# Patient Record
Sex: FEMALE | Race: BLACK OR AFRICAN AMERICAN | NOT HISPANIC OR LATINO | URBAN - METROPOLITAN AREA
[De-identification: names, ages, dates, MRNs, and addresses within clinical notes are randomized per-mention and may not be internally consistent; named-entity substitution may affect disease eponyms.]

---

## 2017-03-21 ENCOUNTER — EMERGENCY (EMERGENCY)
Facility: HOSPITAL | Age: 57
LOS: 1 days | Discharge: HOME | End: 2017-03-21
Attending: EMERGENCY MEDICINE | Admitting: EMERGENCY MEDICINE
Payer: COMMERCIAL

## 2017-03-21 VITALS
RESPIRATION RATE: 22 BRPM | DIASTOLIC BLOOD PRESSURE: 107 MMHG | SYSTOLIC BLOOD PRESSURE: 167 MMHG | OXYGEN SATURATION: 100 % | HEART RATE: 89 BPM | TEMPERATURE: 98 F

## 2017-03-21 DIAGNOSIS — Z98.89 OTHER SPECIFIED POSTPROCEDURAL STATES: Chronic | ICD-10-CM

## 2017-03-21 LAB
ALBUMIN SERPL ELPH-MCNC: 4.3 G/DL — SIGNIFICANT CHANGE UP (ref 3.3–5)
ALP SERPL-CCNC: 75 U/L — SIGNIFICANT CHANGE UP (ref 40–120)
ALT FLD-CCNC: 27 U/L — SIGNIFICANT CHANGE UP (ref 4–33)
AST SERPL-CCNC: 24 U/L — SIGNIFICANT CHANGE UP (ref 4–32)
BASOPHILS # BLD AUTO: 0.01 K/UL — SIGNIFICANT CHANGE UP (ref 0–0.2)
BASOPHILS NFR BLD AUTO: 0.1 % — SIGNIFICANT CHANGE UP (ref 0–2)
BILIRUB SERPL-MCNC: < 0.2 MG/DL — LOW (ref 0.2–1.2)
BUN SERPL-MCNC: 10 MG/DL — SIGNIFICANT CHANGE UP (ref 7–23)
CALCIUM SERPL-MCNC: 9.6 MG/DL — SIGNIFICANT CHANGE UP (ref 8.4–10.5)
CHLORIDE SERPL-SCNC: 102 MMOL/L — SIGNIFICANT CHANGE UP (ref 98–107)
CO2 SERPL-SCNC: 19 MMOL/L — LOW (ref 22–31)
CREAT SERPL-MCNC: 0.82 MG/DL — SIGNIFICANT CHANGE UP (ref 0.5–1.3)
EOSINOPHIL # BLD AUTO: 0.25 K/UL — SIGNIFICANT CHANGE UP (ref 0–0.5)
EOSINOPHIL NFR BLD AUTO: 3.5 % — SIGNIFICANT CHANGE UP (ref 0–6)
GLUCOSE SERPL-MCNC: 138 MG/DL — HIGH (ref 70–99)
HBA1C BLD-MCNC: 6 % — HIGH (ref 4–5.6)
HCT VFR BLD CALC: 37.4 % — SIGNIFICANT CHANGE UP (ref 34.5–45)
HGB BLD-MCNC: 12.4 G/DL — SIGNIFICANT CHANGE UP (ref 11.5–15.5)
IMM GRANULOCYTES NFR BLD AUTO: 0.1 % — SIGNIFICANT CHANGE UP (ref 0–1.5)
LYMPHOCYTES # BLD AUTO: 3.79 K/UL — HIGH (ref 1–3.3)
LYMPHOCYTES # BLD AUTO: 52.4 % — HIGH (ref 13–44)
MAGNESIUM SERPL-MCNC: 2.1 MG/DL — SIGNIFICANT CHANGE UP (ref 1.6–2.6)
MCHC RBC-ENTMCNC: 28.1 PG — SIGNIFICANT CHANGE UP (ref 27–34)
MCHC RBC-ENTMCNC: 33.2 % — SIGNIFICANT CHANGE UP (ref 32–36)
MCV RBC AUTO: 84.8 FL — SIGNIFICANT CHANGE UP (ref 80–100)
MONOCYTES # BLD AUTO: 0.61 K/UL — SIGNIFICANT CHANGE UP (ref 0–0.9)
MONOCYTES NFR BLD AUTO: 8.4 % — SIGNIFICANT CHANGE UP (ref 2–14)
NEUTROPHILS # BLD AUTO: 2.56 K/UL — SIGNIFICANT CHANGE UP (ref 1.8–7.4)
NEUTROPHILS NFR BLD AUTO: 35.5 % — LOW (ref 43–77)
PHOSPHATE SERPL-MCNC: 4 MG/DL — SIGNIFICANT CHANGE UP (ref 2.5–4.5)
PLATELET # BLD AUTO: 254 K/UL — SIGNIFICANT CHANGE UP (ref 150–400)
PMV BLD: 11.2 FL — SIGNIFICANT CHANGE UP (ref 7–13)
POTASSIUM SERPL-MCNC: 3 MMOL/L — LOW (ref 3.5–5.3)
POTASSIUM SERPL-SCNC: 3 MMOL/L — LOW (ref 3.5–5.3)
PROT SERPL-MCNC: 7.4 G/DL — SIGNIFICANT CHANGE UP (ref 6–8.3)
RBC # BLD: 4.41 M/UL — SIGNIFICANT CHANGE UP (ref 3.8–5.2)
RBC # FLD: 12.7 % — SIGNIFICANT CHANGE UP (ref 10.3–14.5)
SODIUM SERPL-SCNC: 143 MMOL/L — SIGNIFICANT CHANGE UP (ref 135–145)
WBC # BLD: 7.23 K/UL — SIGNIFICANT CHANGE UP (ref 3.8–10.5)
WBC # FLD AUTO: 7.23 K/UL — SIGNIFICANT CHANGE UP (ref 3.8–10.5)

## 2017-03-21 PROCEDURE — 71010: CPT | Mod: 26

## 2017-03-21 PROCEDURE — 99219: CPT

## 2017-03-21 RX ORDER — SODIUM CHLORIDE 9 MG/ML
1000 INJECTION INTRAMUSCULAR; INTRAVENOUS; SUBCUTANEOUS
Qty: 0 | Refills: 0 | Status: DISCONTINUED | OUTPATIENT
Start: 2017-03-21 | End: 2017-03-25

## 2017-03-21 RX ORDER — ALBUTEROL 90 UG/1
2.5 AEROSOL, METERED ORAL
Qty: 0 | Refills: 0 | Status: COMPLETED | OUTPATIENT
Start: 2017-03-21 | End: 2017-03-21

## 2017-03-21 RX ORDER — POTASSIUM CHLORIDE 20 MEQ
40 PACKET (EA) ORAL ONCE
Qty: 0 | Refills: 0 | Status: COMPLETED | OUTPATIENT
Start: 2017-03-21 | End: 2017-03-21

## 2017-03-21 RX ORDER — MAGNESIUM SULFATE 500 MG/ML
2 VIAL (ML) INJECTION ONCE
Qty: 0 | Refills: 0 | Status: COMPLETED | OUTPATIENT
Start: 2017-03-21 | End: 2017-03-21

## 2017-03-21 RX ORDER — IPRATROPIUM/ALBUTEROL SULFATE 18-103MCG
3 AEROSOL WITH ADAPTER (GRAM) INHALATION EVERY 6 HOURS
Qty: 0 | Refills: 0 | Status: DISCONTINUED | OUTPATIENT
Start: 2017-03-21 | End: 2017-03-25

## 2017-03-21 RX ORDER — SODIUM CHLORIDE 9 MG/ML
1000 INJECTION INTRAMUSCULAR; INTRAVENOUS; SUBCUTANEOUS ONCE
Qty: 0 | Refills: 0 | Status: COMPLETED | OUTPATIENT
Start: 2017-03-21 | End: 2017-03-21

## 2017-03-21 RX ADMIN — Medication 60 MILLIGRAM(S): at 19:26

## 2017-03-21 RX ADMIN — SODIUM CHLORIDE 150 MILLILITER(S): 9 INJECTION INTRAMUSCULAR; INTRAVENOUS; SUBCUTANEOUS at 23:45

## 2017-03-21 RX ADMIN — Medication 50 GRAM(S): at 20:22

## 2017-03-21 RX ADMIN — SODIUM CHLORIDE 2000 MILLILITER(S): 9 INJECTION INTRAMUSCULAR; INTRAVENOUS; SUBCUTANEOUS at 20:22

## 2017-03-21 RX ADMIN — ALBUTEROL 2.5 MILLIGRAM(S): 90 AEROSOL, METERED ORAL at 19:25

## 2017-03-21 RX ADMIN — ALBUTEROL 2.5 MILLIGRAM(S): 90 AEROSOL, METERED ORAL at 19:42

## 2017-03-21 RX ADMIN — Medication 40 MILLIEQUIVALENT(S): at 22:28

## 2017-03-21 RX ADMIN — ALBUTEROL 2.5 MILLIGRAM(S): 90 AEROSOL, METERED ORAL at 19:26

## 2017-03-21 NOTE — ED CDU PROVIDER NOTE - ATTENDING CONTRIBUTION TO CARE
ED Attending (Dr Sheets): I have personally performed a face to face bedside history and physical examination of this patient.  I have discussed the case with the PA and  I have personally authored the following components: HPI, ROS, Physical Exam and MDM in addition to reviewing and revising the rest of the Provider Note. Asthma exacerbation with likely viral URI. IV hydration, albuterol, meds per orders, trending of Peak Flow measurements, observation and reassessment to continued improvement.

## 2017-03-21 NOTE — ED CDU PROVIDER NOTE - PROGRESS NOTE DETAILS
GIOVANNI Ogden:  Pt endorses improvement in wheezing and shortness of breath.  Pt ambulating around unit without difficulty.  Repeat peak flow 280 LPM which is greater than expected peak flow for pt's age and height (expected is 170-180 LPM).  Pt requesting to go home.  Pt medically stable for discharge.  Pt to follow up with her PMD and pulmonary (referral list provided). GIOVANNI Ogden Discharge note:  Pt endorses improvement in wheezing and shortness of breath.  Pt ambulating around unit without difficulty.  Repeat peak flow 280 LPM which is greater than expected peak flow for pt's age and height (expected is 170-180 LPM).  Pt requesting to go home.  Pt medically stable for discharge.  Pt to follow up with her PMD and pulmonary (referral list provided). CDU ATTG DISPO/PROGRESS NOTE (Dr. Wilkerson) - 55 yo F former smoker, Hx Asthma, DM, HTN who presented to the ED for asthma exacerbation in the setting of recent URI. Pt was partially improved s/p treatment in the ED. Pt sent to CDU for further mgmt. In CDU pt rec'd hydration, nebs and steroids with improvement in symptoms. Ambulate around unit without decompensation. Pt stable for discharge. PMD follow up within 24 - 48 hours. DC instructions and warning signs for return given.  I Debbie M.D. have examined the patient and confirmed the essential components of the history, physical examination, diagnosis, and treatment plan. I agree with the patient's care as documented by the PA and amended herein by me. See note above for complete details of service.

## 2017-03-21 NOTE — ED CDU PROVIDER NOTE - INDICATION FOR OBSERVATION
IV hydration, Duoneb treatments, meds per orders, trending of Peak Flow measurements, observation and reassessment to continued improvement./Other IV hydration, Duoneb treatments, meds per orders, trending of Peak Flow measurements, observation and reassessment to continued improvement./Other/Therapeutic Intensity

## 2017-03-21 NOTE — ED ADULT TRIAGE NOTE - CHIEF COMPLAINT QUOTE
Pt with audible wheeze and chest tightness.  Hx asthma.  Wheezing x few days.  Took albuterol with no relief.  Had sinus infection 1 week ago

## 2017-03-21 NOTE — ED PROVIDER NOTE - RESPIRATORY, MLM
Moderate respiratory distress with audible wheezing, Diminished throughout with expiratory wheezing on exam

## 2017-03-21 NOTE — ED CDU PROVIDER NOTE - RESPIRATORY, MLM
There is slightly generalized diminished air entry overall, but breath sounds auscultated are clear and equal bilaterally.  No wheezing / rales / rhonchi noted.

## 2017-03-21 NOTE — ED ADULT NURSE NOTE - OBJECTIVE STATEMENT
Pt received to spot 26. Pt A&Ox3 PMHx of asthma complaining of chest tightness and wheezing for the past couple days. Pt states that she had no relief with her albuterol at home. Pt given Albuterol treatment as ordered. Will continue to monitor.

## 2017-03-21 NOTE — ED CDU PROVIDER NOTE - PMH
Asthma    DM2 (diabetes mellitus, type 2)    HTN (hypertension) Asthma  (no hx/o intubation)  DM2 (diabetes mellitus, type 2)    Former smoker, stopped smoking many years ago  (Quit ~26 years ago; used to smoke 0.3 ppd x ~10 years.)  HTN (hypertension)

## 2017-03-21 NOTE — ED PROVIDER NOTE - OBJECTIVE STATEMENT
56yof w/ HTN, DM2, Asthma (No intubations, managed w/ symbicort) p/w 3 days of wheezing and shortness of breath. Pt states she was treated for a URI 1 week ago with azithromycin, and for the last 2-3 days she has been having increasing wheezing and shortness of breath that has been unrelieved by home albuterol nebulizers. Endorses a cough productive of yellow sputum. Denies fever or chills. No chest pain. No sick contacts or recent travel. No recent hospitalization. Received duoneb x 3 in triage w/ minimal relief. 56yof w/ HTN, DM2, Asthma (No intubations, managed w/ symbicort) p/w 3 days of wheezing and shortness of breath. Pt states she was treated for a URI 1 week ago with azithromycin, and for the last 2-3 days she has been having increasing wheezing and shortness of breath that has been unrelieved by home albuterol nebulizers. Endorses a cough productive of yellow sputum. Denies fever or chills. No chest pain. No sick contacts or recent travel. No recent hospitalization. Received duoneb x 3 in triage w/ minimal relief. Is SOB upon arrival, tachypneic, even after 3 nebs from triage.

## 2017-03-21 NOTE — ED PROVIDER NOTE - ATTENDING CONTRIBUTION TO CARE
Attending Attestation: Dr. Sheets  I have personally performed a history and physical examination of the patient and discussed management with the resident as well as the patient.  I reviewed the resident's note and agree with the documented findings and plan of care.  I have authored and modified critical sections of the Provider Note, including but not limited to HPI, Physical Exam and MDM. 56yof asthmatic with wheezing, shortness of breath. SpO2 98-99 on room air, pt uncomfortable but not in extremis currently. Continue nebs, prednisone PO, reassess. Consider magnesium if symptoms not improving. Pt does not currently have a pulmonologist, will need referral. Chely CSDU given lack of response to initial nebs

## 2017-03-21 NOTE — ED PROVIDER NOTE - PROGRESS NOTE DETAILS
Alfck: Peak flow 250 prior to albuterol Sadaf: Pt endorses mild improvement after albuterol, peak flow unchanged. Will add magnesium, IVPB, check basic labs. Will obs overnight in CDU, pt endorsed to GIOVANNI Miramontes, will dispo after labs and ECG Sadaf: Peak flow 250 prior to albuterol, predicted 442 Sadaf: Peak flow 250 post triage nebs x 3, but before ED albuterol, predicted 442

## 2017-03-21 NOTE — ED PROVIDER NOTE - MEDICAL DECISION MAKING DETAILS
56yof asthmatic with wheezing, shortness of breath. SpO2 98-99 on room air, pt uncomfortable but not in extremis currently. Continue nebs, prednisone PO, reassess. Consider magnesium if symptoms not improving. Pt does not currently have a pulmonologist, will need referral. 56yof asthmatic with wheezing, shortness of breath. SpO2 98-99 on room air, pt uncomfortable but not in extremis currently. Continue nebs, prednisone PO, reassess. Consider magnesium if symptoms not improving. Pt does not currently have a pulmonologist, will need referral. Chely CSDU given lack of response to initial nebs

## 2017-03-21 NOTE — ED PROVIDER NOTE - PSH
Bunion  b/l   delivery NOS  x1  Uterus Problem  "was getting cramps so they did ablation? enometriosis  10/2012

## 2017-03-21 NOTE — ED CDU PROVIDER NOTE - OBJECTIVE STATEMENT
56yof w/ HTN, DM2, Asthma (No intubations, managed w/ symbicort) p/w 3 days of wheezing and shortness of breath. Pt states she was treated for a URI 1 week ago with azithromycin, and for the last 2-3 days she has been having increasing wheezing and shortness of breath that has been unrelieved by home albuterol nebulizers. Endorses a cough productive of yellow sputum. Denies fever or chills. No chest pain. No sick contacts or recent travel. No recent hospitalization. Received duoneb x 3 in triage w/ minimal relief. Is SOB upon arrival, tachypneic, even after 3 nebs from triage.    CDU GIOVANNI Miramontes Note-----  ED Provider Note reviewed per above; pt is a 57 yo female with PMH of asthma (no intubation history), HTN, and DM, who presented to the ED as noted above.  Pt. was evaluated in the ED and treated for asthma exacerbation; pt was sent to CDU for IV hydration, Duoneb treatments, meds per orders, trending of Peak Flow measurements, observation and reassessment to continued improvement.  On CDU arrival, pt states she is feeling overall improvement as compared to initial arrival in ED; CDU plan discussed with pt who verbalizes agreement with plan.  No hx/o fevers / chills / back pain / abdominal pain / N / V / other c/o. 56yof w/ HTN, DM2, Asthma (No intubations, managed w/ symbicort) p/w 3 days of wheezing and shortness of breath. Pt states she was treated for a URI 1 week ago with azithromycin, and for the last 2-3 days she has been having increasing wheezing and shortness of breath that has been unrelieved by home albuterol nebulizers. Endorses a cough productive of yellow sputum. Denies fever or chills. No chest pain. No sick contacts or recent travel. No recent hospitalization. Received duoneb x 3 in triage w/ minimal relief. Was SOB upon arrival to ED, tachypneic, even after 3 nebs from triage and PF 50% predicted.  Received Mg and Albuterol in ED with some improvement but given severity o sx was sent to CDu for further E/M.     CDU GIOVANNI Miramontes Note-----  ED Provider Note reviewed per above; pt is a 57 yo female with PMH of asthma (no intubation history), HTN, and DM, who presented to the ED as noted above.  Pt. was evaluated in the ED and treated for asthma exacerbation; pt was sent to CDU for IV hydration, Duoneb treatments, meds per orders, trending of Peak Flow measurements, observation and reassessment to continued improvement.  On CDU arrival, pt states she is feeling overall improvement as compared to initial arrival in ED; CDU plan discussed with pt who verbalizes agreement with plan.  No hx/o fevers / chills / back pain / abdominal pain / N / V / other c/o.

## 2017-03-21 NOTE — ED CDU PROVIDER NOTE - MEDICAL DECISION MAKING DETAILS
IV hydration, Duoneb treatments, meds per orders, trending of Peak Flow measurements, observation and reassessment to continued improvement. Asthma exacerbation with likely viral URI. IV hydration, albuterol, meds per orders, trending of Peak Flow measurements, observation and reassessment to continued improvement.

## 2017-03-21 NOTE — ED CDU PROVIDER NOTE - CONSTITUTIONAL, MLM
normal... Well appearing, well nourished, awake, alert, oriented to person, place, time/situation and in no apparent distress.  Pt's voice is slightly hoarse, but pt. is verbalizing in full, otherwise clear, effortless sentences.

## 2017-03-21 NOTE — ED CDU PROVIDER NOTE - PLAN OF CARE
Rest, drink plenty of fluids.  Advance activity as tolerated.  Continue all previously prescribed medications as directed.  Use Albuterol Inhaler 2 puffs every 4 to 6 hours as needed for shortness of breath and/or wheezing. Take Tessalon Perles 100 mg three times a day as needed for cough -- causes drowsiness, no drinking alcohol or driving with this medication. Take Prednisone 20 mg two pills once a day for next 4 days.  Follow up with your primary care physician and pulmonary (referral list provided) in 48-72 hours- bring copies of your results.  Return to the ER for worsening or persistent symptoms, including shortness of breath, wheezing, chest pain, fevers, passing out, and/or ANY NEW OR CONCERNING SYMPTOMS. If you have issues obtaining follow up, please call: [phone] to obtain a doctor or specialist who takes your insurance in your area.

## 2017-03-21 NOTE — ED CDU PROVIDER NOTE - FAMILY HISTORY
No pertinent family history in first degree relatives Father  Still living? No  Family history of MI (myocardial infarction), Age at diagnosis: Age Unknown     Mother  Still living? No  Family history of lung cancer, Age at diagnosis: Age Unknown  Family history of renal cancer, Age at diagnosis: Age Unknown

## 2017-03-22 VITALS
HEART RATE: 76 BPM | OXYGEN SATURATION: 99 % | DIASTOLIC BLOOD PRESSURE: 76 MMHG | RESPIRATION RATE: 16 BRPM | TEMPERATURE: 98 F | SYSTOLIC BLOOD PRESSURE: 131 MMHG

## 2017-03-22 LAB
BUN SERPL-MCNC: 9 MG/DL — SIGNIFICANT CHANGE UP (ref 7–23)
CALCIUM SERPL-MCNC: 9.2 MG/DL — SIGNIFICANT CHANGE UP (ref 8.4–10.5)
CHLORIDE SERPL-SCNC: 106 MMOL/L — SIGNIFICANT CHANGE UP (ref 98–107)
CO2 SERPL-SCNC: 19 MMOL/L — LOW (ref 22–31)
CREAT SERPL-MCNC: 0.77 MG/DL — SIGNIFICANT CHANGE UP (ref 0.5–1.3)
GLUCOSE SERPL-MCNC: 172 MG/DL — HIGH (ref 70–99)
MAGNESIUM SERPL-MCNC: 2.1 MG/DL — SIGNIFICANT CHANGE UP (ref 1.6–2.6)
POTASSIUM SERPL-MCNC: 5 MMOL/L — SIGNIFICANT CHANGE UP (ref 3.5–5.3)
POTASSIUM SERPL-SCNC: 5 MMOL/L — SIGNIFICANT CHANGE UP (ref 3.5–5.3)
SODIUM SERPL-SCNC: 142 MMOL/L — SIGNIFICANT CHANGE UP (ref 135–145)

## 2017-03-22 PROCEDURE — 99217: CPT

## 2017-03-22 RX ORDER — METFORMIN HYDROCHLORIDE 850 MG/1
500 TABLET ORAL
Qty: 0 | Refills: 0 | Status: DISCONTINUED | OUTPATIENT
Start: 2017-03-22 | End: 2017-03-25

## 2017-03-22 RX ORDER — AMLODIPINE BESYLATE 2.5 MG/1
10 TABLET ORAL DAILY
Qty: 0 | Refills: 0 | Status: DISCONTINUED | OUTPATIENT
Start: 2017-03-22 | End: 2017-03-25

## 2017-03-22 RX ORDER — ALBUTEROL 90 UG/1
2 AEROSOL, METERED ORAL
Qty: 1 | Refills: 0
Start: 2017-03-22

## 2017-03-22 RX ORDER — METFORMIN HYDROCHLORIDE 850 MG/1
1 TABLET ORAL
Qty: 0 | Refills: 0 | COMMUNITY

## 2017-03-22 RX ADMIN — Medication 3 MILLILITER(S): at 00:20

## 2017-03-22 RX ADMIN — METFORMIN HYDROCHLORIDE 500 MILLIGRAM(S): 850 TABLET ORAL at 09:38

## 2017-03-22 RX ADMIN — AMLODIPINE BESYLATE 10 MILLIGRAM(S): 2.5 TABLET ORAL at 06:08

## 2017-03-22 RX ADMIN — Medication 50 MILLIGRAM(S): at 10:44

## 2017-03-22 RX ADMIN — Medication 3 MILLILITER(S): at 06:08

## 2017-11-13 ENCOUNTER — EMERGENCY (EMERGENCY)
Facility: HOSPITAL | Age: 57
LOS: 1 days | Discharge: HOME | End: 2017-11-13
Attending: EMERGENCY MEDICINE | Admitting: EMERGENCY MEDICINE
Payer: COMMERCIAL

## 2017-11-13 VITALS
HEART RATE: 83 BPM | TEMPERATURE: 98 F | RESPIRATION RATE: 16 BRPM | DIASTOLIC BLOOD PRESSURE: 99 MMHG | OXYGEN SATURATION: 100 % | SYSTOLIC BLOOD PRESSURE: 154 MMHG

## 2017-11-13 DIAGNOSIS — Z98.89 OTHER SPECIFIED POSTPROCEDURAL STATES: Chronic | ICD-10-CM

## 2017-11-13 LAB
ALBUMIN SERPL ELPH-MCNC: 4.3 G/DL — SIGNIFICANT CHANGE UP (ref 3.3–5)
ALP SERPL-CCNC: 83 U/L — SIGNIFICANT CHANGE UP (ref 40–120)
ALT FLD-CCNC: 34 U/L — HIGH (ref 4–33)
AST SERPL-CCNC: 28 U/L — SIGNIFICANT CHANGE UP (ref 4–32)
BASE EXCESS BLDV CALC-SCNC: 5.4 MMOL/L — SIGNIFICANT CHANGE UP
BILIRUB SERPL-MCNC: 0.2 MG/DL — SIGNIFICANT CHANGE UP (ref 0.2–1.2)
BLOOD GAS VENOUS - CREATININE: 0.73 MG/DL — SIGNIFICANT CHANGE UP (ref 0.5–1.3)
BUN SERPL-MCNC: 16 MG/DL — SIGNIFICANT CHANGE UP (ref 7–23)
CALCIUM SERPL-MCNC: 9.7 MG/DL — SIGNIFICANT CHANGE UP (ref 8.4–10.5)
CHLORIDE BLDV-SCNC: 105 MMOL/L — SIGNIFICANT CHANGE UP (ref 96–108)
CHLORIDE SERPL-SCNC: 101 MMOL/L — SIGNIFICANT CHANGE UP (ref 98–107)
CO2 SERPL-SCNC: 24 MMOL/L — SIGNIFICANT CHANGE UP (ref 22–31)
CREAT SERPL-MCNC: 0.83 MG/DL — SIGNIFICANT CHANGE UP (ref 0.5–1.3)
GAS PNL BLDV: 139 MMOL/L — SIGNIFICANT CHANGE UP (ref 136–146)
GLUCOSE BLDV-MCNC: 147 — HIGH (ref 70–99)
GLUCOSE SERPL-MCNC: 149 MG/DL — HIGH (ref 70–99)
HBA1C BLD-MCNC: 6.6 % — HIGH (ref 4–5.6)
HCO3 BLDV-SCNC: 27 MMOL/L — SIGNIFICANT CHANGE UP (ref 20–27)
HCT VFR BLD CALC: 36.9 % — SIGNIFICANT CHANGE UP (ref 34.5–45)
HCT VFR BLDV CALC: 38.8 % — SIGNIFICANT CHANGE UP (ref 34.5–45)
HGB BLD-MCNC: 12 G/DL — SIGNIFICANT CHANGE UP (ref 11.5–15.5)
HGB BLDV-MCNC: 12.6 G/DL — SIGNIFICANT CHANGE UP (ref 11.5–15.5)
LACTATE BLDV-MCNC: 2.4 MMOL/L — HIGH (ref 0.5–2)
MAGNESIUM SERPL-MCNC: 1.9 MG/DL — SIGNIFICANT CHANGE UP (ref 1.6–2.6)
MCHC RBC-ENTMCNC: 27.3 PG — SIGNIFICANT CHANGE UP (ref 27–34)
MCHC RBC-ENTMCNC: 32.5 % — SIGNIFICANT CHANGE UP (ref 32–36)
MCV RBC AUTO: 84.1 FL — SIGNIFICANT CHANGE UP (ref 80–100)
NRBC # FLD: 0 — SIGNIFICANT CHANGE UP
PCO2 BLDV: 58 MMHG — HIGH (ref 41–51)
PH BLDV: 7.35 PH — SIGNIFICANT CHANGE UP (ref 7.32–7.43)
PHOSPHATE SERPL-MCNC: 3.1 MG/DL — SIGNIFICANT CHANGE UP (ref 2.5–4.5)
PLATELET # BLD AUTO: 259 K/UL — SIGNIFICANT CHANGE UP (ref 150–400)
PMV BLD: 11.1 FL — SIGNIFICANT CHANGE UP (ref 7–13)
PO2 BLDV: 28 MMHG — LOW (ref 35–40)
POTASSIUM BLDV-SCNC: 3.8 MMOL/L — SIGNIFICANT CHANGE UP (ref 3.4–4.5)
POTASSIUM SERPL-MCNC: 4.1 MMOL/L — SIGNIFICANT CHANGE UP (ref 3.5–5.3)
POTASSIUM SERPL-SCNC: 4.1 MMOL/L — SIGNIFICANT CHANGE UP (ref 3.5–5.3)
PROT SERPL-MCNC: 7.3 G/DL — SIGNIFICANT CHANGE UP (ref 6–8.3)
RBC # BLD: 4.39 M/UL — SIGNIFICANT CHANGE UP (ref 3.8–5.2)
RBC # FLD: 12.5 % — SIGNIFICANT CHANGE UP (ref 10.3–14.5)
SAO2 % BLDV: 47.9 % — LOW (ref 60–85)
SODIUM SERPL-SCNC: 141 MMOL/L — SIGNIFICANT CHANGE UP (ref 135–145)
WBC # BLD: 7.39 K/UL — SIGNIFICANT CHANGE UP (ref 3.8–10.5)
WBC # FLD AUTO: 7.39 K/UL — SIGNIFICANT CHANGE UP (ref 3.8–10.5)

## 2017-11-13 PROCEDURE — 99218: CPT

## 2017-11-13 PROCEDURE — 71020: CPT | Mod: 26

## 2017-11-13 RX ORDER — IPRATROPIUM/ALBUTEROL SULFATE 18-103MCG
3 AEROSOL WITH ADAPTER (GRAM) INHALATION ONCE
Qty: 0 | Refills: 0 | Status: COMPLETED | OUTPATIENT
Start: 2017-11-13 | End: 2017-11-13

## 2017-11-13 RX ORDER — MAGNESIUM SULFATE 500 MG/ML
2 VIAL (ML) INJECTION ONCE
Qty: 0 | Refills: 0 | Status: COMPLETED | OUTPATIENT
Start: 2017-11-13 | End: 2017-11-13

## 2017-11-13 RX ORDER — INSULIN LISPRO 100/ML
VIAL (ML) SUBCUTANEOUS AT BEDTIME
Qty: 0 | Refills: 0 | Status: DISCONTINUED | OUTPATIENT
Start: 2017-11-13 | End: 2017-11-17

## 2017-11-13 RX ORDER — LOSARTAN POTASSIUM 100 MG/1
100 TABLET, FILM COATED ORAL DAILY
Qty: 0 | Refills: 0 | Status: DISCONTINUED | OUTPATIENT
Start: 2017-11-13 | End: 2017-11-17

## 2017-11-13 RX ORDER — ACETAMINOPHEN 500 MG
650 TABLET ORAL ONCE
Qty: 0 | Refills: 0 | Status: COMPLETED | OUTPATIENT
Start: 2017-11-13 | End: 2017-11-13

## 2017-11-13 RX ORDER — DEXTROSE 50 % IN WATER 50 %
25 SYRINGE (ML) INTRAVENOUS ONCE
Qty: 0 | Refills: 0 | Status: DISCONTINUED | OUTPATIENT
Start: 2017-11-13 | End: 2017-11-17

## 2017-11-13 RX ORDER — ALBUTEROL 90 UG/1
2.5 AEROSOL, METERED ORAL ONCE
Qty: 0 | Refills: 0 | Status: COMPLETED | OUTPATIENT
Start: 2017-11-13 | End: 2017-11-13

## 2017-11-13 RX ORDER — INSULIN LISPRO 100/ML
VIAL (ML) SUBCUTANEOUS
Qty: 0 | Refills: 0 | Status: DISCONTINUED | OUTPATIENT
Start: 2017-11-13 | End: 2017-11-17

## 2017-11-13 RX ORDER — SODIUM CHLORIDE 9 MG/ML
1000 INJECTION INTRAMUSCULAR; INTRAVENOUS; SUBCUTANEOUS ONCE
Qty: 0 | Refills: 0 | Status: COMPLETED | OUTPATIENT
Start: 2017-11-13 | End: 2017-11-13

## 2017-11-13 RX ORDER — DEXTROSE 50 % IN WATER 50 %
1 SYRINGE (ML) INTRAVENOUS ONCE
Qty: 0 | Refills: 0 | Status: DISCONTINUED | OUTPATIENT
Start: 2017-11-13 | End: 2017-11-17

## 2017-11-13 RX ORDER — DEXTROSE 50 % IN WATER 50 %
12.5 SYRINGE (ML) INTRAVENOUS ONCE
Qty: 0 | Refills: 0 | Status: DISCONTINUED | OUTPATIENT
Start: 2017-11-13 | End: 2017-11-17

## 2017-11-13 RX ORDER — METFORMIN HYDROCHLORIDE 850 MG/1
500 TABLET ORAL
Qty: 0 | Refills: 0 | Status: DISCONTINUED | OUTPATIENT
Start: 2017-11-13 | End: 2017-11-17

## 2017-11-13 RX ORDER — HYDROCHLOROTHIAZIDE 25 MG
12.5 TABLET ORAL DAILY
Qty: 0 | Refills: 0 | Status: DISCONTINUED | OUTPATIENT
Start: 2017-11-13 | End: 2017-11-17

## 2017-11-13 RX ORDER — GLUCAGON INJECTION, SOLUTION 0.5 MG/.1ML
1 INJECTION, SOLUTION SUBCUTANEOUS ONCE
Qty: 0 | Refills: 0 | Status: DISCONTINUED | OUTPATIENT
Start: 2017-11-13 | End: 2017-11-17

## 2017-11-13 RX ORDER — SODIUM CHLORIDE 9 MG/ML
1000 INJECTION, SOLUTION INTRAVENOUS
Qty: 0 | Refills: 0 | Status: DISCONTINUED | OUTPATIENT
Start: 2017-11-13 | End: 2017-11-17

## 2017-11-13 RX ORDER — ALBUTEROL 90 UG/1
2.5 AEROSOL, METERED ORAL EVERY 6 HOURS
Qty: 0 | Refills: 0 | Status: DISCONTINUED | OUTPATIENT
Start: 2017-11-13 | End: 2017-11-14

## 2017-11-13 RX ADMIN — Medication 650 MILLIGRAM(S): at 21:30

## 2017-11-13 RX ADMIN — Medication 125 MILLIGRAM(S): at 10:42

## 2017-11-13 RX ADMIN — Medication 3 MILLILITER(S): at 10:47

## 2017-11-13 RX ADMIN — ALBUTEROL 2.5 MILLIGRAM(S): 90 AEROSOL, METERED ORAL at 18:26

## 2017-11-13 RX ADMIN — Medication 650 MILLIGRAM(S): at 20:26

## 2017-11-13 RX ADMIN — Medication 60 MILLIGRAM(S): at 18:26

## 2017-11-13 RX ADMIN — SODIUM CHLORIDE 2000 MILLILITER(S): 9 INJECTION INTRAMUSCULAR; INTRAVENOUS; SUBCUTANEOUS at 11:23

## 2017-11-13 RX ADMIN — Medication 3 MILLILITER(S): at 10:42

## 2017-11-13 RX ADMIN — Medication: at 23:26

## 2017-11-13 RX ADMIN — Medication 50 GRAM(S): at 14:59

## 2017-11-13 NOTE — ED CDU PROVIDER INITIAL DAY NOTE - OBJECTIVE STATEMENT
58 y/o F w/ past medical history of asthma, previous hospitalizations approx. 5 yrs ago no MICU or intubation, HTN, DM II on metformin, presents to the ED c/o URI sx x 3-4 days. Sx started as sinus congestion and non-productive cough. Now w/ asthma exacerbation w/ associated SOB and wheeze. Pt denies CP, but endorses chest tightness associated w/ cough. Pt also reports HA when coughs. Pt notes sx are similar to previous episodes in the past. Pt endorses use of Nebs at home, last at 3 am, but sx continued to progress so sought treatment in ER. Distant hx of smoking. Denies any other complaints.  ER documentation by scribe as noted above.  Agree with above.

## 2017-11-13 NOTE — ED PROVIDER NOTE - PMH
Asthma  (no hx/o intubation)  DM2 (diabetes mellitus, type 2)    Former smoker, stopped smoking many years ago  (Quit ~26 years ago; used to smoke 0.3 ppd x ~10 years.)  HTN (hypertension)    Hypertension

## 2017-11-13 NOTE — ED CDU PROVIDER INITIAL DAY NOTE - PROGRESS NOTE DETAILS
MARICEL GALDAMEZ, MD: ACP note I performed a face to face bedside interview with this patient regarding history of present illness, review of symptoms and history.  I completed an independent physical examination.  The examination was documented by myself and I attest to the accuracy of the documentation.  I have signed out the follow up of any pending tests (i.e. labs, radiological studies) to the PA.  I have discussed the patient’s plan of care and disposition with the PA. Pt reassessed at beside. Pt speaks in full sentence, c/o mild headache associated with nebulizer treatment. Peak flow 250. On exam: patient no longer wheezing, however, pt cannot get a deep breath. Will continue medication and reassess. patient's glucose 324. Will order insulin sliding scale. Patient feels some tightness, but no SOB or wheezing. peak flow 250.

## 2017-11-13 NOTE — ED PROVIDER NOTE - PROGRESS NOTE DETAILS
MARICEL GALDAMEZ MD: C/W decrease A/E and B/L wheezing.  Will place in CDU for observation.  Steroids and reevaluation.

## 2017-11-13 NOTE — ED CDU PROVIDER INITIAL DAY NOTE - FAMILY HISTORY
Father  Still living? No  Family history of MI (myocardial infarction), Age at diagnosis: Age Unknown     Mother  Still living? No  Family history of lung cancer, Age at diagnosis: Age Unknown  Family history of renal cancer, Age at diagnosis: Age Unknown

## 2017-11-13 NOTE — ED PROVIDER NOTE - OBJECTIVE STATEMENT
56 y/o F w/ PMHx of asthma, previous hospitalizations approx. 5 yrs ago no MICU or intubation, HTN, DM II on metformin, presents to the ED c/o URI sx x3-4 days. Sx started as sinus congestion and non-productive cough. Now w/ asthma exacerbation w/ associated SOB and wheeze. Pt denies CP, but endorses chest tightness associated w/ cough. Pt also reports HA when coughs. Pt notes sx are similar to previous episodes in the past. Pt endorses use of Nebs at home, last at 3 am, but sx continued to progress so sought treatment in ER. Distant hx of smoking. Denies any other complaints.

## 2017-11-13 NOTE — ED PROVIDER NOTE - MEDICAL DECISION MAKING DETAILS
58 y/o F w/ URI/asthma exacerbation. Plan: Duoneb, steroids, CXR and consider CDU vs admission if pt symptomatically does not improve.

## 2017-11-13 NOTE — ED PROVIDER NOTE - PSH
Bunion  b/l   delivery NOS  x1  H/O foot surgery  right  Uterus Problem  "was getting cramps so they did ablation? enometriosis  10/2012

## 2017-11-13 NOTE — ED PROVIDER NOTE - NS_ ATTENDINGSCRIBEDETAILS _ED_A_ED_FT
The scribe's documentation has been prepared under my direction and personally reviewed by me, Beatris Oropeza MD, in its entirety. I confirm that the note above accurately reflects all work, treatment, procedures, and medical decision making performed by me.

## 2017-11-14 LAB
BASE EXCESS BLDV CALC-SCNC: -4.9 MMOL/L — SIGNIFICANT CHANGE UP
BLOOD GAS VENOUS - CREATININE: 0.6 MG/DL — SIGNIFICANT CHANGE UP (ref 0.5–1.3)
BUN SERPL-MCNC: 19 MG/DL — SIGNIFICANT CHANGE UP (ref 7–23)
CALCIUM SERPL-MCNC: 9.4 MG/DL — SIGNIFICANT CHANGE UP (ref 8.4–10.5)
CHLORIDE BLDV-SCNC: 109 MMOL/L — HIGH (ref 96–108)
CHLORIDE SERPL-SCNC: 104 MMOL/L — SIGNIFICANT CHANGE UP (ref 98–107)
CO2 SERPL-SCNC: 20 MMOL/L — LOW (ref 22–31)
CREAT SERPL-MCNC: 0.97 MG/DL — SIGNIFICANT CHANGE UP (ref 0.5–1.3)
GAS PNL BLDV: 134 MMOL/L — LOW (ref 136–146)
GLUCOSE BLDV-MCNC: 207 — HIGH (ref 70–99)
GLUCOSE SERPL-MCNC: 200 MG/DL — HIGH (ref 70–99)
HCO3 BLDV-SCNC: 20 MMOL/L — SIGNIFICANT CHANGE UP (ref 20–27)
HCT VFR BLDV CALC: 39.4 % — SIGNIFICANT CHANGE UP (ref 34.5–45)
HGB BLDV-MCNC: 12.8 G/DL — SIGNIFICANT CHANGE UP (ref 11.5–15.5)
LACTATE BLDV-MCNC: 6.6 MMOL/L — CRITICAL HIGH (ref 0.5–2)
LACTATE SERPL-SCNC: 6.3 MMOL/L — CRITICAL HIGH (ref 0.5–2)
LACTATE SERPL-SCNC: 6.6 MMOL/L — CRITICAL HIGH (ref 0.5–2)
LACTATE SERPL-SCNC: 6.9 MMOL/L — CRITICAL HIGH (ref 0.5–2)
PCO2 BLDV: 39 MMHG — LOW (ref 41–51)
PH BLDV: 7.33 PH — SIGNIFICANT CHANGE UP (ref 7.32–7.43)
PO2 BLDV: 59 MMHG — HIGH (ref 35–40)
POTASSIUM BLDV-SCNC: 4.2 MMOL/L — SIGNIFICANT CHANGE UP (ref 3.4–4.5)
POTASSIUM SERPL-MCNC: 4.3 MMOL/L — SIGNIFICANT CHANGE UP (ref 3.5–5.3)
POTASSIUM SERPL-SCNC: 4.3 MMOL/L — SIGNIFICANT CHANGE UP (ref 3.5–5.3)
SAO2 % BLDV: 89.5 % — HIGH (ref 60–85)
SODIUM SERPL-SCNC: 141 MMOL/L — SIGNIFICANT CHANGE UP (ref 135–145)

## 2017-11-14 PROCEDURE — 99225: CPT

## 2017-11-14 RX ORDER — SODIUM CHLORIDE 9 MG/ML
1000 INJECTION INTRAMUSCULAR; INTRAVENOUS; SUBCUTANEOUS
Qty: 0 | Refills: 0 | Status: DISCONTINUED | OUTPATIENT
Start: 2017-11-14 | End: 2017-11-14

## 2017-11-14 RX ORDER — SODIUM CHLORIDE 9 MG/ML
1000 INJECTION INTRAMUSCULAR; INTRAVENOUS; SUBCUTANEOUS ONCE
Qty: 0 | Refills: 0 | Status: COMPLETED | OUTPATIENT
Start: 2017-11-14 | End: 2017-11-14

## 2017-11-14 RX ORDER — ONDANSETRON 8 MG/1
4 TABLET, FILM COATED ORAL EVERY 4 HOURS
Qty: 0 | Refills: 0 | Status: DISCONTINUED | OUTPATIENT
Start: 2017-11-14 | End: 2017-11-17

## 2017-11-14 RX ORDER — IPRATROPIUM/ALBUTEROL SULFATE 18-103MCG
3 AEROSOL WITH ADAPTER (GRAM) INHALATION EVERY 4 HOURS
Qty: 0 | Refills: 0 | Status: DISCONTINUED | OUTPATIENT
Start: 2017-11-14 | End: 2017-11-17

## 2017-11-14 RX ORDER — ACETAMINOPHEN 500 MG
650 TABLET ORAL ONCE
Qty: 0 | Refills: 0 | Status: COMPLETED | OUTPATIENT
Start: 2017-11-14 | End: 2017-11-14

## 2017-11-14 RX ORDER — IPRATROPIUM/ALBUTEROL SULFATE 18-103MCG
3 AEROSOL WITH ADAPTER (GRAM) INHALATION ONCE
Qty: 0 | Refills: 0 | Status: COMPLETED | OUTPATIENT
Start: 2017-11-14 | End: 2017-11-14

## 2017-11-14 RX ORDER — SODIUM CHLORIDE 9 MG/ML
1000 INJECTION INTRAMUSCULAR; INTRAVENOUS; SUBCUTANEOUS
Qty: 0 | Refills: 0 | Status: DISCONTINUED | OUTPATIENT
Start: 2017-11-14 | End: 2017-11-17

## 2017-11-14 RX ORDER — ALBUTEROL 90 UG/1
2 AEROSOL, METERED ORAL
Qty: 1 | Refills: 0 | OUTPATIENT
Start: 2017-11-14 | End: 2017-12-14

## 2017-11-14 RX ADMIN — Medication 1: at 09:24

## 2017-11-14 RX ADMIN — Medication 3 MILLILITER(S): at 00:28

## 2017-11-14 RX ADMIN — Medication 60 MILLIGRAM(S): at 18:28

## 2017-11-14 RX ADMIN — Medication 3 MILLILITER(S): at 23:08

## 2017-11-14 RX ADMIN — Medication 60 MILLIGRAM(S): at 06:26

## 2017-11-14 RX ADMIN — Medication 2: at 13:37

## 2017-11-14 RX ADMIN — SODIUM CHLORIDE 100 MILLILITER(S): 9 INJECTION INTRAMUSCULAR; INTRAVENOUS; SUBCUTANEOUS at 15:15

## 2017-11-14 RX ADMIN — Medication 3: at 18:30

## 2017-11-14 RX ADMIN — SODIUM CHLORIDE 1000 MILLILITER(S): 9 INJECTION INTRAMUSCULAR; INTRAVENOUS; SUBCUTANEOUS at 07:08

## 2017-11-14 RX ADMIN — Medication 650 MILLIGRAM(S): at 16:15

## 2017-11-14 RX ADMIN — SODIUM CHLORIDE 1000 MILLILITER(S): 9 INJECTION INTRAMUSCULAR; INTRAVENOUS; SUBCUTANEOUS at 10:06

## 2017-11-14 RX ADMIN — Medication 60 MILLIGRAM(S): at 13:36

## 2017-11-14 RX ADMIN — LOSARTAN POTASSIUM 100 MILLIGRAM(S): 100 TABLET, FILM COATED ORAL at 06:26

## 2017-11-14 RX ADMIN — Medication 3 MILLILITER(S): at 06:26

## 2017-11-14 RX ADMIN — METFORMIN HYDROCHLORIDE 500 MILLIGRAM(S): 850 TABLET ORAL at 09:24

## 2017-11-14 RX ADMIN — Medication 650 MILLIGRAM(S): at 15:17

## 2017-11-14 RX ADMIN — SODIUM CHLORIDE 150 MILLILITER(S): 9 INJECTION INTRAMUSCULAR; INTRAVENOUS; SUBCUTANEOUS at 21:14

## 2017-11-14 RX ADMIN — Medication 12.5 MILLIGRAM(S): at 06:26

## 2017-11-14 RX ADMIN — Medication 60 MILLIGRAM(S): at 00:28

## 2017-11-14 NOTE — ED CDU PROVIDER SUBSEQUENT DAY NOTE - PROGRESS NOTE DETAILS
Patient sleeping comfortably in bed NAD, No complaints. DubNebs Q6H. Solu-Medrol Q6H. Insulin sliding scale.  Will continue to monitor overnight. Repeat peak flow in AM. Patient sleeping comfortably in bed NAD, No complaints. DubNebs Q6H. Solu-Medrol Q6H. Insulin sliding scale.  Will continue to monitor overnight. Reassess with peak flow and repeat lactate in AM. Patient laying comfortably in bed NAD, speaking in full sentences. denies any chest tightness or SOB. Breath sound improved b/l, no wheezing. peak flow 325. Lab called w/ lactate 6.6. Will order IVF and re-assess. Rpt lactate showing 6.9 from 6.6. as dw att Licha will give more fluids and repeat lab. Pt feeling well resting comfortably without any complaints at current time. Rpt lactate at 6.3 , slight improvement but still elevated. Pt asthma sx improved, but co mild headache , feeling dehydrated, which she received Tylenol a short while ago for. As dw thomas santiago will give continuous hydration, and observance in CDU over night w/ monitoring lactate pt has remained in the CDU for hydration and repeat lactate. While not rising, it remains relatively stable. from a pulm perspective, pt feels much improved. Pt, however, still feels unwell, difficult to describe w/o specific symptoms but "just doesn't feel myself". Given having symptoms and still elevated lactate, will keep in CDU one more night for hydration and observation. Pt agrees with the plan. will change to nebs prn. continue steroids. pt made an appt for her pulm tomorrow. GIOVANNI Miramontes Addendum----  This patient was signed out to me by GIOVANNI Burgos and attending Dr. Hurt 11/14/17 @ 1900 hrs.  Test results / orders reviewed.  In interim, pt has no complaints; states she is feeling overall improved and breathing is much better; denies SOB or chest discomfort or other c/o.  On exam, pt has WNL cardiac exam and lung exam reveals no wheezing/rales/rhonchi/retractions; pt is verbalizing in full, clear, effortless sentences.  Peak Flow on my reassessment was 260 (best of 3, fair effort).  Pt. will have repeat labs (BMP, lactate ) at 2200 hrs and again at 0600 hrs; if pt continues to feel improved by the morning, plan for likely d/c in am as discussed with Dr. Hurt.  CDU plan discussed with patient; pt verbalizes agreement with plan.

## 2017-11-15 ENCOUNTER — APPOINTMENT (OUTPATIENT)
Dept: PULMONOLOGY | Facility: CLINIC | Age: 57
End: 2017-11-15
Payer: COMMERCIAL

## 2017-11-15 VITALS
OXYGEN SATURATION: 99 % | HEART RATE: 77 BPM | DIASTOLIC BLOOD PRESSURE: 93 MMHG | TEMPERATURE: 98 F | SYSTOLIC BLOOD PRESSURE: 146 MMHG | RESPIRATION RATE: 16 BRPM

## 2017-11-15 VITALS
WEIGHT: 161 LBS | HEIGHT: 61 IN | BODY MASS INDEX: 30.4 KG/M2 | DIASTOLIC BLOOD PRESSURE: 86 MMHG | OXYGEN SATURATION: 98 % | HEART RATE: 75 BPM | SYSTOLIC BLOOD PRESSURE: 146 MMHG

## 2017-11-15 DIAGNOSIS — Z87.09 PERSONAL HISTORY OF OTHER DISEASES OF THE RESPIRATORY SYSTEM: ICD-10-CM

## 2017-11-15 DIAGNOSIS — Z78.9 OTHER SPECIFIED HEALTH STATUS: ICD-10-CM

## 2017-11-15 DIAGNOSIS — Z86.79 PERSONAL HISTORY OF OTHER DISEASES OF THE CIRCULATORY SYSTEM: ICD-10-CM

## 2017-11-15 DIAGNOSIS — Z82.0 FAMILY HISTORY OF EPILEPSY AND OTHER DISEASES OF THE NERVOUS SYSTEM: ICD-10-CM

## 2017-11-15 LAB
BASE EXCESS BLDV CALC-SCNC: -1.7 MMOL/L — SIGNIFICANT CHANGE UP
BLOOD GAS VENOUS - CREATININE: 0.64 MG/DL — SIGNIFICANT CHANGE UP (ref 0.5–1.3)
BUN SERPL-MCNC: 19 MG/DL — SIGNIFICANT CHANGE UP (ref 7–23)
CALCIUM SERPL-MCNC: 9.1 MG/DL — SIGNIFICANT CHANGE UP (ref 8.4–10.5)
CHLORIDE BLDV-SCNC: 111 MMOL/L — HIGH (ref 96–108)
CHLORIDE SERPL-SCNC: 103 MMOL/L — SIGNIFICANT CHANGE UP (ref 98–107)
CO2 SERPL-SCNC: 23 MMOL/L — SIGNIFICANT CHANGE UP (ref 22–31)
CREAT SERPL-MCNC: 0.79 MG/DL — SIGNIFICANT CHANGE UP (ref 0.5–1.3)
GAS PNL BLDV: 137 MMOL/L — SIGNIFICANT CHANGE UP (ref 136–146)
GLUCOSE BLDV-MCNC: 143 — HIGH (ref 70–99)
GLUCOSE SERPL-MCNC: 146 MG/DL — HIGH (ref 70–99)
HCO3 BLDV-SCNC: 23 MMOL/L — SIGNIFICANT CHANGE UP (ref 20–27)
HCT VFR BLDV CALC: 36.2 % — SIGNIFICANT CHANGE UP (ref 34.5–45)
HGB BLDV-MCNC: 11.8 G/DL — SIGNIFICANT CHANGE UP (ref 11.5–15.5)
LACTATE BLDV-MCNC: 3.9 MMOL/L — HIGH (ref 0.5–2)
PCO2 BLDV: 40 MMHG — LOW (ref 41–51)
PH BLDV: 7.38 PH — SIGNIFICANT CHANGE UP (ref 7.32–7.43)
PO2 BLDV: 67 MMHG — HIGH (ref 35–40)
POTASSIUM BLDV-SCNC: 4 MMOL/L — SIGNIFICANT CHANGE UP (ref 3.4–4.5)
POTASSIUM SERPL-MCNC: 4.4 MMOL/L — SIGNIFICANT CHANGE UP (ref 3.5–5.3)
POTASSIUM SERPL-SCNC: 4.4 MMOL/L — SIGNIFICANT CHANGE UP (ref 3.5–5.3)
SAO2 % BLDV: 93.6 % — HIGH (ref 60–85)
SODIUM SERPL-SCNC: 144 MMOL/L — SIGNIFICANT CHANGE UP (ref 135–145)

## 2017-11-15 PROCEDURE — 99217: CPT

## 2017-11-15 PROCEDURE — 99214 OFFICE O/P EST MOD 30 MIN: CPT

## 2017-11-15 RX ORDER — PREDNISONE 50 MG/1
50 TABLET ORAL
Qty: 5 | Refills: 0 | Status: DISCONTINUED | COMMUNITY
Start: 2017-11-14 | End: 2017-11-15

## 2017-11-15 RX ADMIN — Medication 12.5 MILLIGRAM(S): at 06:51

## 2017-11-15 RX ADMIN — Medication 3 MILLILITER(S): at 06:50

## 2017-11-15 RX ADMIN — LOSARTAN POTASSIUM 100 MILLIGRAM(S): 100 TABLET, FILM COATED ORAL at 06:50

## 2017-11-15 RX ADMIN — SODIUM CHLORIDE 150 MILLILITER(S): 9 INJECTION INTRAMUSCULAR; INTRAVENOUS; SUBCUTANEOUS at 06:54

## 2017-11-15 RX ADMIN — Medication 50 MILLIGRAM(S): at 06:50

## 2017-11-15 NOTE — ED CDU PROVIDER DISPOSITION NOTE - CLINICAL COURSE
CDU Att DC Note: Sudeep  Pt is feeling much better.  SOB is improving. Lungs currently CTAB and pt wants to go home. Lactate trending down.  Afebrile and pain free throughout. Cr possibly elevated d/t meds or asthma exacerbation or both.  Pt does not appear to have any systemic infections or signs of ischemia at this time. Seeing her pulm later today. Also, will hold metformin until she sees her PMD next week and confirms lactate continues to be cleared.  I have personally performed a face to face evaluation of this patient including review of the history and focused physical exam.  I have discussed the case and reviewed the plan of care with the PA.

## 2017-11-15 NOTE — ED CDU PROVIDER SUBSEQUENT DAY NOTE - MEDICAL DECISION MAKING DETAILS
Asthma exacerbation-IV, labs, IVF, nebs, IV steroids, CXR, Magnesium.   Observation unit for repeat nebs, repeat IV steroids, and reevaluation
Asthma exacerbation-  Continue steroids/nebs  Elevated Lactate- Will complete IVF and repeat level.

## 2017-11-15 NOTE — ED CDU PROVIDER SUBSEQUENT DAY NOTE - HISTORY
56 yo female with PMH of DM, asthma, HTN, and former smoker status, who presented to the ED 11/13/17 for URI symptoms x 3 -4 days as per initial ED documentation.  Pt. was sent to CDU for further management of asthma exacerbation, including nebs, IV hydration, and steroids; on repeat labs, lactate was noted to have risen to 6's; was trended 11/14.  Overall, pt states she feels much better in interim; has had no new / worsening / other c/o; states her breathing is much improved.  Repeat labs due for 0600 hrs.  CDU plan discussed with pt who verbalizes agreement with plan.
56 yo female, hx asthma, htn  dm being treated for asthma exacerbation. Significant improvement with meds, lungs clear.  lactate elevated, will complete bolus and repeat level prior to dc.

## 2017-11-15 NOTE — ED CDU PROVIDER SUBSEQUENT DAY NOTE - PSH
Bunion  b/l   delivery NOS  x1  H/O foot surgery  right  Uterus Problem  "was getting cramps so they did ablation? enometriosis  10/2012
Bunion  b/l   delivery NOS  x1  H/O foot surgery  right  Uterus Problem  "was getting cramps so they did ablation? enometriosis  10/2012

## 2017-11-15 NOTE — ED CDU PROVIDER SUBSEQUENT DAY NOTE - PMH
Asthma  (no hx/o intubation)  DM2 (diabetes mellitus, type 2)    Former smoker, stopped smoking many years ago  (Quit ~26 years ago; used to smoke 0.3 ppd x ~10 years.)  HTN (hypertension)    Hypertension
Asthma  (no hx/o intubation)  DM2 (diabetes mellitus, type 2)    Former smoker, stopped smoking many years ago  (Quit ~26 years ago; used to smoke 0.3 ppd x ~10 years.)  HTN (hypertension)    Hypertension

## 2017-11-15 NOTE — ED CDU PROVIDER DISPOSITION NOTE - ATTENDING CONTRIBUTION TO CARE
ED Attending (Dr Sheets): I have personally performed a face to face bedside history and physical examination of this patient.  I have discussed the case with the PA and  I have personally authored the following components: HPI, ROS, Physical Exam and MDM in addition to reviewing and revising the rest of the Provider Note.

## 2017-11-15 NOTE — ED CDU PROVIDER SUBSEQUENT DAY NOTE - FAMILY HISTORY
Father  Still living? No  Family history of MI (myocardial infarction), Age at diagnosis: Age Unknown     Mother  Still living? No  Family history of lung cancer, Age at diagnosis: Age Unknown  Family history of renal cancer, Age at diagnosis: Age Unknown
Father  Still living? No  Family history of MI (myocardial infarction), Age at diagnosis: Age Unknown     Mother  Still living? No  Family history of lung cancer, Age at diagnosis: Age Unknown  Family history of renal cancer, Age at diagnosis: Age Unknown

## 2017-11-15 NOTE — ED CDU PROVIDER SUBSEQUENT DAY NOTE - ATTENDING CONTRIBUTION TO CARE
asthmatic patient who presented to ED with an asthma exac, received nebs, steroids and mags in ED with minimal improvement so sent to CDU for futher management. received nebs overnight. This am, notes significant improvement. denies further wheezing. exam, vs wnl, nad. hs normal, lungs clear, abdo benign, legs normal. no resp distress. vbg this am shows lactate 6.6, likely due to nebs. started on IVF prior to my arrival. will check repeat lactate after and if improving, will d/c.
ED Attending (Dr Sheets): I have personally performed a face to face bedside history and physical examination of this patient.  I have discussed the case with the PA and  I have personally authored the following components: HPI, ROS, Physical Exam and MDM in addition to reviewing and revising the rest of the Provider Note. Asthma exacerbation-IV, labs, IVF, nebs, IV steroids, CXR, Magnesium.   Observation unit for repeat nebs, repeat IV steroids, and reevaluation

## 2017-11-15 NOTE — ED CDU PROVIDER SUBSEQUENT DAY NOTE - PROGRESS NOTE DETAILS
GIOVANNI Miramontes Addendum-----  Pt. has been resting comfortably in interim.  Pt has not had any complaints or issues in interim; repeat labs sent; repeat lactate 3.9.  Pt. will be signed out to CDU day PA and MD at 0700 hrs.

## 2017-11-15 NOTE — ED CDU PROVIDER DISPOSITION NOTE - PLAN OF CARE
Follow up with your primary physician for a post hospital visit within 48 hours, taking all results from the ER to be reviewed.   To complete the prednisone 50mg 1 tab daily for 5 more days. You can use the Proventil 2 puffs every 6 hours as needed for wheezing/cough/bronchospasm.  To follow up with your pulmonologist as planned today. In addition monitor your lactate level as discussed with either your pulmonologist or your primary physician. You can hold your metformin until early next week when they repeat your blood work. If any difficulty breathing, fevers, chills, worsening, concerning or new signs or symptoms return to the ER

## 2017-12-07 ENCOUNTER — APPOINTMENT (OUTPATIENT)
Dept: PULMONOLOGY | Facility: CLINIC | Age: 57
End: 2017-12-07
Payer: COMMERCIAL

## 2017-12-07 VITALS
HEIGHT: 61 IN | OXYGEN SATURATION: 99 % | SYSTOLIC BLOOD PRESSURE: 116 MMHG | HEART RATE: 79 BPM | WEIGHT: 152 LBS | BODY MASS INDEX: 28.7 KG/M2 | DIASTOLIC BLOOD PRESSURE: 72 MMHG

## 2017-12-07 PROCEDURE — 99213 OFFICE O/P EST LOW 20 MIN: CPT

## 2017-12-07 RX ORDER — ALBUTEROL SULFATE 90 UG/1
108 (90 BASE) AEROSOL, METERED RESPIRATORY (INHALATION)
Qty: 8 | Refills: 0 | Status: ACTIVE | COMMUNITY
Start: 2017-05-18

## 2017-12-07 RX ORDER — PREDNISONE 10 MG/1
10 TABLET ORAL AS DIRECTED
Qty: 60 | Refills: 0 | Status: DISCONTINUED | COMMUNITY
Start: 2017-11-15 | End: 2017-12-07

## 2018-01-12 NOTE — ED CDU PROVIDER NOTE - CPE EDP HEME LYMPH NORM
CKD (chronic kidney disease), stage 4 (severe)    Dialysis complication    Glomerulonephritis    Hypertension normal...

## 2018-01-28 ENCOUNTER — INPATIENT (INPATIENT)
Facility: HOSPITAL | Age: 58
LOS: 4 days | Discharge: HOME | End: 2018-02-02
Attending: INTERNAL MEDICINE | Admitting: INTERNAL MEDICINE
Payer: COMMERCIAL

## 2018-01-28 VITALS
HEART RATE: 69 BPM | RESPIRATION RATE: 16 BRPM | SYSTOLIC BLOOD PRESSURE: 143 MMHG | DIASTOLIC BLOOD PRESSURE: 90 MMHG | TEMPERATURE: 98 F | OXYGEN SATURATION: 98 %

## 2018-01-28 DIAGNOSIS — Z98.89 OTHER SPECIFIED POSTPROCEDURAL STATES: Chronic | ICD-10-CM

## 2018-01-28 LAB
ALBUMIN SERPL ELPH-MCNC: 4.5 G/DL — SIGNIFICANT CHANGE UP (ref 3.3–5)
ALP SERPL-CCNC: 87 U/L — SIGNIFICANT CHANGE UP (ref 40–120)
ALT FLD-CCNC: 29 U/L — SIGNIFICANT CHANGE UP (ref 4–33)
AST SERPL-CCNC: 22 U/L — SIGNIFICANT CHANGE UP (ref 4–32)
BASE EXCESS BLDV CALC-SCNC: 5.2 MMOL/L — SIGNIFICANT CHANGE UP
BASOPHILS # BLD AUTO: 0.01 K/UL — SIGNIFICANT CHANGE UP (ref 0–0.2)
BASOPHILS NFR BLD AUTO: 0.1 % — SIGNIFICANT CHANGE UP (ref 0–2)
BILIRUB SERPL-MCNC: 0.2 MG/DL — SIGNIFICANT CHANGE UP (ref 0.2–1.2)
BLOOD GAS VENOUS - CREATININE: 0.86 MG/DL — SIGNIFICANT CHANGE UP (ref 0.5–1.3)
BUN SERPL-MCNC: 26 MG/DL — HIGH (ref 7–23)
CALCIUM SERPL-MCNC: 9.3 MG/DL — SIGNIFICANT CHANGE UP (ref 8.4–10.5)
CHLORIDE BLDV-SCNC: 98 MMOL/L — SIGNIFICANT CHANGE UP (ref 96–108)
CHLORIDE SERPL-SCNC: 96 MMOL/L — LOW (ref 98–107)
CO2 SERPL-SCNC: 28 MMOL/L — SIGNIFICANT CHANGE UP (ref 22–31)
CREAT SERPL-MCNC: 0.99 MG/DL — SIGNIFICANT CHANGE UP (ref 0.5–1.3)
EOSINOPHIL # BLD AUTO: 0.02 K/UL — SIGNIFICANT CHANGE UP (ref 0–0.5)
EOSINOPHIL NFR BLD AUTO: 0.2 % — SIGNIFICANT CHANGE UP (ref 0–6)
GAS PNL BLDV: 137 MMOL/L — SIGNIFICANT CHANGE UP (ref 136–146)
GLUCOSE BLDV-MCNC: 185 — HIGH (ref 70–99)
GLUCOSE SERPL-MCNC: 171 MG/DL — HIGH (ref 70–99)
HBA1C BLD-MCNC: 7.1 % — HIGH (ref 4–5.6)
HCO3 BLDV-SCNC: 27 MMOL/L — SIGNIFICANT CHANGE UP (ref 20–27)
HCT VFR BLD CALC: 40.6 % — SIGNIFICANT CHANGE UP (ref 34.5–45)
HCT VFR BLDV CALC: 41.3 % — SIGNIFICANT CHANGE UP (ref 34.5–45)
HGB BLD-MCNC: 13.3 G/DL — SIGNIFICANT CHANGE UP (ref 11.5–15.5)
HGB BLDV-MCNC: 13.4 G/DL — SIGNIFICANT CHANGE UP (ref 11.5–15.5)
IMM GRANULOCYTES # BLD AUTO: 0.12 # — SIGNIFICANT CHANGE UP
IMM GRANULOCYTES NFR BLD AUTO: 1.2 % — SIGNIFICANT CHANGE UP (ref 0–1.5)
LACTATE BLDV-MCNC: 3 MMOL/L — HIGH (ref 0.5–2)
LYMPHOCYTES # BLD AUTO: 2.92 K/UL — SIGNIFICANT CHANGE UP (ref 1–3.3)
LYMPHOCYTES # BLD AUTO: 29.5 % — SIGNIFICANT CHANGE UP (ref 13–44)
MCHC RBC-ENTMCNC: 27.6 PG — SIGNIFICANT CHANGE UP (ref 27–34)
MCHC RBC-ENTMCNC: 32.8 % — SIGNIFICANT CHANGE UP (ref 32–36)
MCV RBC AUTO: 84.2 FL — SIGNIFICANT CHANGE UP (ref 80–100)
MONOCYTES # BLD AUTO: 0.83 K/UL — SIGNIFICANT CHANGE UP (ref 0–0.9)
MONOCYTES NFR BLD AUTO: 8.4 % — SIGNIFICANT CHANGE UP (ref 2–14)
NEUTROPHILS # BLD AUTO: 5.99 K/UL — SIGNIFICANT CHANGE UP (ref 1.8–7.4)
NEUTROPHILS NFR BLD AUTO: 60.6 % — SIGNIFICANT CHANGE UP (ref 43–77)
NRBC # FLD: 0 — SIGNIFICANT CHANGE UP
PCO2 BLDV: 58 MMHG — HIGH (ref 41–51)
PH BLDV: 7.35 PH — SIGNIFICANT CHANGE UP (ref 7.32–7.43)
PLATELET # BLD AUTO: 304 K/UL — SIGNIFICANT CHANGE UP (ref 150–400)
PMV BLD: 10.8 FL — SIGNIFICANT CHANGE UP (ref 7–13)
PO2 BLDV: 46 MMHG — HIGH (ref 35–40)
POTASSIUM BLDV-SCNC: 4.1 MMOL/L — SIGNIFICANT CHANGE UP (ref 3.4–4.5)
POTASSIUM SERPL-MCNC: 4.5 MMOL/L — SIGNIFICANT CHANGE UP (ref 3.5–5.3)
POTASSIUM SERPL-SCNC: 4.5 MMOL/L — SIGNIFICANT CHANGE UP (ref 3.5–5.3)
PROT SERPL-MCNC: 7.7 G/DL — SIGNIFICANT CHANGE UP (ref 6–8.3)
RBC # BLD: 4.82 M/UL — SIGNIFICANT CHANGE UP (ref 3.8–5.2)
RBC # FLD: 12.8 % — SIGNIFICANT CHANGE UP (ref 10.3–14.5)
SAO2 % BLDV: 76.2 % — SIGNIFICANT CHANGE UP (ref 60–85)
SODIUM SERPL-SCNC: 140 MMOL/L — SIGNIFICANT CHANGE UP (ref 135–145)
WBC # BLD: 9.89 K/UL — SIGNIFICANT CHANGE UP (ref 3.8–10.5)
WBC # FLD AUTO: 9.89 K/UL — SIGNIFICANT CHANGE UP (ref 3.8–10.5)

## 2018-01-28 PROCEDURE — 71046 X-RAY EXAM CHEST 2 VIEWS: CPT | Mod: 26

## 2018-01-28 RX ORDER — IPRATROPIUM/ALBUTEROL SULFATE 18-103MCG
3 AEROSOL WITH ADAPTER (GRAM) INHALATION
Qty: 0 | Refills: 0 | Status: DISCONTINUED | OUTPATIENT
Start: 2018-01-28 | End: 2018-01-28

## 2018-01-28 RX ORDER — ALBUTEROL 90 UG/1
1 AEROSOL, METERED ORAL EVERY 4 HOURS
Qty: 0 | Refills: 0 | Status: DISCONTINUED | OUTPATIENT
Start: 2018-01-28 | End: 2018-02-02

## 2018-01-28 RX ORDER — SODIUM CHLORIDE 9 MG/ML
1000 INJECTION INTRAMUSCULAR; INTRAVENOUS; SUBCUTANEOUS ONCE
Qty: 0 | Refills: 0 | Status: COMPLETED | OUTPATIENT
Start: 2018-01-28 | End: 2018-01-28

## 2018-01-28 RX ORDER — LOSARTAN POTASSIUM 100 MG/1
100 TABLET, FILM COATED ORAL DAILY
Qty: 0 | Refills: 0 | Status: DISCONTINUED | OUTPATIENT
Start: 2018-01-29 | End: 2018-02-02

## 2018-01-28 RX ORDER — TIOTROPIUM BROMIDE 18 UG/1
1 CAPSULE ORAL; RESPIRATORY (INHALATION) DAILY
Qty: 0 | Refills: 0 | Status: DISCONTINUED | OUTPATIENT
Start: 2018-01-28 | End: 2018-02-02

## 2018-01-28 RX ORDER — IPRATROPIUM/ALBUTEROL SULFATE 18-103MCG
3 AEROSOL WITH ADAPTER (GRAM) INHALATION EVERY 6 HOURS
Qty: 0 | Refills: 0 | Status: DISCONTINUED | OUTPATIENT
Start: 2018-01-28 | End: 2018-02-02

## 2018-01-28 RX ORDER — HYDROCHLOROTHIAZIDE 25 MG
12.5 TABLET ORAL DAILY
Qty: 0 | Refills: 0 | Status: DISCONTINUED | OUTPATIENT
Start: 2018-01-29 | End: 2018-02-02

## 2018-01-28 RX ORDER — METFORMIN HYDROCHLORIDE 850 MG/1
500 TABLET ORAL
Qty: 0 | Refills: 0 | Status: DISCONTINUED | OUTPATIENT
Start: 2018-01-28 | End: 2018-01-28

## 2018-01-28 RX ORDER — IPRATROPIUM/ALBUTEROL SULFATE 18-103MCG
3 AEROSOL WITH ADAPTER (GRAM) INHALATION ONCE
Qty: 0 | Refills: 0 | Status: COMPLETED | OUTPATIENT
Start: 2018-01-28 | End: 2018-01-28

## 2018-01-28 RX ORDER — METFORMIN HYDROCHLORIDE 850 MG/1
500 TABLET ORAL
Qty: 0 | Refills: 0 | Status: DISCONTINUED | OUTPATIENT
Start: 2018-01-28 | End: 2018-01-29

## 2018-01-28 RX ORDER — MAGNESIUM SULFATE 500 MG/ML
2 VIAL (ML) INJECTION ONCE
Qty: 0 | Refills: 0 | Status: COMPLETED | OUTPATIENT
Start: 2018-01-28 | End: 2018-01-28

## 2018-01-28 RX ADMIN — Medication 3 MILLILITER(S): at 15:24

## 2018-01-28 RX ADMIN — Medication 3 MILLILITER(S): at 16:52

## 2018-01-28 RX ADMIN — SODIUM CHLORIDE 1000 MILLILITER(S): 9 INJECTION INTRAMUSCULAR; INTRAVENOUS; SUBCUTANEOUS at 16:52

## 2018-01-28 RX ADMIN — Medication 50 GRAM(S): at 17:45

## 2018-01-28 RX ADMIN — Medication 3 MILLILITER(S): at 17:35

## 2018-01-28 RX ADMIN — Medication 3 MILLILITER(S): at 16:58

## 2018-01-28 RX ADMIN — Medication 3 MILLILITER(S): at 20:21

## 2018-01-28 RX ADMIN — Medication 50 MILLIGRAM(S): at 16:52

## 2018-01-28 NOTE — ED PROVIDER NOTE - MEDICAL DECISION MAKING DETAILS
URI symptoms with diffuse wheezing.  Likely acute asthma exacerbation.  Albuterol nebs, steroids.  will reassess.

## 2018-01-28 NOTE — ED ADULT TRIAGE NOTE - CHIEF COMPLAINT QUOTE
Pt w/ hx of asthma hospitalized in November for same never intubated c/o asthma exacerbation x 1 week with chest tightness.  Pt p/w audible wheeze and cough, Pt placed in amb bay charge RN notified.

## 2018-01-28 NOTE — ED CDU PROVIDER INITIAL DAY NOTE - OBJECTIVE STATEMENT
57 year old female pmhx asthma, dm, htn, former smoker.  recent CDU stay in November for asthma exacerbation, no hx of intubations.  presents today with 1 week hx of post nasal drip and cough and chest tightness.  completed last dose of medrol dose pack this am with no improvement. has been using inhaler at home without improvement.  + wheezing denies fever, chills, cp n/v/d, abdominal pain Attendin year old female pmhx asthma, dm, htn, former smoker.  recent CDU stay in November for asthma exacerbation, no hx of intubations.  presents today with 1 week hx of post nasal drip and cough and chest tightness.  completed last dose of medrol dose pack this am with no improvement. has been using inhaler at home without improvement.  + wheezing denies fever, chills, cp n/v/d, abdominal pain

## 2018-01-28 NOTE — ED ADULT NURSE REASSESSMENT NOTE - NS ED NURSE REASSESS COMMENT FT1
Break coverage note: Pt placed spot 8A.  Resting with eyes closed, easy to arouse.  AOX4.  No acute respiratory distress noted.  Awaiting dispo.

## 2018-01-28 NOTE — ED PROVIDER NOTE - OBJECTIVE STATEMENT
Attendiny female presents with asthma exacerbation.  pt has history of asthma and is complaining of cough and chest tightness, similar to when he had asthma in the past.  no fever.  no vomiting.  Just finished up a medrol dose pack which did not help her. 57 y.o female former smoker 30 years ago pmhx of DM, HTN asthma no intubations, has been hospitalized recent CDU stay for asthma exacerbation presenting with 1 week of productive cough and wheezing. Was seen at urgent care and given Medrol dose pack with no improvements. Has been using neb and rescue inhaler at home with no improvement. Denies fevers, chills, chest pain, SOB, n/v/d, abdominal pain, numbness, tingling, weakness, urinary symptoms.     Attendiny female presents with asthma exacerbation.  pt has history of asthma and is complaining of cough and chest tightness, similar to when he had asthma in the past.  no fever.  no vomiting.  Just finished up a medrol dose pack which did not help her.

## 2018-01-28 NOTE — ED ADULT NURSE NOTE - OBJECTIVE STATEMENT
A&Ox4, respirations even, speaks in clear sentences, audible wheezes noted, ambulatory with steady gait, skin warm and dry good for color. Patient reports asthma exacerbation since 1 week ago, worsening symptoms. States was seen at urgent care and prescribed prednisone with no improvement of symptoms. Denies chest pain. Denies hx of intubations, states unknown best peak flow. Left AC 20g IV placed, labs drawn and sent.

## 2018-01-28 NOTE — ED CDU PROVIDER INITIAL DAY NOTE - PROGRESS NOTE DETAILS
pt status not improving + audible wheezing.  sat pt status not improving + audible wheezing.  sat 98% ra.  in no apparent distress.  pulm - Dr Burton primary care doctor Parveen pt status not improving + audible wheezing.  sat 98% ra.  in no apparent distress.  pulm - Dr Amaral  primary care doctor Parveen paged Dr Amaral re admission Dr. Amaral asked for pt to be admitted to Dr. Demetri Woodson's service. Spoke with Dr. Woodson, pt admitted to medicine.

## 2018-01-29 DIAGNOSIS — J45.901 UNSPECIFIED ASTHMA WITH (ACUTE) EXACERBATION: ICD-10-CM

## 2018-01-29 DIAGNOSIS — I10 ESSENTIAL (PRIMARY) HYPERTENSION: ICD-10-CM

## 2018-01-29 DIAGNOSIS — E11.9 TYPE 2 DIABETES MELLITUS WITHOUT COMPLICATIONS: ICD-10-CM

## 2018-01-29 DIAGNOSIS — J10.1 INFLUENZA DUE TO OTHER IDENTIFIED INFLUENZA VIRUS WITH OTHER RESPIRATORY MANIFESTATIONS: ICD-10-CM

## 2018-01-29 DIAGNOSIS — Z87.891 PERSONAL HISTORY OF NICOTINE DEPENDENCE: ICD-10-CM

## 2018-01-29 LAB
B PERT DNA SPEC QL NAA+PROBE: SIGNIFICANT CHANGE UP
C PNEUM DNA SPEC QL NAA+PROBE: NOT DETECTED — SIGNIFICANT CHANGE UP
FLUAV H1 2009 PAND RNA SPEC QL NAA+PROBE: NOT DETECTED — SIGNIFICANT CHANGE UP
FLUAV H1 RNA SPEC QL NAA+PROBE: NOT DETECTED — SIGNIFICANT CHANGE UP
FLUAV H3 RNA SPEC QL NAA+PROBE: NOT DETECTED — SIGNIFICANT CHANGE UP
FLUAV SUBTYP SPEC NAA+PROBE: SIGNIFICANT CHANGE UP
FLUBV RNA SPEC QL NAA+PROBE: POSITIVE — HIGH
GLUCOSE BLDC GLUCOMTR-MCNC: 125 MG/DL — HIGH (ref 70–99)
GLUCOSE BLDC GLUCOMTR-MCNC: 157 MG/DL — HIGH (ref 70–99)
GLUCOSE BLDC GLUCOMTR-MCNC: 350 MG/DL — HIGH (ref 70–99)
HADV DNA SPEC QL NAA+PROBE: NOT DETECTED — SIGNIFICANT CHANGE UP
HCOV 229E RNA SPEC QL NAA+PROBE: NOT DETECTED — SIGNIFICANT CHANGE UP
HCOV HKU1 RNA SPEC QL NAA+PROBE: NOT DETECTED — SIGNIFICANT CHANGE UP
HCOV NL63 RNA SPEC QL NAA+PROBE: NOT DETECTED — SIGNIFICANT CHANGE UP
HCOV OC43 RNA SPEC QL NAA+PROBE: NOT DETECTED — SIGNIFICANT CHANGE UP
HMPV RNA SPEC QL NAA+PROBE: NOT DETECTED — SIGNIFICANT CHANGE UP
HPIV1 RNA SPEC QL NAA+PROBE: NOT DETECTED — SIGNIFICANT CHANGE UP
HPIV2 RNA SPEC QL NAA+PROBE: NOT DETECTED — SIGNIFICANT CHANGE UP
HPIV3 RNA SPEC QL NAA+PROBE: NOT DETECTED — SIGNIFICANT CHANGE UP
HPIV4 RNA SPEC QL NAA+PROBE: NOT DETECTED — SIGNIFICANT CHANGE UP
M PNEUMO DNA SPEC QL NAA+PROBE: NOT DETECTED — SIGNIFICANT CHANGE UP
RSV RNA SPEC QL NAA+PROBE: NOT DETECTED — SIGNIFICANT CHANGE UP
RV+EV RNA SPEC QL NAA+PROBE: NOT DETECTED — SIGNIFICANT CHANGE UP

## 2018-01-29 RX ORDER — BUDESONIDE AND FORMOTEROL FUMARATE DIHYDRATE 160; 4.5 UG/1; UG/1
2 AEROSOL RESPIRATORY (INHALATION)
Qty: 0 | Refills: 0 | Status: DISCONTINUED | OUTPATIENT
Start: 2018-01-29 | End: 2018-02-02

## 2018-01-29 RX ORDER — DEXTROSE 50 % IN WATER 50 %
25 SYRINGE (ML) INTRAVENOUS ONCE
Qty: 0 | Refills: 0 | Status: DISCONTINUED | OUTPATIENT
Start: 2018-01-29 | End: 2018-02-02

## 2018-01-29 RX ORDER — DEXTROSE 50 % IN WATER 50 %
12.5 SYRINGE (ML) INTRAVENOUS ONCE
Qty: 0 | Refills: 0 | Status: DISCONTINUED | OUTPATIENT
Start: 2018-01-29 | End: 2018-02-02

## 2018-01-29 RX ORDER — INSULIN LISPRO 100/ML
4 VIAL (ML) SUBCUTANEOUS ONCE
Qty: 0 | Refills: 0 | Status: COMPLETED | OUTPATIENT
Start: 2018-01-29 | End: 2018-01-30

## 2018-01-29 RX ORDER — MONTELUKAST 4 MG/1
10 TABLET, CHEWABLE ORAL DAILY
Qty: 0 | Refills: 0 | Status: DISCONTINUED | OUTPATIENT
Start: 2018-01-29 | End: 2018-02-02

## 2018-01-29 RX ORDER — IPRATROPIUM/ALBUTEROL SULFATE 18-103MCG
3 AEROSOL WITH ADAPTER (GRAM) INHALATION ONCE
Qty: 0 | Refills: 0 | Status: COMPLETED | OUTPATIENT
Start: 2018-01-29 | End: 2018-01-29

## 2018-01-29 RX ORDER — SODIUM CHLORIDE 9 MG/ML
1000 INJECTION, SOLUTION INTRAVENOUS
Qty: 0 | Refills: 0 | Status: DISCONTINUED | OUTPATIENT
Start: 2018-01-29 | End: 2018-02-02

## 2018-01-29 RX ORDER — AMLODIPINE BESYLATE 2.5 MG/1
1 TABLET ORAL
Qty: 0 | Refills: 0 | COMMUNITY

## 2018-01-29 RX ORDER — INSULIN LISPRO 100/ML
5 VIAL (ML) SUBCUTANEOUS ONCE
Qty: 0 | Refills: 0 | Status: DISCONTINUED | OUTPATIENT
Start: 2018-01-29 | End: 2018-01-29

## 2018-01-29 RX ORDER — INSULIN LISPRO 100/ML
VIAL (ML) SUBCUTANEOUS
Qty: 0 | Refills: 0 | Status: DISCONTINUED | OUTPATIENT
Start: 2018-01-29 | End: 2018-02-02

## 2018-01-29 RX ORDER — GLUCAGON INJECTION, SOLUTION 0.5 MG/.1ML
1 INJECTION, SOLUTION SUBCUTANEOUS ONCE
Qty: 0 | Refills: 0 | Status: DISCONTINUED | OUTPATIENT
Start: 2018-01-29 | End: 2018-02-02

## 2018-01-29 RX ORDER — METFORMIN HYDROCHLORIDE 850 MG/1
1 TABLET ORAL
Qty: 0 | Refills: 0 | COMMUNITY

## 2018-01-29 RX ORDER — DEXTROSE 50 % IN WATER 50 %
1 SYRINGE (ML) INTRAVENOUS ONCE
Qty: 0 | Refills: 0 | Status: DISCONTINUED | OUTPATIENT
Start: 2018-01-29 | End: 2018-02-02

## 2018-01-29 RX ORDER — ENOXAPARIN SODIUM 100 MG/ML
40 INJECTION SUBCUTANEOUS DAILY
Qty: 0 | Refills: 0 | Status: DISCONTINUED | OUTPATIENT
Start: 2018-01-29 | End: 2018-02-02

## 2018-01-29 RX ADMIN — Medication 20 MILLIGRAM(S): at 13:23

## 2018-01-29 RX ADMIN — MONTELUKAST 10 MILLIGRAM(S): 4 TABLET, CHEWABLE ORAL at 13:24

## 2018-01-29 RX ADMIN — ENOXAPARIN SODIUM 40 MILLIGRAM(S): 100 INJECTION SUBCUTANEOUS at 13:23

## 2018-01-29 RX ADMIN — Medication 3 MILLILITER(S): at 09:19

## 2018-01-29 RX ADMIN — Medication 1: at 18:22

## 2018-01-29 RX ADMIN — Medication 3 MILLILITER(S): at 23:11

## 2018-01-29 RX ADMIN — Medication 3 MILLILITER(S): at 04:00

## 2018-01-29 RX ADMIN — Medication 75 MILLIGRAM(S): at 11:25

## 2018-01-29 RX ADMIN — LOSARTAN POTASSIUM 100 MILLIGRAM(S): 100 TABLET, FILM COATED ORAL at 06:47

## 2018-01-29 RX ADMIN — Medication 3 MILLILITER(S): at 20:59

## 2018-01-29 RX ADMIN — Medication 200 MILLIGRAM(S): at 21:36

## 2018-01-29 RX ADMIN — BUDESONIDE AND FORMOTEROL FUMARATE DIHYDRATE 2 PUFF(S): 160; 4.5 AEROSOL RESPIRATORY (INHALATION) at 22:19

## 2018-01-29 RX ADMIN — Medication 3 MILLILITER(S): at 17:00

## 2018-01-29 RX ADMIN — Medication 20 MILLIGRAM(S): at 18:26

## 2018-01-29 RX ADMIN — Medication 1 TABLET(S): at 18:27

## 2018-01-29 RX ADMIN — Medication 75 MILLIGRAM(S): at 23:19

## 2018-01-29 RX ADMIN — Medication 12.5 MILLIGRAM(S): at 06:47

## 2018-01-29 NOTE — CONSULT NOTE ADULT - PROBLEM SELECTOR RECOMMENDATION 2
Has been wheezing extensively: Change to IV steroids as well as add Symbicort as well as Singulair and cont nebulizers q 6 hours:  Chest x-ray is clear: no pneumonia: No need for  antibiotics:

## 2018-01-29 NOTE — ED CDU PROVIDER SUBSEQUENT DAY NOTE - HISTORY
56 y/o female pmh htn, dm, former smoker, asthma never intubated c/o worsening URI x1 week. Pt admits to URI symptoms x1 week. Pt admits to going to Urgent Care this week and prescribed medrol dose pack. Pt states that wheezing has progressed despite frequent nebulizer use. Pt denies chest pain, abd pain, n/v/d, numbness, tingling ,weakness, dizziness, syncope, fever or chills.

## 2018-01-29 NOTE — H&P ADULT - FAMILY HISTORY
Family history of MI (myocardial infarction)     Father  Still living? Unknown  Family history of lung cancer, Age at diagnosis: Age Unknown

## 2018-01-29 NOTE — ED CDU PROVIDER DISPOSITION NOTE - ATTENDING CONTRIBUTION TO CARE
AJM: Pt presented to CDU after being for to have worsening asthma exacerbation triggered by influenza B. No signs of hypoxia, resp distress or fever. However despite multiple nebs and steroids, pt still with significant wheezing on exam, and decreased breath sounds bilaterally. No pna on cxr. Spoke with pts private  pulmonologist who agrees with admission for asthma exacerbation secondary to influenza. Pt comfortable with admission plan.

## 2018-01-29 NOTE — ED CDU PROVIDER SUBSEQUENT DAY NOTE - ATTENDING CONTRIBUTION TO CARE
Pt presented to CDU after being for to have worsening asthma exacerbation triggered by influenza B. No signs of hypoxia, resp distress or fever. However despite multiple nebs and steroids, pt still with significant wheezing on exam, and decreased breath sounds bilaterally. No pna on cxr. Spoke with pts private  pulmonologist who agrees with admission for asthma exacerbation secondary to influenza. Pt comfortable with admission plan.

## 2018-01-29 NOTE — CHART NOTE - NSCHARTNOTEFT_GEN_A_CORE
Called by RN to inform that patient has FS of 350. Insulin (humalog) 5 units was ordered as Subcutaneous once.

## 2018-01-29 NOTE — CONSULT NOTE ADULT - SUBJECTIVE AND OBJECTIVE BOX
I  have seen and examine d the patient and reviewed the history pt has asthma for years and have been using Symbicort at home: She could not specify for how many years: She was sick about a week ago with increasing SOB , Wheezing as well as PND , she went to urgicenter: and was given nasal spray as well as medrol dose pack as well as inhalers but she kept getting worse and she took her last dose of steroids and came to hospital; She has been wheezing and today has audible wheezing: She is SOB as well as complaining of chert tightness:      HPI:7 y.o female former smoker 30 years ago pmhx of DM, HTN asthma no intubations, has been hospitalized recent CDU stay for asthma exacerbation presenting with 1 week of productive cough and wheezing. Was seen at urgent care and given Medrol dose pack with no improvements. Has been using neb and rescue inhaler at home with no improvement. Denies fevers, chills, chest pain, SOB, n/v/d, abdominal pain, numbness, tingling, weakness, urinary symptoms.         ?FOLLOWING PRESENT  [x ] Hx of PE/DVT, [x] Hx COPD, [y ] Hx of Asthma, [y ] Hx of Hospitalization, [x ]  Hx of BiPAP/CPAP use, [x ] Hx of LUCIE    Allergies    AValox (Unknown)  NSAIDs (Unknown)  NSAIDS (Unknown)    Intolerances        PAST MEDICAL & SURGICAL HISTORY:  Former smoker, stopped smoking many years ago: (Quit ~26 years ago; used to smoke 0.3 ppd x ~10 years.)  DM2 (diabetes mellitus, type 2)  Hypertension  HTN (hypertension)  Asthma: (no hx/o intubation)  H/O foot surgery: right   delivery NOS: x1  Bunion: b/l  Uterus Problem: &quot;was getting cramps so they did ablation? enometriosis  10/2012      FAMILY HISTORY:  Family history of renal cancer: (primary renal cancer)  Family history of lung cancer: (primary lung cancer)  Family history of MI (myocardial infarction)      Social History: [x: ex : < 5 pk years] TOBACCO                  [ x ] ETOH                                 [ x ] IVDA/DRUGS    REVIEW OF SYSTEMS      General:	x    Skin/Breast:x  	  Ophthalmologic:x  	  ENMT:	x    Respiratory and Thorax: sob, wheezing  	  Cardiovascular:	chest tightness    Gastrointestinal:	x    Genitourinary:	x    Musculoskeletal:	x    Neurological:	x    Psychiatric:	x    Hematology/Lymphatics:	x    Endocrine:	x    Allergic/Immunologic:	x    MEDICATIONS  (STANDING):  ALBUTerol    90 MICROgram(s) HFA Inhaler 1 Puff(s) Inhalation every 4 hours  ALBUTerol/ipratropium for Nebulization 3 milliLiter(s) Nebulizer every 6 hours  hydrochlorothiazide 12.5 milliGRAM(s) Oral daily  losartan 100 milliGRAM(s) Oral daily  metFORMIN  milliGRAM(s) Oral with dinner  oseltamivir 75 milliGRAM(s) Oral two times a day  predniSONE   Tablet 50 milliGRAM(s) Oral once  tiotropium 18 MICROgram(s) Capsule 1 Capsule(s) Inhalation daily    MEDICATIONS  (PRN):       Vital Signs Last 24 Hrs  T(C): 36.7 (2018 09:47), Max: 36.8 (2018 15:07)  T(F): 98.1 (2018 09:47), Max: 98.3 (2018 15:07)  HR: 79 (2018 09:47) (69 - 81)  BP: 123/77 (2018 09:47) (123/72 - 143/90)  BP(mean): --  RR: 20 (2018 09:47) (16 - 20)  SpO2: 98% (2018 09:47) (97% - 100%)        I&O's Summary      Physical Exam:   GENERAL: NAD, well-groomed, well-developed  HEENT: SAQIB/   Atraumatic, Normocephalic  ENMT: No tonsillar erythema, exudates, or enlargement; Moist mucous membranes, Good dentition, No lesions  NECK: Supple, No JVD, Normal thyroid  CHEST/LUNG: wheezing++  CVS: Regular rate and rhythm; No murmurs, rubs, or gallops  GI: : Soft, Nontender, Nondistended; Bowel sounds present  NERVOUS SYSTEM:  Alert & Oriented X3  EXTREMITIES:  2+ Peripheral Pulses, No clubbing, cyanosis, or edema  LYMPH: No lymphadenopathy noted  SKIN: No rashes or lesions  ENDOCRINOLOGY: No Thyromegaly  PSYCH: Appropriate    Labs:  5.2<58<4>>46<<7.355>>5.2<<3><<4><<5<<469>>                            13.3   9.89  )-----------( 304      ( 2018 16:45 )             40.6         140  |  96<L>  |  26<H>  ----------------------------<  171<H>  4.5   |  28  |  0.99    Ca    9.3      2018 16:45    TPro  7.7  /  Alb  4.5  /  TBili  0.2  /  DBili  x   /  AST  22  /  ALT  29  /  AlkPhos  87      CAPILLARY BLOOD GLUCOSE      POCT Blood Glucose.: 129 mg/dL (2018 07:46)  POCT Blood Glucose.: 277 mg/dL (2018 22:45)    LIVER FUNCTIONS - ( 2018 16:45 )  Alb: 4.5 g/dL / Pro: 7.7 g/dL / ALK PHOS: 87 u/L / ALT: 29 u/L / AST: 22 u/L / GGT: x               D DImer    Cultures:   < from: Xray Chest 2 Views PA/Lat (18 @ 17:21) >    INTERPRETATION:  CLINICAL INDICATION: Cough, asthma exacerbation.    EXAM: Frontal and lateral views of the chest with comparison made to   chest radiograph on 2017    FINDINGS:   Low lung volumes. The lungs are clear. There is no pleural effusion or   pneumothorax.  The heart size is normal.   No acute bony pathology.    IMPRESSION:   Clear lungs.              BARRY ABBOTT M.D., RADIOLOGY RESIDENT  This document has beenelectronically signed.  MALISSA PUGA M.D.; ATTENDING RADIOLOGIST  This document has been electronically signed. 2018  7:34AM    < end of copied text >    < from: Xray Chest 2 Views PA/Lat (17 @ 11:02) >  EXAM:  RAD CHEST PA LAT        PROCEDURE DATE:  2017         INTERPRETATION:  CLINICAL INFORMATION: Dyspnea    TIME OF EXAMINATION: 2017 at 11:27 AM    EXAM: PA and Lateral Chest    FINDINGS:  Examination in frontal and lateral projection fails to show   evidence of any active pulmonary disease.  The heart is not enlarged and   there is no pleural effusion or pneumothorax.  There is no acute bone   pathology.        COMPARISON: 2017 without change.        IMPRESSION: Normal chest                    MALISSA PUGA M.D.; ATTENDING RADIOLOGIST  This document has been electronically signed. 2017 11:18AM        < end of copied text >                    Rapid Respiratory Viral Panel Result         @ 03:40  Rapid RVP --  Coronovirus --  Adenovirus NOT DETECTED  Bordetella Pertussis NOT DETECTED  Chlamydia Pneumonia NOT DETECTED  Entero/RhinovirusNOT DETECTED  HKU1 Coronovirus --  HMPV Coronovirus NOT DETECTED  Influenza A NOT DETECTED (any subtype)  Influenza AH1 NOT DETECTED  Influenza AH1 2009 NOT DETECTED  Influenza AH3 NOT DETECTED  Influenza B POSITIVE  Mycoplasma Pneumoniae NOT DETECTED This nucleic acid amplification assay was performed using  the [employer]. The following pathogens are tested  for: Adenovirus, Coronavirus 229E, Coronavirus HKU1,  Coronavirus NL63, Coronavirus OC43, Human Metapneumovirus  (HMPV), Rhinovirus/Enterovirus, Influenza A H1, Influenza A  H1 2009 (Pandemic H1 2009), Influenza A H3, Influenza A (Flu  A) subtype not identified, Influenza B, Parainfluenza Virus  (types 1, 2, 3, 4), Respiratory Syncytial Virus (RSV),  Bordetella pertussis, Chlamydophila pneumoniae, and  Mycoplasma pneumoniae. A negative FilmArray result does not  always exclude the possibility of viral or bacterial  infection. Laboratory results should always be interpreted  in the context of clinical findings.  NL63 Coronovirus --  OC43 Coronovirus --  Parainfluenza 1 NOT DETECTED  Parainfluenza 2 NOT DETECTED  Parainfluenza 3 NOT DETECTED  Parainfluenza 4 NOT DETECTED  Resp Syncytial Virus NOT DETECTED        Studies  Chest X-RAY  CT SCAN Chest   CT Abdomen  Venous Dopplers: LE:   Others

## 2018-01-29 NOTE — H&P ADULT - ASSESSMENT
57 y.o female with a PMH of DM2, HTN asthma no intubations presents to the Emergency Department with 1 week of productive cough and wheezing  diagnosed with influenza B and severe bronchospasm

## 2018-01-29 NOTE — H&P ADULT - HISTORY OF PRESENT ILLNESS
57 y.o female with a PMH of DM2, HTN asthma no intubations presents to the Emergency Department with 1 week of productive cough and wheezing. She was seen at an urgent care center and given a Medrol dose pack with absolutely no improvement in her symptoms. She has been using nebulizers and rescue inhaler at home with no improvement. Denies fevers, chills, chest pain,, nausea, vomiting, abdominal pain, numbness, tingling, weakness, urinary symptoms. Currently feels very tight and states is constantly coughing

## 2018-01-29 NOTE — CONSULT NOTE ADULT - ASSESSMENT
57 y.o female former smoker 30 years ago pmhx of DM, HTN asthma no intubations, has been hospitalized recent CDU stay for asthma exacerbation presenting with 1 week of productive cough and wheezing. Was seen at urgent care and given Medrol dose pack with no improvements. Has been using neb and rescue inhaler at home with no improvement. Denies fevers, chills, chest pain, SOB, n/v/d, abdominal pain, numbness, tingling, weakness, urinary symptoms.

## 2018-01-29 NOTE — H&P ADULT - PMH
Asthma  (no hx/o intubation)  DM2 (diabetes mellitus, type 2)    Former smoker, stopped smoking many years ago  (Quit ~26 years ago; used to smoke 0.3 ppd x ~10 years.)  Hypertension

## 2018-01-29 NOTE — H&P ADULT - NSHPPHYSICALEXAM_GEN_ALL_CORE
PHYSICAL EXAM: vital signs as above  in no apparent distress  HEENT: SAQIB EOMI  Neck: Supple, no JVD, no thyromegaly  Lungs: diffuse expiratory rhonchi bilaterally with very poor air movement  CVS: S1 S2 no M/R/G  Abdomen: no tenderness, no organomegaly, BS present  Neuro: AO x 3 no focal weakness, no sensory abnormalities  Psych: appropriate affect  Skin: warm, dry  Ext: no cyanosis or clubbing, no edema  Msk: no joint swelling or deformities  Back: no CVA tenderness, no kyphosis/scoliosis

## 2018-01-29 NOTE — H&P ADULT - NSHPREVIEWOFSYSTEMS_GEN_ALL_CORE
Gen: no loss of wt or appetite  ENT: no dizziness or hearing loss  Ophth: no blurring of vision or loss of vision  Resp: see above HPI  CVS: No chest pain or palpitations or orthopnea  GI: no N/V/D  : no dysuria, hematuria  Endo: no polyuria or excessive sweating  Neuro: no weakness or paresthesias  Psych: No suicidal or depressive ideation  Heme: No petechiae or easy bruising  Msk: No joint pain or swelling  Skin: No rash or itching  All other ROS negative

## 2018-01-29 NOTE — H&P ADULT - PROBLEM SELECTOR PLAN 1
severe, resistant to po steroids   likely secondary to influenza B  will continue pulmonary recommendations   Dr Noel input noted  IV solumedrol 20 q 6

## 2018-01-30 DIAGNOSIS — E05.90 THYROTOXICOSIS, UNSPECIFIED WITHOUT THYROTOXIC CRISIS OR STORM: ICD-10-CM

## 2018-01-30 LAB
BUN SERPL-MCNC: 28 MG/DL — HIGH (ref 7–23)
CALCIUM SERPL-MCNC: 9.8 MG/DL — SIGNIFICANT CHANGE UP (ref 8.4–10.5)
CHLORIDE SERPL-SCNC: 94 MMOL/L — LOW (ref 98–107)
CO2 SERPL-SCNC: 24 MMOL/L — SIGNIFICANT CHANGE UP (ref 22–31)
CREAT SERPL-MCNC: 1.01 MG/DL — SIGNIFICANT CHANGE UP (ref 0.5–1.3)
GLUCOSE BLDC GLUCOMTR-MCNC: 153 MG/DL — HIGH (ref 70–99)
GLUCOSE BLDC GLUCOMTR-MCNC: 224 MG/DL — HIGH (ref 70–99)
GLUCOSE BLDC GLUCOMTR-MCNC: 231 MG/DL — HIGH (ref 70–99)
GLUCOSE SERPL-MCNC: 209 MG/DL — HIGH (ref 70–99)
HCT VFR BLD CALC: 39.4 % — SIGNIFICANT CHANGE UP (ref 34.5–45)
HCV AB S/CO SERPL IA: 0.05 S/CO — SIGNIFICANT CHANGE UP
HCV AB SERPL-IMP: SIGNIFICANT CHANGE UP
HGB BLD-MCNC: 12.9 G/DL — SIGNIFICANT CHANGE UP (ref 11.5–15.5)
MCHC RBC-ENTMCNC: 27.6 PG — SIGNIFICANT CHANGE UP (ref 27–34)
MCHC RBC-ENTMCNC: 32.7 % — SIGNIFICANT CHANGE UP (ref 32–36)
MCV RBC AUTO: 84.2 FL — SIGNIFICANT CHANGE UP (ref 80–100)
NRBC # FLD: 0 — SIGNIFICANT CHANGE UP
PLATELET # BLD AUTO: 309 K/UL — SIGNIFICANT CHANGE UP (ref 150–400)
PMV BLD: 10.8 FL — SIGNIFICANT CHANGE UP (ref 7–13)
POTASSIUM SERPL-MCNC: 5.1 MMOL/L — SIGNIFICANT CHANGE UP (ref 3.5–5.3)
POTASSIUM SERPL-SCNC: 5.1 MMOL/L — SIGNIFICANT CHANGE UP (ref 3.5–5.3)
RBC # BLD: 4.68 M/UL — SIGNIFICANT CHANGE UP (ref 3.8–5.2)
RBC # FLD: 13.1 % — SIGNIFICANT CHANGE UP (ref 10.3–14.5)
SODIUM SERPL-SCNC: 138 MMOL/L — SIGNIFICANT CHANGE UP (ref 135–145)
TSH SERPL-MCNC: 0.22 UIU/ML — LOW (ref 0.27–4.2)
WBC # BLD: 12.75 K/UL — HIGH (ref 3.8–10.5)
WBC # FLD AUTO: 12.75 K/UL — HIGH (ref 3.8–10.5)

## 2018-01-30 RX ORDER — INSULIN LISPRO 100/ML
5 VIAL (ML) SUBCUTANEOUS
Qty: 0 | Refills: 0 | Status: DISCONTINUED | OUTPATIENT
Start: 2018-01-30 | End: 2018-02-02

## 2018-01-30 RX ORDER — INSULIN GLARGINE 100 [IU]/ML
7 INJECTION, SOLUTION SUBCUTANEOUS AT BEDTIME
Qty: 0 | Refills: 0 | Status: DISCONTINUED | OUTPATIENT
Start: 2018-01-30 | End: 2018-02-02

## 2018-01-30 RX ADMIN — Medication 20 MILLIGRAM(S): at 06:45

## 2018-01-30 RX ADMIN — Medication 20 MILLIGRAM(S): at 19:58

## 2018-01-30 RX ADMIN — Medication 3 MILLILITER(S): at 10:00

## 2018-01-30 RX ADMIN — Medication 20 MILLIGRAM(S): at 00:04

## 2018-01-30 RX ADMIN — Medication 1 TABLET(S): at 12:24

## 2018-01-30 RX ADMIN — Medication 3 MILLILITER(S): at 17:00

## 2018-01-30 RX ADMIN — BUDESONIDE AND FORMOTEROL FUMARATE DIHYDRATE 2 PUFF(S): 160; 4.5 AEROSOL RESPIRATORY (INHALATION) at 09:00

## 2018-01-30 RX ADMIN — BUDESONIDE AND FORMOTEROL FUMARATE DIHYDRATE 2 PUFF(S): 160; 4.5 AEROSOL RESPIRATORY (INHALATION) at 21:45

## 2018-01-30 RX ADMIN — MONTELUKAST 10 MILLIGRAM(S): 4 TABLET, CHEWABLE ORAL at 12:24

## 2018-01-30 RX ADMIN — Medication 75 MILLIGRAM(S): at 07:08

## 2018-01-30 RX ADMIN — Medication 2: at 18:00

## 2018-01-30 RX ADMIN — Medication 75 MILLIGRAM(S): at 19:57

## 2018-01-30 RX ADMIN — Medication 4: at 12:51

## 2018-01-30 RX ADMIN — INSULIN GLARGINE 7 UNIT(S): 100 INJECTION, SOLUTION SUBCUTANEOUS at 23:45

## 2018-01-30 RX ADMIN — Medication 20 MILLIGRAM(S): at 12:23

## 2018-01-30 RX ADMIN — ENOXAPARIN SODIUM 40 MILLIGRAM(S): 100 INJECTION SUBCUTANEOUS at 12:24

## 2018-01-30 RX ADMIN — Medication 1: at 09:00

## 2018-01-30 RX ADMIN — Medication 12.5 MILLIGRAM(S): at 07:07

## 2018-01-30 RX ADMIN — Medication 3 MILLILITER(S): at 04:40

## 2018-01-30 RX ADMIN — LOSARTAN POTASSIUM 100 MILLIGRAM(S): 100 TABLET, FILM COATED ORAL at 07:07

## 2018-01-30 RX ADMIN — Medication 3 MILLILITER(S): at 22:50

## 2018-01-30 RX ADMIN — Medication 4 UNIT(S): at 00:08

## 2018-01-30 NOTE — CONSULT NOTE ADULT - SUBJECTIVE AND OBJECTIVE BOX
HPI:  57 y.o female with a PMH of DM2, HTN asthma no intubations presents to the Emergency Department with 1 week of productive cough and wheezing. She was seen at an urgent care center and given a Medrol dose pack with absolutely no improvement in her symptoms. She has been using nebulizers and rescue inhaler at home with no improvement. Denies fevers, chills, chest pain,, nausea, vomiting, abdominal pain, numbness, tingling, weakness, urinary symptoms. Currently feels very tight and states is constantly coughing (2018 12:33)  Patient has history of diabetes, on oral meds/Metformin at home, no recent hypoglycemic episodes, no polyuria polydipsia. Patient follows up with PCP for diabetes management. No history of thyroid disease not on supplements.    PAST MEDICAL & SURGICAL HISTORY:  Former smoker, stopped smoking many years ago: (Quit ~26 years ago; used to smoke 0.3 ppd x ~10 years.)  DM2 (diabetes mellitus, type 2)  Hypertension  Asthma: (no hx/o intubation)  H/O foot surgery: right   delivery NOS: x1  Bunion: b/l  Uterus Problem: &quot;was getting cramps so they did ablation? enometriosis  10/2012      FAMILY HISTORY:  Family history of lung cancer (Father): (primary lung cancer)  Family history of MI (myocardial infarction)      Social History:    Outpatient Medications:    MEDICATIONS  (STANDING):  ALBUTerol    90 MICROgram(s) HFA Inhaler 1 Puff(s) Inhalation every 4 hours  ALBUTerol/ipratropium for Nebulization 3 milliLiter(s) Nebulizer every 6 hours  buDESOnide 160 MICROgram(s)/formoterol 4.5 MICROgram(s) Inhaler 2 Puff(s) Inhalation two times a day  dextrose 5%. 1000 milliLiter(s) (50 mL/Hr) IV Continuous <Continuous>  dextrose 50% Injectable 12.5 Gram(s) IV Push once  dextrose 50% Injectable 25 Gram(s) IV Push once  dextrose 50% Injectable 25 Gram(s) IV Push once  enoxaparin Injectable 40 milliGRAM(s) SubCutaneous daily  hydrochlorothiazide 12.5 milliGRAM(s) Oral daily  insulin glargine Injectable (LANTUS) 7 Unit(s) SubCutaneous at bedtime  insulin lispro (HumaLOG) corrective regimen sliding scale   SubCutaneous three times a day before meals  insulin lispro Injectable (HumaLOG) 5 Unit(s) SubCutaneous three times a day before meals  losartan 100 milliGRAM(s) Oral daily  methylPREDNISolone sodium succinate Injectable 20 milliGRAM(s) IV Push every 6 hours  montelukast 10 milliGRAM(s) Oral daily  multivitamin 1 Tablet(s) Oral daily  oseltamivir 75 milliGRAM(s) Oral two times a day  tiotropium 18 MICROgram(s) Capsule 1 Capsule(s) Inhalation daily    MEDICATIONS  (PRN):  dextrose Gel 1 Dose(s) Oral once PRN Blood Glucose LESS THAN 70 milliGRAM(s)/deciliter  glucagon  Injectable 1 milliGRAM(s) IntraMuscular once PRN Glucose LESS THAN 70 milligrams/deciliter  guaiFENesin   Syrup  (Sugar-Free) 200 milliGRAM(s) Oral every 6 hours PRN Cough      Allergies    AValox (Unknown)  NSAIDs (Unknown)  NSAIDS (Unknown)    Intolerances      Review of Systems:  Constitutional: No fever, no chills  Eyes: No blurry vision  Neuro: No tremors  HEENT: No pain, no neck swelling  Cardiovascular: No chest pain, no palpitations  Respiratory: Has SOB, no cough  GI: No nausea, vomiting, abdominal pain  : No dysuria  Skin: no rash  MSK: Has leg swelling, no foot ulcers.  Psych: no depression  Endocrine: no polyuria, polydipsia    ALL OTHER SYSTEMS REVIEWED AND NEGATIVE    UNABLE TO OBTAIN    PHYSICAL EXAM:  VITALS: T(C): 36.8 (18 @ 14:26)  T(F): 98.2 (18 @ 14:26), Max: 98.3 (18 @ 05:00)  HR: 86 (18 @ 14:26) (81 - 87)  BP: 124/79 (18 @ 14:26) (124/79 - 154/87)  RR:  (16 - 20)  SpO2:  (97% - 100%)  Wt(kg): --  GENERAL: NAD, well-groomed, well-developed  EYES: No proptosis, no lid lag  HEENT:  Atraumatic, Normocephalic  THYROID: Normal size, no palpable nodules  RESPIRATORY: Clear to auscultation bilaterally; No rales, rhonchi, wheezing  CARDIOVASCULAR: Si S2, No murmurs;  GI: Soft, non distended, normal bowel sounds  SKIN: Dry, intact, No rashes or lesions  MUSCULOSKELETAL: Has BL lower extremity edema.  NEURO:  no tremor, sensation decreased in feet BL,    POCT Blood Glucose.: 231 mg/dL (18 @ 17:27)  POCT Blood Glucose.: 329 mg/dL (18 @ 12:31)  POCT Blood Glucose.: 153 mg/dL (18 @ 08:44)  POCT Blood Glucose.: 350 mg/dL (18 @ 23:07)  POCT Blood Glucose.: 157 mg/dL (18 @ 17:34)  POCT Blood Glucose.: 125 mg/dL (18 @ 13:06)  POCT Blood Glucose.: 129 mg/dL (18 @ 07:46)  POCT Blood Glucose.: 277 mg/dL (18 @ 22:45)                            12.9   12.75 )-----------( 309      ( 2018 05:30 )             39.4           138  |  94<L>  |  28<H>  ----------------------------<  209<H>  5.1   |  24  |  1.01    EGFR if : 73  EGFR if non : 63    Ca    9.8          TPro  7.7  /  Alb  4.5  /  TBili  0.2  /  DBili  x   /  AST  22  /  ALT  29  /  AlkPhos  87        Thyroid Function Tests:   @ 05:30 TSH 0.22 FreeT4 -- T3 -- Anti TPO -- Anti Thyroglobulin Ab -- TSI --      Hemoglobin A1C, Whole Blood: 7.1 % <H> [4.0 - 5.6] (18 @ 16:05)  Hemoglobin A1C, Whole Blood: 6.6 % <H> [4.0 - 5.6] (17 @ 10:30)          Radiology:

## 2018-01-30 NOTE — CONSULT NOTE ADULT - PROBLEM SELECTOR RECOMMENDATION 9
Will start on Lantus to 10 units at bed time.  Will start Humalog to 60 units before each meal in addition to Humalog correction scale coverage.  Patient counseled for compliance with consistent low carb diet.

## 2018-01-30 NOTE — CONSULT NOTE ADULT - ASSESSMENT
Assessment  DMT2: 57y Female with DM T2 with hyperglycemia on insulin, on High dose IV steroids, blood sugars running high, no hypoglycemia,  eating meals,  non compliant with low carb diet.  Asthma exacerbation: Still has cough, SOB, On medications, monitored.  Hyperthyroidism: Elevated TSH, asymptomatic.

## 2018-01-31 LAB
ALBUMIN SERPL ELPH-MCNC: 4.3 G/DL — SIGNIFICANT CHANGE UP (ref 3.3–5)
ALP SERPL-CCNC: 79 U/L — SIGNIFICANT CHANGE UP (ref 40–120)
ALT FLD-CCNC: 22 U/L — SIGNIFICANT CHANGE UP (ref 4–33)
AST SERPL-CCNC: 17 U/L — SIGNIFICANT CHANGE UP (ref 4–32)
BASOPHILS # BLD AUTO: 0.03 K/UL — SIGNIFICANT CHANGE UP (ref 0–0.2)
BASOPHILS NFR BLD AUTO: 0.2 % — SIGNIFICANT CHANGE UP (ref 0–2)
BASOPHILS NFR SPEC: 0 % — SIGNIFICANT CHANGE UP (ref 0–2)
BILIRUB SERPL-MCNC: 0.2 MG/DL — SIGNIFICANT CHANGE UP (ref 0.2–1.2)
BUN SERPL-MCNC: 30 MG/DL — HIGH (ref 7–23)
BUN SERPL-MCNC: 34 MG/DL — HIGH (ref 7–23)
CALCIUM SERPL-MCNC: 10 MG/DL — SIGNIFICANT CHANGE UP (ref 8.4–10.5)
CALCIUM SERPL-MCNC: 9.7 MG/DL — SIGNIFICANT CHANGE UP (ref 8.4–10.5)
CHLORIDE SERPL-SCNC: 96 MMOL/L — LOW (ref 98–107)
CHLORIDE SERPL-SCNC: 96 MMOL/L — LOW (ref 98–107)
CO2 SERPL-SCNC: 22 MMOL/L — SIGNIFICANT CHANGE UP (ref 22–31)
CO2 SERPL-SCNC: 22 MMOL/L — SIGNIFICANT CHANGE UP (ref 22–31)
CREAT SERPL-MCNC: 0.93 MG/DL — SIGNIFICANT CHANGE UP (ref 0.5–1.3)
CREAT SERPL-MCNC: 0.95 MG/DL — SIGNIFICANT CHANGE UP (ref 0.5–1.3)
EOSINOPHIL # BLD AUTO: 0 K/UL — SIGNIFICANT CHANGE UP (ref 0–0.5)
EOSINOPHIL NFR BLD AUTO: 0 % — SIGNIFICANT CHANGE UP (ref 0–6)
EOSINOPHIL NFR FLD: 0 % — SIGNIFICANT CHANGE UP (ref 0–6)
GLUCOSE BLDC GLUCOMTR-MCNC: 173 MG/DL — HIGH (ref 70–99)
GLUCOSE BLDC GLUCOMTR-MCNC: 185 MG/DL — HIGH (ref 70–99)
GLUCOSE BLDC GLUCOMTR-MCNC: 230 MG/DL — HIGH (ref 70–99)
GLUCOSE SERPL-MCNC: 175 MG/DL — HIGH (ref 70–99)
GLUCOSE SERPL-MCNC: 202 MG/DL — HIGH (ref 70–99)
HCT VFR BLD CALC: 38.5 % — SIGNIFICANT CHANGE UP (ref 34.5–45)
HGB BLD-MCNC: 13.6 G/DL — SIGNIFICANT CHANGE UP (ref 11.5–15.5)
IMM GRANULOCYTES # BLD AUTO: 0.6 # — SIGNIFICANT CHANGE UP
IMM GRANULOCYTES NFR BLD AUTO: 3.1 % — HIGH (ref 0–1.5)
LYMPHOCYTES # BLD AUTO: 1.94 K/UL — SIGNIFICANT CHANGE UP (ref 1–3.3)
LYMPHOCYTES # BLD AUTO: 10 % — LOW (ref 13–44)
LYMPHOCYTES NFR SPEC AUTO: 11 % — LOW (ref 13–44)
MCHC RBC-ENTMCNC: 29.6 PG — SIGNIFICANT CHANGE UP (ref 27–34)
MCHC RBC-ENTMCNC: 35.3 % — SIGNIFICANT CHANGE UP (ref 32–36)
MCV RBC AUTO: 83.7 FL — SIGNIFICANT CHANGE UP (ref 80–100)
MONOCYTES # BLD AUTO: 0.57 K/UL — SIGNIFICANT CHANGE UP (ref 0–0.9)
MONOCYTES NFR BLD AUTO: 2.9 % — SIGNIFICANT CHANGE UP (ref 2–14)
MONOCYTES NFR BLD: 0 % — LOW (ref 2–9)
NEUTROPHIL AB SER-ACNC: 88 % — HIGH (ref 43–77)
NEUTROPHILS # BLD AUTO: 16.27 K/UL — HIGH (ref 1.8–7.4)
NEUTROPHILS NFR BLD AUTO: 83.8 % — HIGH (ref 43–77)
NRBC # BLD: 0 /100WBC — SIGNIFICANT CHANGE UP
NRBC # FLD: 0 — SIGNIFICANT CHANGE UP
PLATELET # BLD AUTO: 292 K/UL — SIGNIFICANT CHANGE UP (ref 150–400)
PMV BLD: 10.7 FL — SIGNIFICANT CHANGE UP (ref 7–13)
POTASSIUM SERPL-MCNC: 5.1 MMOL/L — SIGNIFICANT CHANGE UP (ref 3.5–5.3)
POTASSIUM SERPL-MCNC: 5.4 MMOL/L — HIGH (ref 3.5–5.3)
POTASSIUM SERPL-SCNC: 5.1 MMOL/L — SIGNIFICANT CHANGE UP (ref 3.5–5.3)
POTASSIUM SERPL-SCNC: 5.4 MMOL/L — HIGH (ref 3.5–5.3)
PROT SERPL-MCNC: 7.7 G/DL — SIGNIFICANT CHANGE UP (ref 6–8.3)
RBC # BLD: 4.6 M/UL — SIGNIFICANT CHANGE UP (ref 3.8–5.2)
RBC # FLD: 13.2 % — SIGNIFICANT CHANGE UP (ref 10.3–14.5)
REVIEW TO FOLLOW: YES — SIGNIFICANT CHANGE UP
SODIUM SERPL-SCNC: 137 MMOL/L — SIGNIFICANT CHANGE UP (ref 135–145)
SODIUM SERPL-SCNC: 137 MMOL/L — SIGNIFICANT CHANGE UP (ref 135–145)
T4 FREE SERPL-MCNC: 0.92 NG/DL — SIGNIFICANT CHANGE UP (ref 0.9–1.8)
THYROPEROXIDASE AB SERPL-ACNC: 22.6 IU/ML — SIGNIFICANT CHANGE UP (ref 0–34.9)
VARIANT LYMPHS # BLD: 1 % — SIGNIFICANT CHANGE UP
WBC # BLD: 19.41 K/UL — HIGH (ref 3.8–10.5)
WBC # FLD AUTO: 19.41 K/UL — HIGH (ref 3.8–10.5)

## 2018-01-31 RX ADMIN — Medication 3 MILLILITER(S): at 17:20

## 2018-01-31 RX ADMIN — BUDESONIDE AND FORMOTEROL FUMARATE DIHYDRATE 2 PUFF(S): 160; 4.5 AEROSOL RESPIRATORY (INHALATION) at 21:06

## 2018-01-31 RX ADMIN — Medication 20 MILLIGRAM(S): at 22:13

## 2018-01-31 RX ADMIN — Medication 1: at 09:35

## 2018-01-31 RX ADMIN — Medication 5 UNIT(S): at 09:36

## 2018-01-31 RX ADMIN — Medication 3 MILLILITER(S): at 04:21

## 2018-01-31 RX ADMIN — Medication 1: at 13:25

## 2018-01-31 RX ADMIN — Medication 20 MILLIGRAM(S): at 00:30

## 2018-01-31 RX ADMIN — LOSARTAN POTASSIUM 100 MILLIGRAM(S): 100 TABLET, FILM COATED ORAL at 08:26

## 2018-01-31 RX ADMIN — Medication 1 TABLET(S): at 12:57

## 2018-01-31 RX ADMIN — Medication 2: at 18:00

## 2018-01-31 RX ADMIN — MONTELUKAST 10 MILLIGRAM(S): 4 TABLET, CHEWABLE ORAL at 12:57

## 2018-01-31 RX ADMIN — Medication 5 UNIT(S): at 13:25

## 2018-01-31 RX ADMIN — Medication 75 MILLIGRAM(S): at 08:26

## 2018-01-31 RX ADMIN — Medication 3 MILLILITER(S): at 22:19

## 2018-01-31 RX ADMIN — Medication 12.5 MILLIGRAM(S): at 08:26

## 2018-01-31 RX ADMIN — Medication 5 UNIT(S): at 18:00

## 2018-01-31 RX ADMIN — Medication 75 MILLIGRAM(S): at 19:17

## 2018-01-31 RX ADMIN — Medication 20 MILLIGRAM(S): at 14:47

## 2018-01-31 RX ADMIN — Medication 3 MILLILITER(S): at 09:58

## 2018-01-31 RX ADMIN — ENOXAPARIN SODIUM 40 MILLIGRAM(S): 100 INJECTION SUBCUTANEOUS at 12:57

## 2018-01-31 RX ADMIN — Medication 20 MILLIGRAM(S): at 07:30

## 2018-01-31 RX ADMIN — BUDESONIDE AND FORMOTEROL FUMARATE DIHYDRATE 2 PUFF(S): 160; 4.5 AEROSOL RESPIRATORY (INHALATION) at 09:59

## 2018-02-01 LAB
BASOPHILS # BLD AUTO: 0.05 K/UL — SIGNIFICANT CHANGE UP (ref 0–0.2)
BASOPHILS NFR BLD AUTO: 0.3 % — SIGNIFICANT CHANGE UP (ref 0–2)
BUN SERPL-MCNC: 30 MG/DL — HIGH (ref 7–23)
CALCIUM SERPL-MCNC: 9.5 MG/DL — SIGNIFICANT CHANGE UP (ref 8.4–10.5)
CHLORIDE SERPL-SCNC: 95 MMOL/L — LOW (ref 98–107)
CO2 SERPL-SCNC: 22 MMOL/L — SIGNIFICANT CHANGE UP (ref 22–31)
CREAT SERPL-MCNC: 0.95 MG/DL — SIGNIFICANT CHANGE UP (ref 0.5–1.3)
EOSINOPHIL # BLD AUTO: 0.01 K/UL — SIGNIFICANT CHANGE UP (ref 0–0.5)
EOSINOPHIL NFR BLD AUTO: 0.1 % — SIGNIFICANT CHANGE UP (ref 0–6)
GLUCOSE BLDC GLUCOMTR-MCNC: 168 MG/DL — HIGH (ref 70–99)
GLUCOSE BLDC GLUCOMTR-MCNC: 177 MG/DL — HIGH (ref 70–99)
GLUCOSE BLDC GLUCOMTR-MCNC: 188 MG/DL — HIGH (ref 70–99)
GLUCOSE BLDC GLUCOMTR-MCNC: 210 MG/DL — HIGH (ref 70–99)
GLUCOSE SERPL-MCNC: 247 MG/DL — HIGH (ref 70–99)
HCT VFR BLD CALC: 38.4 % — SIGNIFICANT CHANGE UP (ref 34.5–45)
HGB BLD-MCNC: 12.8 G/DL — SIGNIFICANT CHANGE UP (ref 11.5–15.5)
IMM GRANULOCYTES # BLD AUTO: 1.05 # — SIGNIFICANT CHANGE UP
IMM GRANULOCYTES NFR BLD AUTO: 5.7 % — HIGH (ref 0–1.5)
LYMPHOCYTES # BLD AUTO: 10.9 % — LOW (ref 13–44)
LYMPHOCYTES # BLD AUTO: 2.01 K/UL — SIGNIFICANT CHANGE UP (ref 1–3.3)
MCHC RBC-ENTMCNC: 27.8 PG — SIGNIFICANT CHANGE UP (ref 27–34)
MCHC RBC-ENTMCNC: 33.3 % — SIGNIFICANT CHANGE UP (ref 32–36)
MCV RBC AUTO: 83.5 FL — SIGNIFICANT CHANGE UP (ref 80–100)
MONOCYTES # BLD AUTO: 0.66 K/UL — SIGNIFICANT CHANGE UP (ref 0–0.9)
MONOCYTES NFR BLD AUTO: 3.6 % — SIGNIFICANT CHANGE UP (ref 2–14)
NEUTROPHILS # BLD AUTO: 14.63 K/UL — HIGH (ref 1.8–7.4)
NEUTROPHILS NFR BLD AUTO: 79.4 % — HIGH (ref 43–77)
NRBC # FLD: 0 — SIGNIFICANT CHANGE UP
PLATELET # BLD AUTO: 279 K/UL — SIGNIFICANT CHANGE UP (ref 150–400)
PMV BLD: 10.6 FL — SIGNIFICANT CHANGE UP (ref 7–13)
POTASSIUM SERPL-MCNC: 5.4 MMOL/L — HIGH (ref 3.5–5.3)
POTASSIUM SERPL-SCNC: 5.4 MMOL/L — HIGH (ref 3.5–5.3)
RBC # BLD: 4.6 M/UL — SIGNIFICANT CHANGE UP (ref 3.8–5.2)
RBC # FLD: 13.4 % — SIGNIFICANT CHANGE UP (ref 10.3–14.5)
SODIUM SERPL-SCNC: 136 MMOL/L — SIGNIFICANT CHANGE UP (ref 135–145)
TSH RECEP AB FLD-ACNC: <0.5 IU/L — SIGNIFICANT CHANGE UP (ref 0–1.75)
WBC # BLD: 18.41 K/UL — HIGH (ref 3.8–10.5)
WBC # FLD AUTO: 18.41 K/UL — HIGH (ref 3.8–10.5)

## 2018-02-01 RX ADMIN — Medication 12.5 MILLIGRAM(S): at 05:26

## 2018-02-01 RX ADMIN — ENOXAPARIN SODIUM 40 MILLIGRAM(S): 100 INJECTION SUBCUTANEOUS at 11:01

## 2018-02-01 RX ADMIN — Medication 5 UNIT(S): at 17:11

## 2018-02-01 RX ADMIN — Medication 20 MILLIGRAM(S): at 22:35

## 2018-02-01 RX ADMIN — LOSARTAN POTASSIUM 100 MILLIGRAM(S): 100 TABLET, FILM COATED ORAL at 05:26

## 2018-02-01 RX ADMIN — INSULIN GLARGINE 7 UNIT(S): 100 INJECTION, SOLUTION SUBCUTANEOUS at 01:36

## 2018-02-01 RX ADMIN — Medication 3 MILLILITER(S): at 15:49

## 2018-02-01 RX ADMIN — Medication 75 MILLIGRAM(S): at 17:11

## 2018-02-01 RX ADMIN — BUDESONIDE AND FORMOTEROL FUMARATE DIHYDRATE 2 PUFF(S): 160; 4.5 AEROSOL RESPIRATORY (INHALATION) at 20:46

## 2018-02-01 RX ADMIN — Medication 3 MILLILITER(S): at 10:16

## 2018-02-01 RX ADMIN — Medication 20 MILLIGRAM(S): at 16:16

## 2018-02-01 RX ADMIN — BUDESONIDE AND FORMOTEROL FUMARATE DIHYDRATE 2 PUFF(S): 160; 4.5 AEROSOL RESPIRATORY (INHALATION) at 10:59

## 2018-02-01 RX ADMIN — Medication 1 TABLET(S): at 11:01

## 2018-02-01 RX ADMIN — Medication 1: at 12:34

## 2018-02-01 RX ADMIN — Medication 3 MILLILITER(S): at 04:30

## 2018-02-01 RX ADMIN — Medication 75 MILLIGRAM(S): at 05:26

## 2018-02-01 RX ADMIN — MONTELUKAST 10 MILLIGRAM(S): 4 TABLET, CHEWABLE ORAL at 11:00

## 2018-02-01 RX ADMIN — Medication 3 MILLILITER(S): at 21:34

## 2018-02-01 RX ADMIN — Medication 20 MILLIGRAM(S): at 05:25

## 2018-02-01 RX ADMIN — Medication 5 UNIT(S): at 09:18

## 2018-02-01 RX ADMIN — Medication 1: at 17:11

## 2018-02-01 RX ADMIN — Medication 1: at 09:17

## 2018-02-01 RX ADMIN — INSULIN GLARGINE 7 UNIT(S): 100 INJECTION, SOLUTION SUBCUTANEOUS at 22:35

## 2018-02-01 RX ADMIN — Medication 5 UNIT(S): at 12:34

## 2018-02-02 ENCOUNTER — TRANSCRIPTION ENCOUNTER (OUTPATIENT)
Age: 58
End: 2018-02-02

## 2018-02-02 VITALS
OXYGEN SATURATION: 98 % | TEMPERATURE: 98 F | HEART RATE: 89 BPM | SYSTOLIC BLOOD PRESSURE: 147 MMHG | RESPIRATION RATE: 18 BRPM | DIASTOLIC BLOOD PRESSURE: 88 MMHG

## 2018-02-02 LAB
BASOPHILS # BLD AUTO: 0.07 K/UL — SIGNIFICANT CHANGE UP (ref 0–0.2)
BASOPHILS NFR BLD AUTO: 0.3 % — SIGNIFICANT CHANGE UP (ref 0–2)
BUN SERPL-MCNC: 27 MG/DL — HIGH (ref 7–23)
CALCIUM SERPL-MCNC: 9.7 MG/DL — SIGNIFICANT CHANGE UP (ref 8.4–10.5)
CHLORIDE SERPL-SCNC: 92 MMOL/L — LOW (ref 98–107)
CO2 SERPL-SCNC: 29 MMOL/L — SIGNIFICANT CHANGE UP (ref 22–31)
CREAT SERPL-MCNC: 0.91 MG/DL — SIGNIFICANT CHANGE UP (ref 0.5–1.3)
EOSINOPHIL # BLD AUTO: 0 K/UL — SIGNIFICANT CHANGE UP (ref 0–0.5)
EOSINOPHIL NFR BLD AUTO: 0 % — SIGNIFICANT CHANGE UP (ref 0–6)
GLUCOSE BLDC GLUCOMTR-MCNC: 139 MG/DL — HIGH (ref 70–99)
GLUCOSE BLDC GLUCOMTR-MCNC: 170 MG/DL — HIGH (ref 70–99)
GLUCOSE SERPL-MCNC: 170 MG/DL — HIGH (ref 70–99)
HCT VFR BLD CALC: 40.9 % — SIGNIFICANT CHANGE UP (ref 34.5–45)
HGB BLD-MCNC: 13.7 G/DL — SIGNIFICANT CHANGE UP (ref 11.5–15.5)
IMM GRANULOCYTES # BLD AUTO: 1.46 # — SIGNIFICANT CHANGE UP
IMM GRANULOCYTES NFR BLD AUTO: 6.9 % — HIGH (ref 0–1.5)
LYMPHOCYTES # BLD AUTO: 13.2 % — SIGNIFICANT CHANGE UP (ref 13–44)
LYMPHOCYTES # BLD AUTO: 2.8 K/UL — SIGNIFICANT CHANGE UP (ref 1–3.3)
MCHC RBC-ENTMCNC: 28.5 PG — SIGNIFICANT CHANGE UP (ref 27–34)
MCHC RBC-ENTMCNC: 33.5 % — SIGNIFICANT CHANGE UP (ref 32–36)
MCV RBC AUTO: 85.2 FL — SIGNIFICANT CHANGE UP (ref 80–100)
MONOCYTES # BLD AUTO: 0.72 K/UL — SIGNIFICANT CHANGE UP (ref 0–0.9)
MONOCYTES NFR BLD AUTO: 3.4 % — SIGNIFICANT CHANGE UP (ref 2–14)
NEUTROPHILS # BLD AUTO: 16.24 K/UL — HIGH (ref 1.8–7.4)
NEUTROPHILS NFR BLD AUTO: 76.2 % — SIGNIFICANT CHANGE UP (ref 43–77)
NRBC # FLD: 0.02 — SIGNIFICANT CHANGE UP
PLATELET # BLD AUTO: 305 K/UL — SIGNIFICANT CHANGE UP (ref 150–400)
PMV BLD: 10.8 FL — SIGNIFICANT CHANGE UP (ref 7–13)
POTASSIUM SERPL-MCNC: 5.5 MMOL/L — HIGH (ref 3.5–5.3)
POTASSIUM SERPL-SCNC: 5.5 MMOL/L — HIGH (ref 3.5–5.3)
RBC # BLD: 4.8 M/UL — SIGNIFICANT CHANGE UP (ref 3.8–5.2)
RBC # FLD: 13.2 % — SIGNIFICANT CHANGE UP (ref 10.3–14.5)
SODIUM SERPL-SCNC: 137 MMOL/L — SIGNIFICANT CHANGE UP (ref 135–145)
WBC # BLD: 21.29 K/UL — HIGH (ref 3.8–10.5)
WBC # FLD AUTO: 21.29 K/UL — HIGH (ref 3.8–10.5)

## 2018-02-02 RX ORDER — METFORMIN HYDROCHLORIDE 850 MG/1
1 TABLET ORAL
Qty: 0 | Refills: 0 | COMMUNITY

## 2018-02-02 RX ORDER — SODIUM POLYSTYRENE SULFONATE 4.1 MEQ/G
15 POWDER, FOR SUSPENSION ORAL ONCE
Qty: 0 | Refills: 0 | Status: DISCONTINUED | OUTPATIENT
Start: 2018-02-02 | End: 2018-02-02

## 2018-02-02 RX ORDER — BUDESONIDE AND FORMOTEROL FUMARATE DIHYDRATE 160; 4.5 UG/1; UG/1
2 AEROSOL RESPIRATORY (INHALATION)
Qty: 0 | Refills: 0 | DISCHARGE
Start: 2018-02-02

## 2018-02-02 RX ORDER — MONTELUKAST 4 MG/1
1 TABLET, CHEWABLE ORAL
Qty: 30 | Refills: 0
Start: 2018-02-02 | End: 2018-03-03

## 2018-02-02 RX ORDER — LINAGLIPTIN AND METFORMIN HYDROCHLORIDE 2.5; 85 MG/1; MG/1
1 TABLET, FILM COATED ORAL
Qty: 60 | Refills: 0
Start: 2018-02-02 | End: 2018-03-03

## 2018-02-02 RX ORDER — PANTOPRAZOLE SODIUM 20 MG/1
1 TABLET, DELAYED RELEASE ORAL
Qty: 30 | Refills: 0 | OUTPATIENT
Start: 2018-02-02 | End: 2018-03-03

## 2018-02-02 RX ORDER — SITAGLIPTIN AND METFORMIN HYDROCHLORIDE 500; 50 MG/1; MG/1
1 TABLET, FILM COATED ORAL
Qty: 60 | Refills: 0 | OUTPATIENT
Start: 2018-02-02 | End: 2018-03-03

## 2018-02-02 RX ORDER — TIOTROPIUM BROMIDE 18 UG/1
1 CAPSULE ORAL; RESPIRATORY (INHALATION)
Qty: 1 | Refills: 0 | OUTPATIENT
Start: 2018-02-02 | End: 2018-03-03

## 2018-02-02 RX ORDER — PANTOPRAZOLE SODIUM 20 MG/1
1 TABLET, DELAYED RELEASE ORAL
Qty: 30 | Refills: 0
Start: 2018-02-02 | End: 2018-03-03

## 2018-02-02 RX ADMIN — ENOXAPARIN SODIUM 40 MILLIGRAM(S): 100 INJECTION SUBCUTANEOUS at 11:44

## 2018-02-02 RX ADMIN — Medication 1 TABLET(S): at 11:44

## 2018-02-02 RX ADMIN — Medication 20 MILLIGRAM(S): at 06:14

## 2018-02-02 RX ADMIN — BUDESONIDE AND FORMOTEROL FUMARATE DIHYDRATE 2 PUFF(S): 160; 4.5 AEROSOL RESPIRATORY (INHALATION) at 08:38

## 2018-02-02 RX ADMIN — LOSARTAN POTASSIUM 100 MILLIGRAM(S): 100 TABLET, FILM COATED ORAL at 06:15

## 2018-02-02 RX ADMIN — Medication 1: at 08:38

## 2018-02-02 RX ADMIN — Medication 5 UNIT(S): at 08:38

## 2018-02-02 RX ADMIN — Medication 3 MILLILITER(S): at 16:32

## 2018-02-02 RX ADMIN — Medication 3 MILLILITER(S): at 10:39

## 2018-02-02 RX ADMIN — Medication 75 MILLIGRAM(S): at 06:15

## 2018-02-02 RX ADMIN — MONTELUKAST 10 MILLIGRAM(S): 4 TABLET, CHEWABLE ORAL at 11:44

## 2018-02-02 RX ADMIN — Medication 3 MILLILITER(S): at 04:23

## 2018-02-02 RX ADMIN — Medication 12.5 MILLIGRAM(S): at 06:15

## 2018-02-02 RX ADMIN — Medication 5 UNIT(S): at 13:03

## 2018-02-02 NOTE — PROGRESS NOTE ADULT - PROBLEM SELECTOR PLAN 3
On meds primary team following up
no oral meds  finger sticks with short acting insulin sliding scale  A1C 7.1  uncontrolled DM Type 2 worsened by IV solumedrol  endocrinologist evaluation noted
no oral meds  finger sticks with short acting insulin sliding scale  A1C 7.1  uncontrolled DM Type 2 worsened by IV solumedrol  endocrinologist evaluation noted
no oral meds  finger sticks with short acting insulin sliding scale  A1C 7.1  uncontrolled DM Type 2 worsened by IV solumedrol  endocrinologist evaluation requested
seems to be under c ontrol
seems to be under control
seems to be under control
no oral meds  finger sticks with short acting insulin sliding scale  A1C 7.1  uncontrolled DM Type 2 worsened by IV solumedrol  endocrinologist evaluation noted

## 2018-02-02 NOTE — PROGRESS NOTE ADULT - PROBLEM SELECTOR PROBLEM 4
DM2 (diabetes mellitus, type 2)
Hypertension

## 2018-02-02 NOTE — PROGRESS NOTE ADULT - SUBJECTIVE AND OBJECTIVE BOX
Chief complaint  Patient is a 57y old  Female who presents with a chief complaint of wheezing and shortness of breath (02 Feb 2018 13:25)   Review of systems  Patient in bed, looks comfortable, no fever, no hypoglycemia.    Labs and Fingersticks  CAPILLARY BLOOD GLUCOSE      POCT Blood Glucose.: 139 mg/dL (02 Feb 2018 12:27)  POCT Blood Glucose.: 170 mg/dL (02 Feb 2018 08:16)  POCT Blood Glucose.: 210 mg/dL (01 Feb 2018 22:16)  POCT Blood Glucose.: 188 mg/dL (01 Feb 2018 16:49)          Calcium, Total Serum: 9.7 (02-02 @ 07:10)  Calcium, Total Serum: 9.5 (02-01 @ 05:13)          02-02    137  |  92<L>  |  27<H>  ----------------------------<  170<H>  5.5<H>   |  29  |  0.91    Ca    9.7      02 Feb 2018 07:10                          13.7   21.29 )-----------( 305      ( 02 Feb 2018 07:10 )             40.9     Medications  MEDICATIONS  (STANDING):  ALBUTerol    90 MICROgram(s) HFA Inhaler 1 Puff(s) Inhalation every 4 hours  ALBUTerol/ipratropium for Nebulization 3 milliLiter(s) Nebulizer every 6 hours  buDESOnide 160 MICROgram(s)/formoterol 4.5 MICROgram(s) Inhaler 2 Puff(s) Inhalation two times a day  dextrose 5%. 1000 milliLiter(s) (50 mL/Hr) IV Continuous <Continuous>  dextrose 50% Injectable 12.5 Gram(s) IV Push once  dextrose 50% Injectable 25 Gram(s) IV Push once  dextrose 50% Injectable 25 Gram(s) IV Push once  enoxaparin Injectable 40 milliGRAM(s) SubCutaneous daily  hydrochlorothiazide 12.5 milliGRAM(s) Oral daily  insulin glargine Injectable (LANTUS) 7 Unit(s) SubCutaneous at bedtime  insulin lispro (HumaLOG) corrective regimen sliding scale   SubCutaneous three times a day before meals  insulin lispro Injectable (HumaLOG) 5 Unit(s) SubCutaneous three times a day before meals  losartan 100 milliGRAM(s) Oral daily  montelukast 10 milliGRAM(s) Oral daily  multivitamin 1 Tablet(s) Oral daily  oseltamivir 75 milliGRAM(s) Oral two times a day  predniSONE   Tablet 40 milliGRAM(s) Oral daily  tiotropium 18 MICROgram(s) Capsule 1 Capsule(s) Inhalation daily      Physical Exam  General: Patient comfortable in bed  Vital Signs Last 12 Hrs  T(F): 97.8 (02-02-18 @ 13:32), Max: 97.8 (02-02-18 @ 06:08)  HR: 89 (02-02-18 @ 13:32) (67 - 89)  BP: 147/88 (02-02-18 @ 13:32) (132/85 - 147/88)  BP(mean): --  RR: 18 (02-02-18 @ 13:32) (18 - 18)  SpO2: 98% (02-02-18 @ 13:32) (98% - 98%)  Neck: No palpable thyroid nodules.  CVS: S1S2, No murmurs  Respiratory: No wheezing, no crepitations  GI: Abdomen soft, bowel sounds positive  Musculoskeletal:  edema lower extremities.   Skin: No skin rashes, no ecchymosis    Diagnostics    Free Thyroxine, Serum: AM Sched. Collection: 31-Jan-2018 06:00 (01-30 @ 19:08)  Thyroperoxidase Antibody: AM Sched. Collection: 31-Jan-2018 06:00 (01-30 @ 19:08)  TSH Receptor Antibody: AM Sched. Collection: 31-Jan-2018 06:00 (01-30 @ 19:08)
Chief complaint  Patient is a 57y old  Female who presents with a chief complaint of wheezing and shortness of breath (29 Jan 2018 12:33)   Review of systems  Patient in bed, looks comfortable, no fever, no hypoglycemia.    Labs and Fingersticks  CAPILLARY BLOOD GLUCOSE      POCT Blood Glucose.: 173 mg/dL (31 Jan 2018 09:01)  POCT Blood Glucose.: 224 mg/dL (30 Jan 2018 23:33)  POCT Blood Glucose.: 231 mg/dL (30 Jan 2018 17:27)  POCT Blood Glucose.: 329 mg/dL (30 Jan 2018 12:31)          Calcium, Total Serum: 10.0 (01-31 @ 08:35)  Calcium, Total Serum: 9.8 (01-30 @ 05:30)  Albumin, Serum: 4.3 (01-31 @ 08:35)    Alanine Aminotransferase (ALT/SGPT): 22 (01-31 @ 08:35)  Alkaline Phosphatase, Serum: 79 (01-31 @ 08:35)  Aspartate Aminotransferase (AST/SGOT): 17 (01-31 @ 08:35)        01-31    137  |  96<L>  |  30<H>  ----------------------------<  175<H>  5.4<H>   |  22  |  0.93    Ca    10.0      31 Jan 2018 08:35    TPro  7.7  /  Alb  4.3  /  TBili  0.2  /  DBili  x   /  AST  17  /  ALT  22  /  AlkPhos  79  01-31                        12.9   12.75 )-----------( 309      ( 30 Jan 2018 05:30 )             39.4     Medications  MEDICATIONS  (STANDING):  ALBUTerol    90 MICROgram(s) HFA Inhaler 1 Puff(s) Inhalation every 4 hours  ALBUTerol/ipratropium for Nebulization 3 milliLiter(s) Nebulizer every 6 hours  buDESOnide 160 MICROgram(s)/formoterol 4.5 MICROgram(s) Inhaler 2 Puff(s) Inhalation two times a day  dextrose 5%. 1000 milliLiter(s) (50 mL/Hr) IV Continuous <Continuous>  dextrose 50% Injectable 12.5 Gram(s) IV Push once  dextrose 50% Injectable 25 Gram(s) IV Push once  dextrose 50% Injectable 25 Gram(s) IV Push once  enoxaparin Injectable 40 milliGRAM(s) SubCutaneous daily  hydrochlorothiazide 12.5 milliGRAM(s) Oral daily  insulin glargine Injectable (LANTUS) 7 Unit(s) SubCutaneous at bedtime  insulin lispro (HumaLOG) corrective regimen sliding scale   SubCutaneous three times a day before meals  insulin lispro Injectable (HumaLOG) 5 Unit(s) SubCutaneous three times a day before meals  losartan 100 milliGRAM(s) Oral daily  montelukast 10 milliGRAM(s) Oral daily  multivitamin 1 Tablet(s) Oral daily  oseltamivir 75 milliGRAM(s) Oral two times a day  tiotropium 18 MICROgram(s) Capsule 1 Capsule(s) Inhalation daily      Physical Exam  General: Patient comfortable in bed  Vital Signs Last 12 Hrs  T(F): 97.2 (01-31-18 @ 05:08), Max: 97.2 (01-31-18 @ 05:08)  HR: 72 (01-31-18 @ 05:08) (72 - 72)  BP: 126/85 (01-31-18 @ 05:08) (126/85 - 126/85)  BP(mean): --  RR: 18 (01-31-18 @ 05:08) (18 - 18)  SpO2: 98% (01-31-18 @ 05:08) (98% - 98%)  Neck: No palpable thyroid nodules.  CVS: S1S2, No murmurs  Respiratory: No wheezing, no crepitations  GI: Abdomen soft, bowel sounds positive  Musculoskeletal:  edema lower extremities.   Skin: No skin rashes, no ecchymosis    Diagnostics    Free Thyroxine, Serum: AM Sched. Collection: 31-Jan-2018 06:00 (01-30 @ 19:08)  Thyroperoxidase Antibody: AM Sched. Collection: 31-Jan-2018 06:00 (01-30 @ 19:08)  TSH Receptor Antibody: AM Sched. Collection: 31-Jan-2018 06:00 (01-30 @ 19:08)
Patient is a 57y old  Female who presents with a chief complaint of wheezing and shortness of breath    SUBJECTIVE / OVERNIGHT EVENTS: Overnight events noted  states much improved  less cough    ROS:  Resp: No cough or sputum production  CVS: No chest pain or palpitations or orthopnea  GI: no N/V/D  : no dysuria, hematuria  Neuro: no weakness or paresthesias  Heme: No petechiae or easy bruising  Msk: No joint pain or swelling  Skin: No rash or itching  All other ROS negative       MEDICATIONS  (STANDING):  ALBUTerol    90 MICROgram(s) HFA Inhaler 1 Puff(s) Inhalation every 4 hours  ALBUTerol/ipratropium for Nebulization 3 milliLiter(s) Nebulizer every 6 hours  buDESOnide 160 MICROgram(s)/formoterol 4.5 MICROgram(s) Inhaler 2 Puff(s) Inhalation two times a day  dextrose 5%. 1000 milliLiter(s) (50 mL/Hr) IV Continuous <Continuous>  dextrose 50% Injectable 12.5 Gram(s) IV Push once  dextrose 50% Injectable 25 Gram(s) IV Push once  dextrose 50% Injectable 25 Gram(s) IV Push once  enoxaparin Injectable 40 milliGRAM(s) SubCutaneous daily  hydrochlorothiazide 12.5 milliGRAM(s) Oral daily  insulin glargine Injectable (LANTUS) 7 Unit(s) SubCutaneous at bedtime  insulin lispro (HumaLOG) corrective regimen sliding scale   SubCutaneous three times a day before meals  insulin lispro Injectable (HumaLOG) 5 Unit(s) SubCutaneous three times a day before meals  losartan 100 milliGRAM(s) Oral daily  montelukast 10 milliGRAM(s) Oral daily  multivitamin 1 Tablet(s) Oral daily  oseltamivir 75 milliGRAM(s) Oral two times a day  tiotropium 18 MICROgram(s) Capsule 1 Capsule(s) Inhalation daily    MEDICATIONS  (PRN):  dextrose Gel 1 Dose(s) Oral once PRN Blood Glucose LESS THAN 70 milliGRAM(s)/deciliter  glucagon  Injectable 1 milliGRAM(s) IntraMuscular once PRN Glucose LESS THAN 70 milligrams/deciliter  guaiFENesin   Syrup  (Sugar-Free) 200 milliGRAM(s) Oral every 6 hours PRN Cough        CAPILLARY BLOOD GLUCOSE      POCT Blood Glucose.: 139 mg/dL (02 Feb 2018 12:27)  POCT Blood Glucose.: 170 mg/dL (02 Feb 2018 08:16)  POCT Blood Glucose.: 210 mg/dL (01 Feb 2018 22:16)  POCT Blood Glucose.: 188 mg/dL (01 Feb 2018 16:49)    I&O's Summary    PHYSICAL EXAM: vital signs as above  in no apparent distress  HEENT: SAQIB EOMI  Neck: Supple, no JVD, no thyromegaly  Lungs: air entry significantly improved from yesterday   expiratory rhonchi fully resolved   CVS: S1 S2 no M/R/G  Abdomen: no tenderness, no organomegaly, BS present  Neuro: AO x 3 no focal weakness, no sensory abnormalities  Psych: appropriate affect  Skin: warm, dry  Ext: no cyanosis or clubbing, no edema  Msk: no joint swelling or deformities  Back: no CVA tenderness, no kyphosis/scoliosis    LABS:                        13.7   21.29 )-----------( 305      ( 02 Feb 2018 07:10 )             40.9     02-02    137  |  92<L>  |  27<H>  ----------------------------<  170<H>  5.5<H>   |  29  |  0.91    Ca    9.7      02 Feb 2018 07:10                  Consultant(s) Notes Reviewed:      Care Discussed with Consultants/Other Providers:    Contact Number, Dr Woodson [phone]
Patient is a 57y old  Female who presents with a chief complaint of wheezing and shortness of breath (02 Feb 2018 13:25)      Any change in ROS: pt is doing ok   feels better  wheezing is less     MEDICATIONS  (STANDING):  ALBUTerol    90 MICROgram(s) HFA Inhaler 1 Puff(s) Inhalation every 4 hours  ALBUTerol/ipratropium for Nebulization 3 milliLiter(s) Nebulizer every 6 hours  buDESOnide 160 MICROgram(s)/formoterol 4.5 MICROgram(s) Inhaler 2 Puff(s) Inhalation two times a day  dextrose 5%. 1000 milliLiter(s) (50 mL/Hr) IV Continuous <Continuous>  dextrose 50% Injectable 12.5 Gram(s) IV Push once  dextrose 50% Injectable 25 Gram(s) IV Push once  dextrose 50% Injectable 25 Gram(s) IV Push once  enoxaparin Injectable 40 milliGRAM(s) SubCutaneous daily  hydrochlorothiazide 12.5 milliGRAM(s) Oral daily  insulin glargine Injectable (LANTUS) 7 Unit(s) SubCutaneous at bedtime  insulin lispro (HumaLOG) corrective regimen sliding scale   SubCutaneous three times a day before meals  insulin lispro Injectable (HumaLOG) 5 Unit(s) SubCutaneous three times a day before meals  losartan 100 milliGRAM(s) Oral daily  montelukast 10 milliGRAM(s) Oral daily  multivitamin 1 Tablet(s) Oral daily  oseltamivir 75 milliGRAM(s) Oral two times a day  predniSONE   Tablet 40 milliGRAM(s) Oral daily  tiotropium 18 MICROgram(s) Capsule 1 Capsule(s) Inhalation daily    MEDICATIONS  (PRN):  dextrose Gel 1 Dose(s) Oral once PRN Blood Glucose LESS THAN 70 milliGRAM(s)/deciliter  glucagon  Injectable 1 milliGRAM(s) IntraMuscular once PRN Glucose LESS THAN 70 milligrams/deciliter  guaiFENesin   Syrup  (Sugar-Free) 200 milliGRAM(s) Oral every 6 hours PRN Cough    Vital Signs Last 24 Hrs  T(C): 36.6 (02 Feb 2018 13:32), Max: 37 (01 Feb 2018 22:18)  T(F): 97.8 (02 Feb 2018 13:32), Max: 98.6 (01 Feb 2018 22:18)  HR: 89 (02 Feb 2018 13:32) (67 - 89)  BP: 147/88 (02 Feb 2018 13:32) (132/85 - 147/88)  BP(mean): --  RR: 18 (02 Feb 2018 13:32) (17 - 18)  SpO2: 98% (02 Feb 2018 13:32) (97% - 100%)    I&O's Summary        Physical Exam:   GENERAL: NAD, well-groomed, well-developed  HEENT: SAQIB/   Atraumatic, Normocephalic  ENMT: No tonsillar erythema, exudates, or enlargement; Moist mucous membranes, Good dentition, No lesions  NECK: Supple, No JVD, Normal thyroid  CHEST/LUNG: exp wheezing+  CVS: Regular rate and rhythm; No murmurs, rubs, or gallops  GI: : Soft, Nontender, Nondistended; Bowel sounds present  NERVOUS SYSTEM:  Alert & Oriented X3  EXTREMITIES:  2+ Peripheral Pulses, No clubbing, cyanosis, or edema  LYMPH: No lymphadenopathy noted  SKIN: No rashes or lesions  ENDOCRINOLOGY: No Thyromegaly  PSYCH: Appropriate    Labs:  27                            13.7   21.29 )-----------( 305      ( 02 Feb 2018 07:10 )             40.9                         12.8   18.41 )-----------( 279      ( 01 Feb 2018 05:13 )             38.4                         13.6   19.41 )-----------( 292      ( 31 Jan 2018 09:55 )             38.5                         12.9   12.75 )-----------( 309      ( 30 Jan 2018 05:30 )             39.4     02-02    137  |  92<L>  |  27<H>  ----------------------------<  170<H>  5.5<H>   |  29  |  0.91  02-01    136  |  95<L>  |  30<H>  ----------------------------<  247<H>  5.4<H>   |  22  |  0.95  01-31    137  |  96<L>  |  34<H>  ----------------------------<  202<H>  5.1   |  22  |  0.95  01-31    137  |  96<L>  |  30<H>  ----------------------------<  175<H>  5.4<H>   |  22  |  0.93  01-30    138  |  94<L>  |  28<H>  ----------------------------<  209<H>  5.1   |  24  |  1.01    Ca    9.7      02 Feb 2018 07:10  Ca    9.5      01 Feb 2018 05:13    TPro  7.7  /  Alb  4.3  /  TBili  0.2  /  DBili  x   /  AST  17  /  ALT  22  /  AlkPhos  79  01-31    CAPILLARY BLOOD GLUCOSE      POCT Blood Glucose.: 139 mg/dL (02 Feb 2018 12:27)  POCT Blood Glucose.: 170 mg/dL (02 Feb 2018 08:16)  POCT Blood Glucose.: 210 mg/dL (01 Feb 2018 22:16)  POCT Blood Glucose.: 188 mg/dL (01 Feb 2018 16:49)            Cultures:     < from: Xray Chest 2 Views PA/Lat (01.28.18 @ 17:21) >      PROCEDURE DATE:  Jan 28 2018         INTERPRETATION:  CLINICAL INDICATION: Cough, asthma exacerbation.    EXAM: Frontal and lateral views of the chest with comparison made to   chest radiograph on 11/13/2017    FINDINGS:   Low lung volumes. The lungs are clear. There is no pleural effusion or   pneumothorax.  The heart size is normal.   No acute bony pathology.    IMPRESSION:   Clear lungs.              BARRY ABBOTT M.D., RADIOLOGY RESIDENT  This document has beenelectronically signed.  MALISSA PUGA M.D.; ATTENDING RADIOLOGIST  This document has been electronically signed. Jan 29 2018  7:34AM    < end of copied text >                    Rapid Respiratory Viral Panel Result        01-29 @ 03:40  Rapid RVP --  Coronovirus --  Adenovirus NOT DETECTED  Bordetella Pertussis NOT DETECTED  Chlamydia Pneumonia NOT DETECTED  Entero/RhinovirusNOT DETECTED  HKU1 Coronovirus --  HMPV Coronovirus NOT DETECTED  Influenza A NOT DETECTED (any subtype)  Influenza AH1 NOT DETECTED  Influenza AH1 2009 NOT DETECTED  Influenza AH3 NOT DETECTED  Influenza B POSITIVE  Mycoplasma Pneumoniae NOT DETECTED This nucleic acid amplification assay was performed using  the [employer]. The following pathogens are tested  for: Adenovirus, Coronavirus 229E, Coronavirus HKU1,  Coronavirus NL63, Coronavirus OC43, Human Metapneumovirus  (HMPV), Rhinovirus/Enterovirus, Influenza A H1, Influenza A  H1 2009 (Pandemic H1 2009), Influenza A H3, Influenza A (Flu  A) subtype not identified, Influenza B, Parainfluenza Virus  (types 1, 2, 3, 4), Respiratory Syncytial Virus (RSV),  Bordetella pertussis, Chlamydophila pneumoniae, and  Mycoplasma pneumoniae. A negative FilmArray result does not  always exclude the possibility of viral or bacterial  infection. Laboratory results should always be interpreted  in the context of clinical findings.  NL63 Coronovirus --  OC43 Coronovirus --  Parainfluenza 1 NOT DETECTED  Parainfluenza 2 NOT DETECTED  Parainfluenza 3 NOT DETECTED  Parainfluenza 4 NOT DETECTED  Resp Syncytial Virus NOT DETECTED          Studies  Chest X-RAY  CT SCAN Chest   Venous Dopplers: LE:   CT Abdomen  Others
Patient is a 57y old  Female who presents with a chief complaint of wheezing and shortness of breath (29 Jan 2018 12:33)      Any change in ROS: Still with Wheezing    MEDICATIONS  (STANDING):  ALBUTerol    90 MICROgram(s) HFA Inhaler 1 Puff(s) Inhalation every 4 hours  ALBUTerol/ipratropium for Nebulization 3 milliLiter(s) Nebulizer every 6 hours  buDESOnide 160 MICROgram(s)/formoterol 4.5 MICROgram(s) Inhaler 2 Puff(s) Inhalation two times a day  dextrose 5%. 1000 milliLiter(s) (50 mL/Hr) IV Continuous <Continuous>  dextrose 50% Injectable 12.5 Gram(s) IV Push once  dextrose 50% Injectable 25 Gram(s) IV Push once  dextrose 50% Injectable 25 Gram(s) IV Push once  enoxaparin Injectable 40 milliGRAM(s) SubCutaneous daily  hydrochlorothiazide 12.5 milliGRAM(s) Oral daily  insulin glargine Injectable (LANTUS) 7 Unit(s) SubCutaneous at bedtime  insulin lispro (HumaLOG) corrective regimen sliding scale   SubCutaneous three times a day before meals  insulin lispro Injectable (HumaLOG) 5 Unit(s) SubCutaneous three times a day before meals  losartan 100 milliGRAM(s) Oral daily  methylPREDNISolone sodium succinate Injectable 20 milliGRAM(s) IV Push every 6 hours  montelukast 10 milliGRAM(s) Oral daily  multivitamin 1 Tablet(s) Oral daily  oseltamivir 75 milliGRAM(s) Oral two times a day  tiotropium 18 MICROgram(s) Capsule 1 Capsule(s) Inhalation daily    MEDICATIONS  (PRN):  dextrose Gel 1 Dose(s) Oral once PRN Blood Glucose LESS THAN 70 milliGRAM(s)/deciliter  glucagon  Injectable 1 milliGRAM(s) IntraMuscular once PRN Glucose LESS THAN 70 milligrams/deciliter  guaiFENesin   Syrup  (Sugar-Free) 200 milliGRAM(s) Oral every 6 hours PRN Cough    Vital Signs Last 24 Hrs  T(C): 36.8 (30 Jan 2018 14:26), Max: 36.8 (30 Jan 2018 05:00)  T(F): 98.2 (30 Jan 2018 14:26), Max: 98.3 (30 Jan 2018 05:00)  HR: 86 (30 Jan 2018 14:26) (81 - 87)  BP: 124/79 (30 Jan 2018 14:26) (124/79 - 154/87)  BP(mean): --  RR: 16 (30 Jan 2018 14:26) (16 - 20)  SpO2: 100% (30 Jan 2018 14:26) (97% - 100%)    I&O's Summary        Physical Exam:   GENERAL: NAD, well-groomed, well-developed  HEENT: SAQIB/   Atraumatic, Normocephalic  ENMT: No tonsillar erythema, exudates, or enlargement; Moist mucous membranes, Good dentition, No lesions  NECK: Supple, No JVD, Normal thyroid  CHEST/LUNG: wheeze++  CVS: Regular rate and rhythm; No murmurs, rubs, or gallops  GI: : Soft, Nontender, Nondistended; Bowel sounds present  NERVOUS SYSTEM:  Alert & Oriented X3  EXTREMITIES:  2+ Peripheral Pulses, No clubbing, cyanosis, or edema  LYMPH: No lymphadenopathy noted  SKIN: No rashes or lesions  ENDOCRINOLOGY: No Thyromegaly  PSYCH: Appropriate    Labs:  27                            12.9   12.75 )-----------( 309      ( 30 Jan 2018 05:30 )             39.4                         13.3   9.89  )-----------( 304      ( 28 Jan 2018 16:45 )             40.6     01-30    138  |  94<L>  |  28<H>  ----------------------------<  209<H>  5.1   |  24  |  1.01  01-28    140  |  96<L>  |  26<H>  ----------------------------<  171<H>  4.5   |  28  |  0.99    Ca    9.8      30 Jan 2018 05:30    TPro  7.7  /  Alb  4.5  /  TBili  0.2  /  DBili  x   /  AST  22  /  ALT  29  /  AlkPhos  87  01-28    CAPILLARY BLOOD GLUCOSE      POCT Blood Glucose.: 231 mg/dL (30 Jan 2018 17:27)  POCT Blood Glucose.: 329 mg/dL (30 Jan 2018 12:31)  POCT Blood Glucose.: 153 mg/dL (30 Jan 2018 08:44)  POCT Blood Glucose.: 350 mg/dL (29 Jan 2018 23:07)            Cultures:     < from: Xray Chest 2 Views PA/Lat (01.28.18 @ 17:21) >  chest radiograph on 11/13/2017    FINDINGS:   Low lung volumes. The lungs are clear. There is no pleural effusion or   pneumothorax.  The heart size is normal.   No acute bony pathology.    IMPRESSION:   Clear lungs.              BARRY ABBOTT M.D., RADIOLOGY RESIDENT  This document has beenelectronically signed.  MALISSA PUGA M.D.; ATTENDING RADIOLOGIST  This document has been electronically signed. Jan 29 2018  7:34AM    < end of copied text >                    Rapid Respiratory Viral Panel Result        01-29 @ 03:40  Rapid RVP --  Coronovirus --  Adenovirus NOT DETECTED  Bordetella Pertussis NOT DETECTED  Chlamydia Pneumonia NOT DETECTED  Entero/RhinovirusNOT DETECTED  HKU1 Coronovirus --  HMPV Coronovirus NOT DETECTED  Influenza A NOT DETECTED (any subtype)  Influenza AH1 NOT DETECTED  Influenza AH1 2009 NOT DETECTED  Influenza AH3 NOT DETECTED  Influenza B POSITIVE  Mycoplasma Pneumoniae NOT DETECTED This nucleic acid amplification assay was performed using  the [employer]. The following pathogens are tested  for: Adenovirus, Coronavirus 229E, Coronavirus HKU1,  Coronavirus NL63, Coronavirus OC43, Human Metapneumovirus  (HMPV), Rhinovirus/Enterovirus, Influenza A H1, Influenza A  H1 2009 (Pandemic H1 2009), Influenza A H3, Influenza A (Flu  A) subtype not identified, Influenza B, Parainfluenza Virus  (types 1, 2, 3, 4), Respiratory Syncytial Virus (RSV),  Bordetella pertussis, Chlamydophila pneumoniae, and  Mycoplasma pneumoniae. A negative FilmArray result does not  always exclude the possibility of viral or bacterial  infection. Laboratory results should always be interpreted  in the context of clinical findings.  NL63 Coronovirus --  OC43 Coronovirus --  Parainfluenza 1 NOT DETECTED  Parainfluenza 2 NOT DETECTED  Parainfluenza 3 NOT DETECTED  Parainfluenza 4 NOT DETECTED  Resp Syncytial Virus NOT DETECTED          Studies  Chest X-RAY  CT SCAN Chest   Venous Dopplers: LE:   CT Abdomen  Others
Patient is a 57y old  Female who presents with a chief complaint of wheezing and shortness of breath (29 Jan 2018 12:33)      Any change in ROS: doing ok   feels better    MEDICATIONS  (STANDING):  ALBUTerol    90 MICROgram(s) HFA Inhaler 1 Puff(s) Inhalation every 4 hours  ALBUTerol/ipratropium for Nebulization 3 milliLiter(s) Nebulizer every 6 hours  buDESOnide 160 MICROgram(s)/formoterol 4.5 MICROgram(s) Inhaler 2 Puff(s) Inhalation two times a day  dextrose 5%. 1000 milliLiter(s) (50 mL/Hr) IV Continuous <Continuous>  dextrose 50% Injectable 12.5 Gram(s) IV Push once  dextrose 50% Injectable 25 Gram(s) IV Push once  dextrose 50% Injectable 25 Gram(s) IV Push once  enoxaparin Injectable 40 milliGRAM(s) SubCutaneous daily  hydrochlorothiazide 12.5 milliGRAM(s) Oral daily  insulin glargine Injectable (LANTUS) 7 Unit(s) SubCutaneous at bedtime  insulin lispro (HumaLOG) corrective regimen sliding scale   SubCutaneous three times a day before meals  insulin lispro Injectable (HumaLOG) 5 Unit(s) SubCutaneous three times a day before meals  losartan 100 milliGRAM(s) Oral daily  montelukast 10 milliGRAM(s) Oral daily  multivitamin 1 Tablet(s) Oral daily  oseltamivir 75 milliGRAM(s) Oral two times a day  tiotropium 18 MICROgram(s) Capsule 1 Capsule(s) Inhalation daily    MEDICATIONS  (PRN):  dextrose Gel 1 Dose(s) Oral once PRN Blood Glucose LESS THAN 70 milliGRAM(s)/deciliter  glucagon  Injectable 1 milliGRAM(s) IntraMuscular once PRN Glucose LESS THAN 70 milligrams/deciliter  guaiFENesin   Syrup  (Sugar-Free) 200 milliGRAM(s) Oral every 6 hours PRN Cough    Vital Signs Last 24 Hrs  T(C): 36.7 (31 Jan 2018 10:01), Max: 36.9 (30 Jan 2018 18:00)  T(F): 98 (31 Jan 2018 10:01), Max: 98.4 (30 Jan 2018 18:00)  HR: 70 (31 Jan 2018 10:01) (70 - 88)  BP: 106/60 (31 Jan 2018 10:01) (106/60 - 126/85)  BP(mean): --  RR: 18 (31 Jan 2018 10:01) (16 - 18)  SpO2: 98% (31 Jan 2018 10:01) (98% - 100%)    I&O's Summary        Physical Exam:   GENERAL: NAD, well-groomed, well-developed  HEENT: SAQIB/   Atraumatic, Normocephalic  ENMT: No tonsillar erythema, exudates, or enlargement; Moist mucous membranes, Good dentition, No lesions  NECK: Supple, No JVD, Normal thyroid  CHEST/LUNG: wheezing+  CVS: Regular rate and rhythm; No murmurs, rubs, or gallops  GI: : Soft, Nontender, Nondistended; Bowel sounds present  NERVOUS SYSTEM:  Alert & Oriented X3  EXTREMITIES:  2+ Peripheral Pulses, No clubbing, cyanosis, or edema  LYMPH: No lymphadenopathy noted  SKIN: No rashes or lesions  ENDOCRINOLOGY: No Thyromegaly  PSYCH: Appropriate    Labs:  27                            13.6   19.41 )-----------( 292      ( 31 Jan 2018 09:55 )             38.5                         12.9   12.75 )-----------( 309      ( 30 Jan 2018 05:30 )             39.4                         13.3   9.89  )-----------( 304      ( 28 Jan 2018 16:45 )             40.6     01-31    137  |  96<L>  |  30<H>  ----------------------------<  175<H>  5.4<H>   |  22  |  0.93  01-30    138  |  94<L>  |  28<H>  ----------------------------<  209<H>  5.1   |  24  |  1.01  01-28    140  |  96<L>  |  26<H>  ----------------------------<  171<H>  4.5   |  28  |  0.99    Ca    10.0      31 Jan 2018 08:35  Ca    9.8      30 Jan 2018 05:30    TPro  7.7  /  Alb  4.3  /  TBili  0.2  /  DBili  x   /  AST  17  /  ALT  22  /  AlkPhos  79  01-31  TPro  7.7  /  Alb  4.5  /  TBili  0.2  /  DBili  x   /  AST  22  /  ALT  29  /  AlkPhos  87  01-28    CAPILLARY BLOOD GLUCOSE      POCT Blood Glucose.: 173 mg/dL (31 Jan 2018 09:01)  POCT Blood Glucose.: 224 mg/dL (30 Jan 2018 23:33)  POCT Blood Glucose.: 231 mg/dL (30 Jan 2018 17:27)  POCT Blood Glucose.: 329 mg/dL (30 Jan 2018 12:31)      LIVER FUNCTIONS - ( 31 Jan 2018 08:35 )  Alb: 4.3 g/dL / Pro: 7.7 g/dL / ALK PHOS: 79 u/L / ALT: 22 u/L / AST: 17 u/L / GGT: x               Cultures:           < from: Xray Chest 2 Views PA/Lat (01.28.18 @ 17:21) >    EXAM:  XR CHEST PA LAT 2V        PROCEDURE DATE:  Jan 28 2018         INTERPRETATION:  CLINICAL INDICATION: Cough, asthma exacerbation.    EXAM: Frontal and lateral views of the chest with comparison made to   chest radiograph on 11/13/2017    FINDINGS:   Low lung volumes. The lungs are clear. There is no pleural effusion or   pneumothorax.  The heart size is normal.   No acute bony pathology.    IMPRESSION:   Clear lungs.              BARRY ABBOTT M.D., RADIOLOGY RESIDENT  This document has beenelectronically signed.  MALISSA PUGA M.D.; ATTENDING RADIOLOGIST  This document has been electronically signed. Jan 29 2018  7:34AM        < end of copied text >              Rapid Respiratory Viral Panel Result        01-29 @ 03:40  Rapid RVP --  Coronovirus --  Adenovirus NOT DETECTED  Bordetella Pertussis NOT DETECTED  Chlamydia Pneumonia NOT DETECTED  Entero/RhinovirusNOT DETECTED  HKU1 Coronovirus --  HMPV Coronovirus NOT DETECTED  Influenza A NOT DETECTED (any subtype)  Influenza AH1 NOT DETECTED  Influenza AH1 2009 NOT DETECTED  Influenza AH3 NOT DETECTED  Influenza B POSITIVE  Mycoplasma Pneumoniae NOT DETECTED This nucleic acid amplification assay was performed using  the cheerapp. The following pathogens are tested  for: Adenovirus, Coronavirus 229E, Coronavirus HKU1,  Coronavirus NL63, Coronavirus OC43, Human Metapneumovirus  (HMPV), Rhinovirus/Enterovirus, Influenza A H1, Influenza A  H1 2009 (Pandemic H1 2009), Influenza A H3, Influenza A (Flu  A) subtype not identified, Influenza B, Parainfluenza Virus  (types 1, 2, 3, 4), Respiratory Syncytial Virus (RSV),  Bordetella pertussis, Chlamydophila pneumoniae, and  Mycoplasma pneumoniae. A negative FilmArray result does not  always exclude the possibility of viral or bacterial  infection. Laboratory results should always be interpreted  in the context of clinical findings.  NL63 Coronovirus --  OC43 Coronovirus --  Parainfluenza 1 NOT DETECTED  Parainfluenza 2 NOT DETECTED  Parainfluenza 3 NOT DETECTED  Parainfluenza 4 NOT DETECTED  Resp Syncytial Virus NOT DETECTED          Studies  Chest X-RAY  CT SCAN Chest   Venous Dopplers: LE:   CT Abdomen  Others          < from: Xray Chest 2 Views PA/Lat (01.28.18 @ 17:21) >      INTERPRETATION:  CLINICAL INDICATION: Cough, asthma exacerbation.    EXAM: Frontal and lateral views of the chest with comparison made to   chest radiograph on 11/13/2017    FINDINGS:   Low lung volumes. The lungs are clear. There is no pleural effusion or   pneumothorax.  The heart size is normal.   No acute bony pathology.    IMPRESSION:   Clear lungs.              BARRY ABBOTT M.D., RADIOLOGY RESIDENT  This document has beenelectronically signed.  MALISSA PUGA M.D.; ATTENDING RADIOLOGIST  This document has been electronically signed. Jan 29 2018  7:34AM        < end of copied text >
Patient is a 57y old  Female who presents with a chief complaint of wheezing and shortness of breath (29 Jan 2018 12:33)      SUBJECTIVE / OVERNIGHT EVENTS: Overnight events noted    ROS:  Resp: No cough or sputum production  CVS: No chest pain or palpitations or orthopnea  GI: no N/V/D  : no dysuria, hematuria  Neuro: no weakness or paresthesias  Heme: No petechiae or easy bruising  Msk: No joint pain or swelling  Skin: No rash or itching  All other ROS negative       MEDICATIONS  (STANDING):  ALBUTerol    90 MICROgram(s) HFA Inhaler 1 Puff(s) Inhalation every 4 hours  ALBUTerol/ipratropium for Nebulization 3 milliLiter(s) Nebulizer every 6 hours  buDESOnide 160 MICROgram(s)/formoterol 4.5 MICROgram(s) Inhaler 2 Puff(s) Inhalation two times a day  dextrose 5%. 1000 milliLiter(s) (50 mL/Hr) IV Continuous <Continuous>  dextrose 50% Injectable 12.5 Gram(s) IV Push once  dextrose 50% Injectable 25 Gram(s) IV Push once  dextrose 50% Injectable 25 Gram(s) IV Push once  enoxaparin Injectable 40 milliGRAM(s) SubCutaneous daily  hydrochlorothiazide 12.5 milliGRAM(s) Oral daily  insulin glargine Injectable (LANTUS) 7 Unit(s) SubCutaneous at bedtime  insulin lispro (HumaLOG) corrective regimen sliding scale   SubCutaneous three times a day before meals  insulin lispro Injectable (HumaLOG) 5 Unit(s) SubCutaneous three times a day before meals  losartan 100 milliGRAM(s) Oral daily  methylPREDNISolone sodium succinate Injectable 20 milliGRAM(s) IV Push every 8 hours  montelukast 10 milliGRAM(s) Oral daily  multivitamin 1 Tablet(s) Oral daily  oseltamivir 75 milliGRAM(s) Oral two times a day  tiotropium 18 MICROgram(s) Capsule 1 Capsule(s) Inhalation daily    MEDICATIONS  (PRN):  dextrose Gel 1 Dose(s) Oral once PRN Blood Glucose LESS THAN 70 milliGRAM(s)/deciliter  glucagon  Injectable 1 milliGRAM(s) IntraMuscular once PRN Glucose LESS THAN 70 milligrams/deciliter  guaiFENesin   Syrup  (Sugar-Free) 200 milliGRAM(s) Oral every 6 hours PRN Cough        CAPILLARY BLOOD GLUCOSE      POCT Blood Glucose.: 177 mg/dL (01 Feb 2018 12:16)  POCT Blood Glucose.: 168 mg/dL (01 Feb 2018 09:09)  POCT Blood Glucose.: 230 mg/dL (31 Jan 2018 17:20)    I&O's Summary      PHYSICAL EXAM: vital signs as above  in no apparent distress  HEENT: SAQIB EOMI  Neck: Supple, no JVD, no thyromegaly  Lungs: air entry significantly improved from yesterday   still scattered expiratory rhonchi  CVS: S1 S2 no M/R/G  Abdomen: no tenderness, no organomegaly, BS present  Neuro: AO x 3 no focal weakness, no sensory abnormalities  Psych: appropriate affect  Skin: warm, dry  Ext: no cyanosis or clubbing, no edema  Msk: no joint swelling or deformities  Back: no CVA tenderness, no kyphosis/scoliosis    LABS:                        12.8   18.41 )-----------( 279      ( 01 Feb 2018 05:13 )             38.4     02-01    136  |  95<L>  |  30<H>  ----------------------------<  247<H>  5.4<H>   |  22  |  0.95    Ca    9.5      01 Feb 2018 05:13    TPro  7.7  /  Alb  4.3  /  TBili  0.2  /  DBili  x   /  AST  17  /  ALT  22  /  AlkPhos  79  01-31                Consultant(s) Notes Reviewed:      Care Discussed with Consultants/Other Providers:    Contact Number, Dr Woodson [phone]
Patient is a 57y old  Female who presents with a chief complaint of wheezing and shortness of breath (29 Jan 2018 12:33)    SUBJECTIVE / OVERNIGHT EVENTS: Overnight events noted    ROS:  Resp: + cough but better than yesterday  no sputum production yet  CVS: No chest pain or palpitations or orthopnea  GI: no N/V/D  : no dysuria, hematuria  Neuro: no weakness or paresthesias  Heme: No petechiae or easy bruising  Msk: No joint pain or swelling  Skin: No rash or itching  All other ROS negative       MEDICATIONS  (STANDING):  ALBUTerol    90 MICROgram(s) HFA Inhaler 1 Puff(s) Inhalation every 4 hours  ALBUTerol/ipratropium for Nebulization 3 milliLiter(s) Nebulizer every 6 hours  buDESOnide 160 MICROgram(s)/formoterol 4.5 MICROgram(s) Inhaler 2 Puff(s) Inhalation two times a day  dextrose 5%. 1000 milliLiter(s) (50 mL/Hr) IV Continuous <Continuous>  dextrose 50% Injectable 12.5 Gram(s) IV Push once  dextrose 50% Injectable 25 Gram(s) IV Push once  dextrose 50% Injectable 25 Gram(s) IV Push once  enoxaparin Injectable 40 milliGRAM(s) SubCutaneous daily  hydrochlorothiazide 12.5 milliGRAM(s) Oral daily  insulin lispro (HumaLOG) corrective regimen sliding scale   SubCutaneous three times a day before meals  losartan 100 milliGRAM(s) Oral daily  methylPREDNISolone sodium succinate Injectable 20 milliGRAM(s) IV Push every 6 hours  montelukast 10 milliGRAM(s) Oral daily  multivitamin 1 Tablet(s) Oral daily  oseltamivir 75 milliGRAM(s) Oral two times a day  tiotropium 18 MICROgram(s) Capsule 1 Capsule(s) Inhalation daily    MEDICATIONS  (PRN):  dextrose Gel 1 Dose(s) Oral once PRN Blood Glucose LESS THAN 70 milliGRAM(s)/deciliter  glucagon  Injectable 1 milliGRAM(s) IntraMuscular once PRN Glucose LESS THAN 70 milligrams/deciliter  guaiFENesin   Syrup  (Sugar-Free) 200 milliGRAM(s) Oral every 6 hours PRN Cough        CAPILLARY BLOOD GLUCOSE      POCT Blood Glucose.: 329 mg/dL (30 Jan 2018 12:31)  POCT Blood Glucose.: 153 mg/dL (30 Jan 2018 08:44)  POCT Blood Glucose.: 350 mg/dL (29 Jan 2018 23:07)  POCT Blood Glucose.: 157 mg/dL (29 Jan 2018 17:34)    I&O's Summary    PHYSICAL EXAM: vital signs as above  in no apparent distress  HEENT: SAQIB EOMI  Neck: Supple, no JVD, no thyromegaly  Lungs: diffuse expiratory rhonchi bilaterally with very poor air movement but significantly improved from yesterday   CVS: S1 S2 no M/R/G  Abdomen: no tenderness, no organomegaly, BS present  Neuro: AO x 3 no focal weakness, no sensory abnormalities  Psych: appropriate affect  Skin: warm, dry  Ext: no cyanosis or clubbing, no edema  Msk: no joint swelling or deformities  Back: no CVA tenderness, no kyphosis/scoliosis    LABS:                        12.9   12.75 )-----------( 309      ( 30 Jan 2018 05:30 )             39.4     01-30    138  |  94<L>  |  28<H>  ----------------------------<  209<H>  5.1   |  24  |  1.01    Ca    9.8      30 Jan 2018 05:30    TPro  7.7  /  Alb  4.5  /  TBili  0.2  /  DBili  x   /  AST  22  /  ALT  29  /  AlkPhos  87  01-28                Consultant(s) Notes Reviewed:      Care Discussed with Consultants/Other Providers:    Contact Number, Dr Woodson [phone]
Patient is a 57y old  Female who presents with a chief complaint of wheezing and shortness of breath (29 Jan 2018 12:33)      SUBJECTIVE / OVERNIGHT EVENTS: Overnight events noted  still coughing but overall better    ROS:  Resp: + cough minimal sputum production  CVS: No chest pain or palpitations or orthopnea  GI: no N/V/D  : no dysuria, hematuria  Neuro: no weakness or paresthesias  Heme: No petechiae or easy bruising  Msk: No joint pain or swelling  Skin: No rash or itching  All other ROS negative       MEDICATIONS  (STANDING):  ALBUTerol    90 MICROgram(s) HFA Inhaler 1 Puff(s) Inhalation every 4 hours  ALBUTerol/ipratropium for Nebulization 3 milliLiter(s) Nebulizer every 6 hours  buDESOnide 160 MICROgram(s)/formoterol 4.5 MICROgram(s) Inhaler 2 Puff(s) Inhalation two times a day  dextrose 5%. 1000 milliLiter(s) (50 mL/Hr) IV Continuous <Continuous>  dextrose 50% Injectable 12.5 Gram(s) IV Push once  dextrose 50% Injectable 25 Gram(s) IV Push once  dextrose 50% Injectable 25 Gram(s) IV Push once  enoxaparin Injectable 40 milliGRAM(s) SubCutaneous daily  hydrochlorothiazide 12.5 milliGRAM(s) Oral daily  insulin glargine Injectable (LANTUS) 7 Unit(s) SubCutaneous at bedtime  insulin lispro (HumaLOG) corrective regimen sliding scale   SubCutaneous three times a day before meals  insulin lispro Injectable (HumaLOG) 5 Unit(s) SubCutaneous three times a day before meals  losartan 100 milliGRAM(s) Oral daily  methylPREDNISolone sodium succinate Injectable 20 milliGRAM(s) IV Push every 8 hours  montelukast 10 milliGRAM(s) Oral daily  multivitamin 1 Tablet(s) Oral daily  oseltamivir 75 milliGRAM(s) Oral two times a day  tiotropium 18 MICROgram(s) Capsule 1 Capsule(s) Inhalation daily    MEDICATIONS  (PRN):  dextrose Gel 1 Dose(s) Oral once PRN Blood Glucose LESS THAN 70 milliGRAM(s)/deciliter  glucagon  Injectable 1 milliGRAM(s) IntraMuscular once PRN Glucose LESS THAN 70 milligrams/deciliter  guaiFENesin   Syrup  (Sugar-Free) 200 milliGRAM(s) Oral every 6 hours PRN Cough        CAPILLARY BLOOD GLUCOSE      POCT Blood Glucose.: 185 mg/dL (31 Jan 2018 12:44)  POCT Blood Glucose.: 173 mg/dL (31 Jan 2018 09:01)  POCT Blood Glucose.: 224 mg/dL (30 Jan 2018 23:33)  POCT Blood Glucose.: 231 mg/dL (30 Jan 2018 17:27)    I&O's Summary    PHYSICAL EXAM: vital signs as above  in no apparent distress  HEENT: SAQIB EOMI  Neck: Supple, no JVD, no thyromegaly  Lungs: air entry significantly improved from yesterday   still scattered expiratory rhonchi  CVS: S1 S2 no M/R/G  Abdomen: no tenderness, no organomegaly, BS present  Neuro: AO x 3 no focal weakness, no sensory abnormalities  Psych: appropriate affect  Skin: warm, dry  Ext: no cyanosis or clubbing, no edema  Msk: no joint swelling or deformities  Back: no CVA tenderness, no kyphosis/scoliosis    LABS:                        13.6   19.41 )-----------( 292      ( 31 Jan 2018 09:55 )             38.5     01-31    137  |  96<L>  |  34<H>  ----------------------------<  202<H>  5.1   |  22  |  0.95    Ca    9.7      31 Jan 2018 12:10    TPro  7.7  /  Alb  4.3  /  TBili  0.2  /  DBili  x   /  AST  17  /  ALT  22  /  AlkPhos  79  01-31                Consultant(s) Notes Reviewed:      Care Discussed with Consultants/Other Providers:    Contact Number, Dr Woodson [phone]
Chief complaint  Patient is a 57y old  Female who presents with a chief complaint of wheezing and shortness of breath (29 Jan 2018 12:33)   Review of systems  Patient in bed, looks comfortable, no fever, no hypoglycemia.    Labs and Fingersticks  CAPILLARY BLOOD GLUCOSE      POCT Blood Glucose.: 177 mg/dL (01 Feb 2018 12:16)  POCT Blood Glucose.: 168 mg/dL (01 Feb 2018 09:09)  POCT Blood Glucose.: 230 mg/dL (31 Jan 2018 17:20)          Calcium, Total Serum: 9.5 (02-01 @ 05:13)  Calcium, Total Serum: 9.7 (01-31 @ 12:10)  Calcium, Total Serum: 10.0 (01-31 @ 08:35)  Albumin, Serum: 4.3 (01-31 @ 08:35)    Alanine Aminotransferase (ALT/SGPT): 22 (01-31 @ 08:35)  Alkaline Phosphatase, Serum: 79 (01-31 @ 08:35)  Aspartate Aminotransferase (AST/SGOT): 17 (01-31 @ 08:35)        02-01    136  |  95<L>  |  30<H>  ----------------------------<  247<H>  5.4<H>   |  22  |  0.95    Ca    9.5      01 Feb 2018 05:13    TPro  7.7  /  Alb  4.3  /  TBili  0.2  /  DBili  x   /  AST  17  /  ALT  22  /  AlkPhos  79  01-31                        12.8   18.41 )-----------( 279      ( 01 Feb 2018 05:13 )             38.4     Medications  MEDICATIONS  (STANDING):  ALBUTerol    90 MICROgram(s) HFA Inhaler 1 Puff(s) Inhalation every 4 hours  ALBUTerol/ipratropium for Nebulization 3 milliLiter(s) Nebulizer every 6 hours  buDESOnide 160 MICROgram(s)/formoterol 4.5 MICROgram(s) Inhaler 2 Puff(s) Inhalation two times a day  dextrose 5%. 1000 milliLiter(s) (50 mL/Hr) IV Continuous <Continuous>  dextrose 50% Injectable 12.5 Gram(s) IV Push once  dextrose 50% Injectable 25 Gram(s) IV Push once  dextrose 50% Injectable 25 Gram(s) IV Push once  enoxaparin Injectable 40 milliGRAM(s) SubCutaneous daily  hydrochlorothiazide 12.5 milliGRAM(s) Oral daily  insulin glargine Injectable (LANTUS) 7 Unit(s) SubCutaneous at bedtime  insulin lispro (HumaLOG) corrective regimen sliding scale   SubCutaneous three times a day before meals  insulin lispro Injectable (HumaLOG) 5 Unit(s) SubCutaneous three times a day before meals  losartan 100 milliGRAM(s) Oral daily  methylPREDNISolone sodium succinate Injectable 20 milliGRAM(s) IV Push every 8 hours  montelukast 10 milliGRAM(s) Oral daily  multivitamin 1 Tablet(s) Oral daily  oseltamivir 75 milliGRAM(s) Oral two times a day  tiotropium 18 MICROgram(s) Capsule 1 Capsule(s) Inhalation daily      Physical Exam  General: Patient comfortable in bed  Vital Signs Last 12 Hrs  T(F): 98.3 (02-01-18 @ 05:22), Max: 98.3 (02-01-18 @ 05:22)  HR: 78 (02-01-18 @ 10:16) (71 - 79)  BP: 138/85 (02-01-18 @ 05:22) (138/85 - 138/85)  BP(mean): --  RR: 16 (02-01-18 @ 05:22) (16 - 16)  SpO2: 97% (02-01-18 @ 10:16) (97% - 99%)  Neck: No palpable thyroid nodules.  CVS: S1S2, No murmurs  Respiratory: No wheezing, no crepitations  GI: Abdomen soft, bowel sounds positive  Musculoskeletal:  edema lower extremities.   Skin: No skin rashes, no ecchymosis    Diagnostics    Free Thyroxine, Serum: AM Sched. Collection: 31-Jan-2018 06:00 (01-30 @ 19:08)  Thyroperoxidase Antibody: AM Sched. Collection: 31-Jan-2018 06:00 (01-30 @ 19:08)  TSH Receptor Antibody: AM Sched. Collection: 31-Jan-2018 06:00 (01-30 @ 19:08)

## 2018-02-02 NOTE — PROGRESS NOTE ADULT - PROBLEM SELECTOR PLAN 5
encouraged to stay abstinent from smoking

## 2018-02-02 NOTE — PROGRESS NOTE ADULT - PROBLEM SELECTOR PROBLEM 1
DM2 (diabetes mellitus, type 2)
Asthma exacerbation
DM2 (diabetes mellitus, type 2)
DM2 (diabetes mellitus, type 2)
Influenza B
Asthma exacerbation

## 2018-02-02 NOTE — DISCHARGE NOTE ADULT - PLAN OF CARE
symptoms control Complete total of 5 days of Prednisone, use inhalers as ordered and singular at bed time. follow up with your PCP in 1 week Last day of Tamiflu 2/2/2018 BP control Continue low salt diet and home meds Your FS were elevated most likely due to Steroids, Stop Metformin and take Janumet 50/1000 mg 1 tab with breakfast and 1 tab with dinner  Make a follow  up appt with your PCP and/or Dr Ledezma (endocrine ) and follow up . Check FS 1-2 x day in AM and 2 hrs after dinner, bring FS log with your on your follow up appts Your FS were elevated most likely due to Steroids, Stop Metformin and take Jentaduetto 2.5 mg 2 x day 1 tab with breakfast and 1 tab with dinner  Make a follow  up appt with your PCP and/or Dr Ledezma (endocrine ) and follow up . Check FS 1-2 x day in AM and 2 hrs after dinner, bring FS log with your on your follow up appts

## 2018-02-02 NOTE — PROGRESS NOTE ADULT - PROBLEM SELECTOR PROBLEM 2
Hyperthyroidism
Asthma exacerbation
Hyperthyroidism
Hyperthyroidism
Influenza B

## 2018-02-02 NOTE — PROGRESS NOTE ADULT - PROBLEM SELECTOR PLAN 2
Will FU with Free T4 levels.
Still with significant wheezing: cont iv steroids for today too!
Still with significant wheezing: cont iv steroids for today too!  1/31: doing ok : wheezing + better but still signiciant: decrese the dose to 20 mg q 8 hours
Still with significant wheezing: cont iv steroids for today too!  1/31: doing ok : wheezing + better but still significant: decrease the dose to 20 mg q 8 hours  2/2: doing great : change to po prednisone for 5 days 40 mg a day
Will FU with Free T4 levels.
Will FU with Free T4 levels.
continue Tamiflu
continue Tamiflu
finish course of oseltamivir
continue Tamiflu

## 2018-02-02 NOTE — DISCHARGE NOTE ADULT - PATIENT PORTAL LINK FT
“You can access the [facility] Patient Portal, offered by [facility], by registering with the following website: [email or web address]”

## 2018-02-02 NOTE — PROGRESS NOTE ADULT - PROBLEM SELECTOR PLAN 4
continue Losartan HCT with hold precautions   continue to monitor
continue Losartan HCT with hold precautions   continue to monitor
continue Losartan HCT with hold precautions   continue to monitor  K 5.5 hemolyzed  no intervention required  resume HCTZ on discharge
endo on the case: on steroids too!!
karthikeyan on the case: on steorids too!!
karthikeyan on the case: on steorids too!!
continue Losartan HCT with hold precautions   continue to monitor

## 2018-02-02 NOTE — DISCHARGE NOTE ADULT - HOSPITAL COURSE
57 y.o female with a PMH of DM2, HTN asthma no intubations presents to the Emergency Department with 1 week of productive cough and wheezing  diagnosed with influenza B and severe bronchospasm     Problem/Plan - 1:  ·  Problem: Asthma exacerbation.  Plan: likely discharge home today  will defer to pulmonary clearance  likely prednisone 40 po qd x 5 more days  d/w Dr Noel.      Problem/Plan - 2:  ·  Problem: Influenza B.  Plan: finish course of oseltamivir.      Problem/Plan - 3:  ·  Problem: DM2 (diabetes mellitus, type 2).  Plan: no oral meds  finger sticks with short acting insulin sliding scale  A1C 7.1  uncontrolled DM Type 2 worsened by IV solumedrol  endocrinologist evaluation noted.      Problem/Plan - 4:  ·  Problem: Hypertension.  Plan: continue Losartan HCT with hold precautions   continue to monitor  K 5.5 hemolyzed  no intervention required  resume HCTZ on discharge. 57 y.o female with a PMH of DM2, HTN asthma no intubations presents to the Emergency Department with 1 week of productive cough and wheezing diagnosed with influenza B and severe bronchospasm Started on Tamiflu, pulm consulted, treated with IV steroids and transitioned to PO Steroid will complete total 5 days. Pt developed hyperglycemia d/t steroids,  known hx of DM, hg a1c 7.1 %  Pt with significant improvement in her symptoms, optimized for discharge with outpt follow up

## 2018-02-02 NOTE — PROGRESS NOTE ADULT - ASSESSMENT
Assessment  DMT2: 57y Female with DM T2 with hyperglycemia on insulin, on High dose IV steroids, blood sugars improving, no hypoglycemia,  eating meals,  non compliant with low carb diet.  Asthma exacerbation: Still has cough, SOB feeling better, On medications, monitored.  Hyperthyroidism: Elevated TSH, asymptomatic, Free T4 pending.
57 y.o female former smoker 30 years ago pmhx of DM, HTN asthma no intubations, has been hospitalized recent CDU stay for asthma exacerbation presenting with 1 week of productive cough and wheezing. Was seen at urgent care and given Medrol dose pack with no improvements. Has been using neb and rescue inhaler at home with no improvement. Denies fevers, chills, chest pain, SOB, n/v/d, abdominal pain, numbness, tingling, weakness, urinary symptoms.
57 y.o female with a PMH of DM2, HTN asthma no intubations presents to the Emergency Department with 1 week of productive cough and wheezing  diagnosed with influenza B and severe bronchospasm
Assessment  DMT2: 57y Female with DM T2 with hyperglycemia on insulin, on High dose IV steroids, blood sugars improving, no hypoglycemia,  eating meals,  non compliant with low carb diet.  Asthma exacerbation: Still has cough, SOB feeling better, On medications, monitored.  Hyperthyroidism: Elevated TSH, asymptomatic, Free T4 pending.
Assessment  DMT2: 57y Female with DM T2 with hyperglycemia on insulin, on High dose IV steroids, blood sugars improving, no hypoglycemia,  eating meals,  non compliant with low carb diet.  Asthma exacerbation: Still has cough, SOB feeling better, On medications, monitored.  Hyperthyroidism: Elevated TSH, asymptomatic, Free T4 pending.
57 y.o female with a PMH of DM2, HTN asthma no intubations presents to the Emergency Department with 1 week of productive cough and wheezing  diagnosed with influenza B and severe bronchospasm

## 2018-02-02 NOTE — PROGRESS NOTE ADULT - ATTENDING COMMENTS
discussed with patient in detail, all questions answered
improved: change to po prednisone
discussed with patient in detail, all questions answered
discussed with patient in detail, all questions answered
discussed with patient in detail, all questions answered  d/w pulmonary attending

## 2018-02-02 NOTE — PROGRESS NOTE ADULT - PROBLEM SELECTOR PROBLEM 3
Asthma exacerbation
DM2 (diabetes mellitus, type 2)
Hypertension
DM2 (diabetes mellitus, type 2)

## 2018-02-02 NOTE — DISCHARGE NOTE ADULT - MEDICATION SUMMARY - MEDICATIONS TO TAKE
I will START or STAY ON the medications listed below when I get home from the hospital:    predniSONE 20 mg oral tablet  -- 2 tab(s) by mouth once a day  -- Indication: For Asthma exacerbation    sitagliptin-metformin 50mg-1000mg oral tablet  -- 1 tab(s) by mouth 2 times a day   -- Do not drink alcoholic beverages when taking this medication.  Take with food or milk.    -- Indication: For DM2 (diabetes mellitus, type 2)    losartan-hydrochlorothiazide 100mg-12.5mg oral tablet  -- 1 tab(s) by mouth once a day  -- Indication: For Hypertension    oseltamivir 75 mg oral capsule  -- 1 cap(s) by mouth 2 times a day, last day 2/2/18 PM dose   -- Indication: For Influenza B    budesonide-formoterol 160 mcg-4.5 mcg/inh inhalation aerosol  -- 2 puff(s) inhaled 2 times a day  -- Indication: For Asthma exacerbation    tiotropium 18 mcg inhalation capsule  -- 1 cap(s) inhaled once a day  -- Indication: For Asthma exacerbation    Proventil HFA 90 mcg/inh inhalation aerosol  -- 2 puff(s) inhaled every 4 hours, As Needed -for shortness of breath and/or wheezing  -- For inhalation only.  It is very important that you take or use this exactly as directed.  Do not skip doses or discontinue unless directed by your doctor.  Obtain medical advice before taking any non-prescription drugs as some may affect the action of this medication.  Shake well before use.    -- Indication: For Asthma exacerbation    guaiFENesin 100 mg/5 mL oral liquid  -- 10 milliliter(s) by mouth every 6 hours, As needed, Cough  -- Indication: For Influenza B    montelukast 10 mg oral tablet  -- 1 tab(s) by mouth once a day  -- Indication: For Asthma exacerbation    pantoprazole 40 mg oral granule, delayed release  -- 1 each by mouth once a day   -- It is very important that you take or use this exactly as directed.  Do not skip doses or discontinue unless directed by your doctor.  Obtain medical advice before taking any non-prescription drugs as some may affect the action of this medication.    -- Indication: For prophylaxis     Multiple Vitamins oral tablet  -- 1 tab(s) by mouth once a day  -- Indication: For supplement I will START or STAY ON the medications listed below when I get home from the hospital:    predniSONE 20 mg oral tablet  -- 2 tab(s) by mouth once a day  -- Indication: For Asthma exacerbation    sitagliptin-metformin 50mg-1000mg oral tablet  -- 1 tab(s) by mouth 2 times a day   -- Do not drink alcoholic beverages when taking this medication.  Take with food or milk.    -- Indication: For DM2 (diabetes mellitus, type 2)    losartan-hydrochlorothiazide 100mg-12.5mg oral tablet  -- 1 tab(s) by mouth once a day  -- Indication: For Hypertension    oseltamivir 75 mg oral capsule  -- 1 cap(s) by mouth 2 times a day, last day 2/2/18 PM dose   -- Indication: For Influenza B    budesonide-formoterol 160 mcg-4.5 mcg/inh inhalation aerosol  -- 2 puff(s) inhaled 2 times a day  -- Indication: For Asthma exacerbation    Proventil HFA 90 mcg/inh inhalation aerosol  -- 2 puff(s) inhaled every 4 hours, As Needed -for shortness of breath and/or wheezing  -- For inhalation only.  It is very important that you take or use this exactly as directed.  Do not skip doses or discontinue unless directed by your doctor.  Obtain medical advice before taking any non-prescription drugs as some may affect the action of this medication.  Shake well before use.    -- Indication: For Asthma exacerbation    guaiFENesin 100 mg/5 mL oral liquid  -- 10 milliliter(s) by mouth every 6 hours, As needed, Cough  -- Indication: For Influenza B    montelukast 10 mg oral tablet  -- 1 tab(s) by mouth once a day  -- Indication: For Asthma exacerbation    pantoprazole 40 mg oral granule, delayed release  -- 1 each by mouth once a day   -- It is very important that you take or use this exactly as directed.  Do not skip doses or discontinue unless directed by your doctor.  Obtain medical advice before taking any non-prescription drugs as some may affect the action of this medication.    -- Indication: For prophylaxis     Multiple Vitamins oral tablet  -- 1 tab(s) by mouth once a day  -- Indication: For supplement I will START or STAY ON the medications listed below when I get home from the hospital:    predniSONE 20 mg oral tablet  -- 2 tab(s) by mouth once a day  -- Indication: For Asthma exacerbation    Jentadueto 2.5 mg-1000 mg oral tablet  -- 1 tab(s) by mouth 2 times a day   -- Check with your doctor before becoming pregnant.  Do not drink alcoholic beverages when taking this medication.  It is very important that you take or use this exactly as directed.  Do not skip doses or discontinue unless directed by your doctor.  Take with food.    -- Indication: For DM2 (diabetes mellitus, type 2)    losartan-hydrochlorothiazide 100mg-12.5mg oral tablet  -- 1 tab(s) by mouth once a day  -- Indication: For Hypertension    oseltamivir 75 mg oral capsule  -- 1 cap(s) by mouth 2 times a day, last day 2/2/18 PM dose   -- Indication: For Influenza B    budesonide-formoterol 160 mcg-4.5 mcg/inh inhalation aerosol  -- 2 puff(s) inhaled 2 times a day  -- Indication: For Asthma exacerbation    Proventil HFA 90 mcg/inh inhalation aerosol  -- 2 puff(s) inhaled every 4 hours, As Needed -for shortness of breath and/or wheezing  -- For inhalation only.  It is very important that you take or use this exactly as directed.  Do not skip doses or discontinue unless directed by your doctor.  Obtain medical advice before taking any non-prescription drugs as some may affect the action of this medication.  Shake well before use.    -- Indication: For Asthma exacerbation    guaiFENesin 100 mg/5 mL oral liquid  -- 10 milliliter(s) by mouth every 6 hours, As needed, Cough  -- Indication: For Influenza B    montelukast 10 mg oral tablet  -- 1 tab(s) by mouth once a day  -- Indication: For Asthma exacerbation    pantoprazole 20 mg oral delayed release tablet  -- 1 tab(s) by mouth once a day   -- It is very important that you take or use this exactly as directed.  Do not skip doses or discontinue unless directed by your doctor.  Obtain medical advice before taking any non-prescription drugs as some may affect the action of this medication.  Swallow whole.  Do not crush.    -- Indication: For prophylaxis     Multiple Vitamins oral tablet  -- 1 tab(s) by mouth once a day  -- Indication: For supplement

## 2018-02-02 NOTE — PROGRESS NOTE ADULT - PROBLEM SELECTOR PROBLEM 5
Former smoker, stopped smoking many years ago

## 2018-02-02 NOTE — DISCHARGE NOTE ADULT - CARE PLAN
Principal Discharge DX:	Asthma exacerbation  Goal:	symptoms control  Assessment and plan of treatment:	Complete total of 5 days of Prednisone, use inhalers as ordered and singular at bed time. follow up with your PCP in 1 week  Secondary Diagnosis:	Influenza B  Goal:	symptoms control  Assessment and plan of treatment:	Last day of Tamiflu 2/2/2018  Secondary Diagnosis:	Hypertension  Goal:	BP control  Assessment and plan of treatment:	Continue low salt diet and home meds  Secondary Diagnosis:	DM2 (diabetes mellitus, type 2)  Goal:	BP control  Assessment and plan of treatment:	Your FS were elevated most likely due to Steroids, Stop Metformin and take Janumet 50/1000 mg 1 tab with breakfast and 1 tab with dinner  Make a follow  up appt with your PCP and/or Dr Ledezma (endocrine ) and follow up . Check FS 1-2 x day in AM and 2 hrs after dinner, bring FS log with your on your follow up appts Principal Discharge DX:	Asthma exacerbation  Goal:	symptoms control  Assessment and plan of treatment:	Complete total of 5 days of Prednisone, use inhalers as ordered and singular at bed time. follow up with your PCP in 1 week  Secondary Diagnosis:	Influenza B  Goal:	symptoms control  Assessment and plan of treatment:	Last day of Tamiflu 2/2/2018  Secondary Diagnosis:	Hypertension  Goal:	BP control  Assessment and plan of treatment:	Continue low salt diet and home meds  Secondary Diagnosis:	DM2 (diabetes mellitus, type 2)  Goal:	BP control  Assessment and plan of treatment:	Your FS were elevated most likely due to Steroids, Stop Metformin and take Jentaduetto 2.5 mg 2 x day 1 tab with breakfast and 1 tab with dinner  Make a follow  up appt with your PCP and/or Dr Ledezma (endocrine ) and follow up . Check FS 1-2 x day in AM and 2 hrs after dinner, bring FS log with your on your follow up appts

## 2018-02-02 NOTE — PROGRESS NOTE ADULT - PROBLEM SELECTOR PLAN 1
Will continue current insulin regimen for now. Will continue monitoring FS, log, will Follow up.  DC home on home DM meds to FU 1 month  Patient counseled for compliance with consistent low carb diet.
cONT TAMIFLU
cONT TAMIFLU
cONT TAMIFLU  2/2: finish 5 days of tamiflu
likely discharge home today  will defer to pulmonary clearance  likely prednisone 40 po qd x 5 more days  d/w Dr Noel
severe, resistant to po steroids   likely secondary to influenza B  will continue pulmonary recommendations   continue iv solumedrol   maintain 20 q 6 for now  patient subjectively better feels that she is 'opening up'
solumedrol reduced to 20 q 8  will continue pulmonary recommendations   subjectively better but still coughing
solumedrol reduced to 20 q 8  will continue pulmonary recommendations   subjectively better  air entry improved

## 2018-02-05 ENCOUNTER — EMERGENCY (EMERGENCY)
Facility: HOSPITAL | Age: 58
LOS: 1 days | Discharge: HOME | End: 2018-02-05
Attending: EMERGENCY MEDICINE | Admitting: EMERGENCY MEDICINE
Payer: COMMERCIAL

## 2018-02-05 VITALS
HEART RATE: 95 BPM | TEMPERATURE: 98 F | RESPIRATION RATE: 16 BRPM | SYSTOLIC BLOOD PRESSURE: 143 MMHG | DIASTOLIC BLOOD PRESSURE: 73 MMHG | OXYGEN SATURATION: 100 %

## 2018-02-05 DIAGNOSIS — Z98.89 OTHER SPECIFIED POSTPROCEDURAL STATES: Chronic | ICD-10-CM

## 2018-02-05 PROCEDURE — 93971 EXTREMITY STUDY: CPT | Mod: 26,LT

## 2018-02-05 PROCEDURE — 99284 EMERGENCY DEPT VISIT MOD MDM: CPT

## 2018-02-05 RX ORDER — OXYCODONE HYDROCHLORIDE 5 MG/1
5 TABLET ORAL ONCE
Qty: 0 | Refills: 0 | Status: DISCONTINUED | OUTPATIENT
Start: 2018-02-05 | End: 2018-02-05

## 2018-02-05 RX ORDER — ONDANSETRON 8 MG/1
1 TABLET, FILM COATED ORAL
Qty: 15 | Refills: 0
Start: 2018-02-05 | End: 2018-02-09

## 2018-02-05 RX ORDER — ONDANSETRON 8 MG/1
4 TABLET, FILM COATED ORAL ONCE
Qty: 0 | Refills: 0 | Status: COMPLETED | OUTPATIENT
Start: 2018-02-05 | End: 2018-02-05

## 2018-02-05 RX ORDER — ACETAMINOPHEN 500 MG
975 TABLET ORAL ONCE
Qty: 0 | Refills: 0 | Status: COMPLETED | OUTPATIENT
Start: 2018-02-05 | End: 2018-02-05

## 2018-02-05 RX ADMIN — OXYCODONE HYDROCHLORIDE 5 MILLIGRAM(S): 5 TABLET ORAL at 19:11

## 2018-02-05 RX ADMIN — ONDANSETRON 4 MILLIGRAM(S): 8 TABLET, FILM COATED ORAL at 22:07

## 2018-02-05 RX ADMIN — OXYCODONE HYDROCHLORIDE 5 MILLIGRAM(S): 5 TABLET ORAL at 20:15

## 2018-02-05 RX ADMIN — Medication 975 MILLIGRAM(S): at 20:16

## 2018-02-05 RX ADMIN — Medication 975 MILLIGRAM(S): at 19:11

## 2018-02-05 NOTE — ED PROVIDER NOTE - LOWER EXTREMITY EXAM, RIGHT
diffuse rt sided calf and ankle TTP w/ mild lower extremity edema, not arm or erythematous, NV intact, odl surgical scar on lateral aspect of rt heel and on medial aspect of 1st MTP diffuse rt sided calf and ankle TTP w/ mild lower extremity edema, not warm or erythematous, NV intact distally, old surgical scar on lateral aspect of rt heel and on medial aspect of 1st MTP

## 2018-02-05 NOTE — ED PROVIDER NOTE - CARE PLAN
Principal Discharge DX:	Leg pain  Assessment and plan of treatment:	Follow up with your Primary Medical Doctor within 2-3days. Recommending to repeat Duplex DVT study in one week. Follow up with Orthopedist as scheduled for tomorrow.  If results or reports were given to you, show copies of your reports given to you. Take all of your medications as previously prescribed. Worsening pain, fevers, or any other new or concerning symptoms return to the ED.

## 2018-02-05 NOTE — ED ADULT TRIAGE NOTE - CHIEF COMPLAINT QUOTE
Pt complaining of R heel pain, swelling and redness since Friday. Pt denies any recent injury to area. Pt ambulatory at triage. Pt denies n/v/d, fever or chills.

## 2018-02-05 NOTE — ED PROVIDER NOTE - PLAN OF CARE
Follow up with your Primary Medical Doctor within 2-3days. Recommending to repeat Duplex DVT study in one week. Follow up with Orthopedist as scheduled for tomorrow.  If results or reports were given to you, show copies of your reports given to you. Take all of your medications as previously prescribed. Worsening pain, fevers, or any other new or concerning symptoms return to the ED.

## 2018-02-05 NOTE — ED PROVIDER NOTE - PROGRESS NOTE DETAILS
GIOVANNI Valdez: Received signout and discussed plan with QUID attending. DVT study negative., pain well controlled. Stable for d/c home with followup. Pt amenable with plan. upon dc. pt felt slightly nauseous. No other symptoms of vomiting,abd pain.

## 2018-02-05 NOTE — ED PROVIDER NOTE - OBJECTIVE STATEMENT
56 y/o F w/ PMHx of Asthma, DM, HTN, presents to ED c/o x4days of worsening rt ankle swelling and pain w/ associated difficulty bearing weight. Has h/o previous surgery on the ankle [id number] ago; no complications s/p surgery. Pt states she was recently admitted to [facility] until x4days ago for +influenza; noted to be on blood thinners during hospital stay. Did not take any pain medication today. Denies fever, chills, and other complaints/injuries. Allergic to anti-inflammatories which cause wheezing and hives. 56 y/o F w/ PMHx of Asthma, DM, HTN, presents to ED c/o x4days of worsening rt ankle swelling and pain w/ associated difficulty bearing weight. Has h/o previous surgery on the ankle [id number] ago; no complications s/p surgery. Pt states she was recently admitted to [facility] until x4days ago for +influenza; noted to be on blood thinners during hospital stay. No chest pain or SOB- significantly improved since admission. Did not take any pain medication today. Denies fever, chills, and other complaints/injuries. Allergic to anti-inflammatories which cause wheezing and hives.

## 2018-02-07 ENCOUNTER — APPOINTMENT (OUTPATIENT)
Dept: PULMONOLOGY | Facility: CLINIC | Age: 58
End: 2018-02-07
Payer: COMMERCIAL

## 2018-02-07 VITALS
HEIGHT: 61 IN | DIASTOLIC BLOOD PRESSURE: 79 MMHG | WEIGHT: 154 LBS | OXYGEN SATURATION: 98 % | BODY MASS INDEX: 29.07 KG/M2 | SYSTOLIC BLOOD PRESSURE: 124 MMHG | HEART RATE: 81 BPM

## 2018-02-07 PROCEDURE — 94010 BREATHING CAPACITY TEST: CPT

## 2018-02-07 PROCEDURE — 94729 DIFFUSING CAPACITY: CPT

## 2018-02-07 PROCEDURE — 99214 OFFICE O/P EST MOD 30 MIN: CPT | Mod: 25

## 2018-02-07 PROCEDURE — 94727 GAS DIL/WSHOT DETER LNG VOL: CPT

## 2018-02-07 RX ORDER — MONTELUKAST 10 MG/1
10 TABLET, FILM COATED ORAL
Qty: 30 | Refills: 0 | Status: ACTIVE | COMMUNITY
Start: 2018-01-22

## 2018-02-09 ENCOUNTER — EMERGENCY (EMERGENCY)
Facility: HOSPITAL | Age: 58
LOS: 1 days | Discharge: HOME | End: 2018-02-09
Attending: EMERGENCY MEDICINE | Admitting: EMERGENCY MEDICINE
Payer: COMMERCIAL

## 2018-02-09 VITALS
TEMPERATURE: 98 F | HEART RATE: 95 BPM | RESPIRATION RATE: 16 BRPM | OXYGEN SATURATION: 97 % | DIASTOLIC BLOOD PRESSURE: 67 MMHG | SYSTOLIC BLOOD PRESSURE: 125 MMHG

## 2018-02-09 DIAGNOSIS — Z98.89 OTHER SPECIFIED POSTPROCEDURAL STATES: Chronic | ICD-10-CM

## 2018-02-09 LAB
ALBUMIN SERPL ELPH-MCNC: 4 G/DL — SIGNIFICANT CHANGE UP (ref 3.3–5)
ALP SERPL-CCNC: 59 U/L — SIGNIFICANT CHANGE UP (ref 40–120)
ALT FLD-CCNC: 44 U/L — HIGH (ref 4–33)
AST SERPL-CCNC: 27 U/L — SIGNIFICANT CHANGE UP (ref 4–32)
BASOPHILS # BLD AUTO: 0.01 K/UL — SIGNIFICANT CHANGE UP (ref 0–0.2)
BASOPHILS NFR BLD AUTO: 0.1 % — SIGNIFICANT CHANGE UP (ref 0–2)
BILIRUB SERPL-MCNC: 0.2 MG/DL — SIGNIFICANT CHANGE UP (ref 0.2–1.2)
BUN SERPL-MCNC: 25 MG/DL — HIGH (ref 7–23)
CALCIUM SERPL-MCNC: 9.5 MG/DL — SIGNIFICANT CHANGE UP (ref 8.4–10.5)
CHLORIDE SERPL-SCNC: 100 MMOL/L — SIGNIFICANT CHANGE UP (ref 98–107)
CO2 SERPL-SCNC: 29 MMOL/L — SIGNIFICANT CHANGE UP (ref 22–31)
CREAT SERPL-MCNC: 0.93 MG/DL — SIGNIFICANT CHANGE UP (ref 0.5–1.3)
CRP SERPL-MCNC: 13.5 MG/L — HIGH
EOSINOPHIL # BLD AUTO: 0.1 K/UL — SIGNIFICANT CHANGE UP (ref 0–0.5)
EOSINOPHIL NFR BLD AUTO: 1 % — SIGNIFICANT CHANGE UP (ref 0–6)
ERYTHROCYTE [SEDIMENTATION RATE] IN BLOOD: 12 MM/HR — SIGNIFICANT CHANGE UP (ref 4–25)
GLUCOSE SERPL-MCNC: 108 MG/DL — HIGH (ref 70–99)
HCT VFR BLD CALC: 40.3 % — SIGNIFICANT CHANGE UP (ref 34.5–45)
HGB BLD-MCNC: 12.8 G/DL — SIGNIFICANT CHANGE UP (ref 11.5–15.5)
IMM GRANULOCYTES # BLD AUTO: 0.06 # — SIGNIFICANT CHANGE UP
IMM GRANULOCYTES NFR BLD AUTO: 0.6 % — SIGNIFICANT CHANGE UP (ref 0–1.5)
LYMPHOCYTES # BLD AUTO: 4.53 K/UL — HIGH (ref 1–3.3)
LYMPHOCYTES # BLD AUTO: 43.2 % — SIGNIFICANT CHANGE UP (ref 13–44)
MCHC RBC-ENTMCNC: 27.4 PG — SIGNIFICANT CHANGE UP (ref 27–34)
MCHC RBC-ENTMCNC: 31.8 % — LOW (ref 32–36)
MCV RBC AUTO: 86.3 FL — SIGNIFICANT CHANGE UP (ref 80–100)
MONOCYTES # BLD AUTO: 0.9 K/UL — SIGNIFICANT CHANGE UP (ref 0–0.9)
MONOCYTES NFR BLD AUTO: 8.6 % — SIGNIFICANT CHANGE UP (ref 2–14)
NEUTROPHILS # BLD AUTO: 4.88 K/UL — SIGNIFICANT CHANGE UP (ref 1.8–7.4)
NEUTROPHILS NFR BLD AUTO: 46.5 % — SIGNIFICANT CHANGE UP (ref 43–77)
NRBC # FLD: 0 — SIGNIFICANT CHANGE UP
PLATELET # BLD AUTO: 267 K/UL — SIGNIFICANT CHANGE UP (ref 150–400)
PMV BLD: 10.9 FL — SIGNIFICANT CHANGE UP (ref 7–13)
POTASSIUM SERPL-MCNC: 4.3 MMOL/L — SIGNIFICANT CHANGE UP (ref 3.5–5.3)
POTASSIUM SERPL-SCNC: 4.3 MMOL/L — SIGNIFICANT CHANGE UP (ref 3.5–5.3)
PROT SERPL-MCNC: 6.7 G/DL — SIGNIFICANT CHANGE UP (ref 6–8.3)
RBC # BLD: 4.67 M/UL — SIGNIFICANT CHANGE UP (ref 3.8–5.2)
RBC # FLD: 13.6 % — SIGNIFICANT CHANGE UP (ref 10.3–14.5)
SODIUM SERPL-SCNC: 144 MMOL/L — SIGNIFICANT CHANGE UP (ref 135–145)
WBC # BLD: 10.48 K/UL — SIGNIFICANT CHANGE UP (ref 3.8–10.5)
WBC # FLD AUTO: 10.48 K/UL — SIGNIFICANT CHANGE UP (ref 3.8–10.5)

## 2018-02-09 PROCEDURE — 73620 X-RAY EXAM OF FOOT: CPT | Mod: 26,RT

## 2018-02-09 PROCEDURE — 73590 X-RAY EXAM OF LOWER LEG: CPT | Mod: 26,RT

## 2018-02-09 PROCEDURE — 99285 EMERGENCY DEPT VISIT HI MDM: CPT

## 2018-02-09 RX ORDER — ACETAMINOPHEN 500 MG
650 TABLET ORAL ONCE
Qty: 0 | Refills: 0 | Status: COMPLETED | OUTPATIENT
Start: 2018-02-09 | End: 2018-02-09

## 2018-02-09 NOTE — ED PROVIDER NOTE - OBJECTIVE STATEMENT
56 y/o F pt with PMHx of Asthma, HTN, and DM 2 and PSHx of Bunion, foot surgery, , and Uterus problem, arrives to the ED c/o worsening left foot pain/swelling and difficulty bearing weight (limping) for 1 week. Pt reports that she was admitted here on  for the flu, and was released on .; upon d/c home pt began experience left foot swelling/pain. Pt was then here this past Monday and had an US done; results unknown but pt was given Oxycontin (no relief). Pt then saw her podiatrist Tuesday (Dr. Blanco, who advised performed left foot surgery on pt 2 years ago), who advised pt to begin physical therapy and come back to the ED for further evaluation. No hx of PE. Former smoker. Denies any other complaints. Allergic to NSAIDs and Avalox. 58 y/o F pt with PMHx of Asthma, HTN, and DM 2 and PSHx of Bunion, foot surgery, , and Uterus problem, arrives to the ED c/o worsening  right foot pain/swelling and difficulty bearing weight (limping) for 1 week. Pt reports that she was admitted here on  for the flu, and was released on .; upon d/c home pt began experience right foot swelling/pain. Pt was then here this past Monday and had an US done; results unknown but pt was given Oxycontin (no relief). Pt then saw her podiatrist Tuesday (Dr. Blanco, who advised performed  right foot surgery on pt 2 years ago), who advised pt to begin physical therapy and come back to the ED for further evaluation. No hx of PE. Former smoker. Denies any other complaints. Allergic to NSAIDs and Avalox.

## 2018-02-09 NOTE — CONSULT NOTE ADULT - ASSESSMENT
56y/o female pt with right foot pain  - pt seen and evaluated  - pt known to Dr. Clifton who has previously preformed surgery on her  - pt has history of repaired peroneal tear that was reinjured previously and is likely aggravated  - no signs of infection are present, no pain at ankle with range of motion, pain is present at the peroneals  - will treat with immobilization at this point as per Dr. Clifton   - will follow up with Dr. Clfiton on Monday  - advised pt to return to ED if any signs of infection present or if condition worsens  - Call Dr. Clifton's office to follow up (call  to make appointment)

## 2018-02-09 NOTE — ED ADULT TRIAGE NOTE - CHIEF COMPLAINT QUOTE
pt c/o right foot pain and swelling since last friday. pt sts had an u/s on monday with no clots noted. denies any trauma. denies any sob. pmhx asthma, htn, dm

## 2018-02-09 NOTE — ED PROVIDER NOTE - CARE PLAN
Principal Discharge DX:	Foot pain, right  Assessment and plan of treatment:	Rest, ice, elevate area.  Take Motrin 600mg every 8 hrs with food for pain. Follow up with podiatrist Dr. Blanco on Monday.  Follow up with PMD within 48-72 hrs.  Any worsening pain, swelling, weakness, numbness return to ED. Principal Discharge DX:	Foot pain, right  Assessment and plan of treatment:	Rest, ice, elevate area.  Take Tylenol as directed for pain. Follow up with podiatrist Dr. Blanoc on Monday.  Follow up with PMD within 48-72 hrs.  Any worsening pain, swelling, weakness, numbness return to ED.

## 2018-02-09 NOTE — ED PROVIDER NOTE - LOWER EXTREMITY EXAM, LEFT
right lateral ankle with 3cm by 3cm warm circular region above the old incision sight. right lateral ankle with 3cm by 3cm circular region above the old incision sight.

## 2018-02-09 NOTE — CONSULT NOTE ADULT - SUBJECTIVE AND OBJECTIVE BOX
Patient is a 57y old  Female who presents with a chief complaint of right foot pain and swelling    HPI: pt states that she has had pain and swelling in her right foot for about a week. She states that she previously came to ED because of the pain and was given pain medications but she could not tolerate them because they made her nauseous. She states that the pain goes up the side of her foot. She has a history of multiple podiatric surgeries in the past. She denies any f/n/v/c/sob.      PAST MEDICAL & SURGICAL HISTORY:  Former smoker, stopped smoking many years ago: (Quit ~26 years ago; used to smoke 0.3 ppd x ~10 years.)  DM2 (diabetes mellitus, type 2)  Hypertension  Asthma: (no hx/o intubation)  H/O foot surgery: right   delivery NOS: x1  Bunion: b/l  Uterus Problem: &quot;was getting cramps so they did ablation? enometriosis  10/2012      MEDICATIONS  (STANDING):    MEDICATIONS  (PRN):      Allergies    AValox (Unknown)  NSAIDs (Unknown)  NSAIDS (Unknown)    Intolerances        VITALS:    Vital Signs Last 24 Hrs  T(C): 36.8 (2018 11:14), Max: 36.8 (2018 11:14)  T(F): 98.2 (2018 11:14), Max: 98.2 (2018 11:14)  HR: 95 (2018 11:14) (95 - 95)  BP: 125/67 (2018 11:14) (125/67 - 125/67)  BP(mean): --  RR: 16 (2018 11:14) (16 - 16)  SpO2: 97% (2018 11:14) (97% - 97%)    LABS:                          12.8   10.48 )-----------( 267      ( 2018 12:26 )             40.3       -    144  |  100  |  25<H>  ----------------------------<  108<H>  4.3   |  29  |  0.93    Ca    9.5      2018 13:12    TPro  6.7  /  Alb  4.0  /  TBili  0.2  /  DBili  x   /  AST  27  /  ALT  44<H>  /  AlkPhos  59  -      CAPILLARY BLOOD GLUCOSE              LOWER EXTREMITY PHYSICAL EXAM:    Vasular: DP/PT 2/4, B/L, CFT <3 seconds B/L, Temperature gradient WNL B/L.   Neuro: Epicritic sensation intact to the level of toes, B/L.  Musculoskeletal/Ortho: pain with palpation of the of the lateral aspect of the right ankle distal to the fibula, and proximally at the lateral aspect of the leg, edema noted to the lateral aspect of the foot and ankle.  Skin: no open lesions, no erythema, no purulence, no crepitus, no fluctuance, no clinical signs of infection.    RADIOLOGY & ADDITIONAL STUDIES:  no emergent findings on xrays

## 2018-02-09 NOTE — ED PROVIDER NOTE - PROGRESS NOTE DETAILS
podiatry consult appreciated. Pt immobilized by ortho and advised to follow up on Monday 2-12-18 w Dr Clifton. PA Jonas: Pt admits to feeling better, pt instructed to follow up with Dr. Blanco on monday. Patient verbalizes understanding.

## 2018-02-09 NOTE — ED PROVIDER NOTE - PLAN OF CARE
Rest, ice, elevate area.  Take Motrin 600mg every 8 hrs with food for pain. Follow up with podiatrist Dr. Blanco on Monday.  Follow up with PMD within 48-72 hrs.  Any worsening pain, swelling, weakness, numbness return to ED. Rest, ice, elevate area.  Take Tylenol as directed for pain. Follow up with podiatrist Dr. Blanco on Monday.  Follow up with PMD within 48-72 hrs.  Any worsening pain, swelling, weakness, numbness return to ED.

## 2018-02-15 ENCOUNTER — APPOINTMENT (OUTPATIENT)
Dept: PULMONOLOGY | Facility: CLINIC | Age: 58
End: 2018-02-15

## 2018-03-22 ENCOUNTER — APPOINTMENT (OUTPATIENT)
Dept: PULMONOLOGY | Facility: CLINIC | Age: 58
End: 2018-03-22
Payer: COMMERCIAL

## 2018-03-22 VITALS
OXYGEN SATURATION: 97 % | WEIGHT: 152 LBS | HEIGHT: 61 IN | SYSTOLIC BLOOD PRESSURE: 92 MMHG | HEART RATE: 102 BPM | BODY MASS INDEX: 28.7 KG/M2 | DIASTOLIC BLOOD PRESSURE: 63 MMHG

## 2018-03-22 PROCEDURE — 99214 OFFICE O/P EST MOD 30 MIN: CPT

## 2018-03-22 RX ORDER — COLCHICINE 0.6 MG/1
0.6 TABLET, FILM COATED ORAL TWICE DAILY
Qty: 60 | Refills: 0 | Status: DISCONTINUED | COMMUNITY
Start: 2018-02-07 | End: 2018-03-22

## 2018-03-22 RX ORDER — PANTOPRAZOLE 20 MG/1
20 TABLET, DELAYED RELEASE ORAL
Qty: 30 | Refills: 0 | Status: DISCONTINUED | COMMUNITY
Start: 2018-02-02 | End: 2018-03-22

## 2018-03-22 RX ORDER — LOSARTAN POTASSIUM AND HYDROCHLOROTHIAZIDE 12.5; 1 MG/1; MG/1
100-12.5 TABLET ORAL
Qty: 30 | Refills: 0 | Status: DISCONTINUED | COMMUNITY
Start: 2017-05-18 | End: 2018-03-22

## 2018-03-22 RX ORDER — PREDNISONE 20 MG/1
20 TABLET ORAL
Qty: 10 | Refills: 0 | Status: DISCONTINUED | COMMUNITY
Start: 2018-02-02 | End: 2018-03-22

## 2018-03-22 RX ORDER — LINAGLIPTIN AND METFORMIN HYDROCHLORIDE 2.5; 1 MG/1; MG/1
2.5-1 TABLET, FILM COATED ORAL
Qty: 60 | Refills: 0 | Status: DISCONTINUED | COMMUNITY
Start: 2018-02-02 | End: 2018-03-22

## 2018-06-28 ENCOUNTER — APPOINTMENT (OUTPATIENT)
Dept: PULMONOLOGY | Facility: CLINIC | Age: 58
End: 2018-06-28
Payer: COMMERCIAL

## 2018-06-28 VITALS
WEIGHT: 148 LBS | SYSTOLIC BLOOD PRESSURE: 148 MMHG | BODY MASS INDEX: 27.94 KG/M2 | HEIGHT: 61 IN | DIASTOLIC BLOOD PRESSURE: 90 MMHG | OXYGEN SATURATION: 99 % | HEART RATE: 66 BPM

## 2018-06-28 PROCEDURE — 99214 OFFICE O/P EST MOD 30 MIN: CPT

## 2018-07-02 LAB
25(OH)D3 SERPL-MCNC: 28.8 NG/ML
ALBUMIN SERPL ELPH-MCNC: 4.4 G/DL
ALP BLD-CCNC: 106 U/L
ALT SERPL-CCNC: 20 U/L
ANION GAP SERPL CALC-SCNC: 15 MMOL/L
AST SERPL-CCNC: 19 U/L
BASOPHILS # BLD AUTO: 0.01 K/UL
BASOPHILS NFR BLD AUTO: 0.1 %
BILIRUB SERPL-MCNC: <0.2 MG/DL
BUN SERPL-MCNC: 13 MG/DL
CALCIUM SERPL-MCNC: 9.9 MG/DL
CHLORIDE SERPL-SCNC: 102 MMOL/L
CO2 SERPL-SCNC: 27 MMOL/L
CREAT SERPL-MCNC: 0.8 MG/DL
EOSINOPHIL # BLD AUTO: 0.27 K/UL
EOSINOPHIL NFR BLD AUTO: 3.5 %
GLUCOSE SERPL-MCNC: 96 MG/DL
HBA1C MFR BLD HPLC: 6.2 %
HCT VFR BLD CALC: 38.5 %
HGB BLD-MCNC: 12.4 G/DL
IMM GRANULOCYTES NFR BLD AUTO: 0.1 %
LYMPHOCYTES # BLD AUTO: 4.2 K/UL
LYMPHOCYTES NFR BLD AUTO: 53.8 %
MAN DIFF?: NORMAL
MCHC RBC-ENTMCNC: 27 PG
MCHC RBC-ENTMCNC: 32.2 GM/DL
MCV RBC AUTO: 83.7 FL
MONOCYTES # BLD AUTO: 0.41 K/UL
MONOCYTES NFR BLD AUTO: 5.2 %
NEUTROPHILS # BLD AUTO: 2.91 K/UL
NEUTROPHILS NFR BLD AUTO: 37.3 %
PLATELET # BLD AUTO: 280 K/UL
POTASSIUM SERPL-SCNC: 4.4 MMOL/L
PROT SERPL-MCNC: 7.3 G/DL
RBC # BLD: 4.6 M/UL
RBC # FLD: 13.7 %
SODIUM SERPL-SCNC: 144 MMOL/L
WBC # FLD AUTO: 7.81 K/UL

## 2018-07-29 ENCOUNTER — EMERGENCY (EMERGENCY)
Facility: HOSPITAL | Age: 58
LOS: 1 days | Discharge: HOME | End: 2018-07-29
Attending: EMERGENCY MEDICINE
Payer: COMMERCIAL

## 2018-07-29 VITALS
RESPIRATION RATE: 18 BRPM | HEART RATE: 89 BPM | TEMPERATURE: 101 F | SYSTOLIC BLOOD PRESSURE: 133 MMHG | DIASTOLIC BLOOD PRESSURE: 86 MMHG | OXYGEN SATURATION: 95 %

## 2018-07-29 VITALS
SYSTOLIC BLOOD PRESSURE: 137 MMHG | OXYGEN SATURATION: 95 % | WEIGHT: 147.05 LBS | TEMPERATURE: 101 F | DIASTOLIC BLOOD PRESSURE: 82 MMHG | HEART RATE: 94 BPM | RESPIRATION RATE: 18 BRPM

## 2018-07-29 DIAGNOSIS — Z98.89 OTHER SPECIFIED POSTPROCEDURAL STATES: Chronic | ICD-10-CM

## 2018-07-29 PROCEDURE — 82962 GLUCOSE BLOOD TEST: CPT

## 2018-07-29 PROCEDURE — 99283 EMERGENCY DEPT VISIT LOW MDM: CPT

## 2018-07-29 RX ORDER — ACETAMINOPHEN 500 MG
975 TABLET ORAL ONCE
Qty: 0 | Refills: 0 | Status: COMPLETED | OUTPATIENT
Start: 2018-07-29 | End: 2018-07-29

## 2018-07-29 RX ADMIN — Medication 975 MILLIGRAM(S): at 19:53

## 2018-07-29 RX ADMIN — Medication 300 MILLIGRAM(S): at 20:13

## 2018-07-29 NOTE — ED ADULT NURSE NOTE - OBJECTIVE STATEMENT
58 year old female patient presents to ED c/o R upper jaw pain x 3 weeks, worsening over past few days. Patient had xrays at dentists office and was told that her bridge broke and she needed a root canal. Patient has follow up appointment with dentist on 8/15, but states pain has become unbearable. Patient took 1000mg tylenol PTA with little improvement in pain. Pt endorses HA, R sided jaw swelling and pain, R sided ear pain, decreased PO intake and chills. Pt reports pain with swallowing but denies drooling or difficulty swallowing. Patient appears uncomfortable but no apparent respiratory distress. Patient aware of plan of care for dental evaluation

## 2018-07-29 NOTE — ED PROVIDER NOTE - MEDICAL DECISION MAKING DETAILS
58y f w known broken dental bridge in R lower jaw p/w R jaw pain and fever worsening over last 3 days. Pt only taking tylenol, has not seen a dentist. will consult dental for management and recommendations -Tao

## 2018-07-29 NOTE — ED PROVIDER NOTE - ATTENDING CONTRIBUTION TO CARE
58y f w PMhx HTN, asthma, borderline DM, p/w fever and tooth pain. She has multiple dental bridges with pain to gingival region with no swelling, seen by dental with no drainable abscess noted, pain control, has follow up tomorrow dental, low grade temp, abx, analgesia given, rx.

## 2018-07-29 NOTE — PROGRESS NOTE ADULT - SUBJECTIVE AND OBJECTIVE BOX
Please refer to previous dental consult note for additional information.     *INTERVAL HPI/OVERNIGHT EVENTS: 58 year old F present to ED with severe pain on lower right side. Bridge on lower right had fractured about 1 month ago. Patient went to dentist, who said that she needed root canal treatment to fix her bridge. She has an appointment with her dentist in mid-August. On Thursday, July 26th, patient states that she started feeling pain and swelling on lower right side. Patient presents today with fever.    MEDICATIONS  (STANDING): metformin, albuterol    MEDICATIONS  (PRN): acetominophen      Allergies  NSAIDs (Short breath; Hives)      Vital Signs Last 24 Hrs  T(C): 38.6 (29 Jul 2018 20:09), Max: 38.6 (29 Jul 2018 20:09)  T(F): 101.5 (29 Jul 2018 20:09), Max: 101.5 (29 Jul 2018 20:09)  HR: 89 (29 Jul 2018 20:09) (89 - 94)  BP: 133/86 (29 Jul 2018 20:09) (133/86 - 137/82)  BP(mean): --  RR: 18 (29 Jul 2018 20:09) (18 - 18)  SpO2: 95% (29 Jul 2018 20:09) (95% - 95%)    CLINICAL EXAM:  EOE: tenderness on palpation on R buccal to cervical areas of mandible, difficulty swallowing due to pain  No asymmetry, swelling, LAD, TMD, trismus    IOE: tenderness to palpation on R buccal and lingual vestibular areas. No signs of acute inflammation or infection. No lesions present on FOM, lateral borders of tongue, hard/soft palate, uvula, gingiva, buccal mucosa.    DENTAL RADIOGRAPHS: 1 panoramic radiograph and 2 periapical radiographs of lower right teeth taken and reviewed. No signs of periapical pathology noted. Recurrent caries on 5 unit bridge on lower right (#28, #29, and #32).    ASSESSMENT:  58 year old F with a history of asthma, hypertension, diabetes, and allergy to NSAIDs presents to ED for pain and swelling of LR quadrant. Patient reports she was told by her dentist that she needed root canal treatment on some of the lower right teeth in order to fix the bridge. Pain started on Thursday, July 26th and has gotten worse. The acetaminophen has not been helping with the pain. Patient describes pain on swallowing due to pain in her mouth. Clinical and radiographic examination reveal no signs of acute infection at this time.    PROCEDURE:  Verbal and written consent given.  Anesthesia: 18% topical benzocaine applied. 1 x 1.7 cc 0.5% bupivacaine with 1:200k epi given as R AGUSTO block.  Procedure: Patient reports relief of pain following block and no difficulty swallowing at this time.    RECOMMENDATIONS:  1) Pain management as per ED  2) F/U with outpatient dentist for comprehensive dental care upon discharge.  3) If any acute changes occur, page Dental.     Dr. Radha Jackson DDS and Dr. Dori Zee DDS , Pager #[phone]

## 2018-07-29 NOTE — ED PROVIDER NOTE - OBJECTIVE STATEMENT
58y f w PMhx HTN, asthma, borderline DM, p/w fever and tooth pain. She has multiple dental bridges, and 1 mo. ago a bridge in her R lower jaw broke. For the last 3 days she has had worsening pain in her R lower jaw near her bridge, and has developed swelling in the region. She has also developed a fever. She has been taking only tylenol for her symptoms, has not seen a dentist since the pain began.

## 2018-07-29 NOTE — ED ADULT NURSE REASSESSMENT NOTE - NS ED NURSE REASSESS COMMENT FT1
MD made aware that patient took tylenol at about 130pm, will wait another hour before administering additional dose of tylenol

## 2018-07-30 PROBLEM — E11.9 TYPE 2 DIABETES MELLITUS WITHOUT COMPLICATIONS: Chronic | Status: ACTIVE | Noted: 2017-03-21

## 2018-08-11 ENCOUNTER — RX RENEWAL (OUTPATIENT)
Age: 58
End: 2018-08-11

## 2018-08-20 ENCOUNTER — EMERGENCY (EMERGENCY)
Facility: HOSPITAL | Age: 58
LOS: 1 days | Discharge: HOME | End: 2018-08-20
Attending: EMERGENCY MEDICINE | Admitting: EMERGENCY MEDICINE
Payer: COMMERCIAL

## 2018-08-20 VITALS
DIASTOLIC BLOOD PRESSURE: 85 MMHG | RESPIRATION RATE: 22 BRPM | SYSTOLIC BLOOD PRESSURE: 162 MMHG | OXYGEN SATURATION: 100 % | TEMPERATURE: 98 F | HEART RATE: 67 BPM

## 2018-08-20 DIAGNOSIS — Z98.89 OTHER SPECIFIED POSTPROCEDURAL STATES: Chronic | ICD-10-CM

## 2018-08-20 PROCEDURE — 99284 EMERGENCY DEPT VISIT MOD MDM: CPT | Mod: 25

## 2018-08-20 NOTE — ED ADULT TRIAGE NOTE - CHIEF COMPLAINT QUOTE
C/o asthma attack starting this afternoon unrelieved by home rescue medications, respirations even and unlabored however audible wheeze noted. C/o asthma attack starting this afternoon unrelieved by home rescue medications, respirations even and unlabored however audible wheeze noted.    Addendum:  Pt family member became irritate yelling at staff members and other pts about wife's wait time.  MD Perez evaluated pt and ordered duonebs x2.  security present and redirected patient.  security present to observe pt for rpt aggressive behavior.

## 2018-08-21 VITALS
OXYGEN SATURATION: 100 % | DIASTOLIC BLOOD PRESSURE: 83 MMHG | SYSTOLIC BLOOD PRESSURE: 164 MMHG | RESPIRATION RATE: 20 BRPM | HEART RATE: 74 BPM

## 2018-08-21 RX ORDER — ALBUTEROL 90 UG/1
2.5 AEROSOL, METERED ORAL ONCE
Qty: 0 | Refills: 0 | Status: COMPLETED | OUTPATIENT
Start: 2018-08-21 | End: 2018-08-21

## 2018-08-21 RX ORDER — IPRATROPIUM/ALBUTEROL SULFATE 18-103MCG
3 AEROSOL WITH ADAPTER (GRAM) INHALATION ONCE
Qty: 0 | Refills: 0 | Status: COMPLETED | OUTPATIENT
Start: 2018-08-21 | End: 2018-08-21

## 2018-08-21 RX ORDER — ACETAMINOPHEN 500 MG
650 TABLET ORAL ONCE
Qty: 0 | Refills: 0 | Status: COMPLETED | OUTPATIENT
Start: 2018-08-21 | End: 2018-08-21

## 2018-08-21 RX ORDER — ALBUTEROL 90 UG/1
1 AEROSOL, METERED ORAL ONCE
Qty: 0 | Refills: 0 | Status: COMPLETED | OUTPATIENT
Start: 2018-08-21 | End: 2018-08-21

## 2018-08-21 RX ORDER — ALBUTEROL 90 UG/1
2 AEROSOL, METERED ORAL
Qty: 18 | Refills: 0
Start: 2018-08-21 | End: 2018-09-19

## 2018-08-21 RX ADMIN — Medication 3 MILLILITER(S): at 01:19

## 2018-08-21 RX ADMIN — ALBUTEROL 2.5 MILLIGRAM(S): 90 AEROSOL, METERED ORAL at 01:46

## 2018-08-21 RX ADMIN — Medication 3 MILLILITER(S): at 00:16

## 2018-08-21 RX ADMIN — ALBUTEROL 1 PUFF(S): 90 AEROSOL, METERED ORAL at 02:19

## 2018-08-21 RX ADMIN — Medication 650 MILLIGRAM(S): at 01:19

## 2018-08-21 RX ADMIN — Medication 650 MILLIGRAM(S): at 02:19

## 2018-08-21 RX ADMIN — Medication 50 MILLIGRAM(S): at 01:19

## 2018-08-21 NOTE — ED PROVIDER NOTE - PSH
Bunion  b/l   delivery NOS  x1  H/O foot surgery  right  No significant past surgical history    Uterus Problem  "was getting cramps so they did ablation? enometriosis  10/2012

## 2018-08-21 NOTE — ED ADULT NURSE NOTE - OBJECTIVE STATEMENT
pt received to room 14. complains of chest tightness, cough, nasal congestion, and sob since this am. was not relieved with home aluterol and symbicort. hx asthma. received 2 duonebs in triage. reports some relief from nebs but still wheezing. medicated as ordered. pt received to room 14. complains of chest tightness, cough, nasal congestion, and sob since this am. was not relieved with home aluterol and symbicort. hx asthma. received 2 duonebs in triage. reports some relief from nebs but still tight. medicated as ordered.

## 2018-08-21 NOTE — ED ADULT NURSE NOTE - NSIMPLEMENTINTERV_GEN_ALL_ED
Implemented All Universal Safety Interventions:  [place] to call system. Call bell, personal items and telephone within reach. Instruct patient to call for assistance. Room bathroom lighting operational. Non-slip footwear when patient is off stretcher. Physically safe environment: no spills, clutter or unnecessary equipment. Stretcher in lowest position, wheels locked, appropriate side rails in place.

## 2018-08-21 NOTE — ED PROVIDER NOTE - PHYSICAL EXAMINATION
[place]: resp: very minimal expiratory wheeze, good air entry. slightly diminished breath sounds. no r/r.   no stridor.   no LE edema, normal equal distal pulses.

## 2018-08-21 NOTE — ED PROVIDER NOTE - OBJECTIVE STATEMENT
59yo female h/o asthma requiring admissions but no intubations p/w 1 day chest tightness. pt woke up with nasal congestion and chest tightness, had minimal improvement with albuterol and symbicort, worsened throughout the day so came to ED. feels like prior asthma exacerbations. + cough today, no fevers, no chills, no sick contacts. 59yo female h/o asthma requiring admissions but no intubations p/w 1 day chest tightness. pt woke up with nasal congestion and chest tightness, had minimal improvement with albuterol and symbicort, worsened throughout the day so came to ED. feels like prior asthma exacerbations. + cough today, no fevers, no chills, no sick contacts.  [place]: 58F PMh asthma (no intubations) p/w 1d of SOB, chest tightness, slight cough and nasal congestion. Feels like prior asthma 57yo female h/o asthma requiring admissions but no intubations p/w 1 day chest tightness. pt woke up with nasal congestion and chest tightness, had minimal improvement with albuterol and symbicort, worsened throughout the day so came to ED. feels like prior asthma exacerbations. + cough today, no fevers, no chills, no sick contacts.  [place]: 58F PMh asthma (no intubations) p/w 1d of SOB, chest tightness, slight cough and nasal congestion. Feels like prior asthma. Denies associated NVD, lightheaded, diaphoresis, palpitations, black/bloody stool, LE pain/swelling, focal weakness/numbness, recent travel/immobilization, abd pain, urinary complaints, f/c. No hormone use. No FMH CAD/clots/sudden death. Received 2 duonebs prior to my eval w/ improvement in symptoms.

## 2018-08-21 NOTE — ED ADULT NURSE NOTE - CHIEF COMPLAINT QUOTE
C/o asthma attack starting this afternoon unrelieved by home rescue medications, respirations even and unlabored however audible wheeze noted.    Addendum:  Pt family member became irritate yelling at staff members and other pts about wife's wait time.  MD Perez evaluated pt and ordered duonebs x2.  security present and redirected patient.  security present to observe pt for rpt aggressive behavior.

## 2018-08-21 NOTE — ED PROVIDER NOTE - PROGRESS NOTE DETAILS
[place]: improved, breathing comfortably, air movement improved [place]: Feeling much better, even w/ ambulating, wants to go home. Comfortable for dc, outpt pmd f/u.

## 2018-09-17 ENCOUNTER — RX RENEWAL (OUTPATIENT)
Age: 58
End: 2018-09-17

## 2018-09-18 NOTE — ED PROVIDER NOTE - ENMT, MLM
Outpatient Physical Therapy Peds Treatment Note [facility]     Patient Name: Sinan Julian  : 2004  MRN: [id number]  Today's Date: 2018       Visit Date: 2018      Visit Dx:    ICD-10-CM ICD-9-CM   1. PDD (pervasive developmental disorder) F84.9 299.90               PT Assessment/Plan     Row Name 18 0905          PT Assessment    Assessment Comments Child participated well with therapist for treatment this date. Child requires frequent rest breaks secondary to decreased endurance and increased fatigue throughout session, particularly with core and LE strengthening activities. Child completes scooterboard activity seated on stool secondary to inability to complete on scooterboard at this time. Child improving with LE strength as she is now able to complete x3 laps with minimal breaks on low stool. During session therapist child demonstrates improved success with core strengthening exercises however demonstrates decreased core strength with completion of sit-ups requiring cues for posture. Child completes partial movement of rainbow pass activity, keeps knees bent and places ball between knees while only lifting LE off mat; child is attempting to complete with ball between feet but is unable at this time. Child introduced to core exercises using medicine ball today. Child appears to enjoy activity and will work to increase size of medicine ball at later sessions. Child remains appropriate for OP PT services at this time in order to address deficits with strength, coordination, and balance in order for child to participate with same age peers during age appropriate activities.  -        PT Plan    PT Frequency 1x/week  -     PT Plan Comments Continue with current PT POC - encourage endurance type activities at home  -       User Key  (r) = Recorded By, (t) = Taken By, (c) = Cosigned By    Initials Name Provider Type     Kaye Rainey, PT Physical Therapist                     Exercises     Row Name 09/18/18 0905             Subjective Comments    Subjective Comments Child arrived with mother who remained in [facility] for session. Mother and child report no changes and no new concerns.  -DH         Subjective Pain    Able to rate subjective pain? no  -      Subjective Pain Comment No signs or symptoms before, during, or after treatment.  -         Exercise 1    Exercise Name 1 UE/LE recumbent bike for strengthening and coronation  -      Cueing 1 Verbal  -      Time 1 10 minutes  -      Additional Comments level 7.0 (child increased independently to this level)  -         Exercise 2    Exercise Name 2 stance on Bosu ball for dynamic balance activity  -      Cueing 2 Verbal;Tactile  -      Time 2 5 minutes   -         Exercise 3    Exercise Name 3 Scooter activity for lower extremity strengthening  -      Cueing 3 Verbal;Tactile  -      Additional Comments seated on stool with theraband on seat to prevent sliding off; child completes 3 laps - reports increased tiredness of legs and asks for break between laps  -         Exercise 4    Exercise Name 4 Sit-ups  -      Cueing 4 Verbal  -      Sets 4 1  -DH      Reps 4 5  -DH      Additional Comments increased difficulty; max tactile cues for posture during activity  -         Exercise 5    Exercise Name 5 side pass - medicine ball   core strengthening  -      Cueing 5 Verbal;Tactile  -      Reps 5 10   each direction  -DH      Additional Comments standing back to back with PT; pass ball from one side to other; 4 pound ball used   -         Exercise 6    Exercise Name 6 over/under pass with medicine ball   core strengthening   -      Cueing 6 Verbal;Tactile  -      Reps 6 10   each direction  -DH      Additional Comments standing back to back with PT; 4 pound ball used  -         Exercise 7    Exercise Name 7 ascend/descend 6 flights of stairs for LE strengthening   -      Cueing 7 Verbal   -      Additional Comments break at top of stairs due to decreased endurance   -         Exercise 8    Exercise Name 8 rainbow pass activity   -      Cueing 8 Verbal;Tactile  -      Reps 8 10  -      Additional Comments to improve core strength and bilateral coordination; max verbal cues. completes partial movement with just legs being lifted off mat (ball between knees with knees bent)  -        User Key  (r) = Recorded By, (t) = Taken By, (c) = Cosigned By    Initials Name Provider Type     Kaye Rainey, PT Physical Therapist           All Therapeutic Exercises/Activities were chosen and performed to address the patient's specific short-term and long-term goals.              PT OP Goals     Row Name 09/18/18 0905          PT Short Term Goals    STG Date to Achieve 08/10/18  -     STG 1 Child and caregiver will be independent with completing home program a minimum of 5 days per week.  -     STG 1 Progress Met  -     STG 2 Child will hop on 1 leg 5 times consecutively (bilaterally) to demonstrate improvements with balance and lower extremity strength  -     STG 2 Progress Progressing  -     STG 3 Child will throw ball at target 10 feet away with 80% accuracy to demonstrate improvements with hand eye coordination with age-appropriate skill.  -     STG 3 Progress Progressing  -     STG 4 Child will hold superman position for 20 seconds without rest demonstrate improvements with core strength.  -     STG 4 Progress Progressing;Partially Met  -        Long Term Goals    LTG Date to Achieve 11/10/18  -     LTG 1 Child will complete 10 minutes on UE/LE recumbent bike without rest breaks, in order to demonstrate improvements in endurance.  -     LTG 1 Progress Progressing  -     LTG 2 Child will complete 4 flights of stairs, using reciprocal pattern and one handrail, without rest break in order to demonstrate improvements with lower extremity strength and endurance with  functional activity.  -     LTG 2 Progress Partially Met  -     LTG 2 Progress Comments requires rest at top of 6 flights  -     LTG 3 Child will demonstrate lower extremity strength  MMT score 4/5 bilaterally.  -     LTG 3 Progress Progressing  -     LTG 3 Progress Comments improving; approximately 4-/5 bilaterally  -     LTG 4 Child will complete shuttle run, using reciprocal arm/leg pattern, in less than 12 seconds.  -     LTG 4 Progress Progressing  -     LTG 5 Child will complete 5 pushups on knees without falling to demonstrate improvements with overall strength.   -     LTG 5 Progress Progressing  -     LTG 6 Child will independently ride bicycle with no reports of falls  -     LTG 6 Progress Progressing  -        Time Calculation    PT Goal Re-Cert Due Date 10/09/18  -       User Key  (r) = Recorded By, (t) = Taken By, (c) = Cosigned By    Initials Name Provider Type     Kaye Rainey, PT Physical Therapist          Therapy Education  Education Details: Compliant with current [facility] which is appropriate for child             Time Calculation:   Start Time: 0905  Stop Time: 0959  Time Calculation (min): 54 min  Total Timed Code Minutes- PT: 54 minute(s)  Therapy Suggested Charges     Code   Minutes Charges    None           Therapy Charges for Today     Code Description Service Date Service Provider Modifiers Qty    82916581625 HC PT THER PROC EA 15 MIN 9/18/2018 Kaye Rainey, PT GP 4    79626576886 HC PT THER SUPP EA 15 MIN 9/18/2018 Kaye Rainey, PT GP 1                Kaye Rainey PT, DPT, CIMI-2  9/18/2018      Airway patent, Nasal mucosa clear. Mouth with normal mucosa. Throat has no vesicles, no oropharyngeal exudates and uvula is midline. Airway patent.  No stridor, no oropharyngeal edema.

## 2019-03-13 ENCOUNTER — EMERGENCY (EMERGENCY)
Facility: HOSPITAL | Age: 59
LOS: 1 days | Discharge: HOME | End: 2019-03-13
Attending: STUDENT IN AN ORGANIZED HEALTH CARE EDUCATION/TRAINING PROGRAM
Payer: COMMERCIAL

## 2019-03-13 VITALS
RESPIRATION RATE: 20 BRPM | SYSTOLIC BLOOD PRESSURE: 182 MMHG | HEART RATE: 67 BPM | DIASTOLIC BLOOD PRESSURE: 107 MMHG | TEMPERATURE: 98 F | WEIGHT: 149.91 LBS | HEIGHT: 60 IN | OXYGEN SATURATION: 99 %

## 2019-03-13 DIAGNOSIS — Z98.89 OTHER SPECIFIED POSTPROCEDURAL STATES: Chronic | ICD-10-CM

## 2019-03-13 PROCEDURE — 99284 EMERGENCY DEPT VISIT MOD MDM: CPT | Mod: 25

## 2019-03-13 PROCEDURE — 93010 ELECTROCARDIOGRAM REPORT: CPT

## 2019-03-14 VITALS
TEMPERATURE: 98 F | OXYGEN SATURATION: 97 % | RESPIRATION RATE: 18 BRPM | DIASTOLIC BLOOD PRESSURE: 73 MMHG | SYSTOLIC BLOOD PRESSURE: 113 MMHG | HEART RATE: 78 BPM

## 2019-03-14 LAB — RAPID RVP RESULT: SIGNIFICANT CHANGE UP

## 2019-03-14 PROCEDURE — 87581 M.PNEUMON DNA AMP PROBE: CPT

## 2019-03-14 PROCEDURE — 94640 AIRWAY INHALATION TREATMENT: CPT

## 2019-03-14 PROCEDURE — 71046 X-RAY EXAM CHEST 2 VIEWS: CPT | Mod: 26

## 2019-03-14 PROCEDURE — 93005 ELECTROCARDIOGRAM TRACING: CPT

## 2019-03-14 PROCEDURE — 99285 EMERGENCY DEPT VISIT HI MDM: CPT | Mod: 25

## 2019-03-14 PROCEDURE — 87798 DETECT AGENT NOS DNA AMP: CPT

## 2019-03-14 PROCEDURE — 87486 CHLMYD PNEUM DNA AMP PROBE: CPT

## 2019-03-14 PROCEDURE — 71046 X-RAY EXAM CHEST 2 VIEWS: CPT

## 2019-03-14 PROCEDURE — 87633 RESP VIRUS 12-25 TARGETS: CPT

## 2019-03-14 RX ORDER — IPRATROPIUM/ALBUTEROL SULFATE 18-103MCG
3 AEROSOL WITH ADAPTER (GRAM) INHALATION ONCE
Qty: 0 | Refills: 0 | Status: COMPLETED | OUTPATIENT
Start: 2019-03-14 | End: 2019-03-14

## 2019-03-14 RX ORDER — ALBUTEROL 90 UG/1
2.5 AEROSOL, METERED ORAL
Qty: 0 | Refills: 0 | Status: COMPLETED | OUTPATIENT
Start: 2019-03-14 | End: 2019-03-14

## 2019-03-14 RX ORDER — ALBUTEROL 90 UG/1
2.5 AEROSOL, METERED ORAL
Qty: 0 | Refills: 0 | Status: DISCONTINUED | OUTPATIENT
Start: 2019-03-14 | End: 2019-03-14

## 2019-03-14 RX ADMIN — Medication 3 MILLILITER(S): at 03:26

## 2019-03-14 RX ADMIN — ALBUTEROL 2.5 MILLIGRAM(S): 90 AEROSOL, METERED ORAL at 05:08

## 2019-03-14 RX ADMIN — Medication 50 MILLIGRAM(S): at 05:08

## 2019-03-14 RX ADMIN — ALBUTEROL 2.5 MILLIGRAM(S): 90 AEROSOL, METERED ORAL at 06:07

## 2019-03-14 RX ADMIN — Medication 3 MILLILITER(S): at 03:27

## 2019-03-14 NOTE — ED ADULT NURSE NOTE - OBJECTIVE STATEMENT
58y Female PMH Asthma, Htn, DM presents to the ED c/o CP. 58y Female PMH Asthma, Htn, DM presents to the ED c/o CP X4 days. Pt reporting generalized chest tightness for four days and states she sometimes gets a similar feeling with the asthma but states this time is different because she also has pain to L. arm with tingling to L. fingers. Pt was seen at urgent care and sent here for further evaluation. Pt a&oX4, well in appearance, non-diaphoretic, airway intact, breathing spontaneously and unlabored, diminished lung sounds to bilateral lower lung fields, SpO2 of 98% noted on RA, skin warm dry and intact, cap refill <3 seconds, +peripheral pulses, pt denies ha, dizziness, SOB, numbness, weakness, visual changes, fever/chills. MD at bedside for eval. safety maintained.

## 2019-03-14 NOTE — ED PROVIDER NOTE - NSFOLLOWUPINSTRUCTIONS_ED_ALL_ED_FT
You were seen for chest tightness. Your EKG appeared normal. You had decreased air movement on exam thought to be due to asthma, and improved with duonebs and steroids. You should continue steroids for next 4 days and use albuterol every 4 hours for next 3 days, and then as needed. Return to ER if you have worsening symptoms.

## 2019-03-14 NOTE — ED PROVIDER NOTE - CLINICAL SUMMARY MEDICAL DECISION MAKING FREE TEXT BOX
58 F with chest tightness similar to asthma and diffusely diminished BS on exam. LIkely asthma. Will tx with duonebs (patient does not want steroids at this time) 58 F with chest tightness similar to asthma and diffusely diminished BS on exam. LIkely asthma. Will tx with duonebs (patient does not want steroids at this time)  Gonsalo Tariq DO: 59 yo F DM,asthma, htn,former smoker with chest tightness and sob for several days. feels similar to prior asthma attacks. no fever chills, cough n/v abd pain back pain. on exam NAD, diffusely decreased BS slight wheezing, no chest/abd tenderness. ekg nsr no go/d twi. plan nebs steroids, cxr, reassess.

## 2019-03-14 NOTE — ED ADULT NURSE REASSESSMENT NOTE - NS ED NURSE REASSESS COMMENT FT1
Pt sleeping in no apparent distress and reports that she is breathing easier. Pt still reporting mild chest tightness at this time. MD Mario aware.

## 2019-03-14 NOTE — ED PROVIDER NOTE - PROGRESS NOTE DETAILS
Patient feeling slightly better but still tight on exam. No distress. Another albuterol and steroids ordered Patient feeling better. Improved air entry. Ambulating well without symptoms. Offered CDU for further management but patient would like to go home. DC with albuterol q4 and steroids. Return for worsening symptoms, sob, chest tightness. Patient feeling better. Improved air entry. Ambulating well without symptoms. Offered CDU for further management but patient would like to go home. DC with albuterol q4 and steroids. Return for worsening symptoms, sob, chest tightness.  Gonsalo Tariq DO: agree with above. patient feeling better and exam improve. better air movement but some wheezing. walking around ed without sob, labored breathing. recommended cdu stay for continued tx but patient declined and preferred dc. Return precautions were discussed with patient at bedside and patient expressed understanding.

## 2019-03-14 NOTE — ED PROVIDER NOTE - OBJECTIVE STATEMENT
58 F h/o asthma, htn, pre-DM, sent from urgent care for chest tightness. Has been going on for a few days. Feels similar to asthma. Was seen at urgent care and found to have diminished bs diffusely that was concerning so sent to ED. Also with numbness in L fingers and pain with wrist rotation. No fevers/cough/URI symptoms.

## 2019-03-14 NOTE — ED ADULT NURSE NOTE - NSIMPLEMENTINTERV_GEN_ALL_ED
Implemented All Universal Safety Interventions:  [place] to call system. Call bell, personal items and telephone within reach. Instruct patient to call for assistance. Room bathroom lighting operational. Non-slip footwear when patient is off stretcher. Physically safe environment: no spills, clutter or unnecessary equipment. Stretcher in lowest position, wheels locked, appropriate side rails in place.

## 2019-03-14 NOTE — ED ADULT NURSE REASSESSMENT NOTE - NS ED NURSE REASSESS COMMENT FT1
Pt completed Duoneb treatments and reports some relief but states she still feels chest tightness and like she is wheezing. Pt breathing spontaneously and unlabored at this time. MD Mario at bedside for eval. Pt now taken to Xray. safety maintained.

## 2021-02-17 ENCOUNTER — EMERGENCY (EMERGENCY)
Facility: HOSPITAL | Age: 61
LOS: 1 days | Discharge: HOME | End: 2021-02-17
Attending: EMERGENCY MEDICINE | Admitting: EMERGENCY MEDICINE
Payer: COMMERCIAL

## 2021-02-17 VITALS
HEIGHT: 60 IN | DIASTOLIC BLOOD PRESSURE: 81 MMHG | TEMPERATURE: 98 F | HEART RATE: 100 BPM | RESPIRATION RATE: 18 BRPM | SYSTOLIC BLOOD PRESSURE: 166 MMHG | OXYGEN SATURATION: 100 %

## 2021-02-17 VITALS
RESPIRATION RATE: 16 BRPM | TEMPERATURE: 98 F | DIASTOLIC BLOOD PRESSURE: 65 MMHG | OXYGEN SATURATION: 100 % | SYSTOLIC BLOOD PRESSURE: 131 MMHG

## 2021-02-17 DIAGNOSIS — Z98.89 OTHER SPECIFIED POSTPROCEDURAL STATES: Chronic | ICD-10-CM

## 2021-02-17 LAB
ALBUMIN SERPL ELPH-MCNC: 5 G/DL — SIGNIFICANT CHANGE UP (ref 3.3–5)
ALP SERPL-CCNC: 112 U/L — SIGNIFICANT CHANGE UP (ref 40–120)
ALT FLD-CCNC: 27 U/L — SIGNIFICANT CHANGE UP (ref 4–33)
ANION GAP SERPL CALC-SCNC: 18 MMOL/L — HIGH (ref 7–14)
AST SERPL-CCNC: 22 U/L — SIGNIFICANT CHANGE UP (ref 4–32)
BASOPHILS # BLD AUTO: 0.01 K/UL — SIGNIFICANT CHANGE UP (ref 0–0.2)
BASOPHILS NFR BLD AUTO: 0.1 % — SIGNIFICANT CHANGE UP (ref 0–2)
BILIRUB SERPL-MCNC: <0.2 MG/DL — SIGNIFICANT CHANGE UP (ref 0.2–1.2)
BUN SERPL-MCNC: 16 MG/DL — SIGNIFICANT CHANGE UP (ref 7–23)
CALCIUM SERPL-MCNC: 10.5 MG/DL — SIGNIFICANT CHANGE UP (ref 8.4–10.5)
CHLORIDE SERPL-SCNC: 99 MMOL/L — SIGNIFICANT CHANGE UP (ref 98–107)
CO2 SERPL-SCNC: 25 MMOL/L — SIGNIFICANT CHANGE UP (ref 22–31)
CREAT SERPL-MCNC: 0.93 MG/DL — SIGNIFICANT CHANGE UP (ref 0.5–1.3)
EOSINOPHIL # BLD AUTO: 0 K/UL — SIGNIFICANT CHANGE UP (ref 0–0.5)
EOSINOPHIL NFR BLD AUTO: 0 % — SIGNIFICANT CHANGE UP (ref 0–6)
GLUCOSE SERPL-MCNC: 305 MG/DL — HIGH (ref 70–99)
HCT VFR BLD CALC: 41.3 % — SIGNIFICANT CHANGE UP (ref 34.5–45)
HGB BLD-MCNC: 13.3 G/DL — SIGNIFICANT CHANGE UP (ref 11.5–15.5)
IANC: 6.71 K/UL — SIGNIFICANT CHANGE UP (ref 1.5–8.5)
IMM GRANULOCYTES NFR BLD AUTO: 0.5 % — SIGNIFICANT CHANGE UP (ref 0–1.5)
LYMPHOCYTES # BLD AUTO: 1.11 K/UL — SIGNIFICANT CHANGE UP (ref 1–3.3)
LYMPHOCYTES # BLD AUTO: 14 % — SIGNIFICANT CHANGE UP (ref 13–44)
MCHC RBC-ENTMCNC: 27.5 PG — SIGNIFICANT CHANGE UP (ref 27–34)
MCHC RBC-ENTMCNC: 32.2 GM/DL — SIGNIFICANT CHANGE UP (ref 32–36)
MCV RBC AUTO: 85.3 FL — SIGNIFICANT CHANGE UP (ref 80–100)
MONOCYTES # BLD AUTO: 0.06 K/UL — SIGNIFICANT CHANGE UP (ref 0–0.9)
MONOCYTES NFR BLD AUTO: 0.8 % — LOW (ref 2–14)
NEUTROPHILS # BLD AUTO: 6.71 K/UL — SIGNIFICANT CHANGE UP (ref 1.8–7.4)
NEUTROPHILS NFR BLD AUTO: 84.6 % — HIGH (ref 43–77)
NRBC # BLD: 0 /100 WBCS — SIGNIFICANT CHANGE UP
NRBC # FLD: 0 K/UL — SIGNIFICANT CHANGE UP
PLATELET # BLD AUTO: 276 K/UL — SIGNIFICANT CHANGE UP (ref 150–400)
POTASSIUM SERPL-MCNC: 5.2 MMOL/L — SIGNIFICANT CHANGE UP (ref 3.5–5.3)
POTASSIUM SERPL-SCNC: 5.2 MMOL/L — SIGNIFICANT CHANGE UP (ref 3.5–5.3)
PROT SERPL-MCNC: 8.4 G/DL — HIGH (ref 6–8.3)
RBC # BLD: 4.84 M/UL — SIGNIFICANT CHANGE UP (ref 3.8–5.2)
RBC # FLD: 12.8 % — SIGNIFICANT CHANGE UP (ref 10.3–14.5)
SODIUM SERPL-SCNC: 142 MMOL/L — SIGNIFICANT CHANGE UP (ref 135–145)
WBC # BLD: 7.93 K/UL — SIGNIFICANT CHANGE UP (ref 3.8–10.5)
WBC # FLD AUTO: 7.93 K/UL — SIGNIFICANT CHANGE UP (ref 3.8–10.5)

## 2021-02-17 PROCEDURE — 71045 X-RAY EXAM CHEST 1 VIEW: CPT | Mod: 26

## 2021-02-17 PROCEDURE — 99284 EMERGENCY DEPT VISIT MOD MDM: CPT

## 2021-02-17 RX ORDER — ALBUTEROL 90 UG/1
1 AEROSOL, METERED ORAL ONCE
Refills: 0 | Status: COMPLETED | OUTPATIENT
Start: 2021-02-17 | End: 2021-02-17

## 2021-02-17 RX ADMIN — ALBUTEROL 1 PUFF(S): 90 AEROSOL, METERED ORAL at 19:32

## 2021-02-17 NOTE — ED ADULT TRIAGE NOTE - CHIEF COMPLAINT QUOTE
pt c/o sob and headache x 3 days. states she got her second vaccine on Friday and has been experiencing these symptoms since. also report non productive cough since this morning. denies fever/chills/chest pain. was seen by PMD and had negative rapid covid test in office today. referred to ED for eval and chest xray.

## 2021-02-17 NOTE — ED PROVIDER NOTE - PATIENT PORTAL LINK FT
You can access the FollowMyHealth Patient Portal offered by [facility] by registering at the following website: [email or web address]. By joining [employer]’s FollowMyHealth portal, you will also be able to view your health information using other applications (apps) compatible with our system.

## 2021-02-17 NOTE — ED PROVIDER NOTE - OBJECTIVE STATEMENT
60 yr old female with PMH of HTN, asthma (no ETT, last admission many years ago) breast CA in remission presents from [facility] office (neg COVID swab) for further workup.  States received 2nd covid vaccine on Friday and since then has had cough, chest congestion, mild SOB.  Denies long travel, leg swelling, PE risk factors.  States cough non productive.  Denies fever.

## 2021-02-17 NOTE — ED ADULT NURSE NOTE - OBJECTIVE STATEMENT
Facilitator RN- Pt received into intake spot #2. AAOx4, c/o chest tightness, sob and headache x 3 days. Pt states symptoms started after receiving her second COVID vaccine on Friday. Pt also report non productive cough since this morning. MD coleman performed. #22g IVSL placed to right ac, labs drawn and sent. no acute distress. Awaiting further plan of care.

## 2021-02-18 LAB — SARS-COV-2 RNA SPEC QL NAA+PROBE: SIGNIFICANT CHANGE UP

## 2021-03-03 ENCOUNTER — INPATIENT (INPATIENT)
Facility: HOSPITAL | Age: 61
LOS: 1 days | Discharge: HOME | End: 2021-03-05
Attending: STUDENT IN AN ORGANIZED HEALTH CARE EDUCATION/TRAINING PROGRAM | Admitting: STUDENT IN AN ORGANIZED HEALTH CARE EDUCATION/TRAINING PROGRAM
Payer: COMMERCIAL

## 2021-03-03 VITALS
HEIGHT: 60 IN | TEMPERATURE: 98 F | RESPIRATION RATE: 16 BRPM | OXYGEN SATURATION: 99 % | HEART RATE: 97 BPM | SYSTOLIC BLOOD PRESSURE: 127 MMHG | DIASTOLIC BLOOD PRESSURE: 72 MMHG

## 2021-03-03 DIAGNOSIS — C50.911 MALIGNANT NEOPLASM OF UNSPECIFIED SITE OF RIGHT FEMALE BREAST: ICD-10-CM

## 2021-03-03 DIAGNOSIS — Z98.89 OTHER SPECIFIED POSTPROCEDURAL STATES: Chronic | ICD-10-CM

## 2021-03-03 DIAGNOSIS — Z98.890 OTHER SPECIFIED POSTPROCEDURAL STATES: Chronic | ICD-10-CM

## 2021-03-03 DIAGNOSIS — Z29.9 ENCOUNTER FOR PROPHYLACTIC MEASURES, UNSPECIFIED: ICD-10-CM

## 2021-03-03 DIAGNOSIS — J45.909 UNSPECIFIED ASTHMA, UNCOMPLICATED: ICD-10-CM

## 2021-03-03 DIAGNOSIS — E11.65 TYPE 2 DIABETES MELLITUS WITH HYPERGLYCEMIA: ICD-10-CM

## 2021-03-03 DIAGNOSIS — I10 ESSENTIAL (PRIMARY) HYPERTENSION: ICD-10-CM

## 2021-03-03 LAB
ALBUMIN SERPL ELPH-MCNC: 4.8 G/DL — SIGNIFICANT CHANGE UP (ref 3.3–5)
ALP SERPL-CCNC: 101 U/L — SIGNIFICANT CHANGE UP (ref 40–120)
ALT FLD-CCNC: 28 U/L — SIGNIFICANT CHANGE UP (ref 4–33)
ANION GAP SERPL CALC-SCNC: 15 MMOL/L — HIGH (ref 7–14)
AST SERPL-CCNC: 25 U/L — SIGNIFICANT CHANGE UP (ref 4–32)
BASOPHILS # BLD AUTO: 0.02 K/UL — SIGNIFICANT CHANGE UP (ref 0–0.2)
BASOPHILS NFR BLD AUTO: 0.2 % — SIGNIFICANT CHANGE UP (ref 0–2)
BILIRUB SERPL-MCNC: <0.2 MG/DL — SIGNIFICANT CHANGE UP (ref 0.2–1.2)
BUN SERPL-MCNC: 12 MG/DL — SIGNIFICANT CHANGE UP (ref 7–23)
CALCIUM SERPL-MCNC: 10.3 MG/DL — SIGNIFICANT CHANGE UP (ref 8.4–10.5)
CHLORIDE SERPL-SCNC: 99 MMOL/L — SIGNIFICANT CHANGE UP (ref 98–107)
CO2 SERPL-SCNC: 26 MMOL/L — SIGNIFICANT CHANGE UP (ref 22–31)
CREAT SERPL-MCNC: 0.68 MG/DL — SIGNIFICANT CHANGE UP (ref 0.5–1.3)
EOSINOPHIL # BLD AUTO: 0.18 K/UL — SIGNIFICANT CHANGE UP (ref 0–0.5)
EOSINOPHIL NFR BLD AUTO: 2.2 % — SIGNIFICANT CHANGE UP (ref 0–6)
GLUCOSE BLDC GLUCOMTR-MCNC: 260 MG/DL — HIGH (ref 70–99)
GLUCOSE SERPL-MCNC: 155 MG/DL — HIGH (ref 70–99)
HCT VFR BLD CALC: 43.4 % — SIGNIFICANT CHANGE UP (ref 34.5–45)
HGB BLD-MCNC: 13.6 G/DL — SIGNIFICANT CHANGE UP (ref 11.5–15.5)
IANC: 4.61 K/UL — SIGNIFICANT CHANGE UP (ref 1.5–8.5)
IMM GRANULOCYTES NFR BLD AUTO: 0.1 % — SIGNIFICANT CHANGE UP (ref 0–1.5)
LYMPHOCYTES # BLD AUTO: 2.85 K/UL — SIGNIFICANT CHANGE UP (ref 1–3.3)
LYMPHOCYTES # BLD AUTO: 34.8 % — SIGNIFICANT CHANGE UP (ref 13–44)
MCHC RBC-ENTMCNC: 27.3 PG — SIGNIFICANT CHANGE UP (ref 27–34)
MCHC RBC-ENTMCNC: 31.3 GM/DL — LOW (ref 32–36)
MCV RBC AUTO: 87 FL — SIGNIFICANT CHANGE UP (ref 80–100)
MONOCYTES # BLD AUTO: 0.52 K/UL — SIGNIFICANT CHANGE UP (ref 0–0.9)
MONOCYTES NFR BLD AUTO: 6.3 % — SIGNIFICANT CHANGE UP (ref 2–14)
NEUTROPHILS # BLD AUTO: 4.61 K/UL — SIGNIFICANT CHANGE UP (ref 1.8–7.4)
NEUTROPHILS NFR BLD AUTO: 56.4 % — SIGNIFICANT CHANGE UP (ref 43–77)
NRBC # BLD: 0 /100 WBCS — SIGNIFICANT CHANGE UP
NRBC # FLD: 0 K/UL — SIGNIFICANT CHANGE UP
PLATELET # BLD AUTO: 242 K/UL — SIGNIFICANT CHANGE UP (ref 150–400)
POTASSIUM SERPL-MCNC: 4.1 MMOL/L — SIGNIFICANT CHANGE UP (ref 3.5–5.3)
POTASSIUM SERPL-SCNC: 4.1 MMOL/L — SIGNIFICANT CHANGE UP (ref 3.5–5.3)
PROT SERPL-MCNC: 7.9 G/DL — SIGNIFICANT CHANGE UP (ref 6–8.3)
RBC # BLD: 4.99 M/UL — SIGNIFICANT CHANGE UP (ref 3.8–5.2)
RBC # FLD: 13.3 % — SIGNIFICANT CHANGE UP (ref 10.3–14.5)
SARS-COV-2 RNA SPEC QL NAA+PROBE: SIGNIFICANT CHANGE UP
SODIUM SERPL-SCNC: 140 MMOL/L — SIGNIFICANT CHANGE UP (ref 135–145)
TROPONIN T, HIGH SENSITIVITY RESULT: 11 NG/L — SIGNIFICANT CHANGE UP
TROPONIN T, HIGH SENSITIVITY RESULT: 11 NG/L — SIGNIFICANT CHANGE UP
WBC # BLD: 8.19 K/UL — SIGNIFICANT CHANGE UP (ref 3.8–10.5)
WBC # FLD AUTO: 8.19 K/UL — SIGNIFICANT CHANGE UP (ref 3.8–10.5)

## 2021-03-03 PROCEDURE — 99285 EMERGENCY DEPT VISIT HI MDM: CPT

## 2021-03-03 PROCEDURE — 71045 X-RAY EXAM CHEST 1 VIEW: CPT | Mod: 26

## 2021-03-03 PROCEDURE — 99223 1ST HOSP IP/OBS HIGH 75: CPT

## 2021-03-03 RX ORDER — HYDROCORTISONE 1 %
1 OINTMENT (GRAM) TOPICAL DAILY
Refills: 0 | Status: DISCONTINUED | OUTPATIENT
Start: 2021-03-03 | End: 2021-03-05

## 2021-03-03 RX ORDER — LOSARTAN POTASSIUM 100 MG/1
100 TABLET, FILM COATED ORAL DAILY
Refills: 0 | Status: DISCONTINUED | OUTPATIENT
Start: 2021-03-03 | End: 2021-03-05

## 2021-03-03 RX ORDER — ACETAMINOPHEN 500 MG
650 TABLET ORAL EVERY 6 HOURS
Refills: 0 | Status: DISCONTINUED | OUTPATIENT
Start: 2021-03-03 | End: 2021-03-05

## 2021-03-03 RX ORDER — LOSARTAN/HYDROCHLOROTHIAZIDE 100MG-25MG
1 TABLET ORAL
Qty: 0 | Refills: 0 | DISCHARGE

## 2021-03-03 RX ORDER — IPRATROPIUM/ALBUTEROL SULFATE 18-103MCG
3 AEROSOL WITH ADAPTER (GRAM) INHALATION ONCE
Refills: 0 | Status: COMPLETED | OUTPATIENT
Start: 2021-03-03 | End: 2021-03-03

## 2021-03-03 RX ORDER — TIOTROPIUM BROMIDE 18 UG/1
1 CAPSULE ORAL; RESPIRATORY (INHALATION) DAILY
Refills: 0 | Status: DISCONTINUED | OUTPATIENT
Start: 2021-03-03 | End: 2021-03-03

## 2021-03-03 RX ORDER — MAGNESIUM SULFATE 500 MG/ML
1 VIAL (ML) INJECTION ONCE
Refills: 0 | Status: COMPLETED | OUTPATIENT
Start: 2021-03-03 | End: 2021-03-03

## 2021-03-03 RX ORDER — INSULIN LISPRO 100/ML
VIAL (ML) SUBCUTANEOUS AT BEDTIME
Refills: 0 | Status: DISCONTINUED | OUTPATIENT
Start: 2021-03-03 | End: 2021-03-05

## 2021-03-03 RX ORDER — ALBUTEROL 90 UG/1
1 AEROSOL, METERED ORAL EVERY 4 HOURS
Refills: 0 | Status: DISCONTINUED | OUTPATIENT
Start: 2021-03-03 | End: 2021-03-05

## 2021-03-03 RX ORDER — HYDROCHLOROTHIAZIDE 25 MG
12.5 TABLET ORAL DAILY
Refills: 0 | Status: DISCONTINUED | OUTPATIENT
Start: 2021-03-03 | End: 2021-03-05

## 2021-03-03 RX ORDER — DEXTROSE 50 % IN WATER 50 %
25 SYRINGE (ML) INTRAVENOUS ONCE
Refills: 0 | Status: DISCONTINUED | OUTPATIENT
Start: 2021-03-03 | End: 2021-03-05

## 2021-03-03 RX ORDER — IPRATROPIUM/ALBUTEROL SULFATE 18-103MCG
3 AEROSOL WITH ADAPTER (GRAM) INHALATION EVERY 6 HOURS
Refills: 0 | Status: DISCONTINUED | OUTPATIENT
Start: 2021-03-03 | End: 2021-03-05

## 2021-03-03 RX ORDER — DEXTROSE 50 % IN WATER 50 %
12.5 SYRINGE (ML) INTRAVENOUS ONCE
Refills: 0 | Status: DISCONTINUED | OUTPATIENT
Start: 2021-03-03 | End: 2021-03-05

## 2021-03-03 RX ORDER — PANTOPRAZOLE SODIUM 20 MG/1
40 TABLET, DELAYED RELEASE ORAL
Refills: 0 | Status: DISCONTINUED | OUTPATIENT
Start: 2021-03-03 | End: 2021-03-05

## 2021-03-03 RX ORDER — DEXTROSE 50 % IN WATER 50 %
15 SYRINGE (ML) INTRAVENOUS ONCE
Refills: 0 | Status: DISCONTINUED | OUTPATIENT
Start: 2021-03-03 | End: 2021-03-05

## 2021-03-03 RX ORDER — TIOTROPIUM BROMIDE 18 UG/1
1 CAPSULE ORAL; RESPIRATORY (INHALATION) DAILY
Refills: 0 | Status: DISCONTINUED | OUTPATIENT
Start: 2021-03-03 | End: 2021-03-05

## 2021-03-03 RX ORDER — SODIUM CHLORIDE 9 MG/ML
1000 INJECTION, SOLUTION INTRAVENOUS
Refills: 0 | Status: DISCONTINUED | OUTPATIENT
Start: 2021-03-03 | End: 2021-03-05

## 2021-03-03 RX ORDER — GLUCAGON INJECTION, SOLUTION 0.5 MG/.1ML
1 INJECTION, SOLUTION SUBCUTANEOUS ONCE
Refills: 0 | Status: DISCONTINUED | OUTPATIENT
Start: 2021-03-03 | End: 2021-03-05

## 2021-03-03 RX ORDER — ALBUTEROL 90 UG/1
2 AEROSOL, METERED ORAL EVERY 6 HOURS
Refills: 0 | Status: DISCONTINUED | OUTPATIENT
Start: 2021-03-03 | End: 2021-03-03

## 2021-03-03 RX ORDER — INSULIN LISPRO 100/ML
VIAL (ML) SUBCUTANEOUS
Refills: 0 | Status: DISCONTINUED | OUTPATIENT
Start: 2021-03-03 | End: 2021-03-05

## 2021-03-03 RX ORDER — SODIUM CHLORIDE 9 MG/ML
3 INJECTION INTRAMUSCULAR; INTRAVENOUS; SUBCUTANEOUS EVERY 8 HOURS
Refills: 0 | Status: DISCONTINUED | OUTPATIENT
Start: 2021-03-03 | End: 2021-03-05

## 2021-03-03 RX ORDER — ENOXAPARIN SODIUM 100 MG/ML
40 INJECTION SUBCUTANEOUS DAILY
Refills: 0 | Status: DISCONTINUED | OUTPATIENT
Start: 2021-03-03 | End: 2021-03-05

## 2021-03-03 RX ADMIN — SODIUM CHLORIDE 3 MILLILITER(S): 9 INJECTION INTRAMUSCULAR; INTRAVENOUS; SUBCUTANEOUS at 21:04

## 2021-03-03 RX ADMIN — Medication 100 GRAM(S): at 19:57

## 2021-03-03 RX ADMIN — Medication 3 MILLILITER(S): at 15:39

## 2021-03-03 RX ADMIN — Medication 70 MILLIGRAM(S): at 16:21

## 2021-03-03 RX ADMIN — Medication 3 MILLILITER(S): at 16:53

## 2021-03-03 RX ADMIN — Medication 2: at 21:02

## 2021-03-03 NOTE — H&P ADULT - NEGATIVE OPHTHALMOLOGIC SYMPTOMS
no photophobia/no lacrimation L/no lacrimation R/no blurred vision L/no blurred vision R/no discharge L/no discharge R no diplopia/no photophobia/no lacrimation L/no lacrimation R/no blurred vision L/no blurred vision R/no discharge L/no discharge R

## 2021-03-03 NOTE — H&P ADULT - PROBLEM SELECTOR PLAN 3
BP = 144/ 87.  Low salt diet.  Continue BP meds. BP in acceptable range currently.  - C/w pt's home BP meds - losartan and HCTZ. If pt continues to be hyperglycemic, will hold HCTZ given possible diuresis with hyperglycemia.

## 2021-03-03 NOTE — H&P ADULT - NSICDXPASTMEDICALHX_GEN_ALL_CORE_FT
PAST MEDICAL HISTORY:  Asthma (no hx/o intubation)    Breast cancer R breast 10/ 2019    DM2 (diabetes mellitus, type 2)     Former smoker, stopped smoking many years ago (Quit ~32years ago; used to Nicotine patch  Informed pt of the various negative side effects of smoking including risk of COPD, Lung Ca etc  Strongly recommended that pt stops smoking and pt given various options of smoking cessation tools such as NRT's and other pharmacotherapies 0.3 ppd x ~10 years.)    Hypertension

## 2021-03-03 NOTE — H&P ADULT - PROBLEM SELECTOR PLAN 5
VTE with Lovenox 40 mf sub cut daily.  Fall, Aspiration, safety precautions. VTE with Lovenox 40 mg sub cut daily.  Fall, Aspiration, safety precautions. VTE ppx with Lovenox 40 mg sub cut daily.  Fall, aspiration, safety precautions.

## 2021-03-03 NOTE — H&P ADULT - NSICDXPASTSURGICALHX_GEN_ALL_CORE_FT
PAST SURGICAL HISTORY:  Bunion b/l     delivery NOS x1    H/O foot surgery right    S/P lumpectomy, right breast 10/2019    Uterus Problem "was getting cramps so they did ablation? enometriosis  10/2012

## 2021-03-03 NOTE — H&P ADULT - NSHPLABSRESULTS_GEN_ALL_CORE
-CXR Clear  -TROP =11> 11.   -NSR @ 87b/ min, QT/ QTC= 366/ 440.  -COVID PCR Not detected.                         13.6   8.19  )-----------( 242      ( 03 Mar 2021 15:50 )             43.4   03-03    140  |  99  |  12  ----------------------------<  155<H>  4.1   |  26  |  0.68    Ca    10.3      03 Mar 2021 15:50    TPro  7.9  /  Alb  4.8  /  TBili  <0.2  /  DBili  x   /  AST  25  /  ALT  28  /  AlkPhos  101  03-03 -CXR personally reviewed. Clear lungs.   -TROP =11> 11.   -EKG personally reviewed. NSR @ 87b/ min, QT/ QTC= 366/ 440 ms, no acute ischemic changes.  -COVID PCR Not detected.                         13.6   8.19  )-----------( 242      ( 03 Mar 2021 15:50 )             43.4   03-03    140  |  99  |  12  ----------------------------<  155<H>  4.1   |  26  |  0.68    Ca    10.3      03 Mar 2021 15:50    TPro  7.9  /  Alb  4.8  /  TBili  <0.2  /  DBili  x   /  AST  25  /  ALT  28  /  AlkPhos  101  03-03

## 2021-03-03 NOTE — H&P ADULT - PROBLEM SELECTOR PLAN 1
-Check peak flow daily.  -F/U TSH.  -Give DuoNebs Q6* PRN wheeze/ SOB and transition to Pro Air and Spiriva inhaler.  -Give Prednisone 40 mg po daily starting 3/4.  - Robitussin-DM and Tylenol PRN -Check peak flow daily.  -F/U RVP, TSH.  -Give DuoNebs Q6* PRN wheeze/ SOB and transition to Pro Air and Spiriva inhaler.  -Give Prednisone 40 mg po daily starting 3/4.  - Robitussin-DM and Tylenol PRN Possibly 2/2 vaccine reaction and/or URI (pt with postnasal drip).   - COVID-19 PCR negative. Will check SARS-CoV2 / RVP.   - S/p IV Solumedrol 70 mg x1 in ED. C/w steroids with Prednisone 40 mg daily.  - S/p IV magnesium 1 g in ED  - C/w duonebs q6hrs for now, wean to albuterol and Spiriva inhalers as tolerated  - C/w LABA/ICS  - Supplemental O2 PRN  - Robitussin PRN for cough  - Flonase PRN for postnasal drip

## 2021-03-03 NOTE — H&P ADULT - GASTROINTESTINAL DETAILS
soft/nontender/no masses palpable/bowel sounds normal/no bruit/no rebound tenderness/no guarding/no rigidity

## 2021-03-03 NOTE — H&P ADULT - PROBLEM SELECTOR PLAN 4
Currently in remission.  Pt to f/u Mammogram result as out patient. Currently in remission.  - Pt to f/u mammogram result as outpatient

## 2021-03-03 NOTE — H&P ADULT - ASSESSMENT
60F history of DM2, R breast Ca, s/p R Lumpectomy, HTN, asthma, admitted for asthma exacerbation likely due to vaccine reaction.   59 yo woman with history of DM2, R breast Ca, s/p R Lumpectomy, HTN, asthma, admitted for asthma exacerbation likely due to vaccine reaction.

## 2021-03-03 NOTE — H&P ADULT - HISTORY OF PRESENT ILLNESS
60F, self ambulating, obese, history of DM2, R breast Ca, s/p 10/2019, R Lumpectomy, chemo and radiation, HTN, asthma, never intubated, hospitalized a few times, last was 2 years ago. The pt had her second Moderna Covid Vax on 2/12 and on 2/14 she began wheezings with dry cough. She went to her PCP and was given Prednisone 20 mg po x 5 days, started on Amoxicillin po X 5 days and sent to the ED. In the Ed, the pt had a CXR and was discharged home. The pt's wheezing continued and became worse today, 3/3 with HA, continued dry cough, chest tightness felt with cough.  Denies dizziness, falls, palpitations, nausea, vomit, diarrhea, constipation, abdominal pain, chills, diaphoresis, dysuria, generalized body aches.  She came to the Ed and was given Duo Nebs x 3 and Solumedrol 70mg IV x1. The pt says her wheezing decreased s/p treatment.     TROP = 11> 11  CXR Clear  EKG NSR @ 87b/ min , QT/ QTC= 366/ 440    Vitals: T max= 98.7* oral, HR= 88b/ min, BP = 144/ 87, RR= 20b/ min, SPO2= 98% ra    60F, self ambulating, obese, history of DM2, R breast Ca, s/p 10/2019, R Lumpectomy, chemo and radiation, HTN, asthma, never intubated, hospitalized a few times, last was 2 years ago. The pt had her second Moderna Covid Vax on 2/12 and on 2/14 she began wheezings with dry cough. She went to her PCP on 2/17 and was given Prednisone 20 mg po x 5 days, started on Amoxicillin po X 5 days and sent to the ED. In the Ed, the pt had a CXR and was discharged home. The pt's wheezing continued and became worse today, 3/3 with HA, continued dry cough, chest tightness felt with cough.  Denies dizziness, falls, palpitations, nausea, vomit, diarrhea, constipation, abdominal pain, chills, diaphoresis, dysuria, generalized body aches.  She came to the Ed and was given Duo Nebs x 3 and Solumedrol 70mg IV x1. The pt says her wheezing decreased s/p treatment.     TROP = 11> 11  CXR Clear  EKG NSR @ 87b/ min , QT/ QTC= 366/ 440    Vitals: T max= 98.7* oral, HR= 88b/ min, BP = 144/ 87, RR= 20b/ min, SPO2= 98% ra    61 yo woman, self ambulating, obese, with history of DM2, R breast Ca, s/p 10/2019, R Lumpectomy, chemo and radiation, HTN, asthma, never intubated, hospitalized a few times, last was 2 years ago. The pt had her second Moderna Covid Vax on 2/12, and on 2/14, she began having wheezing with dry cough. She went to her PCP on 2/17 and was given Prednisone 20 mg po x 5 days, started on Amoxicillin po x 5 days and sent to the ED. In the ED, the pt had a CXR and was discharged home. The pt's wheezing continued and became worse today, 3/3, associated with HA, continued dry cough, chest tightness felt with cough. Denies dizziness, falls, palpitations, nausea, vomit, diarrhea, constipation, abdominal pain, fevers, chills, diaphoresis, dysuria, generalized body aches.  She came to the ED and was given Duo Nebs x 3 and Solumedrol 70mg IV x1. The pt says her wheezing decreased s/p treatment.     TROP = 11> 11  CXR Clear  EKG NSR @ 87b/ min , QT/ QTC= 366/ 440    Vitals: T max= 98.7* oral, HR= 88b/ min, BP = 144/ 87, RR= 20b/ min, SPO2= 98% ra

## 2021-03-03 NOTE — H&P ADULT - NSHPSOCIALHISTORY_GEN_ALL_CORE
.  Lives with the spouse.  Quit smoking Nicotine 32 years.  Denies ETOH  Denies Illicit/ recreational drugs.  Works as a Prevocational instructor.  Flu Vax 10/2020  COVID Vax 2/12/21  Colonoscopy 2011 Normal.  PAP 2021 Normal  Mammogram 2/2021: Results pending. .  Lives with the spouse.  Quit smoking Nicotine 32 years.  Denies ETOH use.  Denies illicit/recreational drug use.  Works as a Prevocational instructor.  Flu Vax 10/2020  COVID Vax 2/12/21  Colonoscopy 2011 Normal.  PAP 2021 Normal  Mammogram 2/2021: Results pending.

## 2021-03-03 NOTE — ED ADULT NURSE NOTE - OBJECTIVE STATEMENT
61 y/o female presents to ED complaining of sob, chest tightness, and wheezing since Feb 14th. Pt a&ox3, endorses receiving COVID vaccine on 2/13 and on 2/14 began experiencing symptoms. Breathing appears labored, audible wheezes noted. Pt was here at [facility] on 2/17 and was prescribed amoxicillin and prednisone x5 days without relief of symptoms. Pt has been using her albuterol at home with no relief as well. 20g IV placed to LAC. Hx asthma, DM, HTN, Breast CA

## 2021-03-03 NOTE — ED PROVIDER NOTE - OBJECTIVE STATEMENT
60 yr old female with PMH of HTN, asthma (no ETT, last admission many years ago) breast CA in remission, evaluated for SOB 2/17 in ED and discharged. Since then has had persistent and wosrening SOB, cough at night, took albuterol 3 times today (with inhaler/spacer), unknown baseline peak flow. Patient states this feels like her prior asthma exacerbations (no hx of intubations) but she states it's never lasted this long, last dose of prednisone about 1 week ago. denies fevers/chills. SOB worse with exertion, had covid vaccine 2/13, day later symptoms began.     meds: albuterol, budesonide, losartan/hctz

## 2021-03-03 NOTE — ED PROVIDER NOTE - PHYSICAL EXAMINATION
[Const] well-appearing, resting comfortably, no acute distress  [HEENT] PERRL, EOMI, moist mucus membranes  [Neck] Supple, trachea midline  [CV] +S1/S2, no m/r/g appreciated  [Lungs] inspiratory/expiratory wheezing appreciated BL, dimished breath sounds BL  [Abd] soft, non-tender, nondistended in all 4 quadrants  [MSK] 5/5 upper extremity and lower extremity str bilaterally  [Skin] warm, dry, well-perfused  [Neuro] A&Ox3, Cranial Nerves II-XII intact

## 2021-03-03 NOTE — H&P ADULT - MUSCULOSKELETAL
no joint swelling/no joint erythema/no joint warmth/no calf tenderness/normal strength details… detailed exam

## 2021-03-03 NOTE — ED PROVIDER NOTE - CLINICAL SUMMARY MEDICAL DECISION MAKING FREE TEXT BOX
59 y/o F w/ hx asthma presents with worsening and persistent SOB since 2/17 (was seen in ED once previously), no hx intubations, took albuterol via inhaler/space x3 today with minimal improvement, last dose prednisone 1 week ago. no inc WOB but bl wheezing appreciated. Obtain basics, trop, ekg to r/o alternative etiology given persistent sx, duonebs x2 w/ steroids, reassess.

## 2021-03-03 NOTE — H&P ADULT - RS GEN PE MLT RESP DETAILS PC
airway patent/respirations non-labored/clear to auscultation bilaterally/no chest wall tenderness/no intercostal retractions/no rales/no rhonchi/no subcutaneous emphysema

## 2021-03-03 NOTE — H&P ADULT - NSICDXFAMILYHX_GEN_ALL_CORE_FT
FAMILY HISTORY:  Family history of MI (myocardial infarction), Father    Father  Still living? Unknown  Family history of lung cancer, Age at diagnosis: Age Unknown

## 2021-03-03 NOTE — ED PROVIDER NOTE - ATTENDING CONTRIBUTION TO CARE
60F with hx of Asthma p/w worsening sob and wheezing. patient received her 2nd covid vaccine 2 weeks ago and since has been having difficulty breathing. not improving much with her albuterol inhaler. no fevers.    ***GEN - NAD; well appearing; A+O x3 ***HEAD - NC/AT ***EYES/NOSE - PERRL, EOMI, mucous membranes moist, no discharge ***THROAT: Oral cavity and pharynx normal. No inflammation, swelling, exudate, or lesions.  ***NECK: Neck supple, non-tender without lymphadenopathy, no masses, no thyromegaly.   ***PULMONARY - decreased breath sounds bilaterally.. ***CARDIAC -s1s2, RRR, no M,G,R  ***ABDOMEN - +BS, ND, NT, soft, no guarding, no rebound, no masses   ***BACK - no CVA tenderness, Normal  spine ***EXTREMITIES - symmetric pulses, 2+ dp, capillary refill < 2 seconds, no clubbing, no cyanosis, no edema ***SKIN - no rash or bruising   ***NEUROLOGIC - alert, CN 2-12 intact  MDM: 60F with asthma exacerbation. labs, nebs, cxr, steroids, reassess.

## 2021-03-03 NOTE — ED ADULT TRIAGE NOTE - CHIEF COMPLAINT QUOTE
Pt with  sob , chest tightness, and  wheezing since  Feb 16th after receiving vaccine 2nd dose. Expiratory wheeze noted/breathing labored at triage

## 2021-03-03 NOTE — H&P ADULT - PROBLEM SELECTOR PLAN 2
-BS =155  -FS= 260  -S/P Solumedrol 70 mg IV x 1.  -Pt received Admelog 2 U sub cut x1.   -F/U A1C.  -FS QID.   -ISS.  -DM diet. Pt only on Metformin at home. Pt hyperglycemic with BS =155 and FS = 260 on admission.  - S/p IV Solumedrol 70 mg x1 in ED. Pt received Admelog 2u stat overnight.  - Hold Metformin. Start moderate-dose ISS and FS checks qAC and qhs.  - Given that pt on steroids, will likely need basal-bolus regimen if glycemic control poor  - F/u A1c. Possible Endocrine consult pending A1c results.

## 2021-03-03 NOTE — H&P ADULT - PROBLEM SELECTOR PROBLEM 1
Asthma, unspecified asthma severity, unspecified whether complicated, unspecified whether persistent Asthma exacerbation

## 2021-03-03 NOTE — H&P ADULT - NSHPPHYSICALEXAM_GEN_ALL_CORE
Vital Signs Last 24 Hrs  T(C): 36.5 (04 Mar 2021 07:29), Max: 37.1 (03 Mar 2021 15:23)  T(F): 97.7 (04 Mar 2021 07:29), Max: 98.7 (03 Mar 2021 15:23)  HR: 80 (04 Mar 2021 09:42) (68 - 110)  BP: 142/79 (04 Mar 2021 07:29) (105/60 - 196/111)  BP(mean): --  RR: 18 (04 Mar 2021 07:29) (16 - 24)  SpO2: 98% (04 Mar 2021 09:42) (98% - 100%)

## 2021-03-03 NOTE — H&P ADULT - NEGATIVE MUSCULOSKELETAL SYMPTOMS
no myalgia/no muscle weakness/no arm pain L/no arm pain R/no leg pain L/no leg pain R no myalgia/no muscle weakness/no arm pain L/no arm pain R/no back pain/no leg pain L/no leg pain R

## 2021-03-03 NOTE — H&P ADULT - NEGATIVE GASTROINTESTINAL SYMPTOMS
no nausea/no vomiting/no diarrhea/no constipation/no abdominal pain no nausea/no vomiting/no diarrhea/no constipation/no abdominal pain/no melena/no hematochezia

## 2021-03-04 DIAGNOSIS — J45.901 UNSPECIFIED ASTHMA WITH (ACUTE) EXACERBATION: ICD-10-CM

## 2021-03-04 DIAGNOSIS — E78.5 HYPERLIPIDEMIA, UNSPECIFIED: ICD-10-CM

## 2021-03-04 LAB
A1C WITH ESTIMATED AVERAGE GLUCOSE RESULT: 7.7 % — HIGH (ref 4–5.6)
ANION GAP SERPL CALC-SCNC: 19 MMOL/L — HIGH (ref 7–14)
B PERT DNA SPEC QL NAA+PROBE: SIGNIFICANT CHANGE UP
BUN SERPL-MCNC: 20 MG/DL — SIGNIFICANT CHANGE UP (ref 7–23)
C PNEUM DNA SPEC QL NAA+PROBE: SIGNIFICANT CHANGE UP
CALCIUM SERPL-MCNC: 10.2 MG/DL — SIGNIFICANT CHANGE UP (ref 8.4–10.5)
CHLORIDE SERPL-SCNC: 96 MMOL/L — LOW (ref 98–107)
CHOLEST SERPL-MCNC: 230 MG/DL — HIGH
CO2 SERPL-SCNC: 22 MMOL/L — SIGNIFICANT CHANGE UP (ref 22–31)
CREAT SERPL-MCNC: 0.76 MG/DL — SIGNIFICANT CHANGE UP (ref 0.5–1.3)
ESTIMATED AVERAGE GLUCOSE: 174 MG/DL — HIGH (ref 68–114)
FLUAV SUBTYP SPEC NAA+PROBE: SIGNIFICANT CHANGE UP
FLUBV RNA SPEC QL NAA+PROBE: SIGNIFICANT CHANGE UP
GLUCOSE BLDC GLUCOMTR-MCNC: 211 MG/DL — HIGH (ref 70–99)
GLUCOSE BLDC GLUCOMTR-MCNC: 218 MG/DL — HIGH (ref 70–99)
GLUCOSE BLDC GLUCOMTR-MCNC: 296 MG/DL — HIGH (ref 70–99)
GLUCOSE BLDC GLUCOMTR-MCNC: 340 MG/DL — HIGH (ref 70–99)
GLUCOSE BLDC GLUCOMTR-MCNC: 340 MG/DL — HIGH (ref 70–99)
GLUCOSE BLDC GLUCOMTR-MCNC: 421 MG/DL — HIGH (ref 70–99)
GLUCOSE SERPL-MCNC: 283 MG/DL — HIGH (ref 70–99)
HADV DNA SPEC QL NAA+PROBE: SIGNIFICANT CHANGE UP
HCOV 229E RNA SPEC QL NAA+PROBE: SIGNIFICANT CHANGE UP
HCOV HKU1 RNA SPEC QL NAA+PROBE: SIGNIFICANT CHANGE UP
HCOV NL63 RNA SPEC QL NAA+PROBE: SIGNIFICANT CHANGE UP
HCOV OC43 RNA SPEC QL NAA+PROBE: SIGNIFICANT CHANGE UP
HCT VFR BLD CALC: 39.3 % — SIGNIFICANT CHANGE UP (ref 34.5–45)
HDLC SERPL-MCNC: 104 MG/DL — SIGNIFICANT CHANGE UP
HGB BLD-MCNC: 12.7 G/DL — SIGNIFICANT CHANGE UP (ref 11.5–15.5)
HMPV RNA SPEC QL NAA+PROBE: SIGNIFICANT CHANGE UP
HPIV1 RNA SPEC QL NAA+PROBE: SIGNIFICANT CHANGE UP
HPIV2 RNA SPEC QL NAA+PROBE: SIGNIFICANT CHANGE UP
HPIV3 RNA SPEC QL NAA+PROBE: SIGNIFICANT CHANGE UP
HPIV4 RNA SPEC QL NAA+PROBE: SIGNIFICANT CHANGE UP
LIPID PNL WITH DIRECT LDL SERPL: 91 MG/DL — SIGNIFICANT CHANGE UP
MAGNESIUM SERPL-MCNC: 2.3 MG/DL — SIGNIFICANT CHANGE UP (ref 1.6–2.6)
MCHC RBC-ENTMCNC: 27.5 PG — SIGNIFICANT CHANGE UP (ref 27–34)
MCHC RBC-ENTMCNC: 32.3 GM/DL — SIGNIFICANT CHANGE UP (ref 32–36)
MCV RBC AUTO: 85.2 FL — SIGNIFICANT CHANGE UP (ref 80–100)
NON HDL CHOLESTEROL: 126 MG/DL — SIGNIFICANT CHANGE UP
NRBC # BLD: 0 /100 WBCS — SIGNIFICANT CHANGE UP
NRBC # FLD: 0 K/UL — SIGNIFICANT CHANGE UP
PHOSPHATE SERPL-MCNC: 2.8 MG/DL — SIGNIFICANT CHANGE UP (ref 2.5–4.5)
PLATELET # BLD AUTO: 223 K/UL — SIGNIFICANT CHANGE UP (ref 150–400)
POTASSIUM SERPL-MCNC: 5 MMOL/L — SIGNIFICANT CHANGE UP (ref 3.5–5.3)
POTASSIUM SERPL-SCNC: 5 MMOL/L — SIGNIFICANT CHANGE UP (ref 3.5–5.3)
RAPID RVP RESULT: SIGNIFICANT CHANGE UP
RBC # BLD: 4.61 M/UL — SIGNIFICANT CHANGE UP (ref 3.8–5.2)
RBC # FLD: 13.6 % — SIGNIFICANT CHANGE UP (ref 10.3–14.5)
RSV RNA SPEC QL NAA+PROBE: SIGNIFICANT CHANGE UP
RV+EV RNA SPEC QL NAA+PROBE: SIGNIFICANT CHANGE UP
SARS-COV-2 RNA SPEC QL NAA+PROBE: SIGNIFICANT CHANGE UP
SODIUM SERPL-SCNC: 137 MMOL/L — SIGNIFICANT CHANGE UP (ref 135–145)
TRIGL SERPL-MCNC: 173 MG/DL — HIGH
TSH SERPL-MCNC: 0.29 UIU/ML — SIGNIFICANT CHANGE UP (ref 0.27–4.2)
WBC # BLD: 14.6 K/UL — HIGH (ref 3.8–10.5)
WBC # FLD AUTO: 14.6 K/UL — HIGH (ref 3.8–10.5)

## 2021-03-04 PROCEDURE — 71250 CT THORAX DX C-: CPT | Mod: 26

## 2021-03-04 PROCEDURE — 99233 SBSQ HOSP IP/OBS HIGH 50: CPT

## 2021-03-04 RX ORDER — ATORVASTATIN CALCIUM 80 MG/1
20 TABLET, FILM COATED ORAL AT BEDTIME
Refills: 0 | Status: DISCONTINUED | OUTPATIENT
Start: 2021-03-04 | End: 2021-03-05

## 2021-03-04 RX ORDER — INSULIN GLARGINE 100 [IU]/ML
8 INJECTION, SOLUTION SUBCUTANEOUS EVERY MORNING
Refills: 0 | Status: DISCONTINUED | OUTPATIENT
Start: 2021-03-04 | End: 2021-03-05

## 2021-03-04 RX ORDER — FLUTICASONE PROPIONATE 50 MCG
1 SPRAY, SUSPENSION NASAL
Refills: 0 | Status: DISCONTINUED | OUTPATIENT
Start: 2021-03-04 | End: 2021-03-05

## 2021-03-04 RX ORDER — BUDESONIDE AND FORMOTEROL FUMARATE DIHYDRATE 160; 4.5 UG/1; UG/1
2 AEROSOL RESPIRATORY (INHALATION)
Refills: 0 | Status: DISCONTINUED | OUTPATIENT
Start: 2021-03-04 | End: 2021-03-05

## 2021-03-04 RX ORDER — INSULIN LISPRO 100/ML
3 VIAL (ML) SUBCUTANEOUS
Refills: 0 | Status: DISCONTINUED | OUTPATIENT
Start: 2021-03-04 | End: 2021-03-05

## 2021-03-04 RX ADMIN — LOSARTAN POTASSIUM 100 MILLIGRAM(S): 100 TABLET, FILM COATED ORAL at 06:15

## 2021-03-04 RX ADMIN — PANTOPRAZOLE SODIUM 40 MILLIGRAM(S): 20 TABLET, DELAYED RELEASE ORAL at 06:14

## 2021-03-04 RX ADMIN — Medication 12.5 MILLIGRAM(S): at 06:14

## 2021-03-04 RX ADMIN — Medication 1 SPRAY(S): at 10:24

## 2021-03-04 RX ADMIN — Medication 3 UNIT(S): at 09:47

## 2021-03-04 RX ADMIN — Medication 3 UNIT(S): at 13:53

## 2021-03-04 RX ADMIN — Medication 3 MILLILITER(S): at 16:25

## 2021-03-04 RX ADMIN — INSULIN GLARGINE 8 UNIT(S): 100 INJECTION, SOLUTION SUBCUTANEOUS at 10:24

## 2021-03-04 RX ADMIN — Medication 6: at 13:52

## 2021-03-04 RX ADMIN — Medication 40 MILLIGRAM(S): at 06:15

## 2021-03-04 RX ADMIN — Medication 12: at 09:46

## 2021-03-04 RX ADMIN — Medication 4: at 18:46

## 2021-03-04 RX ADMIN — SODIUM CHLORIDE 3 MILLILITER(S): 9 INJECTION INTRAMUSCULAR; INTRAVENOUS; SUBCUTANEOUS at 14:34

## 2021-03-04 RX ADMIN — BUDESONIDE AND FORMOTEROL FUMARATE DIHYDRATE 2 PUFF(S): 160; 4.5 AEROSOL RESPIRATORY (INHALATION) at 22:55

## 2021-03-04 RX ADMIN — Medication 3 MILLILITER(S): at 09:40

## 2021-03-04 RX ADMIN — SODIUM CHLORIDE 3 MILLILITER(S): 9 INJECTION INTRAMUSCULAR; INTRAVENOUS; SUBCUTANEOUS at 21:34

## 2021-03-04 RX ADMIN — ENOXAPARIN SODIUM 40 MILLIGRAM(S): 100 INJECTION SUBCUTANEOUS at 13:57

## 2021-03-04 RX ADMIN — Medication 3 UNIT(S): at 18:47

## 2021-03-04 RX ADMIN — Medication 3 MILLILITER(S): at 22:23

## 2021-03-04 RX ADMIN — Medication 1 TABLET(S): at 13:57

## 2021-03-04 RX ADMIN — SODIUM CHLORIDE 3 MILLILITER(S): 9 INJECTION INTRAMUSCULAR; INTRAVENOUS; SUBCUTANEOUS at 05:44

## 2021-03-04 RX ADMIN — ATORVASTATIN CALCIUM 20 MILLIGRAM(S): 80 TABLET, FILM COATED ORAL at 22:02

## 2021-03-04 RX ADMIN — Medication 1 SUPPOSITORY(S): at 13:51

## 2021-03-04 RX ADMIN — Medication 1 SPRAY(S): at 18:46

## 2021-03-04 NOTE — PROGRESS NOTE ADULT - ASSESSMENT
60F history of DM2, R breast Ca, s/p R Lumpectomy, HTN, asthma, admitted for asthma exacerbation likely due to vaccine reaction.   60F history of DM2, R breast Ca, s/p R Lumpectomy, HTN, asthma, admitted for asthma exacerbation likely d/t sinus congestion.

## 2021-03-04 NOTE — PROGRESS NOTE ADULT - SUBJECTIVE AND OBJECTIVE BOX
Cielo Powell MD  PGY 1 Department of Internal Medicine  Pager: [phone] ([facility])       Patient is a 60y old  Female who presents with a chief complaint of Wheezing x 2 weeks (03 Mar 2021 21:36)      SUBJECTIVE / OVERNIGHT EVENTS: Pt seen and examined. No acute overnight events. Denies fevers, chills, CP, SOB, Abdominal pain, N/V, Constipation, Diarrhea        MEDICATIONS  (STANDING):  ALBUTerol    90 MICROgram(s) HFA Inhaler 1 Puff(s) Inhalation every 4 hours  albuterol/ipratropium for Nebulization 3 milliLiter(s) Nebulizer every 6 hours  dextrose 40% Gel 15 Gram(s) Oral once  dextrose 5%. 1000 milliLiter(s) (50 mL/Hr) IV Continuous <Continuous>  dextrose 5%. 1000 milliLiter(s) (100 mL/Hr) IV Continuous <Continuous>  dextrose 50% Injectable 25 Gram(s) IV Push once  dextrose 50% Injectable 12.5 Gram(s) IV Push once  dextrose 50% Injectable 25 Gram(s) IV Push once  enoxaparin Injectable 40 milliGRAM(s) SubCutaneous daily  fluticasone propionate 50 MICROgram(s)/spray Nasal Spray 1 Spray(s) Both Nostrils two times a day  glucagon  Injectable 1 milliGRAM(s) IntraMuscular once  hydrochlorothiazide 12.5 milliGRAM(s) Oral daily  hydrocortisone hemorrhoidal Suppository 1 Suppository(s) Rectal daily  insulin lispro (ADMELOG) corrective regimen sliding scale   SubCutaneous three times a day before meals  insulin lispro (ADMELOG) corrective regimen sliding scale   SubCutaneous at bedtime  losartan 100 milliGRAM(s) Oral daily  multivitamin 1 Tablet(s) Oral daily  pantoprazole    Tablet 40 milliGRAM(s) Oral before breakfast  predniSONE   Tablet 40 milliGRAM(s) Oral daily  sodium chloride 0.9% lock flush 3 milliLiter(s) IV Push every 8 hours  tiotropium 18 MICROgram(s) Capsule 1 Capsule(s) Inhalation daily    MEDICATIONS  (PRN):  acetaminophen   Tablet .. 650 milliGRAM(s) Oral every 6 hours PRN Temp greater or equal to 38C (100.4F), Mild Pain (1 - 3), Moderate Pain (4 - 6)  guaiFENesin   Syrup  (Sugar-Free) 200 milliGRAM(s) Oral every 6 hours PRN Cough      I&O's Summary      Vital Signs Last 24 Hrs  T(C): 36.5 (04 Mar 2021 07:29), Max: 37.1 (03 Mar 2021 15:23)  T(F): 97.7 (04 Mar 2021 07:29), Max: 98.7 (03 Mar 2021 15:23)  HR: 87 (04 Mar 2021 07:29) (68 - 110)  BP: 142/79 (04 Mar 2021 07:29) (105/60 - 196/111)  BP(mean): --  RR: 18 (04 Mar 2021 07:29) (16 - 24)  SpO2: 98% (04 Mar 2021 07:29) (98% - 100%)    CAPILLARY BLOOD GLUCOSE      POCT Blood Glucose.: 340 mg/dL (04 Mar 2021 03:48)  POCT Blood Glucose.: 340 mg/dL (04 Mar 2021 03:47)  POCT Blood Glucose.: 260 mg/dL (03 Mar 2021 20:55)      PHYSICAL EXAM:         LABS:                        12.7   14.60 )-----------( 223      ( 04 Mar 2021 05:49 )             39.3     Auto Eosinophil # x     / Auto Eosinophil % x     / Auto Neutrophil # x     / Auto Neutrophil % x     / BANDS % x                            13.6   8.19  )-----------( 242      ( 03 Mar 2021 15:50 )             43.4     Auto Eosinophil # 0.18  / Auto Eosinophil % 2.2   / Auto Neutrophil # 4.61  / Auto Neutrophil % 56.4  / BANDS % x        03-04    137  |  96<L>  |  20  ----------------------------<  283<H>  5.0   |  22  |  0.76  03-03    140  |  99  |  12  ----------------------------<  155<H>  4.1   |  26  |  0.68    Ca    10.2      04 Mar 2021 05:49  Mg     2.3     03-04  Phos  2.8     03-04  TPro  7.9  /  Alb  4.8  /  TBili  <0.2  /  DBili  x   /  AST  25  /  ALT  28  /  AlkPhos  101  03-03                  RADIOLOGY & ADDITIONAL TESTS:      EXAM:  XR CHEST PORTABLE URGENT 1V    PROCEDURE DATE:  Mar  3 2021     INTERPRETATION:  CLINICAL INFORMATION: Shortness of breath and cough. Evaluate for pneumonia.    TECHNIQUE: AP view of the chest.    COMPARISON: X-ray of the chest from 2/17/2021.    FINDINGS:  The lungs are clear.  No pleural effusion or pneumothorax.  Heart size cannot be appropriately assessed in this projection..  No acute osseous abnormalities..    IMPRESSION:  Clear lungs.   Cielo Powell MD  PGY 1 Department of Internal Medicine  Pager: [phone] ([facility])       Patient is a 60y old  Female who presents with a chief complaint of Wheezing x 2 weeks (03 Mar 2021 21:36)      SUBJECTIVE / OVERNIGHT EVENTS: Pt seen and examined. PatientDenies fevers, chills, CP, SOB, Abdominal pain, N/V, Constipation, Diarrhea        MEDICATIONS  (STANDING):  ALBUTerol    90 MICROgram(s) HFA Inhaler 1 Puff(s) Inhalation every 4 hours  albuterol/ipratropium for Nebulization 3 milliLiter(s) Nebulizer every 6 hours  dextrose 40% Gel 15 Gram(s) Oral once  dextrose 5%. 1000 milliLiter(s) (50 mL/Hr) IV Continuous <Continuous>  dextrose 5%. 1000 milliLiter(s) (100 mL/Hr) IV Continuous <Continuous>  dextrose 50% Injectable 25 Gram(s) IV Push once  dextrose 50% Injectable 12.5 Gram(s) IV Push once  dextrose 50% Injectable 25 Gram(s) IV Push once  enoxaparin Injectable 40 milliGRAM(s) SubCutaneous daily  fluticasone propionate 50 MICROgram(s)/spray Nasal Spray 1 Spray(s) Both Nostrils two times a day  glucagon  Injectable 1 milliGRAM(s) IntraMuscular once  hydrochlorothiazide 12.5 milliGRAM(s) Oral daily  hydrocortisone hemorrhoidal Suppository 1 Suppository(s) Rectal daily  insulin lispro (ADMELOG) corrective regimen sliding scale   SubCutaneous three times a day before meals  insulin lispro (ADMELOG) corrective regimen sliding scale   SubCutaneous at bedtime  losartan 100 milliGRAM(s) Oral daily  multivitamin 1 Tablet(s) Oral daily  pantoprazole    Tablet 40 milliGRAM(s) Oral before breakfast  predniSONE   Tablet 40 milliGRAM(s) Oral daily  sodium chloride 0.9% lock flush 3 milliLiter(s) IV Push every 8 hours  tiotropium 18 MICROgram(s) Capsule 1 Capsule(s) Inhalation daily    MEDICATIONS  (PRN):  acetaminophen   Tablet .. 650 milliGRAM(s) Oral every 6 hours PRN Temp greater or equal to 38C (100.4F), Mild Pain (1 - 3), Moderate Pain (4 - 6)  guaiFENesin   Syrup  (Sugar-Free) 200 milliGRAM(s) Oral every 6 hours PRN Cough      I&O's Summary      Vital Signs Last 24 Hrs  T(C): 36.5 (04 Mar 2021 07:29), Max: 37.1 (03 Mar 2021 15:23)  T(F): 97.7 (04 Mar 2021 07:29), Max: 98.7 (03 Mar 2021 15:23)  HR: 87 (04 Mar 2021 07:29) (68 - 110)  BP: 142/79 (04 Mar 2021 07:29) (105/60 - 196/111)  BP(mean): --  RR: 18 (04 Mar 2021 07:29) (16 - 24)  SpO2: 98% (04 Mar 2021 07:29) (98% - 100%)    CAPILLARY BLOOD GLUCOSE      POCT Blood Glucose.: 340 mg/dL (04 Mar 2021 03:48)  POCT Blood Glucose.: 340 mg/dL (04 Mar 2021 03:47)  POCT Blood Glucose.: 260 mg/dL (03 Mar 2021 20:55)      PHYSICAL EXAM:         LABS:                        12.7   14.60 )-----------( 223      ( 04 Mar 2021 05:49 )             39.3     Auto Eosinophil # x     / Auto Eosinophil % x     / Auto Neutrophil # x     / Auto Neutrophil % x     / BANDS % x                            13.6   8.19  )-----------( 242      ( 03 Mar 2021 15:50 )             43.4     Auto Eosinophil # 0.18  / Auto Eosinophil % 2.2   / Auto Neutrophil # 4.61  / Auto Neutrophil % 56.4  / BANDS % x        03-04    137  |  96<L>  |  20  ----------------------------<  283<H>  5.0   |  22  |  0.76  03-03    140  |  99  |  12  ----------------------------<  155<H>  4.1   |  26  |  0.68    Ca    10.2      04 Mar 2021 05:49  Mg     2.3     03-04  Phos  2.8     03-04  TPro  7.9  /  Alb  4.8  /  TBili  <0.2  /  DBili  x   /  AST  25  /  ALT  28  /  AlkPhos  101  03-03                  RADIOLOGY & ADDITIONAL TESTS:      EXAM:  XR CHEST PORTABLE URGENT 1V    PROCEDURE DATE:  Mar  3 2021     INTERPRETATION:  CLINICAL INFORMATION: Shortness of breath and cough. Evaluate for pneumonia.    TECHNIQUE: AP view of the chest.    COMPARISON: X-ray of the chest from 2/17/2021.    FINDINGS:  The lungs are clear.  No pleural effusion or pneumothorax.  Heart size cannot be appropriately assessed in this projection..  No acute osseous abnormalities..    IMPRESSION:  Clear lungs.

## 2021-03-05 ENCOUNTER — TRANSCRIPTION ENCOUNTER (OUTPATIENT)
Age: 61
End: 2021-03-05

## 2021-03-05 VITALS — OXYGEN SATURATION: 98 %

## 2021-03-05 PROBLEM — C50.919 MALIGNANT NEOPLASM OF UNSPECIFIED SITE OF UNSPECIFIED FEMALE BREAST: Chronic | Status: ACTIVE | Noted: 2021-03-03

## 2021-03-05 LAB
ANION GAP SERPL CALC-SCNC: 16 MMOL/L — HIGH (ref 7–14)
BUN SERPL-MCNC: 17 MG/DL — SIGNIFICANT CHANGE UP (ref 7–23)
CALCIUM SERPL-MCNC: 10.1 MG/DL — SIGNIFICANT CHANGE UP (ref 8.4–10.5)
CHLORIDE SERPL-SCNC: 98 MMOL/L — SIGNIFICANT CHANGE UP (ref 98–107)
CO2 SERPL-SCNC: 26 MMOL/L — SIGNIFICANT CHANGE UP (ref 22–31)
CREAT SERPL-MCNC: 0.76 MG/DL — SIGNIFICANT CHANGE UP (ref 0.5–1.3)
GLUCOSE BLDC GLUCOMTR-MCNC: 180 MG/DL — HIGH (ref 70–99)
GLUCOSE BLDC GLUCOMTR-MCNC: 298 MG/DL — HIGH (ref 70–99)
GLUCOSE SERPL-MCNC: 143 MG/DL — HIGH (ref 70–99)
HCT VFR BLD CALC: 37.1 % — SIGNIFICANT CHANGE UP (ref 34.5–45)
HGB BLD-MCNC: 12.2 G/DL — SIGNIFICANT CHANGE UP (ref 11.5–15.5)
MAGNESIUM SERPL-MCNC: 2.2 MG/DL — SIGNIFICANT CHANGE UP (ref 1.6–2.6)
MCHC RBC-ENTMCNC: 27.9 PG — SIGNIFICANT CHANGE UP (ref 27–34)
MCHC RBC-ENTMCNC: 32.9 GM/DL — SIGNIFICANT CHANGE UP (ref 32–36)
MCV RBC AUTO: 84.7 FL — SIGNIFICANT CHANGE UP (ref 80–100)
NRBC # BLD: 0 /100 WBCS — SIGNIFICANT CHANGE UP
NRBC # FLD: 0 K/UL — SIGNIFICANT CHANGE UP
PHOSPHATE SERPL-MCNC: 3.7 MG/DL — SIGNIFICANT CHANGE UP (ref 2.5–4.5)
PLATELET # BLD AUTO: 202 K/UL — SIGNIFICANT CHANGE UP (ref 150–400)
POTASSIUM SERPL-MCNC: 3.9 MMOL/L — SIGNIFICANT CHANGE UP (ref 3.5–5.3)
POTASSIUM SERPL-SCNC: 3.9 MMOL/L — SIGNIFICANT CHANGE UP (ref 3.5–5.3)
RBC # BLD: 4.38 M/UL — SIGNIFICANT CHANGE UP (ref 3.8–5.2)
RBC # FLD: 13.9 % — SIGNIFICANT CHANGE UP (ref 10.3–14.5)
SODIUM SERPL-SCNC: 140 MMOL/L — SIGNIFICANT CHANGE UP (ref 135–145)
WBC # BLD: 13.91 K/UL — HIGH (ref 3.8–10.5)
WBC # FLD AUTO: 13.91 K/UL — HIGH (ref 3.8–10.5)

## 2021-03-05 PROCEDURE — 99239 HOSP IP/OBS DSCHRG MGMT >30: CPT

## 2021-03-05 RX ORDER — ALBUTEROL 90 UG/1
0 AEROSOL, METERED ORAL
Qty: 0 | Refills: 1 | DISCHARGE

## 2021-03-05 RX ORDER — FLUTICASONE PROPIONATE 50 MCG
1 SPRAY, SUSPENSION NASAL
Qty: 1 | Refills: 0
Start: 2021-03-05 | End: 2021-03-18

## 2021-03-05 RX ORDER — ATORVASTATIN CALCIUM 80 MG/1
1 TABLET, FILM COATED ORAL
Qty: 14 | Refills: 0
Start: 2021-03-05 | End: 2021-03-18

## 2021-03-05 RX ORDER — ALBUTEROL 90 UG/1
2 AEROSOL, METERED ORAL
Qty: 1 | Refills: 0
Start: 2021-03-05 | End: 2021-04-03

## 2021-03-05 RX ADMIN — Medication 2: at 09:18

## 2021-03-05 RX ADMIN — Medication 3 UNIT(S): at 09:18

## 2021-03-05 RX ADMIN — BUDESONIDE AND FORMOTEROL FUMARATE DIHYDRATE 2 PUFF(S): 160; 4.5 AEROSOL RESPIRATORY (INHALATION) at 09:20

## 2021-03-05 RX ADMIN — Medication 40 MILLIGRAM(S): at 06:09

## 2021-03-05 RX ADMIN — Medication 3 UNIT(S): at 12:54

## 2021-03-05 RX ADMIN — Medication 3 MILLILITER(S): at 09:43

## 2021-03-05 RX ADMIN — SODIUM CHLORIDE 3 MILLILITER(S): 9 INJECTION INTRAMUSCULAR; INTRAVENOUS; SUBCUTANEOUS at 13:01

## 2021-03-05 RX ADMIN — PANTOPRAZOLE SODIUM 40 MILLIGRAM(S): 20 TABLET, DELAYED RELEASE ORAL at 06:09

## 2021-03-05 RX ADMIN — Medication 1 TABLET(S): at 12:54

## 2021-03-05 RX ADMIN — Medication 12.5 MILLIGRAM(S): at 06:08

## 2021-03-05 RX ADMIN — LOSARTAN POTASSIUM 100 MILLIGRAM(S): 100 TABLET, FILM COATED ORAL at 06:09

## 2021-03-05 RX ADMIN — Medication 3 MILLILITER(S): at 04:29

## 2021-03-05 RX ADMIN — SODIUM CHLORIDE 3 MILLILITER(S): 9 INJECTION INTRAMUSCULAR; INTRAVENOUS; SUBCUTANEOUS at 06:11

## 2021-03-05 RX ADMIN — INSULIN GLARGINE 8 UNIT(S): 100 INJECTION, SOLUTION SUBCUTANEOUS at 09:17

## 2021-03-05 RX ADMIN — Medication 1 SPRAY(S): at 06:09

## 2021-03-05 RX ADMIN — Medication 6: at 12:54

## 2021-03-05 NOTE — DISCHARGE NOTE PROVIDER - NSDCMRMEDTOKEN_GEN_ALL_CORE_FT
ALBUTEROL HFA INH(200 PUFFS)18GM: INHALE 2 PUFFS BY MOUTH EVERY 6 HOURS AS NEEDED  budesonide-formoterol 160 mcg-4.5 mcg/inh inhalation aerosol: 2 puff(s) inhaled 2 times a day  guaiFENesin 100 mg/5 mL oral liquid: 10 milliliter(s) orally every 6 hours, As needed, Cough  HC PRAMOXINE 2.5-1% CREAM 30GM: INSERT 1 APPLICATION RECTALLY TWICE DAILY  LOSARTAN/HCTZ 100/12.5MG TABLETS: TAKE 1 TABLET BY MOUTH EVERY DAY  METFORMIN ER 500MG 24HR TABS: TAKE 1 TABLET BY MOUTH EVERY DAY WITH THE EVENING MEAL  Multiple Vitamins oral tablet: 1 tab(s) orally once a day  OMEPRAZOLE 20MG CAPSULES: TAKE 1 CAPSULE BY MOUTH EVERY DAY BEFORE MEALS   albuterol 90 mcg/inh inhalation aerosol: 2 puff(s) inhaled every 4 hours, As Needed -for bronchospasm   atorvastatin 20 mg oral tablet: 1 tab(s) orally once a day (at bedtime)  budesonide-formoterol 160 mcg-4.5 mcg/inh inhalation aerosol: 2 puff(s) inhaled 2 times a day  fluticasone 50 mcg/inh nasal spray: 1 spray(s) nasal 2 times a day  guaiFENesin 100 mg/5 mL oral liquid: 10 milliliter(s) orally every 6 hours, As needed, Cough  HC PRAMOXINE 2.5-1% CREAM 30GM: INSERT 1 APPLICATION RECTALLY TWICE DAILY  LOSARTAN/HCTZ 100/12.5MG TABLETS: TAKE 1 TABLET BY MOUTH EVERY DAY  METFORMIN ER 500MG 24HR TABS: TAKE 1 TABLET BY MOUTH EVERY DAY WITH THE EVENING MEAL  Multiple Vitamins oral tablet: 1 tab(s) orally once a day  OMEPRAZOLE 20MG CAPSULES: TAKE 1 CAPSULE BY MOUTH EVERY DAY BEFORE MEALS  predniSONE 20 mg oral tablet: 2 tab(s) orally once a day

## 2021-03-05 NOTE — PROGRESS NOTE ADULT - PROBLEM SELECTOR PLAN 2
-BS =155  -FS= 260  -S/P Solumedrol 70 mg IV x 1.  -Pt received Admelog 2 U sub cut x1.   -HgbA1c- 7.7  -FS QID.   -ISS.  -DM diet.  - adjust insulin regimen as needed
-S/P Solumedrol 70 mg IV x 1.  -Pt received Admelog 2 U sub cut x1.   -HgbA1c- 7.7  - On Metformin  mg daily   Current regimen: Lantus 8, lispro 3 TID   -ISS.  -DM diet.  - will discuss with team regarding discharge recs

## 2021-03-05 NOTE — PROGRESS NOTE ADULT - SUBJECTIVE AND OBJECTIVE BOX
Cielo Powell MD  PGY 1 Department of Internal Medicine  Pager: [phone] ([facility])       Patient is a 60y old  Female who presents with a chief complaint of Wheezing x 2 weeks (04 Mar 2021 07:44)      SUBJECTIVE / OVERNIGHT EVENTS: Pt seen and examined. No acute overnight events. CT chest completed overnight, prelim report, clear lungs, no PNA. Vitals stable overnight.   Denies fevers, chills, CP, SOB, Abdominal pain, N/V, Constipation, Diarrhea        MEDICATIONS  (STANDING):  ALBUTerol    90 MICROgram(s) HFA Inhaler 1 Puff(s) Inhalation every 4 hours  albuterol/ipratropium for Nebulization 3 milliLiter(s) Nebulizer every 6 hours  atorvastatin 20 milliGRAM(s) Oral at bedtime  budesonide 160 MICROgram(s)/formoterol 4.5 MICROgram(s) Inhaler 2 Puff(s) Inhalation two times a day  dextrose 40% Gel 15 Gram(s) Oral once  dextrose 5%. 1000 milliLiter(s) (50 mL/Hr) IV Continuous <Continuous>  dextrose 5%. 1000 milliLiter(s) (100 mL/Hr) IV Continuous <Continuous>  dextrose 50% Injectable 25 Gram(s) IV Push once  dextrose 50% Injectable 12.5 Gram(s) IV Push once  dextrose 50% Injectable 25 Gram(s) IV Push once  enoxaparin Injectable 40 milliGRAM(s) SubCutaneous daily  fluticasone propionate 50 MICROgram(s)/spray Nasal Spray 1 Spray(s) Both Nostrils two times a day  glucagon  Injectable 1 milliGRAM(s) IntraMuscular once  hydrochlorothiazide 12.5 milliGRAM(s) Oral daily  hydrocortisone hemorrhoidal Suppository 1 Suppository(s) Rectal daily  insulin glargine Injectable (LANTUS) 8 Unit(s) SubCutaneous every morning  insulin lispro (ADMELOG) corrective regimen sliding scale   SubCutaneous three times a day before meals  insulin lispro (ADMELOG) corrective regimen sliding scale   SubCutaneous at bedtime  insulin lispro Injectable (ADMELOG) 3 Unit(s) SubCutaneous three times a day before meals  losartan 100 milliGRAM(s) Oral daily  multivitamin 1 Tablet(s) Oral daily  pantoprazole    Tablet 40 milliGRAM(s) Oral before breakfast  predniSONE   Tablet 40 milliGRAM(s) Oral daily  sodium chloride 0.9% lock flush 3 milliLiter(s) IV Push every 8 hours  tiotropium 18 MICROgram(s) Capsule 1 Capsule(s) Inhalation daily    MEDICATIONS  (PRN):  acetaminophen   Tablet .. 650 milliGRAM(s) Oral every 6 hours PRN Temp greater or equal to 38C (100.4F), Mild Pain (1 - 3), Moderate Pain (4 - 6)  guaiFENesin   Syrup  (Sugar-Free) 200 milliGRAM(s) Oral every 6 hours PRN Cough      I&O's Summary      Vital Signs Last 24 Hrs  T(C): 36.8 (05 Mar 2021 06:06), Max: 36.9 (04 Mar 2021 15:14)  T(F): 98.3 (05 Mar 2021 06:06), Max: 98.5 (04 Mar 2021 15:14)  HR: 70 (05 Mar 2021 06:06) (70 - 87)  BP: 121/69 (05 Mar 2021 06:06) (111/67 - 142/79)  BP(mean): --  RR: 18 (05 Mar 2021 06:06) (18 - 18)  SpO2: 100% (05 Mar 2021 06:06) (96% - 100%)    CAPILLARY BLOOD GLUCOSE      POCT Blood Glucose.: 211 mg/dL (04 Mar 2021 22:50)  POCT Blood Glucose.: 218 mg/dL (04 Mar 2021 17:48)  POCT Blood Glucose.: 296 mg/dL (04 Mar 2021 13:22)  POCT Blood Glucose.: 421 mg/dL (04 Mar 2021 09:27)      PHYSICAL EXAM:  GENERAL: NAD, laying in bed, audible auditory wheeze   HEAD:  Atraumatic, Normocephalic  EYES: conjunctiva and sclera clear  CHEST/LUNG: Clear to auscultation on upper lobes, mild expiratory wheeze on lower lobes   HEART: Regular rate and rhythm; No murmurs, rubs, or gallops  ABDOMEN: Obese, Soft, Nontender, Nondistended; Bowel sounds present  EXTREMITIES:  2+ Peripheral Pulses, No clubbing, cyanosis, or edema  PSYCH: AAOx3  NEUROLOGY: non-focal         LABS:                        12.7   14.60 )-----------( 223      ( 04 Mar 2021 05:49 )             39.3     Auto Eosinophil # x     / Auto Eosinophil % x     / Auto Neutrophil # x     / Auto Neutrophil % x     / BANDS % x                            13.6   8.19  )-----------( 242      ( 03 Mar 2021 15:50 )             43.4     Auto Eosinophil # 0.18  / Auto Eosinophil % 2.2   / Auto Neutrophil # 4.61  / Auto Neutrophil % 56.4  / BANDS % x        03-04    137  |  96<L>  |  20  ----------------------------<  283<H>  5.0   |  22  |  0.76  03-03    140  |  99  |  12  ----------------------------<  155<H>  4.1   |  26  |  0.68    Ca    10.2      04 Mar 2021 05:49  Mg     2.3     03-04  Phos  2.8     03-04  TPro  7.9  /  Alb  4.8  /  TBili  <0.2  /  DBili  x   /  AST  25  /  ALT  28  /  AlkPhos  101  03-03        RADIOLOGY & ADDITIONAL TESTS:    EXAM:  CT CHEST        PROCEDURE DATE:  Mar  4 2021     ******PRELIMINARY REPORT******    ******PRELIMINARY REPORT******            INTERPRETATION:  The lungs are clear. No pneumonia. Follow-up the official report.

## 2021-03-05 NOTE — PROGRESS NOTE ADULT - PROBLEM SELECTOR PLAN 6
VTE with Lovenox 40 mg sub cut daily.  Fall, Aspiration, safety precautions.
VTE with Lovenox 40 mg sub cut daily.  Fall, Aspiration, safety precautions.

## 2021-03-05 NOTE — DISCHARGE NOTE NURSING/CASE MANAGEMENT/SOCIAL WORK - PATIENT PORTAL LINK FT
You can access the FollowMyHealth Patient Portal offered by [facility] by registering at the following website: [email or web address]. By joining [facility]’s FollowMyHealth portal, you will also be able to view your health information using other applications (apps) compatible with our system.

## 2021-03-05 NOTE — PROGRESS NOTE ADULT - ASSESSMENT
60F history of DM2, R breast Ca, s/p R Lumpectomy, HTN, asthma, admitted for asthma exacerbation likely secondary to URI/sinus congestion now improved with steroids and duo-nebs

## 2021-03-05 NOTE — DISCHARGE NOTE PROVIDER - CARE PROVIDER_API CALL
Geena Amaral)  Critical Care Medicine; Internal Medicine; Pulmonary Disease  [address] [place] [address]  Phone: [phone]  Fax: [phone]  Established Patient  Scheduled Appointment: 04/06/2021 04:00 PM

## 2021-03-05 NOTE — PROGRESS NOTE ADULT - PROBLEM SELECTOR PLAN 5
Currently in remission.  Pt to f/u Mammogram result as out patient.
Currently in remission.  Pt to f/u Mammogram result as out patient.

## 2021-03-05 NOTE — DISCHARGE NOTE PROVIDER - INSTRUCTIONS
You should limit yourself to 180 grams of carbohydrates daily to help lower your hemoglobin A1C to a goal of <7.0%. You should limit excessive intake of foods that are high in simple sugars such as sodas, candies, and large amounts of sweet fruits. You should also limit excessive intake of foods that are high in complex carbohydrates such as pastas, rices, and potatoes, all of which are high in starch - a complex carbohydrate. In order to further aide in dietary changes to manage your diabtetes you should maintain a food diary for one month and record the amount of carbohydrates in each meal or snack. This should be brought to your primary care doctros office and then further discussed. For more information about dietary recommendations you can visit the website for the National Diabetes Initiative at: http://www.ndei.org/ADA-nutrition-guidelines-2013.aspx.

## 2021-03-05 NOTE — PROGRESS NOTE ADULT - ATTENDING COMMENTS
Pt seen at bedside, no complaints, ambulating around room w/out difficulty.   60F w/T2DM, R breast Ca, s/p R Lumpectomy, chemo/RT, HTN, asthma, admitted for asthma exacerbation likely d/t sinus congestion.   #Asthma exac: resolved, will finish PO steroids for total 5 days, flonase, spiriva.   CT chest normal   Appt made with pts pulmonologist   #Steroid-induced hyperglycemia: A1c 7.7, insulin and ISS, c/w home Metformin upon dc   #Leukocytosis 2/2 steroids: stable     DC home today on PO steroids, followup appt made with pts pulmonologist.   ~40 minutes
Pt seen at bedside, sitting in bed, has no complaints, overall feeling better.   60F w/T2DM, R breast Ca, s/p R Lumpectomy, chemo/RT, HTN, asthma, admitted for asthma exacerbation likely d/t sinus congestion.   #Asthma exac: mild exp wheezing on exam, c/w duoneb, PO steroids, ambulatory O2, standing flonase. Pt has pulmonologist, will need collateral.   f/u CT Chest as pt w/hx of breast ca and RT    #Steroid-induced hyperglycemia: A1c 7.7, c/w insulin, ISS, consistent carb diet   #Leukocytosis 2/2 steroids: no signs of active infection, monitor     Dispo: Home likely in AM if not requiring O2 and CT chest completed.

## 2021-03-05 NOTE — PROGRESS NOTE ADULT - PROBLEM SELECTOR PLAN 4
BP = 144/ 87.  Low salt diet.  Continue BP meds.- Losartan 100 mg daily, HCTZ 12.5 mg daily
BP = 144/ 87.  Low salt diet.  Continue BP meds.- Losartan 100 mg daily, HCTZ 12.5 mg daily

## 2021-03-05 NOTE — PROGRESS NOTE ADULT - PROBLEM SELECTOR PLAN 1
-Check peak flow daily.  - RVP negative   -F/U TSH.  -DuoNebs Q6* PRN wheeze/ SOB and transition to Pro Air and Spiriva inhaler.  - Prednisone 40 mg po daily starting 3/4.  - Robitussin-DM and Tylenol PRN
- RVP negative   -TSH 0.29 (low-normal)  -DuoNebs Q6* wheeze/ SOB and transition to Pro Air and Spiriva inhaler.  - Prednisone 40 mg po daily starting 3/4.  - Robitussin-DM and Tylenol PRN  -

## 2021-03-05 NOTE — PROGRESS NOTE ADULT - PROBLEM SELECTOR PLAN 3
Cholesterol 230 with TGs 173 and LDL 91  ASCVD score 6 % risk of CV events in 10 years  Plan:   - start statin?
Cholesterol 230 with TGs 173 and LDL 91  ASCVD score 6 % risk of CV events in 10 years  Plan:   - start Atorvastatin 20 mg bedtime

## 2021-03-05 NOTE — DISCHARGE NOTE PROVIDER - HOSPITAL COURSE
59 yo woman, self ambulating, obese, with history of DM2, R breast Ca, s/p 10/2019, R Lumpectomy, chemo and radiation, HTN, asthma, never intubated, hospitalized a few times, last was 2 years ago. The pt had her second Moderna Covid Vax on 2/12, and on 2/14, she began having wheezing with dry cough. She went to her PCP on 2/17 and was given Prednisone 20 mg po x 5 days, started on Amoxicillin po x 5 days and sent to the ED. In the ED, the pt had a CXR and was discharged home. The pt's wheezing continued and became worse today, 3/3, associated with HA, continued dry cough, chest tightness felt with cough. Denies dizziness, falls, palpitations, nausea, vomit, diarrhea, constipation, abdominal pain, fevers, chills, diaphoresis, dysuria, generalized body aches.  She came to the ED and was given Duo Nebs x 3 and Solumedrol 70mg IV x1. The pt says her wheezing decreased s/p treatment.     TROP = 11> 11  CXR Clear  EKG NSR @ 87b/ min , QT/ QTC= 366/ 440    Vitals: T max= 98.7* oral, HR= 88b/ min, BP = 144/ 87, RR= 20b/ min, SPO2= 98% ra    61 yo woman, self ambulating, obese, with history of DM2, R breast Ca, s/p 10/2019, R Lumpectomy, chemo and radiation, HTN, asthma, never intubated, hospitalized a few times, last was 2 years ago. The pt had her second Moderna Covid Vax on 2/12, and on 2/14, she began having wheezing with dry cough. She went to her PCP on 2/17 and was given Prednisone 20 mg po x 5 days, started on Amoxicillin po x 5 days and sent to the ED. In the ED, the pt had a CXR and was discharged home. The pt's wheezing continued and became worse today, 3/3, associated with HA, continued dry cough, chest tightness felt with cough. Denies dizziness, falls, palpitations, nausea, vomit, diarrhea, constipation, abdominal pain, fevers, chills, diaphoresis, dysuria, generalized body aches.  She came to the ED and was given Duo Nebs x 3 and Solumedrol 70mg IV x1. The pt says her wheezing decreased s/p treatment.     TROP = 11> 11  CXR Clear  EKG NSR @ 87b/ min , QT/ QTC= 366/ 440    Vitals: T max= 98.7* oral, HR= 88b/ min, BP = 144/ 87, RR= 20b/ min, SPO2= 98% ra     The patient was admitted to medicine for management of asthma exacerbation. She was started on prednisone and around the clock albuterol. She had a CT chest which was negative for acute pathology. She had improved symptoms and was discharged home in stable condition with follow up appointments with her PMD and pulmonologist.

## 2021-03-05 NOTE — DISCHARGE NOTE PROVIDER - NSDCCPTREATMENT_GEN_ALL_CORE_FT
24-Jan-2018
PRINCIPAL PROCEDURE  Procedure: CT chest wo con  Findings and Treatment: CT of the chest was performed from the thoracic inlet to the level of the adrenal glands without contrast injection.  Comparison: Chest x-ray dated March 3, 2021  Tubes/Lines: None.  Mediastinum/Vessels/Heart: Aorta and pulmonary arteries are normal in size. There is no pericardial effusion. No lymphadenopathy. Thyroid gland is unremarkable  Lungs/Pleura/Airways: No consolidations, edema, effusion or pneumothorax  Visualized abdomen: Incidental note made of right renal hypodensity which is too small to characterize. Otherwise, unremarkable noncontrast appearance of the upper abdomen  Bones and soft tissues: No suspicious osseous lesions. Degenerative changes noted throughout the spine.  IMPRESSION:  Unremarkable CT chest

## 2021-03-05 NOTE — DISCHARGE NOTE PROVIDER - NSDCFUADDINST_GEN_ALL_CORE_FT
PMD appointment with Dr. Saini on March 15th 2021@2:45 pm  Pulm appt  Dr. Jackson April 6th 2021 at 4:00 pm

## 2021-03-05 NOTE — DISCHARGE NOTE PROVIDER - PROVIDER TOKENS
PROVIDER:[TOKEN:[[id number]],SCHEDULEDAPPT:[04/06/2021],SCHEDULEDAPPTTIME:[04:00 PM],ESTABLISHEDPATIENT:[T]]

## 2021-03-05 NOTE — DISCHARGE NOTE PROVIDER - NSDCCPCAREPLAN_GEN_ALL_CORE_FT
PRINCIPAL DISCHARGE DIAGNOSIS  Diagnosis: Asthma  Assessment and Plan of Treatment: You were admitted to the hospital for mangament of your asthma. Take 2 tablets of prednisone (40mg) every day for the next 3 days.  Use your albuterol inhaler every 4 hours as needed for wheezing and/or shortness of breath. Use the budesonide-fometorol inhaler two times per day. Use the fluticasone nasal spray 1 spray two times per day. Follow up with your pulmonologist and primary care doctor as scheduled.  Return to the ER if you have shortness of breath not relieved with albuterol, chest pain, fevers, or if any other concerning symptoms arise.      SECONDARY DISCHARGE DIAGNOSES  Diagnosis: Hyperlipidemia  Assessment and Plan of Treatment: You were found to have high cholesterol. Start taking atorvastatin as prescribed.    Diagnosis: Type 2 diabetes mellitus with hyperglycemia, without long-term current use of insulin  Assessment and Plan of Treatment: You have elevated blood sugar in the setting of using prednisone. Continue to take your metformin as previously prescribed. Follow up with your primary care doctor for management of your diabetes.

## 2021-04-06 ENCOUNTER — NON-APPOINTMENT (OUTPATIENT)
Age: 61
End: 2021-04-06

## 2021-04-06 ENCOUNTER — APPOINTMENT (OUTPATIENT)
Dept: PULMONOLOGY | Facility: CLINIC | Age: 61
End: 2021-04-06
Payer: COMMERCIAL

## 2021-04-06 VITALS
HEIGHT: 61 IN | WEIGHT: 149 LBS | TEMPERATURE: 95.2 F | HEART RATE: 84 BPM | DIASTOLIC BLOOD PRESSURE: 80 MMHG | OXYGEN SATURATION: 96 % | SYSTOLIC BLOOD PRESSURE: 119 MMHG | BODY MASS INDEX: 28.13 KG/M2

## 2021-04-06 DIAGNOSIS — Z85.3 PERSONAL HISTORY OF MALIGNANT NEOPLASM OF BREAST: ICD-10-CM

## 2021-04-06 DIAGNOSIS — Z72.820 SLEEP DEPRIVATION: ICD-10-CM

## 2021-04-06 PROCEDURE — 99072 ADDL SUPL MATRL&STAF TM PHE: CPT

## 2021-04-06 PROCEDURE — 99214 OFFICE O/P EST MOD 30 MIN: CPT

## 2021-04-06 NOTE — DISCUSSION/SUMMARY
[FreeTextEntry1] : Will do a home sleep study.  Patient had sleep apnea in the past.\par Will do PFT. \par Increase time in bed.

## 2021-04-06 NOTE — HISTORY OF PRESENT ILLNESS
[Former] : former [TextBox_4] : 60 year old lady with h/o sleep apnea and asthma  is here for follow up. She was diagnosed to have breast cancer in September 2019. She completed her chemo, surgery and radiation  which she completed in November of 2020. She had a recent attack in early March of 2021.Her asthma is now stable. She is taking Symbicort.\par \par She has sleeping difficulties. She has been doing 2 jobs. She sleeps at around 12 MN and wakes up at 6 AM. she wakes up every 1 hour. She is exhausted. She is very sleepy all day. She falls asleep while driving.She says " I know I am sleep deprived".\par

## 2021-04-07 ENCOUNTER — EMERGENCY (EMERGENCY)
Facility: HOSPITAL | Age: 61
LOS: 1 days | Discharge: HOME | End: 2021-04-07
Attending: EMERGENCY MEDICINE | Admitting: EMERGENCY MEDICINE
Payer: COMMERCIAL

## 2021-04-07 VITALS
DIASTOLIC BLOOD PRESSURE: 72 MMHG | HEART RATE: 93 BPM | SYSTOLIC BLOOD PRESSURE: 141 MMHG | OXYGEN SATURATION: 99 % | RESPIRATION RATE: 16 BRPM | TEMPERATURE: 98 F

## 2021-04-07 VITALS
SYSTOLIC BLOOD PRESSURE: 145 MMHG | HEIGHT: 60 IN | OXYGEN SATURATION: 100 % | HEART RATE: 77 BPM | DIASTOLIC BLOOD PRESSURE: 84 MMHG | RESPIRATION RATE: 16 BRPM | TEMPERATURE: 98 F

## 2021-04-07 DIAGNOSIS — Z98.890 OTHER SPECIFIED POSTPROCEDURAL STATES: Chronic | ICD-10-CM

## 2021-04-07 DIAGNOSIS — E78.5 HYPERLIPIDEMIA, UNSPECIFIED: ICD-10-CM

## 2021-04-07 DIAGNOSIS — E11.65 TYPE 2 DIABETES MELLITUS WITH HYPERGLYCEMIA: ICD-10-CM

## 2021-04-07 DIAGNOSIS — I10 ESSENTIAL (PRIMARY) HYPERTENSION: ICD-10-CM

## 2021-04-07 DIAGNOSIS — Z98.89 OTHER SPECIFIED POSTPROCEDURAL STATES: Chronic | ICD-10-CM

## 2021-04-07 LAB
ALBUMIN SERPL ELPH-MCNC: 4 G/DL — SIGNIFICANT CHANGE UP (ref 3.3–5)
ALP SERPL-CCNC: 113 U/L — SIGNIFICANT CHANGE UP (ref 40–120)
ALT FLD-CCNC: 22 U/L — SIGNIFICANT CHANGE UP (ref 4–33)
ANION GAP SERPL CALC-SCNC: 12 MMOL/L — SIGNIFICANT CHANGE UP (ref 7–14)
AST SERPL-CCNC: 21 U/L — SIGNIFICANT CHANGE UP (ref 4–32)
BASOPHILS # BLD AUTO: 0.01 K/UL — SIGNIFICANT CHANGE UP (ref 0–0.2)
BASOPHILS NFR BLD AUTO: 0.2 % — SIGNIFICANT CHANGE UP (ref 0–2)
BILIRUB SERPL-MCNC: 0.2 MG/DL — SIGNIFICANT CHANGE UP (ref 0.2–1.2)
BLOOD GAS VENOUS COMPREHENSIVE RESULT: SIGNIFICANT CHANGE UP
BUN SERPL-MCNC: 22 MG/DL — SIGNIFICANT CHANGE UP (ref 7–23)
CALCIUM SERPL-MCNC: 9.3 MG/DL — SIGNIFICANT CHANGE UP (ref 8.4–10.5)
CHLORIDE SERPL-SCNC: 94 MMOL/L — LOW (ref 98–107)
CO2 SERPL-SCNC: 24 MMOL/L — SIGNIFICANT CHANGE UP (ref 22–31)
CREAT SERPL-MCNC: 0.77 MG/DL — SIGNIFICANT CHANGE UP (ref 0.5–1.3)
EOSINOPHIL # BLD AUTO: 0.04 K/UL — SIGNIFICANT CHANGE UP (ref 0–0.5)
EOSINOPHIL NFR BLD AUTO: 0.7 % — SIGNIFICANT CHANGE UP (ref 0–6)
GLUCOSE SERPL-MCNC: 553 MG/DL — CRITICAL HIGH (ref 70–99)
HCT VFR BLD CALC: 37.6 % — SIGNIFICANT CHANGE UP (ref 34.5–45)
HGB BLD-MCNC: 12.8 G/DL — SIGNIFICANT CHANGE UP (ref 11.5–15.5)
IANC: 2.99 K/UL — SIGNIFICANT CHANGE UP (ref 1.5–8.5)
IMM GRANULOCYTES NFR BLD AUTO: 0.3 % — SIGNIFICANT CHANGE UP (ref 0–1.5)
LYMPHOCYTES # BLD AUTO: 2.49 K/UL — SIGNIFICANT CHANGE UP (ref 1–3.3)
LYMPHOCYTES # BLD AUTO: 41.5 % — SIGNIFICANT CHANGE UP (ref 13–44)
MCHC RBC-ENTMCNC: 29.2 PG — SIGNIFICANT CHANGE UP (ref 27–34)
MCHC RBC-ENTMCNC: 34 GM/DL — SIGNIFICANT CHANGE UP (ref 32–36)
MCV RBC AUTO: 85.6 FL — SIGNIFICANT CHANGE UP (ref 80–100)
MONOCYTES # BLD AUTO: 0.45 K/UL — SIGNIFICANT CHANGE UP (ref 0–0.9)
MONOCYTES NFR BLD AUTO: 7.5 % — SIGNIFICANT CHANGE UP (ref 2–14)
NEUTROPHILS # BLD AUTO: 2.99 K/UL — SIGNIFICANT CHANGE UP (ref 1.8–7.4)
NEUTROPHILS NFR BLD AUTO: 49.8 % — SIGNIFICANT CHANGE UP (ref 43–77)
NRBC # BLD: 0 /100 WBCS — SIGNIFICANT CHANGE UP
NRBC # FLD: 0.03 K/UL — HIGH
PLATELET # BLD AUTO: 204 K/UL — SIGNIFICANT CHANGE UP (ref 150–400)
POTASSIUM SERPL-MCNC: 4.8 MMOL/L — SIGNIFICANT CHANGE UP (ref 3.5–5.3)
POTASSIUM SERPL-SCNC: 4.8 MMOL/L — SIGNIFICANT CHANGE UP (ref 3.5–5.3)
PROT SERPL-MCNC: 6.9 G/DL — SIGNIFICANT CHANGE UP (ref 6–8.3)
RBC # BLD: 4.39 M/UL — SIGNIFICANT CHANGE UP (ref 3.8–5.2)
RBC # FLD: 13.3 % — SIGNIFICANT CHANGE UP (ref 10.3–14.5)
SARS-COV-2 RNA SPEC QL NAA+PROBE: SIGNIFICANT CHANGE UP
SODIUM SERPL-SCNC: 130 MMOL/L — LOW (ref 135–145)
TROPONIN T, HIGH SENSITIVITY RESULT: 10 NG/L — SIGNIFICANT CHANGE UP
TROPONIN T, HIGH SENSITIVITY RESULT: 10 NG/L — SIGNIFICANT CHANGE UP
WBC # BLD: 6 K/UL — SIGNIFICANT CHANGE UP (ref 3.8–10.5)
WBC # FLD AUTO: 6 K/UL — SIGNIFICANT CHANGE UP (ref 3.8–10.5)

## 2021-04-07 PROCEDURE — 99245 OFF/OP CONSLTJ NEW/EST HI 55: CPT

## 2021-04-07 PROCEDURE — 99236 HOSP IP/OBS SAME DATE HI 85: CPT

## 2021-04-07 PROCEDURE — 93018 CV STRESS TEST I&R ONLY: CPT | Mod: GC

## 2021-04-07 PROCEDURE — 78452 HT MUSCLE IMAGE SPECT MULT: CPT | Mod: 26

## 2021-04-07 PROCEDURE — 71046 X-RAY EXAM CHEST 2 VIEWS: CPT | Mod: 26

## 2021-04-07 PROCEDURE — 93016 CV STRESS TEST SUPVJ ONLY: CPT | Mod: GC

## 2021-04-07 RX ORDER — DEXTROSE 50 % IN WATER 50 %
25 SYRINGE (ML) INTRAVENOUS ONCE
Refills: 0 | Status: DISCONTINUED | OUTPATIENT
Start: 2021-04-07 | End: 2021-04-10

## 2021-04-07 RX ORDER — ISOPROPYL ALCOHOL, BENZOCAINE .7; .06 ML/ML; ML/ML
1 SWAB TOPICAL
Qty: 1 | Refills: 5
Start: 2021-04-07

## 2021-04-07 RX ORDER — HYDROCHLOROTHIAZIDE 25 MG
12.5 TABLET ORAL DAILY
Refills: 0 | Status: DISCONTINUED | OUTPATIENT
Start: 2021-04-07 | End: 2021-04-10

## 2021-04-07 RX ORDER — DEXTROSE 50 % IN WATER 50 %
15 SYRINGE (ML) INTRAVENOUS ONCE
Refills: 0 | Status: DISCONTINUED | OUTPATIENT
Start: 2021-04-07 | End: 2021-04-10

## 2021-04-07 RX ORDER — SODIUM CHLORIDE 9 MG/ML
1000 INJECTION, SOLUTION INTRAVENOUS
Refills: 0 | Status: DISCONTINUED | OUTPATIENT
Start: 2021-04-07 | End: 2021-04-10

## 2021-04-07 RX ORDER — INSULIN LISPRO 100/ML
VIAL (ML) SUBCUTANEOUS
Refills: 0 | Status: DISCONTINUED | OUTPATIENT
Start: 2021-04-07 | End: 2021-04-10

## 2021-04-07 RX ORDER — INSULIN GLARGINE 100 [IU]/ML
13 INJECTION, SOLUTION SUBCUTANEOUS AT BEDTIME
Refills: 0 | Status: DISCONTINUED | OUTPATIENT
Start: 2021-04-07 | End: 2021-04-10

## 2021-04-07 RX ORDER — METFORMIN HYDROCHLORIDE 850 MG/1
1 TABLET ORAL
Qty: 14 | Refills: 0
Start: 2021-04-07 | End: 2021-04-13

## 2021-04-07 RX ORDER — METFORMIN HYDROCHLORIDE 850 MG/1
1 TABLET ORAL
Qty: 180 | Refills: 0
Start: 2021-04-07 | End: 2021-07-05

## 2021-04-07 RX ORDER — ACETAMINOPHEN 500 MG
650 TABLET ORAL ONCE
Refills: 0 | Status: COMPLETED | OUTPATIENT
Start: 2021-04-07 | End: 2021-04-07

## 2021-04-07 RX ORDER — DEXTROSE 50 % IN WATER 50 %
12.5 SYRINGE (ML) INTRAVENOUS ONCE
Refills: 0 | Status: DISCONTINUED | OUTPATIENT
Start: 2021-04-07 | End: 2021-04-10

## 2021-04-07 RX ORDER — GLUCAGON INJECTION, SOLUTION 0.5 MG/.1ML
1 INJECTION, SOLUTION SUBCUTANEOUS ONCE
Refills: 0 | Status: DISCONTINUED | OUTPATIENT
Start: 2021-04-07 | End: 2021-04-10

## 2021-04-07 RX ORDER — INSULIN LISPRO 100/ML
VIAL (ML) SUBCUTANEOUS AT BEDTIME
Refills: 0 | Status: DISCONTINUED | OUTPATIENT
Start: 2021-04-07 | End: 2021-04-10

## 2021-04-07 RX ORDER — LOSARTAN POTASSIUM 100 MG/1
100 TABLET, FILM COATED ORAL DAILY
Refills: 0 | Status: DISCONTINUED | OUTPATIENT
Start: 2021-04-07 | End: 2021-04-10

## 2021-04-07 RX ORDER — INSULIN LISPRO 100/ML
4 VIAL (ML) SUBCUTANEOUS
Refills: 0 | Status: DISCONTINUED | OUTPATIENT
Start: 2021-04-07 | End: 2021-04-10

## 2021-04-07 RX ORDER — SODIUM CHLORIDE 9 MG/ML
1000 INJECTION INTRAMUSCULAR; INTRAVENOUS; SUBCUTANEOUS ONCE
Refills: 0 | Status: COMPLETED | OUTPATIENT
Start: 2021-04-07 | End: 2021-04-07

## 2021-04-07 RX ORDER — ENOXAPARIN SODIUM 100 MG/ML
15 INJECTION SUBCUTANEOUS
Qty: 3 | Refills: 0
Start: 2021-04-07 | End: 2021-07-05

## 2021-04-07 RX ADMIN — SODIUM CHLORIDE 1000 MILLILITER(S): 9 INJECTION INTRAMUSCULAR; INTRAVENOUS; SUBCUTANEOUS at 04:14

## 2021-04-07 RX ADMIN — Medication 4 UNIT(S): at 13:08

## 2021-04-07 RX ADMIN — Medication 6: at 06:32

## 2021-04-07 RX ADMIN — LOSARTAN POTASSIUM 100 MILLIGRAM(S): 100 TABLET, FILM COATED ORAL at 06:26

## 2021-04-07 RX ADMIN — Medication 4: at 13:08

## 2021-04-07 RX ADMIN — SODIUM CHLORIDE 1000 MILLILITER(S): 9 INJECTION INTRAMUSCULAR; INTRAVENOUS; SUBCUTANEOUS at 06:34

## 2021-04-07 RX ADMIN — Medication 12.5 MILLIGRAM(S): at 06:26

## 2021-04-07 RX ADMIN — Medication 4 UNIT(S): at 08:46

## 2021-04-07 RX ADMIN — Medication 650 MILLIGRAM(S): at 03:02

## 2021-04-07 NOTE — ED PROVIDER NOTE - ATTENDING CONTRIBUTION TO CARE
61yo F nonsmoker with PMHx right-sided breast cancer in 2019 s/p lumpectomy, HTN, DM, asthma, and obesity, p/w atraumatic left sided sharp intermittent chest pains with radiation to Left upper extremity and epigastrium.  Says pain worsened both with exertion and movement of left arm. Not worse with neck movement, deep inspiration, or eating.  No focal weakness.  No n/v, diaphoresis, sob, syncope, or history of prior similar pain episodes.  Denies prior cardiac work up    *Of note patient has anaphylactic allergy to aspirin and NSAIDs    General: Patient in no apparent distress, AAO x 3  Skin: Dry and intact. no rash to chest  HEENT: Head atraumatic. Oral mucosa moist. No pharyngeal exudates or tonsillar enlargement  Eyes: Conjunctiva normal  Cardiac: Regular rhythm and rate. No pretibial edema b/l  Respiratory: Lungs clear b/l and symmetric. No respiratory distress. Able to speak in complete sentences.  Gastrointestinal: Abdomen soft, nondistended, nontender  Musculoskeletal: Moves all extremities spontaneously.   Neurological: alert and oriented to person, place, and time  Psychiatric: Cooperative    EKG nsr with no acute ischemic changes    a/p  nonspecific chest pain with components of MSK pain and ACS  will get labs with trop and cxr and give tylenol for pain

## 2021-04-07 NOTE — ED CDU PROVIDER INITIAL DAY NOTE - ATTENDING CONTRIBUTION TO CARE
Patient is a 60 y.o female with PMHx of Asthma, Former smoker, Breast CA (in remission), HTN who presents to ED c/o few days of Lt side arm and chest pain. Pt states that few days ago began to have Lt arm pain, then noted it was radiating to Lt side of chest. Pain non-pleuritic, non-exertional. Pt has never had cardiac work up. +FHx of Father and mother with CAD. Pt also noted polyuria and polydipsia recently. Denies hx of blood clots, n/v/d, fevers, chills, URI sx, sob, neck pain, back pain, LE swelling or any other complaints. Pt seen and worked up in ED; ECG nsr, Trop 10, rpt pending, CXR normal, pt incidentally noted to have elevated BG in 500's, has recently been on prednisone s/p admission for asthma exacerbation 3/20 and then again on prednisone past week for Lt ear infection.     Dispo-ed to CDU for; Tele, Stress, IVF, BG monitoring, ?endo consult prn pending HA1C.

## 2021-04-07 NOTE — ED ADULT NURSE NOTE - OBJECTIVE STATEMENT
Received pt to [facility] A7Ox4, amb without assistance. 61y/o female hx asthma, former smoker, breast CA (last chemo one year ago) presenting with chest pain radiating down her arm associated with exertion. Resps are even and unlabored, spo2 100% on RA. Abdomen is soft, pt endorses mild generalized abdominal pain. Denies fever/chills, weakness, dizziness, nausea/vomiting, palpitations. 20G IV placed to left arm, labs sent, meds given as ordered. pt taken to Xray at this time.

## 2021-04-07 NOTE — ED CDU PROVIDER INITIAL DAY NOTE - PHYSICAL EXAMINATION
Vital signs reviewed.   CONSTITUTIONAL: Well-appearing; well-nourished; in no apparent distress. Non-toxic appearing.   HEAD: Normocephalic, atraumatic.  EYES: PERRL, EOM intact, conjunctiva and sclera WNL.  CARD: Normal S1, S2; no murmurs, rubs, or gallops noted.  RESP: Normal chest excursion with respiration; breath sounds clear and equal bilaterally; no wheezes, rhonchi, or rales.  EXT/MS: moves all extremities; no pedal edema. No calf tenderness.   SKIN: Normal for age and race  NEURO: Awake, alert, oriented x 3, no gross deficits

## 2021-04-07 NOTE — ED CDU PROVIDER INITIAL DAY NOTE - MEDICAL DECISION MAKING DETAILS
Patient is a 60 y.o female with PMHx of Asthma, Former smoker, Breast CA (in remission), HTN who presents to ED c/o few days of Lt side arm and chest pain. Pt seen and worked up in ED; ECG nsr, Trop 10, rpt pending, CXR normal, pt incidentally noted to have elevated BG in 500's, has recently been on prednisone s/p admission for asthma exacerbation 3/20 and then again on prednisone past week for Lt ear infection. Pt transferred to CDU for; Tele, Stress, IVF, BG monitoring, ?endo consult prn pending HA1C.

## 2021-04-07 NOTE — CONSULT NOTE ADULT - PROBLEM SELECTOR RECOMMENDATION 9
- A1c now 10.5%, was 7.7% in 3/2021, was on short term steroids in the last month  - last dose of Prednisone was about a month ago, thus this does not explain severe hyperglycemia on presentation  - proceed with basal bolus insulin: Lantus 15 units qhs, Admelog 5 units before meals, low correction scale qac and qhs  - consistent carb diet  - check FS qac and qhs  - will follow if she remains in the CDU  - for discharge: suggest Metformin 500 mg ER BID (to be increased to 1000 mg BID in one week as outpatient) and Lantus/Basaglar/Tresiba/Toujeo/Levemir 15 units qhs. Send script for Lantus pen 15 units qhs to assess coverage. She requires insulin pen teaching as well and scripts for insulin pen, pen needles, new glucometer as well as supplies for glucometer. Can follow up in the office if she wishes - [phone] - [address], [address], [facility]

## 2021-04-07 NOTE — ED CDU PROVIDER DISPOSITION NOTE - NSFOLLOWUPINSTRUCTIONS_ED_ALL_ED_FT
Follow up with your primary doctor and see an Endocrinologist, ENT and Cardiologist. The ER will schedule you for an appointment, if they do not call  to schedule an appointment. Take Metformin two times per day for 7 days (500MG strength) and then increase to the 1000MG strength two times per day. Use 15 units of lantus at bedtime. Advance activity as tolerated.  Continue all previously prescribed medications as directed.  Follow up with your primary care physician in 48-72 hours- bring copies of your results.  Return to the ER for worsening or persistent symptoms, and/or ANY NEW OR CONCERNING SYMPTOMS. This includes but is not limited to chest pain, lightheadedness, shortness of breath or for any other symptoms that concern you. If you have issues obtaining follow up, please call: [phone] to obtain a doctor or specialist who takes your insurance in your area.  You may call [phone] to make an appointment with the internal medicine clinic.

## 2021-04-07 NOTE — ED PROVIDER NOTE - OBJECTIVE STATEMENT
60F with hx of asthma, former smoker, breast cancer (in remission, last chemo 1 year ago), presenting with chest pain radiating down left arm x few days. Worse with exertion. No radiation of pain. No associated dyspnea, diaphoresis, n/v. No abdominal or back pain. Worse with deep inspirations. No recent travel, lower extremity swelling/pain/redness. Never had a cardiac work up in the past (stress, echo, cath). no trauma to arm. Did not over-exert upper extremity. Has anaphylactic reaction to ASA.

## 2021-04-07 NOTE — ED PROVIDER NOTE - PMH
Asthma  (no hx/o intubation)  Breast cancer  R breast 10/ 2019  DM2 (diabetes mellitus, type 2)    Former smoker, stopped smoking many years ago  (Quit ~32years ago; used to Nicotine patch  Informed pt of the various negative side effects of smoking including risk of COPD, Lung Ca etc  Strongly recommended that pt stops smoking and pt given various options of smoking cessation tools such as NRT's and other pharmacotherapies 0.3 ppd x ~10 years.)  Hypertension

## 2021-04-07 NOTE — CONSULT NOTE ADULT - ASSESSMENT
60 year old man with PMH HTN, HLD, uncontrolled DM2, here for evaluation of chest pain, now s/p stress test. Consult called for evaluation of hyperglycemia to the 500's on admission in setting of uncontrolled DM2, A1c 10.5%. BG goal 100-180 mg/dL. High risk patient with high level decision making.

## 2021-04-07 NOTE — ED CDU PROVIDER INITIAL DAY NOTE - OBJECTIVE STATEMENT
HPI- Patient is a 60 y.o female with PMHx of Asthma, Former smoker, Breast CA (in remission), HTN who presents to ED c/o few days of Lt side arm and chest pain. Pt states that few days ago began to have Lt arm pain, then noted it was radiating to Lt side of chest. Pain non-pleuritic, non-exertional. Pt has never had cardiac work up. +FHx of Father and mother with CAD. Pt also noted polyuria and polydipsia recently. Denies hx of blood clots, n/v/d, fevers, chills, URI sx, sob, neck pain, back pain, LE swelling or any other complaints. Pt seen and worked up in ED; ECG nsr, Trop 10, rpt pending, CXR normal, pt incidentally noted to have elevated BG in 500's, has recently been on prednisone s/p admission for asthma exacerbation 3/20 and then again on prednisone past week for Lt ear infection. Pt transferred to CDU for; Tele, Stress, IVF, BG monitoring, ?endo consult prn pending HA1C.

## 2021-04-07 NOTE — ED PROVIDER NOTE - PROGRESS NOTE DETAILS
Tong: glucose 553. No hx of diabetes. Patient states that she finished a week course of prednisone a few days ago for an ear infection. Now reports polydipsia and polyuria. Will give IVF and recheck FS. CDU called for evaluation. Patient amenable to staying.

## 2021-04-07 NOTE — ED CDU PROVIDER DISPOSITION NOTE - PATIENT PORTAL LINK FT
You can access the FollowMyHealth Patient Portal offered by [facility] by registering at the following website: [email or web address]. By joining [employer]’s FollowMyHealth portal, you will also be able to view your health information using other applications (apps) compatible with our system.

## 2021-04-07 NOTE — ED CDU PROVIDER DISPOSITION NOTE - SECONDARY DIAGNOSIS.
Tympanic membrane perforation Uncontrolled type 2 diabetes mellitus with hyperglycemia Hyperlipidemia, unspecified hyperlipidemia type Essential hypertension

## 2021-04-07 NOTE — CONSULT NOTE ADULT - PROBLEM SELECTOR RECOMMENDATION 3
- LDL 91 in 3/2021  - would benefit from tx with moderate intensity statin such as Lipitor 20 mg qhs as long as there is no contraindication

## 2021-04-07 NOTE — CONSULT NOTE ADULT - SUBJECTIVE AND OBJECTIVE BOX
HPI:  Patient is a 60 year old man with PMH HTN, HLD, uncontrolled DM2, here for evaluation of chest pain, now s/p stress test. Consult called for evaluation of hyperglycemia to the 500's on admission in setting of uncontrolled DM2, A1c 10.5%. A1c in 3/2021 was 7.7%. Patient reports being told her diabetes were borderline in the past, and takes Metformin 500 mg ER daily. She does not check her BG, states that she wasn't told that she had to do so. Last month, she was admitted here with an asthma exacerbation, and was started on Prednisone. When she was discharged, she took an extra few days of Prednisone. Then, about 10 days ago, she was given another short course of Prednisone, last dose was Wednesday last week. Has not taken steroids for the last 6 days, yet BG on presentation overnight was in the 500's. She does not know why her BG is high, states that she has been watching her diet, eating fruit and vegatables, etc. Does not drink soda or juice. She does not have sx of neuropathy in the feet. However, she states that she has a wound on right foot which hasn't been healing well. Last optho visit was more than 1 year ago, no known retinopathy and has never received laser or injections to the eyes. She does not have blurry vision, she does have polyuria and polydipsia.     We discussed tx with either full dose Metformin alone versus full dose Metformin with basal insulin - after discussion of risks of hyperglycemia (Which include poor wound healing, and she has a wound on her right foot) she has opted for Metformin with basal insulin.       PAST MEDICAL & SURGICAL HISTORY:  Asthma  (no hx/o intubation)    Hypertension    DM2 (diabetes mellitus, type 2)    Former smoker, stopped smoking many years ago  (Quit ~32years ago; used to Nicotine patch  Informed pt of the various negative side effects of smoking including risk of COPD, Lung Ca etc  Strongly recommended that pt stops smoking and pt given various options of smoking cessation tools such as NRT&#x27;s and other pharmacotherapies 0.3 ppd x ~10 years.)    Breast cancer  R breast 10/ 2019    Uterus Problem  &quot;was getting cramps so they did ablation? enometriosis  10/2012    Bunion  b/l     delivery NOS  x1    H/O foot surgery  right    S/P lumpectomy, right breast  10/2019        FAMILY HISTORY:  Family history of MI (myocardial infarction)  Father    Family history of lung cancer (Father)  (primary lung cancer)    DM2 in mother       Social History:  no cigarette use  no alcohol use  employed    Outpatient Medications:  Metformin  mg daily    MEDICATIONS  (STANDING):  dextrose 40% Gel 15 Gram(s) Oral once  dextrose 5%. 1000 milliLiter(s) (50 mL/Hr) IV Continuous <Continuous>  dextrose 5%. 1000 milliLiter(s) (100 mL/Hr) IV Continuous <Continuous>  dextrose 50% Injectable 25 Gram(s) IV Push once  dextrose 50% Injectable 12.5 Gram(s) IV Push once  dextrose 50% Injectable 25 Gram(s) IV Push once  glucagon  Injectable 1 milliGRAM(s) IntraMuscular once  hydrochlorothiazide 12.5 milliGRAM(s) Oral daily  insulin glargine Injectable (LANTUS) 13 Unit(s) SubCutaneous at bedtime  insulin lispro (ADMELOG) corrective regimen sliding scale   SubCutaneous three times a day before meals  insulin lispro (ADMELOG) corrective regimen sliding scale   SubCutaneous at bedtime  insulin lispro Injectable (ADMELOG) 4 Unit(s) SubCutaneous three times a day with meals  losartan 100 milliGRAM(s) Oral daily    MEDICATIONS  (PRN):    Allergies  AValox (Unknown)  NSAIDs (Unknown)      Review of Systems:  Constitutional: No fever  Eyes: No blurry vision  Neuro: No tremors  HEENT: No pain  Cardiovascular: chest pain improved, palpitations  Respiratory: No SOB, no cough  GI: No nausea, vomiting, abdominal pain  : No dysuria  Skin: no rash  Endocrine: + polyuria, polydipsia    ALL OTHER SYSTEMS REVIEWED AND NEGATIVE    PHYSICAL EXAM:  VITALS: T(C): 36.6 (21 @ 09:00)  T(F): 97.8 (21 @ 09:00), Max: 98.4 (21 @ 05:42)  HR: 63 (21 @ 09:00) (63 - 77)  BP: 113/67 (21 @ 09:00) (113/67 - 168/86)  RR:  (16 - 18)  SpO2:  (100% - 100%)  Wt(kg): --  GENERAL: NAD, well-groomed, well-developed  EYES: No proptosis, anicteric  HEENT:  Atraumatic, Normocephalic  THYROID: Normal size, no palpable nodules  RESPIRATORY: Clear to auscultation bilaterally; No rales, rhonchi, wheezing  CARDIOVASCULAR: Regular rate and rhythm; No murmurs; no peripheral edema  GI: Soft, nontender, non distended, normal bowel sounds  SKIN: Dry, intact, +wound on right lateral malleolus - tender to touch; no wound on left foot   MUSCULOSKELETAL: Full range of motion, normal strength  PSYCH: Alert and oriented x 3, reactive affect, euthymic mood       POCT Blood Glucose.: 282 mg/dL (21 @ 08:35) A 4   POCT Blood Glucose.: 366 mg/dL (21 @ 07:26)  POCT Blood Glucose.: 410 mg/dL (21 @ 05:39) A 6                          12.8   6.00  )-----------( 204      ( 2021 03:08 )             37.6       04-07    130<L>  |  94<L>  |  22  ----------------------------<  553<HH>  4.8   |  24  |  0.77    EGFR if : 99  EGFR if non : 85    Ca    9.3      -07    TPro  6.9  /  Alb  4.0  /  TBili  0.2  /  DBili  x   /  AST  21  /  ALT  22  /  AlkPhos  113  -    A1c 10.5%

## 2021-04-07 NOTE — ED PROVIDER NOTE - PSH
Bunion  b/l   delivery NOS  x1  H/O foot surgery  right  S/P lumpectomy, right breast  10/2019  Uterus Problem  "was getting cramps so they did ablation? enometriosis  10/2012

## 2021-04-07 NOTE — ED PROVIDER NOTE - CLINICAL SUMMARY MEDICAL DECISION MAKING FREE TEXT BOX
60F with hx of asthma, former smoker, breast cancer (in remission, last chemo 1 year ago), presenting with chest pain radiating down left arm x few days. On exam, appears well clinically, VS wnl, lungs CTAB, no wheezing, no peripheral edema. Will check labs, EKG, trop, CXR, will defer administration of ASA, ?CDU.

## 2021-04-07 NOTE — ED CDU PROVIDER INITIAL DAY NOTE - CROS ED ROS STATEMENT
"Requested Prescriptions   Pending Prescriptions Disp Refills     metFORMIN (GLUCOPHAGE-XR) 500 MG 24 hr tablet [Pharmacy Med Name: metFORMIN HCl ER Oral Tablet Extended Release 24 Hour 500 MG] 360 tablet 0     Sig: TAKE 2 TABLETS BY MOUTH TWICE DAILY WITH MEALS   Last Written Prescription Date:  3-30-20  Last Fill Quantity: 360,  # refills: 0   Last office visit: 1/6/2020 with prescribing provider:  1-6-20   Future Office Visit:      Biguanide Agents Passed - 3/30/2020  1:12 PM        Passed - Patient is age 10 or older        Passed - Patient has documented A1c within the specified period of time.     If HgbA1C is 8 or greater, it needs to be on file within the past 3 months.  If less than 8, must be on file within the past 6 months.     Recent Labs   Lab Test 01/06/20  1819   A1C 7.4*             Passed - Patient's CR is NOT>1.4 OR Patient's EGFR is NOT<45 within past 12 mos.     Recent Labs   Lab Test 05/20/19  1221   GFRESTIMATED >90   GFRESTBLACK >90       Recent Labs   Lab Test 05/20/19  1221   CR 0.74             Passed - Patient does NOT have a diagnosis of CHF.        Passed - Medication is active on med list        Passed - Patient is not pregnant        Passed - Patient has not had a positive pregnancy test within the past 12 mos.         Passed - Recent (6 mo) or future (30 days) visit within the authorizing provider's specialty     Patient had office visit in the last 6 months or has a visit in the next 30 days with authorizing provider or within the authorizing provider's specialty.  See \"Patient Info\" tab in inbasket, or \"Choose Columns\" in Meds & Orders section of the refill encounter.               diltiazem ER (TIAZAC) 240 MG 24 hr ER beaded capsule [Pharmacy Med Name: dilTIAZem HCl ER Beads Oral Capsule Extended Release 24 Hour 240 MG] 90 capsule 0     Sig: TAKE 1 CAPSULE BY MOUTH ONCE DAILY   Last Written Prescription Date:  3-30-20  Last Fill Quantity: 90,  # refills: 0   Last office visit: " "1/6/2020 with prescribing provider:  1-6-20   Future Office Visit:      Calcium Channel Blockers Protocol  Failed - 3/30/2020  1:12 PM        Failed - Normal ALT in past 12 months     Recent Labs   Lab Test 04/15/16  1605   ALT 48             Passed - Blood pressure under 140/90 in past 12 months     BP Readings from Last 3 Encounters:   01/06/20 124/86   11/05/19 138/84   05/20/19 113/57                 Passed - Recent (12 mo) or future (30 days) visit within the authorizing provider's specialty     Patient has had an office visit with the authorizing provider or a provider within the authorizing providers department within the previous 12 mos or has a future within next 30 days. See \"Patient Info\" tab in inbasket, or \"Choose Columns\" in Meds & Orders section of the refill encounter.              Passed - Medication is active on med list        Passed - Patient is age 18 or older        Passed - No active pregnancy on record        Passed - Normal serum creatinine on file in past 12 months     Recent Labs   Lab Test 05/20/19  1221   CR 0.74       Ok to refill medication if creatinine is low          Passed - No positive pregnancy test in past 12 months           montelukast (SINGULAIR) 10 MG tablet [Pharmacy Med Name: Montelukast Sodium Oral Tablet 10 MG] 90 tablet 1     Sig: TAKE 1 TABLET BY MOUTH AT BEDTIME   Last Written Prescription Date:  3-30-20  Last Fill Quantity: 90,  # refills: 1   Last office visit: 1/6/2020 with prescribing provider:  1-6-20   Future Office Visit:      Leukotriene Inhibitors Protocol Failed - 3/30/2020  1:12 PM        Failed - Asthma control assessment score within normal limits in last 6 months     Please review ACT score.           Passed - Patient is age 12 or older     If patient is under 16, ok to refill using age based dosing.           Passed - Medication is active on med list        Passed - Recent (6 mo) or future (30 days) visit within the authorizing provider's specialty     " "Patient had office visit in the last 6 months or has a visit in the next 30 days with authorizing provider or within the authorizing provider's specialty.  See \"Patient Info\" tab in inbasket, or \"Choose Columns\" in Meds & Orders section of the refill encounter.               " all other ROS negative except as per HPI

## 2021-04-07 NOTE — ED CDU PROVIDER DISPOSITION NOTE - CLINICAL COURSE
60 y.o female with PMHx of Asthma, Former smoker, Breast CA (in remission), HTN who presents to ED c/o few days of Lt side arm and chest pain. Pt seen and worked up in ED; ECG nsr, Trop 10, rpt 10, CXR normal, pt incidentally noted to have elevated BG in 500's, has recently been on prednisone s/p admission for asthma exacerbation 3/20 and then again on prednisone past week for Lt ear infection. In CDU tele w/o events, stress wnl, endo recs for increased metformin and lantus, diabetes teaching performed, d/c center for f/u appointments. Left ear w/ perf referred to ENT.

## 2021-04-07 NOTE — ED ADULT TRIAGE NOTE - CHIEF COMPLAINT QUOTE
c/o worsening LUE and CP x a few days, accompanied with generalized fatigue, hx breast ca with no current chemo/radiation, EKG in progress

## 2021-04-09 ENCOUNTER — APPOINTMENT (OUTPATIENT)
Dept: PULMONOLOGY | Facility: CLINIC | Age: 61
End: 2021-04-09

## 2021-04-10 ENCOUNTER — APPOINTMENT (OUTPATIENT)
Dept: OTOLARYNGOLOGY | Facility: CLINIC | Age: 61
End: 2021-04-10
Payer: COMMERCIAL

## 2021-04-10 VITALS
SYSTOLIC BLOOD PRESSURE: 126 MMHG | TEMPERATURE: 98 F | DIASTOLIC BLOOD PRESSURE: 80 MMHG | BODY MASS INDEX: 28.32 KG/M2 | HEIGHT: 61 IN | WEIGHT: 150 LBS | HEART RATE: 75 BPM

## 2021-04-10 PROCEDURE — 99072 ADDL SUPL MATRL&STAF TM PHE: CPT

## 2021-04-10 PROCEDURE — 92504 EAR MICROSCOPY EXAMINATION: CPT

## 2021-04-10 PROCEDURE — 99203 OFFICE O/P NEW LOW 30 MIN: CPT | Mod: 25

## 2021-04-10 NOTE — REVIEW OF SYSTEMS
[Ear Pain] : ear pain [Recurrent Ear Infections] : recurrent ear infections [Dizziness] : dizziness [Nose Bleeds] : nose bleeds [Negative] : Heme/Lymph

## 2021-04-12 NOTE — PHYSICAL EXAM
[Binocular Microscopic Exam] : Binocular microscopic exam was performed [Hearing Loss Right Only] : normal [Hearing Loss Left Only] : normal [FreeTextEntry9] : erythema, very mild edema, no exudate; tender; GV painted [Midline] : trachea located in midline position [Normal] : no rashes

## 2021-04-12 NOTE — HISTORY OF PRESENT ILLNESS
[de-identified] : L otalgia and otorrhea\par did not start any meds /PO or topical\par went to ER, was told had a TM perf\par her hearing seems normal, unsure if slightly muffled\par does have DM/on metformin

## 2021-04-12 NOTE — PROCEDURE
[Same] : same as the Pre Op Dx. [] : Binocular Microscopy [FreeTextEntry1] : L OE [FreeTextEntry4] : none [FreeTextEntry6] : Operative microscope was used to examine/treat the ear canal, ear drum and visible middle ear landmarks. Adequate exam/treatment would not have been possible without the use of a microscope. Findings are described.\par \par

## 2021-04-12 NOTE — REASON FOR VISIT
[Initial Evaluation] : an initial evaluation for [FreeTextEntry2] : recurrent ear infections from left ear, patient was at ER and states she have a hole in left ear

## 2021-04-20 DIAGNOSIS — Z82.49 FAMILY HISTORY OF ISCHEMIC HEART DISEASE AND OTHER DISEASES OF THE CIRCULATORY SYSTEM: ICD-10-CM

## 2021-04-20 DIAGNOSIS — Z23 ENCOUNTER FOR IMMUNIZATION: ICD-10-CM

## 2021-04-20 DIAGNOSIS — Z87.891 PERSONAL HISTORY OF NICOTINE DEPENDENCE: ICD-10-CM

## 2021-04-20 RX ORDER — BUDESONIDE AND FORMOTEROL FUMARATE DIHYDRATE 160; 4.5 UG/1; UG/1
160-4.5 AEROSOL RESPIRATORY (INHALATION)
Qty: 3 | Refills: 0 | Status: DISCONTINUED | COMMUNITY
Start: 2017-11-15 | End: 2021-04-20

## 2021-04-20 RX ORDER — AZELASTINE HYDROCHLORIDE 137 UG/1
0.1 SPRAY, METERED NASAL
Qty: 30 | Refills: 0 | Status: DISCONTINUED | COMMUNITY
Start: 2018-01-22 | End: 2021-04-20

## 2021-04-23 ENCOUNTER — APPOINTMENT (OUTPATIENT)
Dept: PULMONOLOGY | Facility: CLINIC | Age: 61
End: 2021-04-23
Payer: COMMERCIAL

## 2021-04-23 DIAGNOSIS — Z80.1 FAMILY HISTORY OF MALIGNANT NEOPLASM OF TRACHEA, BRONCHUS AND LUNG: ICD-10-CM

## 2021-04-23 PROCEDURE — ZZZZZ: CPT

## 2021-04-23 RX ORDER — GUAIFENESIN 100 MG/5ML
100 LIQUID ORAL EVERY 6 HOURS
Refills: 0 | Status: ACTIVE | COMMUNITY

## 2021-04-23 RX ORDER — LOSARTAN POTASSIUM 50 MG/1
50 TABLET, FILM COATED ORAL
Qty: 90 | Refills: 9 | Status: DISCONTINUED | COMMUNITY
Start: 2018-03-22 | End: 2021-04-23

## 2021-04-23 RX ORDER — ALBUTEROL 90 MCG
90 AEROSOL (GRAM) INHALATION 4 TIMES DAILY
Refills: 0 | Status: ACTIVE | COMMUNITY

## 2021-04-23 RX ORDER — FLUTICASONE PROPIONATE 50 UG/1
50 SPRAY, METERED NASAL TWICE DAILY
Refills: 0 | Status: ACTIVE | COMMUNITY

## 2021-04-23 RX ORDER — HYDROCORTISONE/PRAMOXINE 2.5 %-1 %
2.5-1 CREAM (GRAM) TOPICAL
Refills: 0 | Status: ACTIVE | COMMUNITY

## 2021-04-23 RX ORDER — ALBUTEROL SULFATE 1.25 MG/3ML
1.25 SOLUTION RESPIRATORY (INHALATION)
Qty: 270 | Refills: 0 | Status: DISCONTINUED | COMMUNITY
Start: 2017-11-15 | End: 2021-04-23

## 2021-04-24 ENCOUNTER — APPOINTMENT (OUTPATIENT)
Dept: OTOLARYNGOLOGY | Facility: CLINIC | Age: 61
End: 2021-04-24
Payer: COMMERCIAL

## 2021-04-24 VITALS
HEIGHT: 61 IN | DIASTOLIC BLOOD PRESSURE: 89 MMHG | HEART RATE: 76 BPM | BODY MASS INDEX: 28.89 KG/M2 | WEIGHT: 153 LBS | SYSTOLIC BLOOD PRESSURE: 143 MMHG

## 2021-04-24 DIAGNOSIS — H92.02 OTALGIA, LEFT EAR: ICD-10-CM

## 2021-04-24 DIAGNOSIS — H60.392 OTHER INFECTIVE OTITIS EXTERNA, LEFT EAR: ICD-10-CM

## 2021-04-24 PROCEDURE — 99072 ADDL SUPL MATRL&STAF TM PHE: CPT

## 2021-04-24 PROCEDURE — 92504 EAR MICROSCOPY EXAMINATION: CPT

## 2021-04-24 PROCEDURE — 99212 OFFICE O/P EST SF 10 MIN: CPT | Mod: 25

## 2021-04-26 ENCOUNTER — APPOINTMENT (OUTPATIENT)
Dept: PULMONOLOGY | Facility: CLINIC | Age: 61
End: 2021-04-26
Payer: COMMERCIAL

## 2021-04-26 ENCOUNTER — APPOINTMENT (OUTPATIENT)
Dept: CARDIOLOGY | Facility: CLINIC | Age: 61
End: 2021-04-26

## 2021-04-26 VITALS — TEMPERATURE: 97.1 F

## 2021-04-26 PROBLEM — H60.392 ACUTE INFECTIVE OTITIS EXTERNA, LEFT: Status: ACTIVE | Noted: 2021-04-12

## 2021-04-26 PROBLEM — H92.02 OTALGIA OF LEFT EAR: Status: ACTIVE | Noted: 2021-04-12

## 2021-04-26 LAB — SARS-COV-2 N GENE NPH QL NAA+PROBE: NOT DETECTED

## 2021-04-26 PROCEDURE — 94729 DIFFUSING CAPACITY: CPT

## 2021-04-26 PROCEDURE — 94727 GAS DIL/WSHOT DETER LNG VOL: CPT

## 2021-04-26 PROCEDURE — 99072 ADDL SUPL MATRL&STAF TM PHE: CPT

## 2021-04-26 PROCEDURE — 94010 BREATHING CAPACITY TEST: CPT

## 2021-04-26 PROCEDURE — 95806 SLEEP STUDY UNATT&RESP EFFT: CPT

## 2021-04-26 NOTE — HISTORY OF PRESENT ILLNESS
[de-identified] : ear still with pain but better\par responded to conservative management\par here to make sure not getting worse

## 2021-04-26 NOTE — PHYSICAL EXAM
[Midline] : trachea located in midline position [Binocular Microscopic Exam] : Binocular microscopic exam was performed [Normal] : the left mastoid was normal [Hearing Loss Right Only] : normal [Hearing Loss Left Only] : normal [FreeTextEntry9] : dry and tender; GV painted

## 2021-04-27 ENCOUNTER — APPOINTMENT (OUTPATIENT)
Dept: ENDOCRINOLOGY | Facility: CLINIC | Age: 61
End: 2021-04-27

## 2021-04-29 ENCOUNTER — NON-APPOINTMENT (OUTPATIENT)
Age: 61
End: 2021-04-29

## 2021-05-17 ENCOUNTER — APPOINTMENT (OUTPATIENT)
Dept: PULMONOLOGY | Facility: CLINIC | Age: 61
End: 2021-05-17

## 2021-06-17 NOTE — ED PROVIDER NOTE - DISPOSITION TYPE
MA received phone call from Dr Simone Sue that labs were stable, that patient may go home. Dr Ebenezer Wall requested updated home care orders be placed to read pack old ostomy site with iodoform daily. MA made attempt to contact [facility] to inform of change. MA transferred to \"floor nurse\" Carmel Cortez RN, and left detailed message informing of update requesting return call if questions.     Electronically signed by Mary Sy on 6/17/21 at 1:59 PM EDT
DISCHARGE

## 2021-09-14 NOTE — ED ADULT NURSE NOTE - CCCP TRG CHIEF CMPLNT
chest pain Consent (Lip)/Introductory Paragraph: The rationale for Mohs was explained to the patient and consent was obtained. The risks, benefits and alternatives to therapy were discussed in detail. Specifically, the risks of lip deformity, changes in the oral aperture, infection, scarring, bleeding, prolonged wound healing, incomplete removal, allergy to anesthesia, nerve injury and recurrence were addressed. Prior to the procedure, the treatment site was clearly identified and confirmed by the patient. All components of Universal Protocol/PAUSE Rule completed.

## 2021-09-27 ENCOUNTER — APPOINTMENT (OUTPATIENT)
Dept: ENDOCRINOLOGY | Facility: CLINIC | Age: 61
End: 2021-09-27
Payer: COMMERCIAL

## 2021-09-27 DIAGNOSIS — E11.9 TYPE 2 DIABETES MELLITUS W/OUT COMPLICATIONS: ICD-10-CM

## 2021-09-27 PROCEDURE — 99204 OFFICE O/P NEW MOD 45 MIN: CPT | Mod: 95

## 2021-09-27 RX ORDER — BLOOD SUGAR DIAGNOSTIC
STRIP MISCELLANEOUS TWICE DAILY
Qty: 2 | Refills: 0 | Status: ACTIVE | COMMUNITY
Start: 2021-09-27 | End: 1900-01-01

## 2021-09-27 RX ORDER — LANCETS
EACH MISCELLANEOUS
Qty: 2 | Refills: 2 | Status: ACTIVE | COMMUNITY
Start: 2021-09-27 | End: 1900-01-01

## 2021-09-27 NOTE — REVIEW OF SYSTEMS
[Negative] : Heme/Lymph [All other systems negative] : All other systems negative [Swelling] : no swelling [de-identified] : + right ankle wound, no open skin areas, no edema

## 2021-09-27 NOTE — ASSESSMENT
[Diabetes Foot Care] : diabetes foot care [Long Term Vascular Complications] : long term vascular complications of diabetes [Carbohydrate Consistent Diet] : carbohydrate consistent diet [Importance of Diet and Exercise] : importance of diet and exercise to improve glycemic control, achieve weight loss and improve cardiovascular health [Self Monitoring of Blood Glucose] : self monitoring of blood glucose [Sick-Day Management] : sick-day management [Retinopathy Screening] : Patient was referred to ophthalmology for retinopathy screening [Weight Loss] : weight loss [Diabetic Medications] : Risks and benefits of diabetic medications were discussed [FreeTextEntry1] : 60 y/o female with uncontrolled T2DM (A1c 10.5% in April 2021), Right ankle wound, HTN and HLD\par \par T2DM\par - I had a discussion regarding healthy diet (consistent carbohydrates) with the patient. I also emphasized to maintain routine exercise activity as tolerated.\par - We discussed that A1c now will reflect Lantus use, only stopped 3 weeks ago. FS  since stopping Lantus suggest good glycemic control on her diet\par - Recommend stay off Lantus and continue Metformin ER 1000 mg BID\par - Continue FS BID, occ check TID for goal  as best able\par - Check CMP, A1c, and urine microalbumin when able and then A1c again in Dec\par - Recommend f/u ophtho\par - Foot wound examined via telehealth, no open skin areas, recommend f/u podiatry\par - Recommend call me incase diet gets out of hand or she is again on Prednisone in the future\par \par HTN\par - She checks BP at home and states <140/90\par - Recommend continue Losartan 100/12.5 mg QD\par \par HLD\par - HDL was 104 and LDL was 91 in March 2021\par - Continue Atorvastatin 20 mg QHS for risk reduction\par \par F/u in office in 6 months\par Check A1c in Dec 2021, call me for RX\par Sent RX to her pharmacy\par \par Conor Mcgraw DO\par \par

## 2021-09-27 NOTE — HISTORY OF PRESENT ILLNESS
[Home] : at home, [unfilled] , at the time of the visit. [Other Location: e.g. Home (Enter Location, City,State)___] : at [unfilled] [FreeTextEntry1] : 62 y/o female for telehealth new patient apt for T2DM ([facility]). Also HTN and HLD\par \par At age 21, she had breast cancer s/p lumpectomy and chemo with residual neuropathy.\par Also had radiation exposure. Was recently diagnosed with precancerous colon polyp early Sept, will f/u tmrw\par Needs to set up podiatry apt for f/u on right ankle wound (not open)\par \par Patient has prediabetes <5 years ago \par A1c 7.7% in 2021, A1c in April was 10.5% \par \par Complications include: R-foot wound below ankle, previous surgical site, it has drained fluid in the past. In the past she had neuropathy. Last eye exam>1 year, no retinal damage. No CAD or CVA. No known nephropathy\par \par Hospitalizations: 2021 for hyperglycemia s/s with FS 500s. Had taken Prednisone taper for 10 days prior to that. She was discharged on Metformin ER 1000 mg BID (was on 500 mg QD), Atorvastatin 20 mg QHS, and basal insulin Lantus 15 units QHS.\par \par Current DM Medications/Insulin: Metformin ER 1000 mg BID. 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

## 2021-09-27 NOTE — PHYSICAL EXAM
[Alert] : alert [Well Nourished] : well nourished [Healthy Appearance] : healthy appearance [No Acute Distress] : no acute distress [Well Developed] : well developed [Normal Sclera/Conjunctiva] : normal sclera/conjunctiva [EOMI] : extra ocular movement intact [No Proptosis] : no proptosis [Normal Outer Ear/Nose] : the ears and nose were normal in appearance [Normal Hearing] : hearing was normal [Thyroid Not Enlarged] : the thyroid was not enlarged [No Respiratory Distress] : no respiratory distress [No Accessory Muscle Use] : no accessory muscle use [Normal Rate and Effort] : normal respiratory rate and effort [Normal Rate] : heart rate was normal [No Edema] : no peripheral edema [Not Distended] : not distended [Soft] : abdomen soft [Spine Straight] : spine straight [No Stigmata of Cushings Syndrome] : no stigmata of Cushings Syndrome [Normal Gait] : normal gait [No Involuntary Movements] : no involuntary movements were seen [No Motor Deficits] : the motor exam was normal [No Tremors] : no tremors [Oriented x3] : oriented to person, place, and time [Normal Affect] : the affect was normal [Recent Memory Normal] : recent memory was not impaired [Normal Insight/Judgement] : insight and judgment were intact [Normal Mood] : the mood was normal [Remote Memory Normal] : remote memory was not impaired [Acanthosis Nigricans] : no acanthosis nigricans [de-identified] : + right ankle wound with skin darkening, no edema, no erythema, no open skin areas

## 2021-10-21 LAB
ALBUMIN SERPL ELPH-MCNC: 4.8 G/DL
ALP BLD-CCNC: 116 U/L
ALT SERPL-CCNC: 25 U/L
ANION GAP SERPL CALC-SCNC: 13 MMOL/L
AST SERPL-CCNC: 27 U/L
BILIRUB SERPL-MCNC: 0.3 MG/DL
BUN SERPL-MCNC: 10 MG/DL
CALCIUM SERPL-MCNC: 10.5 MG/DL
CHLORIDE SERPL-SCNC: 103 MMOL/L
CO2 SERPL-SCNC: 26 MMOL/L
CREAT SERPL-MCNC: 0.79 MG/DL
CREAT SPEC-SCNC: 68 MG/DL
ESTIMATED AVERAGE GLUCOSE: 131 MG/DL
GLUCOSE SERPL-MCNC: 94 MG/DL
HBA1C MFR BLD HPLC: 6.2 %
MICROALBUMIN 24H UR DL<=1MG/L-MCNC: <1.2 MG/DL
MICROALBUMIN/CREAT 24H UR-RTO: NORMAL MG/G
POTASSIUM SERPL-SCNC: 4.6 MMOL/L
PROT SERPL-MCNC: 7.6 G/DL
SODIUM SERPL-SCNC: 142 MMOL/L

## 2021-10-29 ENCOUNTER — NON-APPOINTMENT (OUTPATIENT)
Age: 61
End: 2021-10-29

## 2021-11-29 ENCOUNTER — EMERGENCY (EMERGENCY)
Facility: HOSPITAL | Age: 61
LOS: 1 days | Discharge: HOME | End: 2021-11-29
Attending: STUDENT IN AN ORGANIZED HEALTH CARE EDUCATION/TRAINING PROGRAM | Admitting: STUDENT IN AN ORGANIZED HEALTH CARE EDUCATION/TRAINING PROGRAM
Payer: COMMERCIAL

## 2021-11-29 VITALS
HEART RATE: 71 BPM | RESPIRATION RATE: 17 BRPM | OXYGEN SATURATION: 100 % | DIASTOLIC BLOOD PRESSURE: 92 MMHG | SYSTOLIC BLOOD PRESSURE: 133 MMHG

## 2021-11-29 VITALS
DIASTOLIC BLOOD PRESSURE: 90 MMHG | RESPIRATION RATE: 16 BRPM | OXYGEN SATURATION: 100 % | SYSTOLIC BLOOD PRESSURE: 160 MMHG | HEART RATE: 75 BPM | HEIGHT: 60 IN | TEMPERATURE: 98 F

## 2021-11-29 DIAGNOSIS — Z98.890 OTHER SPECIFIED POSTPROCEDURAL STATES: Chronic | ICD-10-CM

## 2021-11-29 DIAGNOSIS — Z98.89 OTHER SPECIFIED POSTPROCEDURAL STATES: Chronic | ICD-10-CM

## 2021-11-29 LAB
ALBUMIN SERPL ELPH-MCNC: 4.3 G/DL — SIGNIFICANT CHANGE UP (ref 3.3–5)
ALP SERPL-CCNC: 121 U/L — HIGH (ref 40–120)
ALT FLD-CCNC: 19 U/L — SIGNIFICANT CHANGE UP (ref 4–33)
ANION GAP SERPL CALC-SCNC: 10 MMOL/L — SIGNIFICANT CHANGE UP (ref 7–14)
AST SERPL-CCNC: 16 U/L — SIGNIFICANT CHANGE UP (ref 4–32)
BASOPHILS # BLD AUTO: 0.02 K/UL — SIGNIFICANT CHANGE UP (ref 0–0.2)
BASOPHILS NFR BLD AUTO: 0.2 % — SIGNIFICANT CHANGE UP (ref 0–2)
BILIRUB SERPL-MCNC: <0.2 MG/DL — SIGNIFICANT CHANGE UP (ref 0.2–1.2)
BUN SERPL-MCNC: 13 MG/DL — SIGNIFICANT CHANGE UP (ref 7–23)
CALCIUM SERPL-MCNC: 9.7 MG/DL — SIGNIFICANT CHANGE UP (ref 8.4–10.5)
CHLORIDE SERPL-SCNC: 105 MMOL/L — SIGNIFICANT CHANGE UP (ref 98–107)
CO2 SERPL-SCNC: 26 MMOL/L — SIGNIFICANT CHANGE UP (ref 22–31)
CREAT SERPL-MCNC: 0.68 MG/DL — SIGNIFICANT CHANGE UP (ref 0.5–1.3)
EOSINOPHIL # BLD AUTO: 0.13 K/UL — SIGNIFICANT CHANGE UP (ref 0–0.5)
EOSINOPHIL NFR BLD AUTO: 1.6 % — SIGNIFICANT CHANGE UP (ref 0–6)
GLUCOSE SERPL-MCNC: 129 MG/DL — HIGH (ref 70–99)
HCG SERPL-ACNC: <5 MIU/ML — SIGNIFICANT CHANGE UP
HCT VFR BLD CALC: 38 % — SIGNIFICANT CHANGE UP (ref 34.5–45)
HGB BLD-MCNC: 12.3 G/DL — SIGNIFICANT CHANGE UP (ref 11.5–15.5)
IANC: 4.46 K/UL — SIGNIFICANT CHANGE UP (ref 1.5–8.5)
IMM GRANULOCYTES NFR BLD AUTO: 0.1 % — SIGNIFICANT CHANGE UP (ref 0–1.5)
LIDOCAIN IGE QN: 34 U/L — SIGNIFICANT CHANGE UP (ref 7–60)
LYMPHOCYTES # BLD AUTO: 3.29 K/UL — SIGNIFICANT CHANGE UP (ref 1–3.3)
LYMPHOCYTES # BLD AUTO: 39.3 % — SIGNIFICANT CHANGE UP (ref 13–44)
MCHC RBC-ENTMCNC: 27.2 PG — SIGNIFICANT CHANGE UP (ref 27–34)
MCHC RBC-ENTMCNC: 32.4 GM/DL — SIGNIFICANT CHANGE UP (ref 32–36)
MCV RBC AUTO: 83.9 FL — SIGNIFICANT CHANGE UP (ref 80–100)
MONOCYTES # BLD AUTO: 0.46 K/UL — SIGNIFICANT CHANGE UP (ref 0–0.9)
MONOCYTES NFR BLD AUTO: 5.5 % — SIGNIFICANT CHANGE UP (ref 2–14)
NEUTROPHILS # BLD AUTO: 4.46 K/UL — SIGNIFICANT CHANGE UP (ref 1.8–7.4)
NEUTROPHILS NFR BLD AUTO: 53.3 % — SIGNIFICANT CHANGE UP (ref 43–77)
NRBC # BLD: 0 /100 WBCS — SIGNIFICANT CHANGE UP
NRBC # FLD: 0 K/UL — SIGNIFICANT CHANGE UP
NT-PROBNP SERPL-SCNC: 40 PG/ML — SIGNIFICANT CHANGE UP
PLATELET # BLD AUTO: 301 K/UL — SIGNIFICANT CHANGE UP (ref 150–400)
POTASSIUM SERPL-MCNC: 3.8 MMOL/L — SIGNIFICANT CHANGE UP (ref 3.5–5.3)
POTASSIUM SERPL-SCNC: 3.8 MMOL/L — SIGNIFICANT CHANGE UP (ref 3.5–5.3)
PROT SERPL-MCNC: 7.4 G/DL — SIGNIFICANT CHANGE UP (ref 6–8.3)
RBC # BLD: 4.53 M/UL — SIGNIFICANT CHANGE UP (ref 3.8–5.2)
RBC # FLD: 13.2 % — SIGNIFICANT CHANGE UP (ref 10.3–14.5)
SARS-COV-2 RNA SPEC QL NAA+PROBE: SIGNIFICANT CHANGE UP
SODIUM SERPL-SCNC: 141 MMOL/L — SIGNIFICANT CHANGE UP (ref 135–145)
TROPONIN T, HIGH SENSITIVITY RESULT: 6 NG/L — SIGNIFICANT CHANGE UP
WBC # BLD: 8.37 K/UL — SIGNIFICANT CHANGE UP (ref 3.8–10.5)
WBC # FLD AUTO: 8.37 K/UL — SIGNIFICANT CHANGE UP (ref 3.8–10.5)

## 2021-11-29 PROCEDURE — 71045 X-RAY EXAM CHEST 1 VIEW: CPT | Mod: 26

## 2021-11-29 PROCEDURE — 76705 ECHO EXAM OF ABDOMEN: CPT | Mod: 26

## 2021-11-29 PROCEDURE — 71275 CT ANGIOGRAPHY CHEST: CPT | Mod: 26,MA

## 2021-11-29 PROCEDURE — 93010 ELECTROCARDIOGRAM REPORT: CPT | Mod: NC

## 2021-11-29 PROCEDURE — 99285 EMERGENCY DEPT VISIT HI MDM: CPT | Mod: 25

## 2021-11-29 RX ORDER — ACETAMINOPHEN 500 MG
975 TABLET ORAL ONCE
Refills: 0 | Status: COMPLETED | OUTPATIENT
Start: 2021-11-29 | End: 2021-11-29

## 2021-11-29 RX ORDER — OXYCODONE HYDROCHLORIDE 5 MG/1
5 TABLET ORAL ONCE
Refills: 0 | Status: DISCONTINUED | OUTPATIENT
Start: 2021-11-29 | End: 2021-11-29

## 2021-11-29 RX ADMIN — OXYCODONE HYDROCHLORIDE 5 MILLIGRAM(S): 5 TABLET ORAL at 19:03

## 2021-11-29 RX ADMIN — Medication 975 MILLIGRAM(S): at 13:48

## 2021-11-29 NOTE — ED PROVIDER NOTE - PATIENT PORTAL LINK FT
You can access the FollowMyHealth Patient Portal offered by [facility] by registering at the following website: [email or web address]. By joining [employer]’s FollowMyHealth portal, you will also be able to view your health information using other applications (apps) compatible with our system.

## 2021-11-29 NOTE — ED PROVIDER NOTE - ATTENDING CONTRIBUTION TO CARE
I have personally performed a history and physical examination of the patient and discussed management with the resident as well as the patient.  I reviewed the resident's note and agree with the documented findings and plan of care.  I have authored and modified critical sections of the Provider Note, including but not limited to HPI, Physical Exam and MDM.    61 y F, hx R breast cancer, colon cancer, presenting with R sided chest pain that is worsened with deep inspiration and movement. /90. VSWNL. Patient is satting 99% at RA. not in acute respiratory distress and speaking full sentences. RUQ vs R lower chest pain. Obtain RUQ US for cholecystitis. Cardiac screen ACS with ekg, tni, bnp. CT chest to rule out PE or cancer relapse/metastatic disease. Symptomatic control prn.

## 2021-11-29 NOTE — ED PROVIDER NOTE - NSFOLLOWUPINSTRUCTIONS_ED_ALL_ED_FT
Please follow up with your Primary Medical Doctor within the next 7 days to monitor your symptoms.     You may take Tylenol 500 mg every 6 hours as needed for pain.     Please come back to the Emergency Room if your symptoms get worse or if you start experiencing fever, chest pain with shortness of breath. Please follow up with your Primary Medical Doctor within the next 3-5 days to monitor your symptoms.     You may take Tylenol 500 mg every 6 hours as needed for pain.     Please come back to the Emergency Room if your symptoms get worse or if you start experiencing fever, chest pain with shortness of breath.

## 2021-11-29 NOTE — ED PROVIDER NOTE - PHYSICAL EXAMINATION
Gen: Patient is well-appearing, NAD, AAOx3, able to ambulate without assistance  HEENT: NCAT, normal conjunctiva, tongue midline, oral mucosa moist  Lung: CTAB, no respiratory distress, no wheezes/rhonchi/rales B/L, speaking in full sentences, R sided chest wall tenderness with palpation   CV: RRR, no murmurs, rubs or gallops, distal pulses 2+ b/l  Abd: soft, NT, ND, no guarding, no rigidity, no rebound tenderness  MSK: no visible deformities, ROM normal in UE/LE  Neuro: No focal sensory or motor deficits  Skin: Warm, well perfused, no leg swelling  Psych: normal affect, calm Gen: Patient is well-appearing, NAD, AAOx3, able to ambulate without assistance  HEENT: NCAT, normal conjunctiva, tongue midline, oral mucosa moist  Lung: CTAB, no respiratory distress, no wheezes/rhonchi/rales B/L, speaking in full sentences, R sided chest wall tenderness with palpation   CV: s1s2 RRR, no murmurs, rubs or gallops, distal pulses 2+ b/l  Abd: soft, NT, ND, no guarding, no rigidity, no rebound tenderness  MSK: no visible deformities, ROM normal in UE/LE  Neuro: No focal sensory or motor deficits  Skin: Warm, well perfused, no leg swelling  Psych: normal affect, calm

## 2021-11-29 NOTE — ED PROVIDER NOTE - PROGRESS NOTE DETAILS
Patient reassessed. Pain still 8-9/10. Patient stable. Will give oxycodone. Labs unremarkable. CT read pending. CT negative for PE. Patient to be dc and outpatient follow up.

## 2021-11-29 NOTE — ED PROVIDER NOTE - CLINICAL SUMMARY MEDICAL DECISION MAKING FREE TEXT BOX
61 y F, hx R breast cancer, colon cancer, presenting with R sided chest pain that is worsened with deep inspiration and movement. /90. VSWNL. Patient is satting 99% at RA. not in acute respiratory distress. Speaking full sentences. Lungs are clear bilaterally. R chest wall tenderness with appreciated. Will do cardiac workup to rule out ACS, CHF. May consider CT chest to rule out PE or cancer relapse or metastatic disease.

## 2021-11-29 NOTE — ED PROVIDER NOTE - OBJECTIVE STATEMENT
61 y F, hx of htn, hld, dm, R breast cancer s/p chemo, radiation therapy with no current active treatment, colon cancer s/p surgery with no current active treatment, presenting with 4-hours onset of persistent nonradiating R sided chest pain. Denies fever, sob, nausea, vomiting, diaphoresis. Patient was at work when the symptom started. No trauma, no injury, no recent travel hx. 61 y F, hx of htn, hld, dm, R breast cancer s/p chemo, radiation therapy with no current active treatment, colon cancer s/p surgery with no current active treatment, presenting with 4-hours onset of persistent nonradiating R sided chest pain. Reports pain with deep inspiration and movement. Denies fever, sob, nausea, vomiting, diaphoresis. Patient was at work when the symptom started. No trauma, no injury, no heavy lifting, no recent travel hx. 61 y F, hx of htn, hld, dm, R breast cancer s/p chemo, radiation therapy with no current active treatment, colon cancer s/p surgery with no current active treatment, presenting with 4-hours onset of persistent non-radiating R sided chest pain. Reports sharp pain with deep inspiration and movement. Denies fever, sob, nausea, vomiting, diaphoresis. Patient was at work when the symptom started. Non-exertional symptoms. No trauma, no injury, no heavy lifting, no recent travel hx.

## 2021-11-29 NOTE — ED ADULT TRIAGE NOTE - CHIEF COMPLAINT QUOTE
Pt presents to ED via wheelchair from home with c/o chest pain worse on deep inspiration x several hours. Pt denies fall or trauma. Pt has hx of HTN, DM, breast CA and colon CA.

## 2021-11-29 NOTE — ED ADULT NURSE NOTE - OBJECTIVE STATEMENT
Pt aa&ox4 PMH HTN, DM2, Breast CA (in remission last chemo 2020), Recent dx of colon CA s/p resectioning on 9/2/2021 presenting to ED for right sided cp, worse with deep inspiration and with radiation to back starting this morning while at work. Pt denies trauma/ heavy lifting/ recent long travel. Pt placed on monitor, NSR noted. Pt appears uncomfortable. 20G IV placed in RT AC, labs sent. Will monitor Pt aa&ox4 PMH HTN, DM2, Breast CA (in remission last chemo 2020), Recent dx of colon CA s/p resectioning on 9/2/2021 presenting to ED for right sided cp, worse with deep inspiration and with radiation to back starting this morning while at work. Pt denies trauma/ heavy lifting/ recent long travel. Pt right chest wall tender to touch upon palpation. No bruising or redness noted. Pt placed on monitor, NSR noted. Pt appears uncomfortable. 20G IV placed in RT AC, labs sent. Will monitor

## 2021-11-30 ENCOUNTER — APPOINTMENT (OUTPATIENT)
Dept: PULMONOLOGY | Facility: CLINIC | Age: 61
End: 2021-11-30
Payer: COMMERCIAL

## 2021-11-30 VITALS
HEART RATE: 70 BPM | HEIGHT: 61 IN | OXYGEN SATURATION: 99 % | TEMPERATURE: 97.2 F | WEIGHT: 145 LBS | BODY MASS INDEX: 27.38 KG/M2 | DIASTOLIC BLOOD PRESSURE: 81 MMHG | SYSTOLIC BLOOD PRESSURE: 145 MMHG

## 2021-11-30 DIAGNOSIS — R07.89 OTHER CHEST PAIN: ICD-10-CM

## 2021-11-30 PROCEDURE — 99214 OFFICE O/P EST MOD 30 MIN: CPT

## 2021-11-30 RX ORDER — PREDNISONE 20 MG/1
20 TABLET ORAL DAILY
Refills: 0 | Status: DISCONTINUED | COMMUNITY
End: 2021-11-30

## 2021-11-30 RX ORDER — MELOXICAM 15 MG/1
15 TABLET ORAL DAILY
Qty: 30 | Refills: 0 | Status: ACTIVE | COMMUNITY
Start: 2021-11-30 | End: 1900-01-01

## 2021-11-30 RX ORDER — OMEPRAZOLE 20 MG/1
20 TABLET, DELAYED RELEASE ORAL DAILY
Refills: 0 | Status: DISCONTINUED | COMMUNITY
End: 2021-11-30

## 2021-11-30 NOTE — DISCUSSION/SUMMARY
[FreeTextEntry1] : The patient will be given meloxicam to help her but the pain has she states that she gets severe wheezing with Advil and Motrin.We'll treat her as chest wall pain from inflammation secondary to viral disease.\par The patient was advised to call me with any change in her condition

## 2021-11-30 NOTE — HISTORY OF PRESENT ILLNESS
[TextBox_4] : The patient is a 61-year-old lady with history of bronchial asthma here with severe right anterior chest wall pain which is pleuritic in nature. She has this pain for the last 36 hours. She was seen in the emergency room because of the pain. She was evaluated including a CT scan which revealed no significant abnormality except a stable right-sided lung nodule. Her EKG was unremarkable. The patient has no other symptoms other than chest wall pain which is severe on taking deep breaths. The pain comes from the back of the upper thorax involves an entire intercostal space.\par The patient was not given any pain medication other than Tylenol.\par The patient had malignant colonic polyps which were resected and patient had end-to-end anastomosis of the colon done in September 2021. Patient has history of breast cancer which was treated last year.

## 2021-11-30 NOTE — PHYSICAL EXAM
[No Acute Distress] : no acute distress [Normal Oropharynx] : normal oropharynx [Normal Appearance] : normal appearance [No Neck Mass] : no neck mass [Normal Rate/Rhythm] : normal rate/rhythm [Normal S1, S2] : normal s1, s2 [No Murmurs] : no murmurs [No Resp Distress] : no resp distress [Clear to Auscultation Bilaterally] : clear to auscultation bilaterally [No Abnormalities] : no abnormalities [Benign] : benign [Normal Gait] : normal gait [No Clubbing] : no clubbing [No Cyanosis] : no cyanosis [No Edema] : no edema [FROM] : FROM [Normal Color/ Pigmentation] : normal color/ pigmentation [No Focal Deficits] : no focal deficits [Oriented x3] : oriented x3 [Normal Affect] : normal affect [TextBox_68] : Tenderness in the right anterior and posterior chest wall localized 3 intercostal spaces on the right side.There is no rash.

## 2021-12-07 ENCOUNTER — APPOINTMENT (OUTPATIENT)
Dept: PULMONOLOGY | Facility: CLINIC | Age: 61
End: 2021-12-07
Payer: COMMERCIAL

## 2021-12-07 VITALS
DIASTOLIC BLOOD PRESSURE: 89 MMHG | BODY MASS INDEX: 27.75 KG/M2 | WEIGHT: 147 LBS | SYSTOLIC BLOOD PRESSURE: 157 MMHG | HEIGHT: 61 IN | HEART RATE: 85 BPM | OXYGEN SATURATION: 100 %

## 2021-12-07 PROCEDURE — 99213 OFFICE O/P EST LOW 20 MIN: CPT

## 2021-12-07 NOTE — HISTORY OF PRESENT ILLNESS
[TextBox_4] : The patient has chest pressure,it is slightly better than last week. She has difficulty in breathing. There is no evidence of zoster.

## 2021-12-28 ENCOUNTER — APPOINTMENT (OUTPATIENT)
Dept: PULMONOLOGY | Facility: CLINIC | Age: 61
End: 2021-12-28
Payer: COMMERCIAL

## 2021-12-28 VITALS
HEIGHT: 61 IN | SYSTOLIC BLOOD PRESSURE: 146 MMHG | HEART RATE: 70 BPM | BODY MASS INDEX: 27.75 KG/M2 | WEIGHT: 147 LBS | OXYGEN SATURATION: 98 % | DIASTOLIC BLOOD PRESSURE: 80 MMHG

## 2021-12-28 DIAGNOSIS — G47.10 HYPERSOMNIA, UNSPECIFIED: ICD-10-CM

## 2021-12-28 DIAGNOSIS — E66.9 OBESITY, UNSPECIFIED: ICD-10-CM

## 2021-12-28 DIAGNOSIS — Z87.898 PERSONAL HISTORY OF OTHER SPECIFIED CONDITIONS: ICD-10-CM

## 2021-12-28 DIAGNOSIS — R07.9 CHEST PAIN, UNSPECIFIED: ICD-10-CM

## 2021-12-28 PROCEDURE — 99214 OFFICE O/P EST MOD 30 MIN: CPT

## 2021-12-28 NOTE — DISCUSSION/SUMMARY
[FreeTextEntry1] : Will treat insomnia with melatonin and magnesium.\par Continue Symbicort to treat asthma. \par 
No

## 2021-12-28 NOTE — HISTORY OF PRESENT ILLNESS
[Former] : former [TextBox_4] : 61 year old lady with h/o chest pressure , now completely resolved. She has no dyspnea or cough. She can climb 2 flights. She does not sleep well. She sleeps for 4 hours a night. She stays in the bed. She has no significant LUCIE. She has hypersomnia due to insomnia.

## 2022-02-07 RX ORDER — LOSARTAN POTASSIUM AND HYDROCHLOROTHIAZIDE 12.5; 1 MG/1; MG/1
100-12.5 TABLET ORAL DAILY
Qty: 30 | Refills: 1 | Status: ACTIVE | COMMUNITY

## 2022-03-17 NOTE — ED CDU PROVIDER DISPOSITION NOTE - NS ED MD DISPO DISCHARGE
Problem: Skin Integrity:  Goal: Will show no infection signs and symptoms  Description: Will show no infection signs and symptoms  Outcome: Met This Shift  Goal: Absence of new skin breakdown  Description: Absence of new skin breakdown  Outcome: Met This Shift     Problem: Falls - Risk of:  Goal: Will remain free from falls  Description: Will remain free from falls  Outcome: Met This Shift  Goal: Absence of physical injury  Description: Absence of physical injury  Outcome: Met This Shift Home

## 2022-03-22 ENCOUNTER — APPOINTMENT (OUTPATIENT)
Dept: PULMONOLOGY | Facility: CLINIC | Age: 62
End: 2022-03-22
Payer: COMMERCIAL

## 2022-03-22 VITALS
SYSTOLIC BLOOD PRESSURE: 153 MMHG | BODY MASS INDEX: 27.94 KG/M2 | DIASTOLIC BLOOD PRESSURE: 80 MMHG | HEIGHT: 61 IN | OXYGEN SATURATION: 98 % | TEMPERATURE: 97.3 F | HEART RATE: 74 BPM | WEIGHT: 148 LBS

## 2022-03-22 DIAGNOSIS — Z86.69 PERSONAL HISTORY OF OTHER DISEASES OF THE NERVOUS SYSTEM AND SENSE ORGANS: ICD-10-CM

## 2022-03-22 DIAGNOSIS — Z87.898 PERSONAL HISTORY OF OTHER SPECIFIED CONDITIONS: ICD-10-CM

## 2022-03-22 DIAGNOSIS — Z86.79 PERSONAL HISTORY OF OTHER DISEASES OF THE CIRCULATORY SYSTEM: ICD-10-CM

## 2022-03-22 PROCEDURE — 99214 OFFICE O/P EST MOD 30 MIN: CPT | Mod: 25

## 2022-03-22 PROCEDURE — 94640 AIRWAY INHALATION TREATMENT: CPT

## 2022-03-22 NOTE — DISCUSSION/SUMMARY
[FreeTextEntry1] : The patient was given nebulized albuterol 2.5 mg after which the patient improved significantly.\par Will renew Symbicort inhaler.  We will give her Medrol pack.  We will see her next week.

## 2022-03-22 NOTE — PHYSICAL EXAM
[No Acute Distress] : no acute distress [Normal Oropharynx] : normal oropharynx [Normal Appearance] : normal appearance [No Neck Mass] : no neck mass [Normal Rate/Rhythm] : normal rate/rhythm [Normal S1, S2] : normal s1, s2 [No Murmurs] : no murmurs [No Resp Distress] : no resp distress [Clear to Auscultation Bilaterally] : clear to auscultation bilaterally [No Abnormalities] : no abnormalities [Benign] : benign [Normal Gait] : normal gait [No Clubbing] : no clubbing [No Cyanosis] : no cyanosis [No Edema] : no edema [FROM] : FROM [Normal Color/ Pigmentation] : normal color/ pigmentation [No Focal Deficits] : no focal deficits [Oriented x3] : oriented x3 [Normal Affect] : normal affect [TextBox_68] : Bilateral rhonchi with wheezing and  diminished air entry

## 2022-03-22 NOTE — HISTORY OF PRESENT ILLNESS
[Former] : former [TextBox_4] : 61-year-old lady with history of asthma returns for follow-up.  She has had severe worsening asthma, tightness of chest and cough with dyspnea for the last few days.  She has been taking Proventil.  She ran out of Symbicort.\par Prior to this episode of acute worsening of asthma she has been sleeping well.  She takes melatonin and sleeps well.\par She has been working full-time.

## 2022-03-22 NOTE — REVIEW OF SYSTEMS
[Fatigue] : fatigue [Cough] : cough [Chest Tightness] : chest tightness [Dyspnea] : dyspnea [Wheezing] : wheezing [Negative] : Endocrine

## 2022-04-01 NOTE — ED PROVIDER NOTE - NSFOLLOWUPINSTRUCTIONS_ED_ALL_ED_FT
5 steps
As we discussed take mucinex as a decongestant  take tylenol if you develop fevers    return to ER for any worsening of symptoms including shortness of breath    if you're COVID swab is positive you will receive a phone call at number listed

## 2022-04-12 ENCOUNTER — APPOINTMENT (OUTPATIENT)
Dept: PULMONOLOGY | Facility: CLINIC | Age: 62
End: 2022-04-12
Payer: COMMERCIAL

## 2022-04-12 VITALS
SYSTOLIC BLOOD PRESSURE: 132 MMHG | WEIGHT: 153 LBS | DIASTOLIC BLOOD PRESSURE: 83 MMHG | OXYGEN SATURATION: 98 % | HEART RATE: 70 BPM | BODY MASS INDEX: 28.89 KG/M2 | HEIGHT: 61 IN

## 2022-04-12 DIAGNOSIS — J45.50 SEVERE PERSISTENT ASTHMA, UNCOMPLICATED: ICD-10-CM

## 2022-04-12 PROCEDURE — 96401 CHEMO ANTI-NEOPL SQ/IM: CPT

## 2022-04-12 PROCEDURE — 94060 EVALUATION OF WHEEZING: CPT

## 2022-04-12 PROCEDURE — 99214 OFFICE O/P EST MOD 30 MIN: CPT | Mod: 25

## 2022-04-12 PROCEDURE — 94729 DIFFUSING CAPACITY: CPT

## 2022-04-12 RX ORDER — PREDNISONE 5 MG/1
5 TABLET ORAL
Qty: 20 | Refills: 0 | Status: DISCONTINUED | COMMUNITY
Start: 2021-12-07 | End: 2022-04-12

## 2022-04-12 RX ORDER — METHYLPREDNISOLONE 4 MG/1
4 TABLET ORAL
Qty: 1 | Refills: 0 | Status: ACTIVE | COMMUNITY
Start: 2022-04-12 | End: 1900-01-01

## 2022-04-12 RX ORDER — METHYLPREDNISOLONE 4 MG/1
4 TABLET ORAL
Qty: 1 | Refills: 0 | Status: DISCONTINUED | COMMUNITY
Start: 2022-03-22 | End: 2022-04-12

## 2022-04-12 NOTE — DISCUSSION/SUMMARY
[FreeTextEntry1] : The patient has acute exacerbation of asthma and has required several courses of steroids. She will be given medrol pack and will start her on Dupixent.

## 2022-04-12 NOTE — HISTORY OF PRESENT ILLNESS
[TextBox_4] : The patient is a 61-year-old lady with history of bronchial here with increasing cough shortness of breath and wheezing for the last few days.  She has had several recent acute exacerbation of bronchial asthma and required steroids.  Today she has intense itching all over.\par She has been taking Symbicort 2 puffs 4-5 times a day.  In addition she is using nebulized albuterol.  Patient was recently treated with oral steroids.

## 2022-04-15 NOTE — ED CDU PROVIDER NOTE - CONDITION AT DISCHARGE:
"Chief Complaint:     Chief Complaint   Patient presents with     Otalgia     Left ear pain.       No results found for any visits on 04/15/22.    Medical Decision Making:    Vital signs reviewed by Daniel Jama PA-C  BP (!) 144/81 (BP Location: Left leg, Patient Position: Sitting, Cuff Size: Adult Regular)   Pulse 73   Temp 97.8  F (36.6  C) (Tympanic)   Ht 1.6 m (5' 3\")   Wt 62.9 kg (138 lb 9.6 oz)   SpO2 99%   BMI 24.55 kg/m      Differential Diagnosis:  Otitis media, otitis externa, eustachian tube dysfunction        ASSESSMENT    1. Left ear pain    2. Acute sinusitis with symptoms > 10 days        PLAN    Patient is in no acute distress.    Temp is 97.8 in clinic today, lung sounds were clear, and O2 sats at 99% on RA.    Rx for Augmentin sent in per patient request.    Rest, Push fluids, vaporizer.  Ibuprofen and or Tylenol for any fever or body aches.  If symptoms worsen, recheck immediately otherwise follow up with your PCP in 1 week if symptoms are not improving.  Worrisome symptoms discussed with instructions to go to the ED.  Patient verbalized understanding and agreed with this plan.    Labs:    No results found for any visits on 04/15/22.     Vital signs reviewed by Daniel Jama PA-C  BP (!) 144/81 (BP Location: Left leg, Patient Position: Sitting, Cuff Size: Adult Regular)   Pulse 73   Temp 97.8  F (36.6  C) (Tympanic)   Ht 1.6 m (5' 3\")   Wt 62.9 kg (138 lb 9.6 oz)   SpO2 99%   BMI 24.55 kg/m      Current Meds      Current Outpatient Medications:      albuterol (PROAIR HFA/PROVENTIL HFA/VENTOLIN HFA) 108 (90 Base) MCG/ACT inhaler, Inhale 2 puffs into the lungs every 6 hours as needed for shortness of breath / dyspnea or wheezing, Disp: , Rfl:      amoxicillin-clavulanate (AUGMENTIN) 875-125 MG tablet, Take 1 tablet by mouth 2 times daily for 10 days, Disp: 20 tablet, Rfl: 0     azelastine (ASTEPRO) 0.15 % nasal spray, Spray 2 sprays into both nostrils 2 times daily, Disp: 30 mL, " Rfl: 11     azithromycin (ZITHROMAX) 250 MG tablet, Take 2 tablets (500 mg) by mouth daily for 1 day, THEN 1 tablet (250 mg) daily for 4 days., Disp: 6 tablet, Rfl: 0     conjugated estrogens (PREMARIN) 0.625 MG/GM vaginal cream, Place 1 g vaginally twice a week As needed, Disp: 30 g, Rfl: 3     diphenhydrAMINE (BENADRYL) 25 MG tablet, Take 25 mg by mouth every 6 hours as needed for itching or allergies, Disp: , Rfl:      EPINEPHrine (ANY BX GENERIC EQUIV) 0.3 MG/0.3ML injection 2-pack, Inject 0.3 mLs (0.3 mg) into the muscle as needed for anaphylaxis (or exposure to trigger), Disp: 0.6 mL, Rfl: 1     hydrocortisone 2.5 % cream, Apply sparingly to affected area 2 times daily for no more than 14 days, Disp: 30 g, Rfl: 0     loratadine (CLARITIN) 10 MG tablet, Take 10 mg by mouth daily as needed for allergies, Disp: , Rfl:      mometasone (NASONEX) 50 MCG/ACT nasal spray, Spray 2 sprays into both nostrils daily for allergy prevention., Disp: 17 g, Rfl: 11     prednisoLONE acetate (PRED FORTE) 1 % ophthalmic suspension, 4 drops into both ears twice as needed, Disp: 10 mL, Rfl: 11      Respiratory History    occasional episodes of bronchitis      SUBJECTIVE    HPI: Jhoana Hernandez is an 70 year old female who presents with ongoing L ear pain.  Symptoms began 3  weeks ago and has unchanged. Patient was seen and started on Z-sourav 4 days ago and states that her ear pain has not improved.  There is no shortness of breath, wheezing, chest pain and cough.  Patient is eating and drinking well.  No fever, nausea, vomiting, or diarrhea.    Patient denies any recent travel or exposure to known COVID positive tested individual.      ROS:     Review of Systems   Constitutional: Negative for chills and fever.   HENT: Positive for ear pain. Negative for congestion, rhinorrhea and sore throat.    Eyes: Negative.    Respiratory: Negative.  Negative for cough and shortness of breath.    Cardiovascular: Negative.  Negative for chest  pain and palpitations.   Gastrointestinal: Negative for diarrhea, nausea and vomiting.   Endocrine: Negative.    Genitourinary: Negative.    Musculoskeletal: Negative.  Negative for arthralgias, back pain, joint swelling, myalgias, neck pain and neck stiffness.   Skin: Negative.  Negative for rash and wound.   Allergic/Immunologic: Negative.  Negative for immunocompromised state.   Neurological: Negative for dizziness, weakness, light-headedness and headaches.         Family History   Family History   Problem Relation Age of Onset     Asthma Sister      Thyroid Disease Sister      Lipids Mother      Food Allergy Mother      Thyroid Disease Mother      Cerebrovascular Disease Father      Asthma Father      Thyroid Disease Brother      Diabetes Type 2  Sister      Breast Cancer Sister 60     Thyroid Disease Sister      Graves' disease Sister      Thyroid Disease Sister      Other - See Comments Son      Other - See Comments Son      C.A.D. No family hx of      Hypertension No family hx of      Cancer - colorectal No family hx of      Prostate Cancer No family hx of         Problem history  Patient Active Problem List   Diagnosis     CARDIOVASCULAR SCREENING; LDL GOAL LESS THAN 160     Lactose intolerance     Seasonal allergic rhinitis     Allergy, food     Advanced directives, counseling/discussion     Subclinical hypothyroidism     Osteopenia     Hollenhorst plaque, left eye     Family history of breast cancer     Chronic eczematous otitis externa of both ears        Allergies  Allergies   Allergen Reactions     Docosahexaenoic Acid-Epa Anaphylaxis     Fish Anaphylaxis     Nuts Anaphylaxis     [place] Oil Shortness Of Breath     Shellfish Allergy Anaphylaxis     Aloe Hives     Aloe Vera      Canola Oil [Vegetable Oil] Other (See Comments)     Sneezing     Coconut Oil Other (See Comments)     Fish      Fluticasone      Food Hives     Hydrogenated Palm Oil Glycerides Other (See Comments)     Ibuprofen Sodium       "Macrodantin [Nitrofuran Derivatives]      Mineral Oil Itching     Peanut (Diagnostic)      Seafood      Sulfa Drugs      Yellow Dye      Yellow Dyes Itching     Ibuprofen Other (See Comments)     Jittery, Chest pain, SOB, Congestion, Dizziness        Social History  Social History     Socioeconomic History     Marital status:      Spouse name: Bruno     Number of children: 2     Years of education: Not on file     Highest education level: Not on file   Occupational History     Occupation:  shop      Employer: [employer]     Occupation: [employer]   Tobacco Use     Smoking status: Never Smoker     Smokeless tobacco: Never Used   Substance and Sexual Activity     Alcohol use: Yes     Comment: occassionally     Drug use: No     Sexual activity: Yes     Partners: Male   Other Topics Concern     Parent/sibling w/ CABG, MI or angioplasty before 65F 55M? No   Social History Narrative    Retail - retired     Social Determinants of Health     Financial Resource Strain: Not on file   Food Insecurity: Not on file   Transportation Needs: Not on file   Physical Activity: Not on file   Stress: Not on file   Social Connections: Not on file   Intimate Partner Violence: Not on file   Housing Stability: Not on file        OBJECTIVE     Vital signs reviewed by Daniel Jama PA-C  BP (!) 144/81 (BP Location: Left leg, Patient Position: Sitting, Cuff Size: Adult Regular)   Pulse 73   Temp 97.8  F (36.6  C) (Tympanic)   Ht 1.6 m (5' 3\")   Wt 62.9 kg (138 lb 9.6 oz)   SpO2 99%   BMI 24.55 kg/m       Physical Exam  Vitals and nursing note reviewed.   Constitutional:       General: She is not in acute distress.     Appearance: She is well-developed. She is not ill-appearing, toxic-appearing or diaphoretic.   HENT:      Head: Normocephalic and atraumatic.      Right Ear: Hearing, tympanic membrane, ear canal and external ear normal. Tympanic membrane is not perforated, erythematous, retracted or bulging. "      Left Ear: Hearing, tympanic membrane, ear canal and external ear normal. Tympanic membrane is not perforated, erythematous, retracted or bulging.      Nose: Congestion present. No mucosal edema or rhinorrhea.      Right Sinus: No maxillary sinus tenderness or frontal sinus tenderness.      Left Sinus: No maxillary sinus tenderness or frontal sinus tenderness.      Mouth/Throat:      Pharynx: No pharyngeal swelling, oropharyngeal exudate, posterior oropharyngeal erythema or uvula swelling.      Tonsils: No tonsillar exudate or tonsillar abscesses. 0 on the right. 0 on the left.   Eyes:      General:         Right eye: No discharge.         Left eye: No discharge.      Pupils: Pupils are equal, round, and reactive to light.   Cardiovascular:      Rate and Rhythm: Normal rate and regular rhythm.      Heart sounds: Normal heart sounds. No murmur heard.    No friction rub. No gallop.   Pulmonary:      Effort: Pulmonary effort is normal. No respiratory distress.      Breath sounds: Normal breath sounds. No decreased breath sounds, wheezing, rhonchi or rales.   Chest:      Chest wall: No tenderness.   Abdominal:      General: Bowel sounds are normal. There is no distension.      Palpations: Abdomen is soft. There is no mass.      Tenderness: There is no abdominal tenderness. There is no guarding.   Musculoskeletal:      Cervical back: Normal range of motion and neck supple.   Lymphadenopathy:      Head:      Right side of head: No submental, submandibular, tonsillar, preauricular or posterior auricular adenopathy.      Left side of head: No submental, submandibular, tonsillar, preauricular or posterior auricular adenopathy.      Cervical: No cervical adenopathy.      Right cervical: No superficial or posterior cervical adenopathy.     Left cervical: No superficial or posterior cervical adenopathy.   Skin:     General: Skin is warm and dry.      Findings: No rash.   Neurological:      Mental Status: She is alert and  oriented to person, place, and time.      Cranial Nerves: No cranial nerve deficit.      Deep Tendon Reflexes: Reflexes are normal and symmetric.   Psychiatric:         Behavior: Behavior normal. Behavior is cooperative.         Thought Content: Thought content normal.         Judgment: Judgment normal.           Daniel Jama PA-C  4/15/2022, 9:58 AM     Improved

## 2022-05-02 ENCOUNTER — NON-APPOINTMENT (OUTPATIENT)
Age: 62
End: 2022-05-02

## 2022-05-02 RX ORDER — DUPILUMAB 300 MG/2ML
300 INJECTION, SOLUTION SUBCUTANEOUS
Qty: 7 | Refills: 6 | Status: ACTIVE | COMMUNITY
Start: 1900-01-01 | End: 1900-01-01

## 2022-07-02 NOTE — ED PROVIDER NOTE - PROGRESS NOTE DETAILS
Tripped and fell with pain on right arm and laceration on face denies LOC and no dizziness prior to fall
[place]: pt ate prior to blood draw; glucose 305; pt will follow up with PMD; stable for d/c

## 2022-07-22 NOTE — DISCHARGE NOTE ADULT - HAS THE PATIENT RECEIVED THE INFLUENZA VACCINE DURING THIS VISIT
No

## 2022-08-18 NOTE — DISCHARGE NOTE ADULT - CARE PROVIDER_API CALL
Rx Refill Note  Requested Prescriptions     Pending Prescriptions Disp Refills   • celecoxib (CeleBREX) 200 MG capsule 180 capsule 3     Sig: Take 1 capsule by mouth 2 (Two) Times a Day.      Last office visit with prescribing clinician: 7/28/2022      Next office visit with prescribing clinician: 9/8/2022            Nate Gabriel MA  08/18/22, 16:04 EDT   Tenzin Noel), Critical Care Medicine; Internal Medicine; Pulmonary Disease; Sleep Medicine  [address] [place] [address]  Phone: [phone]  Fax: [phone]    Bean Ledezma), EndocrinologyMetabDiabetes; Internal Medicine  [address] [place] [address]  Phone: [phone]  Fax: [phone]

## 2022-08-19 ENCOUNTER — APPOINTMENT (OUTPATIENT)
Dept: ENDOCRINOLOGY | Facility: CLINIC | Age: 62
End: 2022-08-19

## 2022-08-19 VITALS
OXYGEN SATURATION: 97 % | TEMPERATURE: 97.3 F | HEART RATE: 77 BPM | WEIGHT: 149 LBS | DIASTOLIC BLOOD PRESSURE: 70 MMHG | HEIGHT: 60 IN | BODY MASS INDEX: 29.25 KG/M2 | SYSTOLIC BLOOD PRESSURE: 126 MMHG

## 2022-08-19 DIAGNOSIS — G47.10 HYPERSOMNIA, UNSPECIFIED: ICD-10-CM

## 2022-08-19 DIAGNOSIS — E11.9 TYPE 2 DIABETES MELLITUS W/OUT COMPLICATIONS: ICD-10-CM

## 2022-08-19 LAB
GLUCOSE BLDC GLUCOMTR-MCNC: 89
HBA1C MFR BLD HPLC: 6.9

## 2022-08-19 PROCEDURE — 82962 GLUCOSE BLOOD TEST: CPT | Mod: NC

## 2022-08-19 PROCEDURE — 83036 HEMOGLOBIN GLYCOSYLATED A1C: CPT | Mod: QW

## 2022-08-19 PROCEDURE — 99215 OFFICE O/P EST HI 40 MIN: CPT

## 2022-08-19 RX ORDER — INSULIN GLARGINE 100 [IU]/ML
100 INJECTION, SOLUTION SUBCUTANEOUS AT BEDTIME
Refills: 0 | Status: DISCONTINUED | COMMUNITY
End: 2022-08-19

## 2022-08-19 RX ORDER — METFORMIN HYDROCHLORIDE 500 MG/1
500 TABLET, COATED ORAL
Qty: 180 | Refills: 0 | Status: DISCONTINUED | COMMUNITY
Start: 2018-03-22 | End: 2022-08-19

## 2022-08-19 RX ORDER — METFORMIN HYDROCHLORIDE 1000 MG/1
1000 TABLET, COATED ORAL TWICE DAILY
Refills: 0 | Status: DISCONTINUED | COMMUNITY
End: 2022-08-19

## 2022-08-19 NOTE — HISTORY OF PRESENT ILLNESS
[FreeTextEntry1] : Patient is a 61 F with breast cancer s/p lumpectomy and chemo, T2DM, HTN, HLD here for follow up.  Last visit 09/27/2021. \par \par FH: mother has diabetes\par SH:quit smoking 30 years ago. Denies alcohol use. Working with mentally challenged adults, [employer] for [employer]\par History of radiation exposure. \par \par T2DM:\par Diabetes history:\par Diagnosed in 2017. \par \par Most recent A1C 6.9\par Previously A1c 7.7% in March 2021, A1c in April was 10.5%. \par \par Diabetes complications:\par Neuropathy: At age 21, she had breast cancer s/p lumpectomy and chemo with residual neuropathy. Also has R-foot wound below ankle, previous surgical site, it has drained fluid in the past.\par Retinopathy: last eye exam last year\par Nephropathy: ACR negative 10/2021 \par CAD/MI: none\par DKA/Hospitalization: April 2021 for hyperglycemia s/s with FS 500s. Had taken Prednisone taper for 10 days prior to that.  \par \par Current DM medications: \par Metformin ER 1000 mg BID\par \par Past DM medications: \par Stopped Lantus since early Sept 2021\par \par Finger sticks: \par Checks three times a week,  \par \par Diet: \par Breakfast: 10am - oatmeal, smoothies, tea\par Lunch: 1-2pm -- salad or wrap, something light\par Dinner: 6pm - salad or salmon\par Snacks: fruit\par Drinks: no sodas or juice\par Exercise: 2-3 times per week go to the gym, has a new bike \par \par Interval events: no active complaints, no steroids Trying to lose weight\par \par HTN\par Losartan/HCTZ 100/12.5 mg QD\par \par HLD\par On atorvastatin 20 mg daily

## 2022-08-19 NOTE — REVIEW OF SYSTEMS
[Fatigue] : no fatigue [Decreased Appetite] : appetite not decreased [Fever] : no fever [Chills] : no chills [Eye Pain] : no pain [Blurred Vision] : no blurred vision [Neck Pain] : no neck pain [Chest Pain] : no chest pain [Palpitations] : no palpitations [Lower Ext Edema] : no lower extremity edema [Shortness Of Breath] : no shortness of breath [Cough] : no cough [Wheezing] : no wheezing [Nausea] : no nausea [Constipation] : no constipation [Abdominal Pain] : no abdominal pain [Heartburn] : no heartburn [Vomiting] : no vomiting [Diarrhea] : no diarrhea [Gas/Bloating] : no gas/bloating [Polyuria] : no polyuria [Dysuria] : no dysuria [Muscle Weakness] : no muscle weakness [Headaches] : no headaches [Tremors] : no tremors [Polydipsia] : no polydipsia [Cold Intolerance] : no cold intolerance [Heat Intolerance] : no heat intolerance

## 2022-08-19 NOTE — PHYSICAL EXAM
[Alert] : alert [Well Nourished] : well nourished [Healthy Appearance] : healthy appearance [No Acute Distress] : no acute distress [Well Developed] : well developed [EOMI] : extra ocular movement intact [No Proptosis] : no proptosis [No Lid Lag] : no lid lag [No Neck Mass] : no neck mass was observed [Thyroid Not Enlarged] : the thyroid was not enlarged [No Thyroid Nodules] : no palpable thyroid nodules [No Respiratory Distress] : no respiratory distress [No Accessory Muscle Use] : no accessory muscle use [Clear to Auscultation] : lungs were clear to auscultation bilaterally [Normal S1, S2] : normal S1 and S2 [No Murmurs] : no murmurs [Normal Rate] : heart rate was normal [Regular Rhythm] : with a regular rhythm [No Edema] : no peripheral edema [Normal Bowel Sounds] : normal bowel sounds [Not Tender] : non-tender [Not Distended] : not distended [Soft] : abdomen soft [No Stigmata of Cushings Syndrome] : no stigmata of Cushings Syndrome [No Rash] : no rash [Normal] : normal [Full ROM] : with full range of motion [Normal Reflexes] : deep tendon reflexes were 2+ and symmetric [No Tremors] : no tremors [Oriented x3] : oriented to person, place, and time [Normal Affect] : the affect was normal [Normal Mood] : the mood was normal [Foot Ulcers] : no foot ulcers [Diminished Throughout Both Feet] : normal tactile sensation with monofilament testing throughout both feet

## 2022-08-19 NOTE — ASSESSMENT
[FreeTextEntry1] : Patient is a 61 F with breast cancer s/p lumpectomy and chemo, T2DM, HTN, HLD here for follow up. \par \par  1.T2 Diabetes\par - HgB A1C: 6.9\par - Fingerstick glucose: 89\par - Blood pressure: 126/70\par - Complications:  none\par - Aspirin: no\par - Most recent urine microalbumin   repeat today   . ACE-I/ARB: yes losartan \par - Most recent LDL:  91 in March 2021, repeat today  . On statin:  atorvastatin 20 mg daily \par - Opthalmology up to date:  no advised for yearly check up\par - Podiatry up to date:  no  advised for yearly check up\par - Patient to call for persistent glucose < 70 or > 300\par - Patient counseled on the importance of consistent carbohydrate diet and regular physical activity \par - Advised patient to continue checking FSG and to bring meter to all visits \par - Blood work ordered include: ACR, lipid, BMP\par - Medications changes: continue with metformin 1000 mg BID (renewed)\par \par 2. HTN\par - /70\par - Recommend continue Losartan 100/12.5 mg QD\par \par 3. HLD\par - HDL was 104 and LDL was 91 in March 2021, repeat today \par - Continue Atorvastatin 20 mg QHS for risk reduction\par \par 4. Vitamin D deficiency \par - Check vitamin D level today \par \par FOLLOW UP: I recommend that next visit for diabetes care be in 6 month\par  \par Tika Suero MD\par Endocrinology, diabetes and metabolism\par  [Diabetes Foot Care] : diabetes foot care [Importance of Diet and Exercise] : importance of diet and exercise to improve glycemic control, achieve weight loss and improve cardiovascular health [Self Monitoring of Blood Glucose] : self monitoring of blood glucose

## 2022-08-22 LAB
25(OH)D3 SERPL-MCNC: 21 NG/ML
ANION GAP SERPL CALC-SCNC: 14 MMOL/L
BUN SERPL-MCNC: 13 MG/DL
CALCIUM SERPL-MCNC: 10 MG/DL
CHLORIDE SERPL-SCNC: 101 MMOL/L
CHOLEST SERPL-MCNC: 214 MG/DL
CO2 SERPL-SCNC: 26 MMOL/L
CREAT SERPL-MCNC: 0.8 MG/DL
CREAT SPEC-SCNC: 74 MG/DL
EGFR: 84 ML/MIN/1.73M2
GLUCOSE SERPL-MCNC: 83 MG/DL
HDLC SERPL-MCNC: 75 MG/DL
LDLC SERPL CALC-MCNC: 65 MG/DL
MICROALBUMIN 24H UR DL<=1MG/L-MCNC: <1.2 MG/DL
MICROALBUMIN/CREAT 24H UR-RTO: NORMAL MG/G
NONHDLC SERPL-MCNC: 139 MG/DL
POTASSIUM SERPL-SCNC: 5 MMOL/L
SODIUM SERPL-SCNC: 141 MMOL/L
TRIGL SERPL-MCNC: 370 MG/DL

## 2022-08-22 RX ORDER — MULTIVIT-MIN/FOLIC/VIT K/LYCOP 400-300MCG
50 MCG TABLET ORAL
Qty: 90 | Refills: 3 | Status: ACTIVE | COMMUNITY
Start: 2022-08-22 | End: 1900-01-01

## 2022-09-22 ENCOUNTER — APPOINTMENT (OUTPATIENT)
Dept: PULMONOLOGY | Facility: CLINIC | Age: 62
End: 2022-09-22

## 2022-09-22 VITALS
OXYGEN SATURATION: 97 % | DIASTOLIC BLOOD PRESSURE: 87 MMHG | HEART RATE: 76 BPM | BODY MASS INDEX: 29.64 KG/M2 | HEIGHT: 60 IN | SYSTOLIC BLOOD PRESSURE: 156 MMHG | WEIGHT: 151 LBS | TEMPERATURE: 97.2 F

## 2022-09-22 VITALS — SYSTOLIC BLOOD PRESSURE: 130 MMHG | DIASTOLIC BLOOD PRESSURE: 80 MMHG

## 2022-09-22 PROCEDURE — 94060 EVALUATION OF WHEEZING: CPT

## 2022-09-22 PROCEDURE — 94729 DIFFUSING CAPACITY: CPT

## 2022-09-22 PROCEDURE — 99215 OFFICE O/P EST HI 40 MIN: CPT | Mod: 25

## 2022-09-22 PROCEDURE — 94640 AIRWAY INHALATION TREATMENT: CPT | Mod: 59

## 2022-09-22 RX ORDER — AZITHROMYCIN 250 MG/1
250 TABLET, FILM COATED ORAL
Qty: 6 | Refills: 0 | Status: ACTIVE | COMMUNITY
Start: 2022-09-22 | End: 1900-01-01

## 2022-09-22 RX ORDER — PREDNISONE 5 MG/1
5 TABLET ORAL
Qty: 180 | Refills: 0 | Status: ACTIVE | COMMUNITY
Start: 2022-09-22 | End: 1900-01-01

## 2022-09-22 RX ORDER — HYDROCODONE BITARTRATE AND HOMATROPINE METHYLBROMIDE 5; 1.5 MG/5ML; MG/5ML
5-1.5 SOLUTION ORAL
Qty: 250 | Refills: 0 | Status: ACTIVE | COMMUNITY
Start: 2022-09-22 | End: 1900-01-01

## 2022-09-22 NOTE — PHYSICAL EXAM
[No Acute Distress] : no acute distress [Normal Oropharynx] : normal oropharynx [Normal Appearance] : normal appearance [No Neck Mass] : no neck mass [Normal Rate/Rhythm] : normal rate/rhythm [Normal S1, S2] : normal s1, s2 [No Murmurs] : no murmurs [No Resp Distress] : no resp distress [Clear to Auscultation Bilaterally] : clear to auscultation bilaterally [No Abnormalities] : no abnormalities [Benign] : benign [Normal Gait] : normal gait [No Clubbing] : no clubbing [No Cyanosis] : no cyanosis [No Edema] : no edema [FROM] : FROM [Normal Color/ Pigmentation] : normal color/ pigmentation [No Focal Deficits] : no focal deficits [Oriented x3] : oriented x3 [Normal Affect] : normal affect [TextBox_2] : Patient is some distress with wheezing and tachypnea. [TextBox_68] : Double wheezing.  Decreased air entry bilaterally.

## 2022-09-22 NOTE — REVIEW OF SYSTEMS
[Fatigue] : fatigue [Cough] : cough [Dyspnea] : dyspnea [Wheezing] : wheezing [Headache] : headache [Negative] : Endocrine

## 2022-09-22 NOTE — HISTORY OF PRESENT ILLNESS
[TextBox_4] : Ms. Alka Cuevas returns with acute shortness of breath wheezing and cough.  She was seen in the emergency room yesterday and was given 40 mg twice a day of prednisone.  She has been taking nebulized albuterol several times a day.  She has been taking Symbicort twice daily.  She was well till yesterday morning when she had some juice following which she developed severe shortness of breath.\par She has severe recurrent asthma.  She was given Dupixent injection in April and there was no follow-up.  She states that she was well till now.  The patient comes in moderately severe distress.  Unable to bring up the phlegm.  She has burning in the chest.  She has headache when she coughs.  She was unable to sleep because of the cough.\par \par 4/12/22\par The patient is a 61-year-old lady with history of bronchial here with increasing cough shortness of breath and wheezing for the last few days.  She has had several recent acute exacerbation of bronchial asthma and required steroids.  Today she has intense itching all over.\par She has been taking Symbicort 2 puffs 4-5 times a day.  In addition she is using nebulized albuterol.  Patient was recently treated with oral steroids.

## 2022-09-22 NOTE — PROCEDURE
[FreeTextEntry1] : Pulmonary function testing done today reveals moderately severe obstructive airways disease.  FEV1 is 1 L.

## 2022-09-22 NOTE — DISCUSSION/SUMMARY
[FreeTextEntry1] : Patient was given nebulized albuterol.  She persisted to have wheezing.  She was given EpiPen injection subcutaneously.  She felt slightly better after that.  She was then given nebulized saline for 10 minutes.  Which loosened up some of her secretions.  She improved.  She was then given a second dose of albuterol after 2 hours.  There was slight improvement in her clinical condition.\par The patient has acute exacerbation of asthma after exposure to some allergen.  She will be given slow taper of steroids.  She is advised to take Symbicort 2 puffs 4 times a day.  She will be given azithromycin.  Will renew Dupixent.

## 2022-09-26 ENCOUNTER — INPATIENT (INPATIENT)
Facility: HOSPITAL | Age: 62
LOS: 7 days | Discharge: HOME | End: 2022-10-04
Attending: INTERNAL MEDICINE | Admitting: INTERNAL MEDICINE

## 2022-09-26 VITALS
SYSTOLIC BLOOD PRESSURE: 213 MMHG | DIASTOLIC BLOOD PRESSURE: 96 MMHG | HEIGHT: 60 IN | HEART RATE: 85 BPM | OXYGEN SATURATION: 98 % | TEMPERATURE: 98 F | RESPIRATION RATE: 20 BRPM

## 2022-09-26 DIAGNOSIS — I10 ESSENTIAL (PRIMARY) HYPERTENSION: ICD-10-CM

## 2022-09-26 DIAGNOSIS — Z98.89 OTHER SPECIFIED POSTPROCEDURAL STATES: Chronic | ICD-10-CM

## 2022-09-26 DIAGNOSIS — J45.901 UNSPECIFIED ASTHMA WITH (ACUTE) EXACERBATION: ICD-10-CM

## 2022-09-26 DIAGNOSIS — E11.9 TYPE 2 DIABETES MELLITUS WITHOUT COMPLICATIONS: ICD-10-CM

## 2022-09-26 DIAGNOSIS — R79.89 OTHER SPECIFIED ABNORMAL FINDINGS OF BLOOD CHEMISTRY: ICD-10-CM

## 2022-09-26 DIAGNOSIS — Z98.890 OTHER SPECIFIED POSTPROCEDURAL STATES: Chronic | ICD-10-CM

## 2022-09-26 DIAGNOSIS — E78.5 HYPERLIPIDEMIA, UNSPECIFIED: ICD-10-CM

## 2022-09-26 LAB
ALBUMIN SERPL ELPH-MCNC: 5 G/DL — SIGNIFICANT CHANGE UP (ref 3.3–5)
ALP SERPL-CCNC: 103 U/L — SIGNIFICANT CHANGE UP (ref 40–120)
ALT FLD-CCNC: 31 U/L — SIGNIFICANT CHANGE UP (ref 4–33)
ANION GAP SERPL CALC-SCNC: 16 MMOL/L — HIGH (ref 7–14)
AST SERPL-CCNC: 23 U/L — SIGNIFICANT CHANGE UP (ref 4–32)
B PERT DNA SPEC QL NAA+PROBE: SIGNIFICANT CHANGE UP
B PERT+PARAPERT DNA PNL SPEC NAA+PROBE: SIGNIFICANT CHANGE UP
BASE EXCESS BLDV CALC-SCNC: -1.7 MMOL/L — SIGNIFICANT CHANGE UP (ref -2–3)
BASE EXCESS BLDV CALC-SCNC: 2.2 MMOL/L — SIGNIFICANT CHANGE UP (ref -2–3)
BASOPHILS # BLD AUTO: 0.01 K/UL — SIGNIFICANT CHANGE UP (ref 0–0.2)
BASOPHILS NFR BLD AUTO: 0.1 % — SIGNIFICANT CHANGE UP (ref 0–2)
BILIRUB SERPL-MCNC: <0.2 MG/DL — SIGNIFICANT CHANGE UP (ref 0.2–1.2)
BLOOD GAS VENOUS COMPREHENSIVE RESULT: SIGNIFICANT CHANGE UP
BLOOD GAS VENOUS COMPREHENSIVE RESULT: SIGNIFICANT CHANGE UP
BORDETELLA PARAPERTUSSIS (RAPRVP): SIGNIFICANT CHANGE UP
BUN SERPL-MCNC: 16 MG/DL — SIGNIFICANT CHANGE UP (ref 7–23)
C PNEUM DNA SPEC QL NAA+PROBE: SIGNIFICANT CHANGE UP
CALCIUM SERPL-MCNC: 10.4 MG/DL — SIGNIFICANT CHANGE UP (ref 8.4–10.5)
CHLORIDE BLDV-SCNC: 100 MMOL/L — SIGNIFICANT CHANGE UP (ref 96–108)
CHLORIDE BLDV-SCNC: 100 MMOL/L — SIGNIFICANT CHANGE UP (ref 96–108)
CHLORIDE SERPL-SCNC: 98 MMOL/L — SIGNIFICANT CHANGE UP (ref 98–107)
CO2 BLDV-SCNC: 25 MMOL/L — SIGNIFICANT CHANGE UP (ref 22–26)
CO2 BLDV-SCNC: 29.8 MMOL/L — HIGH (ref 22–26)
CO2 SERPL-SCNC: 26 MMOL/L — SIGNIFICANT CHANGE UP (ref 22–31)
CREAT SERPL-MCNC: 0.74 MG/DL — SIGNIFICANT CHANGE UP (ref 0.5–1.3)
EGFR: 93 ML/MIN/1.73M2 — SIGNIFICANT CHANGE UP
EOSINOPHIL # BLD AUTO: 0 K/UL — SIGNIFICANT CHANGE UP (ref 0–0.5)
EOSINOPHIL NFR BLD AUTO: 0 % — SIGNIFICANT CHANGE UP (ref 0–6)
FLUAV SUBTYP SPEC NAA+PROBE: SIGNIFICANT CHANGE UP
FLUBV RNA SPEC QL NAA+PROBE: SIGNIFICANT CHANGE UP
GAS PNL BLDV: 135 MMOL/L — LOW (ref 136–145)
GAS PNL BLDV: 137 MMOL/L — SIGNIFICANT CHANGE UP (ref 136–145)
GAS PNL BLDV: SIGNIFICANT CHANGE UP
GLUCOSE BLDC GLUCOMTR-MCNC: 366 MG/DL — HIGH (ref 70–99)
GLUCOSE BLDV-MCNC: 199 MG/DL — HIGH (ref 70–99)
GLUCOSE BLDV-MCNC: 274 MG/DL — HIGH (ref 70–99)
GLUCOSE SERPL-MCNC: 185 MG/DL — HIGH (ref 70–99)
HADV DNA SPEC QL NAA+PROBE: SIGNIFICANT CHANGE UP
HCO3 BLDV-SCNC: 24 MMOL/L — SIGNIFICANT CHANGE UP (ref 22–29)
HCO3 BLDV-SCNC: 28 MMOL/L — SIGNIFICANT CHANGE UP (ref 22–29)
HCOV 229E RNA SPEC QL NAA+PROBE: SIGNIFICANT CHANGE UP
HCOV HKU1 RNA SPEC QL NAA+PROBE: SIGNIFICANT CHANGE UP
HCOV NL63 RNA SPEC QL NAA+PROBE: SIGNIFICANT CHANGE UP
HCOV OC43 RNA SPEC QL NAA+PROBE: SIGNIFICANT CHANGE UP
HCT VFR BLD CALC: 38.9 % — SIGNIFICANT CHANGE UP (ref 34.5–45)
HCT VFR BLDA CALC: 36 % — SIGNIFICANT CHANGE UP (ref 34.5–46.5)
HCT VFR BLDA CALC: 39 % — SIGNIFICANT CHANGE UP (ref 34.5–46.5)
HGB BLD CALC-MCNC: 12.1 G/DL — SIGNIFICANT CHANGE UP (ref 11.5–15.5)
HGB BLD CALC-MCNC: 13 G/DL — SIGNIFICANT CHANGE UP (ref 11.5–15.5)
HGB BLD-MCNC: 12.7 G/DL — SIGNIFICANT CHANGE UP (ref 11.5–15.5)
HMPV RNA SPEC QL NAA+PROBE: SIGNIFICANT CHANGE UP
HPIV1 RNA SPEC QL NAA+PROBE: SIGNIFICANT CHANGE UP
HPIV2 RNA SPEC QL NAA+PROBE: SIGNIFICANT CHANGE UP
HPIV3 RNA SPEC QL NAA+PROBE: SIGNIFICANT CHANGE UP
HPIV4 RNA SPEC QL NAA+PROBE: SIGNIFICANT CHANGE UP
IANC: 7.59 K/UL — HIGH (ref 1.8–7.4)
IMM GRANULOCYTES NFR BLD AUTO: 0.9 % — SIGNIFICANT CHANGE UP (ref 0–0.9)
LACTATE BLDV-MCNC: 4.6 MMOL/L — CRITICAL HIGH (ref 0.5–2)
LACTATE BLDV-MCNC: 6 MMOL/L — CRITICAL HIGH (ref 0.5–2)
LACTATE SERPL-SCNC: 9 MMOL/L — CRITICAL HIGH (ref 0.5–2)
LYMPHOCYTES # BLD AUTO: 1.75 K/UL — SIGNIFICANT CHANGE UP (ref 1–3.3)
LYMPHOCYTES # BLD AUTO: 18.1 % — SIGNIFICANT CHANGE UP (ref 13–44)
M PNEUMO DNA SPEC QL NAA+PROBE: SIGNIFICANT CHANGE UP
MCHC RBC-ENTMCNC: 27.6 PG — SIGNIFICANT CHANGE UP (ref 27–34)
MCHC RBC-ENTMCNC: 32.6 GM/DL — SIGNIFICANT CHANGE UP (ref 32–36)
MCV RBC AUTO: 84.6 FL — SIGNIFICANT CHANGE UP (ref 80–100)
MONOCYTES # BLD AUTO: 0.24 K/UL — SIGNIFICANT CHANGE UP (ref 0–0.9)
MONOCYTES NFR BLD AUTO: 2.5 % — SIGNIFICANT CHANGE UP (ref 2–14)
NEUTROPHILS # BLD AUTO: 7.59 K/UL — HIGH (ref 1.8–7.4)
NEUTROPHILS NFR BLD AUTO: 78.4 % — HIGH (ref 43–77)
NRBC # BLD: 0 /100 WBCS — SIGNIFICANT CHANGE UP (ref 0–0)
NRBC # FLD: 0 K/UL — SIGNIFICANT CHANGE UP (ref 0–0)
PCO2 BLDV: 42 MMHG — SIGNIFICANT CHANGE UP (ref 39–42)
PCO2 BLDV: 49 MMHG — HIGH (ref 39–42)
PH BLDV: 7.36 — SIGNIFICANT CHANGE UP (ref 7.32–7.43)
PH BLDV: 7.37 — SIGNIFICANT CHANGE UP (ref 7.32–7.43)
PLATELET # BLD AUTO: 293 K/UL — SIGNIFICANT CHANGE UP (ref 150–400)
PO2 BLDV: 33 MMHG — SIGNIFICANT CHANGE UP
PO2 BLDV: 61 MMHG — SIGNIFICANT CHANGE UP
POTASSIUM BLDV-SCNC: 4.4 MMOL/L — SIGNIFICANT CHANGE UP (ref 3.5–5.1)
POTASSIUM BLDV-SCNC: 4.6 MMOL/L — SIGNIFICANT CHANGE UP (ref 3.5–5.1)
POTASSIUM SERPL-MCNC: 4.7 MMOL/L — SIGNIFICANT CHANGE UP (ref 3.5–5.3)
POTASSIUM SERPL-SCNC: 4.7 MMOL/L — SIGNIFICANT CHANGE UP (ref 3.5–5.3)
PROT SERPL-MCNC: 7.9 G/DL — SIGNIFICANT CHANGE UP (ref 6–8.3)
RAPID RVP RESULT: DETECTED
RBC # BLD: 4.6 M/UL — SIGNIFICANT CHANGE UP (ref 3.8–5.2)
RBC # FLD: 12.8 % — SIGNIFICANT CHANGE UP (ref 10.3–14.5)
RSV RNA SPEC QL NAA+PROBE: SIGNIFICANT CHANGE UP
RV+EV RNA SPEC QL NAA+PROBE: DETECTED
SAO2 % BLDV: 50.5 % — SIGNIFICANT CHANGE UP
SAO2 % BLDV: 90 % — SIGNIFICANT CHANGE UP
SARS-COV-2 RNA SPEC QL NAA+PROBE: SIGNIFICANT CHANGE UP
SARS-COV-2 RNA SPEC QL NAA+PROBE: SIGNIFICANT CHANGE UP
SODIUM SERPL-SCNC: 140 MMOL/L — SIGNIFICANT CHANGE UP (ref 135–145)
TROPONIN T, HIGH SENSITIVITY RESULT: 7 NG/L — SIGNIFICANT CHANGE UP
WBC # BLD: 9.68 K/UL — SIGNIFICANT CHANGE UP (ref 3.8–10.5)
WBC # FLD AUTO: 9.68 K/UL — SIGNIFICANT CHANGE UP (ref 3.8–10.5)

## 2022-09-26 PROCEDURE — 99223 1ST HOSP IP/OBS HIGH 75: CPT

## 2022-09-26 PROCEDURE — 99291 CRITICAL CARE FIRST HOUR: CPT

## 2022-09-26 PROCEDURE — 71045 X-RAY EXAM CHEST 1 VIEW: CPT | Mod: 26

## 2022-09-26 RX ORDER — LANOLIN ALCOHOL/MO/W.PET/CERES
3 CREAM (GRAM) TOPICAL AT BEDTIME
Refills: 0 | Status: DISCONTINUED | OUTPATIENT
Start: 2022-09-26 | End: 2022-10-04

## 2022-09-26 RX ORDER — BUDESONIDE AND FORMOTEROL FUMARATE DIHYDRATE 160; 4.5 UG/1; UG/1
2 AEROSOL RESPIRATORY (INHALATION)
Refills: 0 | Status: DISCONTINUED | OUTPATIENT
Start: 2022-09-26 | End: 2022-10-04

## 2022-09-26 RX ORDER — METFORMIN HYDROCHLORIDE 850 MG/1
0 TABLET ORAL
Qty: 0 | Refills: 0 | DISCHARGE

## 2022-09-26 RX ORDER — GLUCAGON INJECTION, SOLUTION 0.5 MG/.1ML
1 INJECTION, SOLUTION SUBCUTANEOUS ONCE
Refills: 0 | Status: DISCONTINUED | OUTPATIENT
Start: 2022-09-26 | End: 2022-10-04

## 2022-09-26 RX ORDER — ALBUTEROL 90 UG/1
2.5 AEROSOL, METERED ORAL ONCE
Refills: 0 | Status: COMPLETED | OUTPATIENT
Start: 2022-09-26 | End: 2022-09-26

## 2022-09-26 RX ORDER — SODIUM CHLORIDE 9 MG/ML
1000 INJECTION, SOLUTION INTRAVENOUS
Refills: 0 | Status: DISCONTINUED | OUTPATIENT
Start: 2022-09-26 | End: 2022-10-04

## 2022-09-26 RX ORDER — SODIUM CHLORIDE 9 MG/ML
1000 INJECTION, SOLUTION INTRAVENOUS
Refills: 0 | Status: DISCONTINUED | OUTPATIENT
Start: 2022-09-26 | End: 2022-09-30

## 2022-09-26 RX ORDER — ALBUTEROL 90 UG/1
0 AEROSOL, METERED ORAL
Qty: 0 | Refills: 0 | DISCHARGE

## 2022-09-26 RX ORDER — HYDROCHLOROTHIAZIDE 25 MG
12.5 TABLET ORAL DAILY
Refills: 0 | Status: DISCONTINUED | OUTPATIENT
Start: 2022-09-27 | End: 2022-10-04

## 2022-09-26 RX ORDER — ONDANSETRON 8 MG/1
4 TABLET, FILM COATED ORAL EVERY 8 HOURS
Refills: 0 | Status: DISCONTINUED | OUTPATIENT
Start: 2022-09-26 | End: 2022-10-04

## 2022-09-26 RX ORDER — IPRATROPIUM/ALBUTEROL SULFATE 18-103MCG
3 AEROSOL WITH ADAPTER (GRAM) INHALATION EVERY 4 HOURS
Refills: 0 | Status: DISCONTINUED | OUTPATIENT
Start: 2022-09-26 | End: 2022-09-27

## 2022-09-26 RX ORDER — OMEPRAZOLE 10 MG/1
0 CAPSULE, DELAYED RELEASE ORAL
Qty: 0 | Refills: 3 | DISCHARGE

## 2022-09-26 RX ORDER — INSULIN LISPRO 100/ML
VIAL (ML) SUBCUTANEOUS
Refills: 0 | Status: DISCONTINUED | OUTPATIENT
Start: 2022-09-26 | End: 2022-09-27

## 2022-09-26 RX ORDER — ENOXAPARIN SODIUM 100 MG/ML
40 INJECTION SUBCUTANEOUS EVERY 24 HOURS
Refills: 0 | Status: DISCONTINUED | OUTPATIENT
Start: 2022-09-26 | End: 2022-10-04

## 2022-09-26 RX ORDER — DEXTROSE 50 % IN WATER 50 %
25 SYRINGE (ML) INTRAVENOUS ONCE
Refills: 0 | Status: DISCONTINUED | OUTPATIENT
Start: 2022-09-26 | End: 2022-10-04

## 2022-09-26 RX ORDER — DEXTROSE 50 % IN WATER 50 %
15 SYRINGE (ML) INTRAVENOUS ONCE
Refills: 0 | Status: DISCONTINUED | OUTPATIENT
Start: 2022-09-26 | End: 2022-10-04

## 2022-09-26 RX ORDER — IPRATROPIUM/ALBUTEROL SULFATE 18-103MCG
3 AEROSOL WITH ADAPTER (GRAM) INHALATION ONCE
Refills: 0 | Status: COMPLETED | OUTPATIENT
Start: 2022-09-26 | End: 2022-09-26

## 2022-09-26 RX ORDER — MAGNESIUM SULFATE 500 MG/ML
2 VIAL (ML) INJECTION ONCE
Refills: 0 | Status: COMPLETED | OUTPATIENT
Start: 2022-09-26 | End: 2022-09-26

## 2022-09-26 RX ORDER — ACETAMINOPHEN 500 MG
650 TABLET ORAL EVERY 6 HOURS
Refills: 0 | Status: DISCONTINUED | OUTPATIENT
Start: 2022-09-26 | End: 2022-10-04

## 2022-09-26 RX ORDER — LOSARTAN POTASSIUM 100 MG/1
100 TABLET, FILM COATED ORAL DAILY
Refills: 0 | Status: DISCONTINUED | OUTPATIENT
Start: 2022-09-26 | End: 2022-10-04

## 2022-09-26 RX ORDER — DEXTROSE 50 % IN WATER 50 %
12.5 SYRINGE (ML) INTRAVENOUS ONCE
Refills: 0 | Status: DISCONTINUED | OUTPATIENT
Start: 2022-09-26 | End: 2022-10-04

## 2022-09-26 RX ORDER — ATORVASTATIN CALCIUM 80 MG/1
40 TABLET, FILM COATED ORAL AT BEDTIME
Refills: 0 | Status: DISCONTINUED | OUTPATIENT
Start: 2022-09-26 | End: 2022-10-04

## 2022-09-26 RX ORDER — HYDROCORTISONE/PRAMOXINE 2.5 %-1 %
0 CREAM WITH APPLICATOR RECTAL
Qty: 0 | Refills: 2 | DISCHARGE

## 2022-09-26 RX ORDER — ALBUTEROL 90 UG/1
4 AEROSOL, METERED ORAL EVERY 6 HOURS
Refills: 0 | Status: DISCONTINUED | OUTPATIENT
Start: 2022-09-26 | End: 2022-09-27

## 2022-09-26 RX ORDER — INSULIN LISPRO 100/ML
VIAL (ML) SUBCUTANEOUS AT BEDTIME
Refills: 0 | Status: DISCONTINUED | OUTPATIENT
Start: 2022-09-26 | End: 2022-10-03

## 2022-09-26 RX ORDER — SODIUM CHLORIDE 9 MG/ML
500 INJECTION INTRAMUSCULAR; INTRAVENOUS; SUBCUTANEOUS ONCE
Refills: 0 | Status: COMPLETED | OUTPATIENT
Start: 2022-09-26 | End: 2022-09-26

## 2022-09-26 RX ADMIN — ALBUTEROL 2.5 MILLIGRAM(S): 90 AEROSOL, METERED ORAL at 13:59

## 2022-09-26 RX ADMIN — Medication 3 MILLILITER(S): at 22:54

## 2022-09-26 RX ADMIN — SODIUM CHLORIDE 500 MILLILITER(S): 9 INJECTION INTRAMUSCULAR; INTRAVENOUS; SUBCUTANEOUS at 13:55

## 2022-09-26 RX ADMIN — Medication 3 MILLILITER(S): at 12:25

## 2022-09-26 RX ADMIN — Medication 2 GRAM(S): at 13:45

## 2022-09-26 RX ADMIN — ATORVASTATIN CALCIUM 40 MILLIGRAM(S): 80 TABLET, FILM COATED ORAL at 22:55

## 2022-09-26 RX ADMIN — Medication 3: at 22:54

## 2022-09-26 RX ADMIN — Medication 150 GRAM(S): at 12:33

## 2022-09-26 RX ADMIN — BUDESONIDE AND FORMOTEROL FUMARATE DIHYDRATE 2 PUFF(S): 160; 4.5 AEROSOL RESPIRATORY (INHALATION) at 22:54

## 2022-09-26 RX ADMIN — Medication 3 MILLILITER(S): at 16:35

## 2022-09-26 RX ADMIN — Medication 125 MILLIGRAM(S): at 14:21

## 2022-09-26 NOTE — H&P ADULT - PROBLEM SELECTOR PLAN 2
New  LA 4.6 -> 6.0  Likely 2/2 above though pt is also on metformin   Hold metformin  LR 75ml/hr  Repeat VBG  Monitor New  LA 4.6 -> 6.0  Likely 2/2 above though pt is also on metformin and has been using albuterol nebulizer frequently over the past few days  Hold metformin  LR 75ml/hr  Repeat VBG  Monitor

## 2022-09-26 NOTE — ED ADULT TRIAGE NOTE - CHIEF COMPLAINT QUOTE
Patient has c/o chest discomfort, shortness of breath and wheezing. Pt is asthmatic. Pt has been taking her nebulizers, steroids and meds with no relief for the past week. Pt wheezing and cxhest is tight . Patient has c/o chest discomfort, shortness of breath and wheezing. Pt is asthmatic. Pt has been taking her nebulizers, steroids and meds with no relief for the past week. Pt wheezing and chest is tight .

## 2022-09-26 NOTE — ED ADULT NURSE NOTE - OBJECTIVE STATEMENT
Pt recevieved to room 3 AAO x 4 c/o chest tightness and SOB x 1 week unrelieved with nebs, steriods and antibiotics. Labs sent. 18G placed to the left AC and pt medicated as per MD orders. eval in progress.

## 2022-09-26 NOTE — ED ADULT NURSE NOTE - CHIEF COMPLAINT QUOTE
Patient has c/o chest discomfort, shortness of breath and wheezing. Pt is asthmatic. Pt has been taking her nebulizers, steroids and meds with no relief for the past week. Pt wheezing and chest is tight .

## 2022-09-26 NOTE — H&P ADULT - PROBLEM SELECTOR PLAN 3
Chronic moderate exacerbation   BP on presentation 213/96-> 152/82  Continue home losartan 100mg and hctz 12.5mg daily   Monitor

## 2022-09-26 NOTE — H&P ADULT - PROBLEM SELECTOR PLAN 1
New  wheezing/ cough/ SOB since Sunday  CXR clear lungs  S/p solumedrol 125mg IV in ED  Continue with prednisone 40mg daily - will likely need prolonged taker  Duoneb Q4 scheduled   Sputum culture, RVP and IS ordered  Monitor New  wheezing/ cough/ SOB since Sunday  CXR clear lungs, + entero/rhino-virus  S/p solumedrol 125mg IV in ED  Continue with prednisone 40mg daily - will likely need prolonged taker  Duoneb Q4 scheduled   Sputum culture, RVP and IS ordered  Monitor

## 2022-09-26 NOTE — H&P ADULT - PROBLEM SELECTOR PLAN 4
Chronic moderate exacerbation     Hold home metformin  LDCS with diabetic diet  A1c and lipid panel in AM

## 2022-09-26 NOTE — ED PROVIDER NOTE - OBJECTIVE STATEMENT
61YO F hx asthma, remote breast and colon CA, p/w SOB, cough. onset 1d prior, pt denies fevers. has taken zpack, prednisone,proventil. no sx improvement. pt endorses cp while she is coughing. pt denies prior hx of intubation. pt denies abdominal pain, dysuria, flank pain.

## 2022-09-26 NOTE — ED PROVIDER NOTE - CLINICAL SUMMARY MEDICAL DECISION MAKING FREE TEXT BOX
Vicki Booth MD, PGY-3: 61YO F hx asthma, remote breast and colon CA, p/w SOB, cough, cp during coughing episodes. vss, satting well on RA although tachypneic. suspect asthma exacerbation, pneumonia, low suspicion PE (cancer not active), plan for basic labs, chest xray, mag, duonebs.

## 2022-09-26 NOTE — ED PROVIDER NOTE - PHYSICAL EXAMINATION
Gen: WDWN, NAD  HEENT: EOMI, no nasal discharge, mucous membranes moist  CV: RRR, 2+ radial pulses R radial  Resp: + audible wheezes, no accessory muscle use, no increased work of breathing  MSK: No open wounds, no bruising, no LE edema  Neuro: A&Ox4, following commands, moving all four extremities spontaneously  Psych: appropriate mood

## 2022-09-26 NOTE — H&P ADULT - NSHPLABSRESULTS_GEN_ALL_CORE
labs reviewed                        12.7   9.68  )-----------( 293      ( 26 Sep 2022 12:30 )             38.9       09-26    140  |  98  |  16  ----------------------------<  185<H>  4.7   |  26  |  0.74    Ca    10.4      26 Sep 2022 12:30    TPro  7.9  /  Alb  5.0  /  TBili  <0.2  /  DBili  x   /  AST  23  /  ALT  31  /  AlkPhos  103  09-26  13:55 - VBG - pH: 7.36  | pCO2: 42    | pO2: 61    | Lactate: 6.0    12:30 - VBG - pH: 7.37  | pCO2: 49    | pO2: 33    | Lactate: 4.6      CXR interpreted by myself: Clear lungs  EKG interpreted by myself: nsr

## 2022-09-26 NOTE — ED PROVIDER NOTE - ATTENDING CONTRIBUTION TO CARE
62F h/o asthma, remote breast and colon CA, p/w SOB, wheezing and cough x1 wk. States had seen PMD who prescribed duoneb, prednisone and z-pack but has not had improvement. +cp only with coughing. Denies sob. Speaking full sentences.  Denies prior hx of intubation. Denies fever/chills, nasal congestion, diffiuclty breathing.   Gen: speaking full sentences, no acc muscle use  CV: rrr, no appreciable murmur  Pulm: wheezing worst in upper lung fields, clear at bases  Abd: soft, ntnd  Ext: no edema/swelling, no calf pain/tenderness  MDM: 62F h/o asthma, remote breast and colon CA, p/w SOB, wheezing and cough x1 wk. Speaking full sentences, sat 100% on room air, no acc muscle use noted. Asthma exacerbation vs ?vocal cord dysfunction. Had d/w ENT PA regarding possible scope to assess, states med team can consult after admission if not improved with nebs. Will give duoneb x3, solumedrol, mag. CXR, basic labs 62F h/o asthma, remote breast and colon CA, p/w SOB, wheezing and cough x1 wk. States had seen PMD who prescribed duoneb, prednisone and z-pack but has not had improvement. +cp only with coughing. Denies sob. Speaking full sentences.  Denies prior hx of intubation. Denies fever/chills, nasal congestion, diffiuclty breathing.   Gen: speaking full sentences, no acc muscle use  CV: rrr, no appreciable murmur  Pulm: wheezing worst in upper lung fields, clear at bases  Abd: soft, ntnd  Ext: no edema/swelling, no calf pain/tenderness  MDM: 62F h/o asthma, remote breast and colon CA, p/w SOB, wheezing and cough x1 wk. Speaking full sentences, sat 100% on room air, no acc muscle use noted. Asthma exacerbation vs ?vocal cord dysfunction. Had d/w ENT PA regarding possible scope to assess, states med team can consult after admission if not improved with nebs. Will give duoneb x3, solumedrol, mag. CXR, basic labs    Upon my evaluation, this patient is at high risk for imminent or life threatening deterioration due to reactive airway disease requiring multuple bronchodilator doses and ffrequent reassessment  which require my direct attention, intervention, and personal management.  I have personally spent  35    discontinuous minutes  of critical care time exclusive of time spent on separate billing procedures. This includes review of laboratory data, radiology results, discussion with primary team, and monitoring for potential decompensation. Interventions were performed as documented above.

## 2022-09-26 NOTE — H&P ADULT - HISTORY OF PRESENT ILLNESS
62 yr old female with a pmh of asthma (never intubated), remote breast and colon CA, HTN, HLD, T2DM, who presents with wheezing and SOB since Sunday. Reports that initially when in [place] over the weekend she felt like her throat starting hurting a bit. This then developed into wheezing and a productive cough with yellow sputum. Does have associated headache and pleuritic chest pain with the coughing. She went to urgent care who prescribed prednisone 20mg BID and a zpack. As not improvement she went to her pulmonologist who put her on a steroid taper. She reports relief with duonebs but only for a short time period.   Vitals: T 98, , /82 ( 213/96), RR 20 satting 100% RA

## 2022-09-26 NOTE — H&P ADULT - NSHPSOCIALHISTORY_GEN_ALL_CORE
Does not use tobacco products (former smoker >30yrs ago), consume alcohol or partake in illicit drug use

## 2022-09-26 NOTE — H&P ADULT - NSHPPHYSICALEXAM_GEN_ALL_CORE
PHYSICAL EXAM:  GENERAL: NAD, well-developed, well-nourished  HEAD:  Atraumatic, Normocephalic  EYES: EOMI, PERRL, conjunctiva and sclera clear  NECK: Supple, No JVD  CHEST/LUNG: air entry bilaterally though decreased, mild wheezing present no rales or rhonchi  HEART: Regular rate and rhythm; No murmurs, rubs, or gallops, (+)S1, S2  ABDOMEN: Soft, Nontender, Nondistended; Normal Bowel sounds   EXTREMITIES:  2+ Peripheral Pulses, No clubbing, cyanosis, or edema  PSYCH: normal mood and affect  NEUROLOGY: AAOx3, non-focal  SKIN: No rashes or lesions

## 2022-09-26 NOTE — H&P ADULT - NSHPREVIEWOFSYSTEMS_GEN_ALL_CORE
REVIEW OF SYSTEMS:    CONSTITUTIONAL: No weakness, fevers or chills  EYES/ENT: No visual changes;  No dysphagia; No sore throat; No rhinorrhea; No sinus pain/pressure  NECK: No pain or stiffness  RESPIRATORY: + cough, wheezing, no hemoptysis; + shortness of breath  CARDIOVASCULAR: + chest pain no  palpitations; No lower extremity edema  GASTROINTESTINAL: No abdominal or epigastric pain. No nausea, vomiting, or hematemesis; No diarrhea or constipation. No melena or hematochezia.  GENITOURINARY: No dysuria, frequency or hematuria  NEUROLOGICAL: No numbness or weakness + headache   MSK: ambulates without assistance   SKIN: No itching, burning, rashes, or lesions   All other review of systems is negative unless indicated above.

## 2022-09-27 DIAGNOSIS — Z29.9 ENCOUNTER FOR PROPHYLACTIC MEASURES, UNSPECIFIED: ICD-10-CM

## 2022-09-27 LAB
A1C WITH ESTIMATED AVERAGE GLUCOSE RESULT: 7.1 % — HIGH (ref 4–5.6)
ANION GAP SERPL CALC-SCNC: 21 MMOL/L — HIGH (ref 7–14)
ANION GAP SERPL CALC-SCNC: 22 MMOL/L — HIGH (ref 7–14)
BASE EXCESS BLDV CALC-SCNC: -5.3 MMOL/L — LOW (ref -2–3)
BASOPHILS # BLD AUTO: 0.01 K/UL — SIGNIFICANT CHANGE UP (ref 0–0.2)
BASOPHILS NFR BLD AUTO: 0.1 % — SIGNIFICANT CHANGE UP (ref 0–2)
BLOOD GAS VENOUS COMPREHENSIVE RESULT: SIGNIFICANT CHANGE UP
BUN SERPL-MCNC: 17 MG/DL — SIGNIFICANT CHANGE UP (ref 7–23)
BUN SERPL-MCNC: 17 MG/DL — SIGNIFICANT CHANGE UP (ref 7–23)
CALCIUM SERPL-MCNC: 10 MG/DL — SIGNIFICANT CHANGE UP (ref 8.4–10.5)
CALCIUM SERPL-MCNC: 10.3 MG/DL — SIGNIFICANT CHANGE UP (ref 8.4–10.5)
CHLORIDE BLDV-SCNC: 99 MMOL/L — SIGNIFICANT CHANGE UP (ref 96–108)
CHLORIDE SERPL-SCNC: 96 MMOL/L — LOW (ref 98–107)
CHLORIDE SERPL-SCNC: 97 MMOL/L — LOW (ref 98–107)
CHOLEST SERPL-MCNC: 171 MG/DL — SIGNIFICANT CHANGE UP
CO2 BLDV-SCNC: 19.9 MMOL/L — LOW (ref 22–26)
CO2 SERPL-SCNC: 13 MMOL/L — LOW (ref 22–31)
CO2 SERPL-SCNC: 13 MMOL/L — LOW (ref 22–31)
CREAT SERPL-MCNC: 0.62 MG/DL — SIGNIFICANT CHANGE UP (ref 0.5–1.3)
CREAT SERPL-MCNC: 0.66 MG/DL — SIGNIFICANT CHANGE UP (ref 0.5–1.3)
EGFR: 100 ML/MIN/1.73M2 — SIGNIFICANT CHANGE UP
EGFR: 102 ML/MIN/1.73M2 — SIGNIFICANT CHANGE UP
EOSINOPHIL # BLD AUTO: 0 K/UL — SIGNIFICANT CHANGE UP (ref 0–0.5)
EOSINOPHIL NFR BLD AUTO: 0 % — SIGNIFICANT CHANGE UP (ref 0–6)
ESTIMATED AVERAGE GLUCOSE: 157 — SIGNIFICANT CHANGE UP
GAS PNL BLDV: 132 MMOL/L — LOW (ref 136–145)
GLUCOSE BLDC GLUCOMTR-MCNC: 209 MG/DL — HIGH (ref 70–99)
GLUCOSE BLDC GLUCOMTR-MCNC: 227 MG/DL — HIGH (ref 70–99)
GLUCOSE BLDC GLUCOMTR-MCNC: 273 MG/DL — HIGH (ref 70–99)
GLUCOSE BLDV-MCNC: 343 MG/DL — HIGH (ref 70–99)
GLUCOSE SERPL-MCNC: 350 MG/DL — HIGH (ref 70–99)
GLUCOSE SERPL-MCNC: 351 MG/DL — HIGH (ref 70–99)
HCO3 BLDV-SCNC: 19 MMOL/L — LOW (ref 22–29)
HCT VFR BLD CALC: 39 % — SIGNIFICANT CHANGE UP (ref 34.5–45)
HCT VFR BLDA CALC: 37 % — SIGNIFICANT CHANGE UP (ref 34.5–46.5)
HDLC SERPL-MCNC: 97 MG/DL — SIGNIFICANT CHANGE UP
HGB BLD CALC-MCNC: 12.3 G/DL — SIGNIFICANT CHANGE UP (ref 11.5–15.5)
HGB BLD-MCNC: 12.1 G/DL — SIGNIFICANT CHANGE UP (ref 11.5–15.5)
IANC: 9.46 K/UL — HIGH (ref 1.8–7.4)
IMM GRANULOCYTES NFR BLD AUTO: 0.8 % — SIGNIFICANT CHANGE UP (ref 0–0.9)
LACTATE BLDV-MCNC: 6.9 MMOL/L — CRITICAL HIGH (ref 0.5–2)
LACTATE SERPL-SCNC: 5.9 MMOL/L — CRITICAL HIGH (ref 0.5–2)
LACTATE SERPL-SCNC: 7.9 MMOL/L — CRITICAL HIGH (ref 0.5–2)
LIPID PNL WITH DIRECT LDL SERPL: 51 MG/DL — SIGNIFICANT CHANGE UP
LYMPHOCYTES # BLD AUTO: 1.54 K/UL — SIGNIFICANT CHANGE UP (ref 1–3.3)
LYMPHOCYTES # BLD AUTO: 13.2 % — SIGNIFICANT CHANGE UP (ref 13–44)
MAGNESIUM SERPL-MCNC: 2.2 MG/DL — SIGNIFICANT CHANGE UP (ref 1.6–2.6)
MCHC RBC-ENTMCNC: 27.6 PG — SIGNIFICANT CHANGE UP (ref 27–34)
MCHC RBC-ENTMCNC: 31 GM/DL — LOW (ref 32–36)
MCV RBC AUTO: 89 FL — SIGNIFICANT CHANGE UP (ref 80–100)
MONOCYTES # BLD AUTO: 0.55 K/UL — SIGNIFICANT CHANGE UP (ref 0–0.9)
MONOCYTES NFR BLD AUTO: 4.7 % — SIGNIFICANT CHANGE UP (ref 2–14)
NEUTROPHILS # BLD AUTO: 9.46 K/UL — HIGH (ref 1.8–7.4)
NEUTROPHILS NFR BLD AUTO: 81.2 % — HIGH (ref 43–77)
NON HDL CHOLESTEROL: 74 MG/DL — SIGNIFICANT CHANGE UP
NRBC # BLD: 0 /100 WBCS — SIGNIFICANT CHANGE UP (ref 0–0)
NRBC # FLD: 0 K/UL — SIGNIFICANT CHANGE UP (ref 0–0)
PCO2 BLDV: 32 MMHG — LOW (ref 39–42)
PH BLDV: 7.38 — SIGNIFICANT CHANGE UP (ref 7.32–7.43)
PHOSPHATE SERPL-MCNC: 3.5 MG/DL — SIGNIFICANT CHANGE UP (ref 2.5–4.5)
PLATELET # BLD AUTO: 192 K/UL — SIGNIFICANT CHANGE UP (ref 150–400)
PO2 BLDV: 67 MMHG — SIGNIFICANT CHANGE UP
POTASSIUM BLDV-SCNC: 4.3 MMOL/L — SIGNIFICANT CHANGE UP (ref 3.5–5.1)
POTASSIUM SERPL-MCNC: 4.6 MMOL/L — SIGNIFICANT CHANGE UP (ref 3.5–5.3)
POTASSIUM SERPL-MCNC: 4.6 MMOL/L — SIGNIFICANT CHANGE UP (ref 3.5–5.3)
POTASSIUM SERPL-SCNC: 4.6 MMOL/L — SIGNIFICANT CHANGE UP (ref 3.5–5.3)
POTASSIUM SERPL-SCNC: 4.6 MMOL/L — SIGNIFICANT CHANGE UP (ref 3.5–5.3)
RBC # BLD: 4.38 M/UL — SIGNIFICANT CHANGE UP (ref 3.8–5.2)
RBC # FLD: 12.9 % — SIGNIFICANT CHANGE UP (ref 10.3–14.5)
SAO2 % BLDV: 92.8 % — SIGNIFICANT CHANGE UP
SODIUM SERPL-SCNC: 131 MMOL/L — LOW (ref 135–145)
SODIUM SERPL-SCNC: 131 MMOL/L — LOW (ref 135–145)
TRIGL SERPL-MCNC: 115 MG/DL — SIGNIFICANT CHANGE UP
WBC # BLD: 11.65 K/UL — HIGH (ref 3.8–10.5)
WBC # FLD AUTO: 11.65 K/UL — HIGH (ref 3.8–10.5)

## 2022-09-27 PROCEDURE — 99233 SBSQ HOSP IP/OBS HIGH 50: CPT

## 2022-09-27 RX ORDER — SODIUM CHLORIDE 9 MG/ML
250 INJECTION, SOLUTION INTRAVENOUS ONCE
Refills: 0 | Status: COMPLETED | OUTPATIENT
Start: 2022-09-27 | End: 2022-09-27

## 2022-09-27 RX ORDER — INSULIN LISPRO 100/ML
VIAL (ML) SUBCUTANEOUS
Refills: 0 | Status: DISCONTINUED | OUTPATIENT
Start: 2022-09-27 | End: 2022-10-03

## 2022-09-27 RX ORDER — LEVALBUTEROL 1.25 MG/.5ML
0.63 SOLUTION, CONCENTRATE RESPIRATORY (INHALATION) EVERY 6 HOURS
Refills: 0 | Status: DISCONTINUED | OUTPATIENT
Start: 2022-09-27 | End: 2022-09-30

## 2022-09-27 RX ADMIN — Medication 1200 MILLIGRAM(S): at 07:22

## 2022-09-27 RX ADMIN — LOSARTAN POTASSIUM 100 MILLIGRAM(S): 100 TABLET, FILM COATED ORAL at 07:22

## 2022-09-27 RX ADMIN — Medication 12.5 MILLIGRAM(S): at 07:21

## 2022-09-27 RX ADMIN — BUDESONIDE AND FORMOTEROL FUMARATE DIHYDRATE 2 PUFF(S): 160; 4.5 AEROSOL RESPIRATORY (INHALATION) at 11:11

## 2022-09-27 RX ADMIN — Medication 1200 MILLIGRAM(S): at 17:57

## 2022-09-27 RX ADMIN — SODIUM CHLORIDE 75 MILLILITER(S): 9 INJECTION, SOLUTION INTRAVENOUS at 00:02

## 2022-09-27 RX ADMIN — Medication 3: at 12:56

## 2022-09-27 RX ADMIN — Medication 3 MILLILITER(S): at 07:22

## 2022-09-27 RX ADMIN — Medication 650 MILLIGRAM(S): at 23:34

## 2022-09-27 RX ADMIN — SODIUM CHLORIDE 500 MILLILITER(S): 9 INJECTION, SOLUTION INTRAVENOUS at 21:51

## 2022-09-27 RX ADMIN — ENOXAPARIN SODIUM 40 MILLIGRAM(S): 100 INJECTION SUBCUTANEOUS at 07:22

## 2022-09-27 RX ADMIN — Medication 3 MILLILITER(S): at 03:45

## 2022-09-27 RX ADMIN — Medication 2: at 09:19

## 2022-09-27 RX ADMIN — Medication 650 MILLIGRAM(S): at 22:54

## 2022-09-27 RX ADMIN — LEVALBUTEROL 0.63 MILLIGRAM(S): 1.25 SOLUTION, CONCENTRATE RESPIRATORY (INHALATION) at 19:26

## 2022-09-27 RX ADMIN — LEVALBUTEROL 0.63 MILLIGRAM(S): 1.25 SOLUTION, CONCENTRATE RESPIRATORY (INHALATION) at 13:00

## 2022-09-27 RX ADMIN — ATORVASTATIN CALCIUM 40 MILLIGRAM(S): 80 TABLET, FILM COATED ORAL at 21:49

## 2022-09-27 RX ADMIN — Medication 40 MILLIGRAM(S): at 07:22

## 2022-09-27 RX ADMIN — BUDESONIDE AND FORMOTEROL FUMARATE DIHYDRATE 2 PUFF(S): 160; 4.5 AEROSOL RESPIRATORY (INHALATION) at 21:50

## 2022-09-27 RX ADMIN — Medication 4: at 17:24

## 2022-09-27 RX ADMIN — SODIUM CHLORIDE 75 MILLILITER(S): 9 INJECTION, SOLUTION INTRAVENOUS at 13:48

## 2022-09-27 NOTE — PROGRESS NOTE ADULT - PROBLEM SELECTOR PLAN 1
likely secondary to rhino/enterovirus infection   follow with OP pulmonologist Geena Amaral, did not improve with PO steroids and azithromycin  CXR clear lungs, + entero/rhino-virus  S/p solumedrol 125mg IV in ED  Continue with prednisone 40mg daily - will need taper   change nebulizer to Xopenex given elevated lactate, q6h  continue to monitor

## 2022-09-27 NOTE — PROGRESS NOTE ADULT - SUBJECTIVE AND OBJECTIVE BOX
Patient is a 62y old  Female who presents with a chief complaint of asthma exacerbation (26 Sep 2022 19:10)    Ministerio Holland MD   [facility] Division of Hospital Medicine   Pager [phone]  Reachable on [employer] Teams     SUBJECTIVE / OVERNIGHT EVENTS:  Patient seen and examined this morning. She states that her SOB and wheezing has significantly improved since coming to the hospital. She still has cough which is nonproductive.   No fevers, chills, chest pain. No sputum production.     MEDICATIONS  (STANDING):  ALBUTerol    90 MICROgram(s) HFA Inhaler 4 Puff(s) Inhalation every 6 hours  atorvastatin 40 milliGRAM(s) Oral at bedtime  budesonide 160 MICROgram(s)/formoterol 4.5 MICROgram(s) Inhaler 2 Puff(s) Inhalation two times a day  dextrose 5%. 1000 milliLiter(s) (100 mL/Hr) IV Continuous <Continuous>  dextrose 5%. 1000 milliLiter(s) (50 mL/Hr) IV Continuous <Continuous>  dextrose 50% Injectable 25 Gram(s) IV Push once  dextrose 50% Injectable 12.5 Gram(s) IV Push once  dextrose 50% Injectable 25 Gram(s) IV Push once  enoxaparin Injectable 40 milliGRAM(s) SubCutaneous every 24 hours  glucagon  Injectable 1 milliGRAM(s) IntraMuscular once  guaiFENesin ER 1200 milliGRAM(s) Oral every 12 hours  hydrochlorothiazide 12.5 milliGRAM(s) Oral daily  insulin lispro (ADMELOG) corrective regimen sliding scale   SubCutaneous three times a day before meals  insulin lispro (ADMELOG) corrective regimen sliding scale   SubCutaneous at bedtime  lactated ringers. 1000 milliLiter(s) (75 mL/Hr) IV Continuous <Continuous>  levalbuterol Inhalation 0.63 milliGRAM(s) Inhalation every 6 hours  losartan 100 milliGRAM(s) Oral daily  predniSONE   Tablet 40 milliGRAM(s) Oral daily    MEDICATIONS  (PRN):  acetaminophen     Tablet .. 650 milliGRAM(s) Oral every 6 hours PRN Temp greater or equal to 38C (100.4F), Mild Pain (1 - 3)  aluminum hydroxide/magnesium hydroxide/simethicone Suspension 30 milliLiter(s) Oral every 4 hours PRN Dyspepsia  benzonatate 100 milliGRAM(s) Oral every 8 hours PRN Cough  dextrose Oral Gel 15 Gram(s) Oral once PRN Blood Glucose LESS THAN 70 milliGRAM(s)/deciliter  hydrocodone/homatropine Syrup 5 milliLiter(s) Oral every 12 hours PRN Cough  melatonin 3 milliGRAM(s) Oral at bedtime PRN Insomnia  ondansetron Injectable 4 milliGRAM(s) IV Push every 8 hours PRN Nausea and/or Vomiting      Vital Signs Last 24 Hrs  T(C): 36.7 (27 Sep 2022 09:46), Max: 36.7 (26 Sep 2022 16:35)  T(F): 98.1 (27 Sep 2022 09:46), Max: 98.1 (27 Sep 2022 06:58)  HR: 65 (27 Sep 2022 09:46) (65 - 100)  BP: 170/82 (27 Sep 2022 09:46) (103/56 - 170/82)  BP(mean): --  RR: 20 (27 Sep 2022 09:46) (18 - 20)  SpO2: 100% (27 Sep 2022 09:46) (99% - 100%)  CAPILLARY BLOOD GLUCOSE  234 (27 Sep 2022 09:11)      POCT Blood Glucose.: 273 mg/dL (27 Sep 2022 12:23)  POCT Blood Glucose.: 366 mg/dL (26 Sep 2022 22:29)    I&O's Summary      General: middle aged woman sitting up in chair appears well in NAD, awake and alert  HENMT:  MMM  Respiratory: No respiratory distress, CTABL, No rales or wheezing.   Cardiovascular: S1,S2; Regular rate and rhythm; No m/g/r. 2+ peripheral pulses  Gastrointestinal: Soft, Nontender, Nondistended; +BS.   Extremities: No c/c/e; warm to touch  Psych: appropriate mood and affect    LABS:                        12.1   11.65 )-----------( 192      ( 27 Sep 2022 06:10 )             39.0     09-27    131<L>  |  96<L>  |  17  ----------------------------<  350<H>  4.6   |  13<L>  |  0.66    Ca    10.0      27 Sep 2022 06:10  Phos  3.5     09-27  Mg     2.20     09-27    TPro  7.9  /  Alb  5.0  /  TBili  <0.2  /  DBili  x   /  AST  23  /  ALT  31  /  AlkPhos  103  09-26              RADIOLOGY & ADDITIONAL TESTS:    Imaging Personally Reviewed:    Consultant(s) Notes Reviewed:      Care Discussed with Consultants/Other Providers:

## 2022-09-27 NOTE — PROGRESS NOTE ADULT - PROBLEM SELECTOR PLAN 2
LA 4.6 -> 6.0->9.0->6.9 -> 5.9   appears well and is clinically improving   likely Type B 2/2 albuterol nebulizers, has also been on metformin which is on hold   changed nebulizer to Xopenex given elevated lactate, q6h  c/w LR 75ml/hr  will continue to monitor to normalization  if develops worsening SOB or increasing lactate will check CTA chest

## 2022-09-28 LAB
ANION GAP SERPL CALC-SCNC: 12 MMOL/L — SIGNIFICANT CHANGE UP (ref 7–14)
BUN SERPL-MCNC: 18 MG/DL — SIGNIFICANT CHANGE UP (ref 7–23)
CALCIUM SERPL-MCNC: 9.5 MG/DL — SIGNIFICANT CHANGE UP (ref 8.4–10.5)
CHLORIDE SERPL-SCNC: 103 MMOL/L — SIGNIFICANT CHANGE UP (ref 98–107)
CHOLEST SERPL-MCNC: 147 MG/DL — SIGNIFICANT CHANGE UP
CO2 SERPL-SCNC: 25 MMOL/L — SIGNIFICANT CHANGE UP (ref 22–31)
CREAT SERPL-MCNC: 0.8 MG/DL — SIGNIFICANT CHANGE UP (ref 0.5–1.3)
EGFR: 84 ML/MIN/1.73M2 — SIGNIFICANT CHANGE UP
GAS PNL BLDV: SIGNIFICANT CHANGE UP
GLUCOSE BLDC GLUCOMTR-MCNC: 133 MG/DL — HIGH (ref 70–99)
GLUCOSE BLDC GLUCOMTR-MCNC: 169 MG/DL — HIGH (ref 70–99)
GLUCOSE BLDC GLUCOMTR-MCNC: 242 MG/DL — HIGH (ref 70–99)
GLUCOSE BLDC GLUCOMTR-MCNC: 372 MG/DL — HIGH (ref 70–99)
GLUCOSE SERPL-MCNC: 146 MG/DL — HIGH (ref 70–99)
HCT VFR BLD CALC: 35.7 % — SIGNIFICANT CHANGE UP (ref 34.5–45)
HDLC SERPL-MCNC: 100 MG/DL — SIGNIFICANT CHANGE UP
HGB BLD-MCNC: 11.7 G/DL — SIGNIFICANT CHANGE UP (ref 11.5–15.5)
LACTATE SERPL-SCNC: 2.6 MMOL/L — HIGH (ref 0.5–2)
LACTATE SERPL-SCNC: 3.6 MMOL/L — HIGH (ref 0.5–2)
LACTATE SERPL-SCNC: 3.7 MMOL/L — HIGH (ref 0.5–2)
LACTATE SERPL-SCNC: 5.5 MMOL/L — CRITICAL HIGH (ref 0.5–2)
LIPID PNL WITH DIRECT LDL SERPL: 31 MG/DL — SIGNIFICANT CHANGE UP
MAGNESIUM SERPL-MCNC: 1.9 MG/DL — SIGNIFICANT CHANGE UP (ref 1.6–2.6)
MCHC RBC-ENTMCNC: 27.7 PG — SIGNIFICANT CHANGE UP (ref 27–34)
MCHC RBC-ENTMCNC: 32.8 GM/DL — SIGNIFICANT CHANGE UP (ref 32–36)
MCV RBC AUTO: 84.4 FL — SIGNIFICANT CHANGE UP (ref 80–100)
NON HDL CHOLESTEROL: 47 MG/DL — SIGNIFICANT CHANGE UP
NRBC # BLD: 0 /100 WBCS — SIGNIFICANT CHANGE UP (ref 0–0)
NRBC # FLD: 0 K/UL — SIGNIFICANT CHANGE UP (ref 0–0)
PHOSPHATE SERPL-MCNC: 3.4 MG/DL — SIGNIFICANT CHANGE UP (ref 2.5–4.5)
PLATELET # BLD AUTO: 269 K/UL — SIGNIFICANT CHANGE UP (ref 150–400)
POTASSIUM SERPL-MCNC: 4.5 MMOL/L — SIGNIFICANT CHANGE UP (ref 3.5–5.3)
POTASSIUM SERPL-SCNC: 4.5 MMOL/L — SIGNIFICANT CHANGE UP (ref 3.5–5.3)
RBC # BLD: 4.23 M/UL — SIGNIFICANT CHANGE UP (ref 3.8–5.2)
RBC # FLD: 13 % — SIGNIFICANT CHANGE UP (ref 10.3–14.5)
SODIUM SERPL-SCNC: 140 MMOL/L — SIGNIFICANT CHANGE UP (ref 135–145)
TRIGL SERPL-MCNC: 81 MG/DL — SIGNIFICANT CHANGE UP
WBC # BLD: 12.58 K/UL — HIGH (ref 3.8–10.5)
WBC # FLD AUTO: 12.58 K/UL — HIGH (ref 3.8–10.5)

## 2022-09-28 PROCEDURE — 99233 SBSQ HOSP IP/OBS HIGH 50: CPT

## 2022-09-28 RX ORDER — INFLUENZA VIRUS VACCINE 15; 15; 15; 15 UG/.5ML; UG/.5ML; UG/.5ML; UG/.5ML
0.5 SUSPENSION INTRAMUSCULAR ONCE
Refills: 0 | Status: DISCONTINUED | OUTPATIENT
Start: 2022-09-28 | End: 2022-10-04

## 2022-09-28 RX ADMIN — SODIUM CHLORIDE 75 MILLILITER(S): 9 INJECTION, SOLUTION INTRAVENOUS at 18:18

## 2022-09-28 RX ADMIN — Medication 1200 MILLIGRAM(S): at 05:34

## 2022-09-28 RX ADMIN — BUDESONIDE AND FORMOTEROL FUMARATE DIHYDRATE 2 PUFF(S): 160; 4.5 AEROSOL RESPIRATORY (INHALATION) at 21:06

## 2022-09-28 RX ADMIN — Medication 10: at 18:12

## 2022-09-28 RX ADMIN — LOSARTAN POTASSIUM 100 MILLIGRAM(S): 100 TABLET, FILM COATED ORAL at 05:34

## 2022-09-28 RX ADMIN — Medication 1200 MILLIGRAM(S): at 18:11

## 2022-09-28 RX ADMIN — Medication 40 MILLIGRAM(S): at 18:11

## 2022-09-28 RX ADMIN — Medication 4: at 12:54

## 2022-09-28 RX ADMIN — ATORVASTATIN CALCIUM 40 MILLIGRAM(S): 80 TABLET, FILM COATED ORAL at 21:07

## 2022-09-28 RX ADMIN — Medication 12.5 MILLIGRAM(S): at 05:34

## 2022-09-28 RX ADMIN — Medication 2: at 07:45

## 2022-09-28 RX ADMIN — BUDESONIDE AND FORMOTEROL FUMARATE DIHYDRATE 2 PUFF(S): 160; 4.5 AEROSOL RESPIRATORY (INHALATION) at 13:04

## 2022-09-28 RX ADMIN — Medication 40 MILLIGRAM(S): at 05:34

## 2022-09-28 RX ADMIN — LEVALBUTEROL 0.63 MILLIGRAM(S): 1.25 SOLUTION, CONCENTRATE RESPIRATORY (INHALATION) at 09:59

## 2022-09-28 RX ADMIN — LEVALBUTEROL 0.63 MILLIGRAM(S): 1.25 SOLUTION, CONCENTRATE RESPIRATORY (INHALATION) at 22:07

## 2022-09-28 RX ADMIN — LEVALBUTEROL 0.63 MILLIGRAM(S): 1.25 SOLUTION, CONCENTRATE RESPIRATORY (INHALATION) at 16:19

## 2022-09-28 NOTE — PROGRESS NOTE ADULT - PROBLEM SELECTOR PLAN 1
secondary to rhino/enterovirus infection   follows with OP pulmonologist Geena Amaral, did not improve with PO steroids and azithromycin  CXR clear lungs, + entero/rhino-virus  S/p solumedrol 125mg IV in ED  Continue with prednisone 40mg daily - will need taper   c/w Xopenex nebulizers given elevated lactate, q6h  continue to monitor

## 2022-09-28 NOTE — PATIENT PROFILE ADULT - FALL HARM RISK - HARM RISK INTERVENTIONS

## 2022-09-28 NOTE — PROGRESS NOTE ADULT - PROBLEM SELECTOR PLAN 2
LA 4.6 -> 6.0->9.0->6.9 -> 5.9 -> 3.6  appears well and is clinically improving   likely Type B 2/2 albuterol nebulizers, has also been on metformin which is on hold   changed nebulizer to Xopenex given elevated lactate, q6h  c/w LR 75ml/hr  will continue to monitor to normalization

## 2022-09-28 NOTE — PROGRESS NOTE ADULT - SUBJECTIVE AND OBJECTIVE BOX
Patient is a 62y old  Female who presents with a chief complaint of asthma exacerbation (27 Sep 2022 13:56)    Ministerio Holland MD   [facility] Division of Hospital Medicine   Pager [phone]  Reachable on Microsoft Teams     SUBJECTIVE / OVERNIGHT EVENTS:  Patient seen and examined this morning. Coughing increased today and has been producing yellow colored mucous.   No fevers or chills.     MEDICATIONS  (STANDING):  atorvastatin 40 milliGRAM(s) Oral at bedtime  budesonide 160 MICROgram(s)/formoterol 4.5 MICROgram(s) Inhaler 2 Puff(s) Inhalation two times a day  dextrose 5%. 1000 milliLiter(s) (100 mL/Hr) IV Continuous <Continuous>  dextrose 5%. 1000 milliLiter(s) (50 mL/Hr) IV Continuous <Continuous>  dextrose 50% Injectable 25 Gram(s) IV Push once  dextrose 50% Injectable 12.5 Gram(s) IV Push once  dextrose 50% Injectable 25 Gram(s) IV Push once  enoxaparin Injectable 40 milliGRAM(s) SubCutaneous every 24 hours  glucagon  Injectable 1 milliGRAM(s) IntraMuscular once  guaiFENesin ER 1200 milliGRAM(s) Oral every 12 hours  hydrochlorothiazide 12.5 milliGRAM(s) Oral daily  influenza   Vaccine 0.5 milliLiter(s) IntraMuscular once  insulin lispro (ADMELOG) corrective regimen sliding scale   SubCutaneous three times a day before meals  insulin lispro (ADMELOG) corrective regimen sliding scale   SubCutaneous at bedtime  lactated ringers. 1000 milliLiter(s) (75 mL/Hr) IV Continuous <Continuous>  levalbuterol Inhalation 0.63 milliGRAM(s) Inhalation every 6 hours  losartan 100 milliGRAM(s) Oral daily  predniSONE   Tablet 40 milliGRAM(s) Oral daily    MEDICATIONS  (PRN):  acetaminophen     Tablet .. 650 milliGRAM(s) Oral every 6 hours PRN Temp greater or equal to 38C (100.4F), Mild Pain (1 - 3)  aluminum hydroxide/magnesium hydroxide/simethicone Suspension 30 milliLiter(s) Oral every 4 hours PRN Dyspepsia  benzonatate 100 milliGRAM(s) Oral every 8 hours PRN Cough  dextrose Oral Gel 15 Gram(s) Oral once PRN Blood Glucose LESS THAN 70 milliGRAM(s)/deciliter  hydrocodone/homatropine Syrup 5 milliLiter(s) Oral every 12 hours PRN Cough  melatonin 3 milliGRAM(s) Oral at bedtime PRN Insomnia  ondansetron Injectable 4 milliGRAM(s) IV Push every 8 hours PRN Nausea and/or Vomiting      Vital Signs Last 24 Hrs  T(C): 36.5 (28 Sep 2022 13:30), Max: 37 (27 Sep 2022 18:10)  T(F): 97.7 (28 Sep 2022 13:30), Max: 98.6 (27 Sep 2022 18:10)  HR: 70 (28 Sep 2022 13:30) (63 - 70)  BP: 147/88 (28 Sep 2022 13:30) (133/77 - 147/88)  BP(mean): --  RR: 18 (28 Sep 2022 13:30) (17 - 18)  SpO2: 100% (28 Sep 2022 13:30) (96% - 100%)  CAPILLARY BLOOD GLUCOSE      POCT Blood Glucose.: 242 mg/dL (28 Sep 2022 12:52)  POCT Blood Glucose.: 169 mg/dL (28 Sep 2022 07:27)  POCT Blood Glucose.: 227 mg/dL (27 Sep 2022 23:04)  POCT Blood Glucose.: 209 mg/dL (27 Sep 2022 17:06)    I&O's Summary      General: middle aged woman sitting up in chair appears well in NAD, awake and alert  HENMT:  MMM  Respiratory: No respiratory distress, CTABL, mild expiratory wheezing  Cardiovascular: S1,S2; Regular rate and rhythm; No m/g/r. 2+ peripheral pulses  Gastrointestinal: Soft, Nontender, Nondistended; +BS.   Extremities: No c/c/e; warm to touch  Psych: appropriate mood and affect    LABS:                        11.7   12.58 )-----------( 269      ( 28 Sep 2022 06:55 )             35.7     09-28    140  |  103  |  18  ----------------------------<  146<H>  4.5   |  25  |  0.80    Ca    9.5      28 Sep 2022 06:55  Phos  3.4     09-28  Mg     1.90     09-28                RADIOLOGY & ADDITIONAL TESTS:    Imaging Personally Reviewed:    Consultant(s) Notes Reviewed:      Care Discussed with Consultants/Other Providers:

## 2022-09-29 ENCOUNTER — NON-APPOINTMENT (OUTPATIENT)
Age: 62
End: 2022-09-29

## 2022-09-29 LAB
GLUCOSE BLDC GLUCOMTR-MCNC: 123 MG/DL — HIGH (ref 70–99)
GLUCOSE BLDC GLUCOMTR-MCNC: 150 MG/DL — HIGH (ref 70–99)
GLUCOSE BLDC GLUCOMTR-MCNC: 191 MG/DL — HIGH (ref 70–99)
GLUCOSE BLDC GLUCOMTR-MCNC: 382 MG/DL — HIGH (ref 70–99)
GLUCOSE BLDC GLUCOMTR-MCNC: 404 MG/DL — HIGH (ref 70–99)
HCT VFR BLD CALC: 37.7 % — SIGNIFICANT CHANGE UP (ref 34.5–45)
HGB BLD-MCNC: 12.4 G/DL — SIGNIFICANT CHANGE UP (ref 11.5–15.5)
LACTATE SERPL-SCNC: 4.7 MMOL/L — CRITICAL HIGH (ref 0.5–2)
LACTATE SERPL-SCNC: 5.2 MMOL/L — CRITICAL HIGH (ref 0.5–2)
MCHC RBC-ENTMCNC: 27.8 PG — SIGNIFICANT CHANGE UP (ref 27–34)
MCHC RBC-ENTMCNC: 32.9 GM/DL — SIGNIFICANT CHANGE UP (ref 32–36)
MCV RBC AUTO: 84.5 FL — SIGNIFICANT CHANGE UP (ref 80–100)
NRBC # BLD: 0 /100 WBCS — SIGNIFICANT CHANGE UP (ref 0–0)
NRBC # FLD: 0 K/UL — SIGNIFICANT CHANGE UP (ref 0–0)
PLATELET # BLD AUTO: 279 K/UL — SIGNIFICANT CHANGE UP (ref 150–400)
RBC # BLD: 4.46 M/UL — SIGNIFICANT CHANGE UP (ref 3.8–5.2)
RBC # FLD: 13 % — SIGNIFICANT CHANGE UP (ref 10.3–14.5)
WBC # BLD: 10.96 K/UL — HIGH (ref 3.8–10.5)
WBC # FLD AUTO: 10.96 K/UL — HIGH (ref 3.8–10.5)

## 2022-09-29 PROCEDURE — 99233 SBSQ HOSP IP/OBS HIGH 50: CPT

## 2022-09-29 RX ORDER — SENNA PLUS 8.6 MG/1
2 TABLET ORAL AT BEDTIME
Refills: 0 | Status: DISCONTINUED | OUTPATIENT
Start: 2022-09-29 | End: 2022-09-29

## 2022-09-29 RX ORDER — POLYETHYLENE GLYCOL 3350 17 G/17G
17 POWDER, FOR SOLUTION ORAL DAILY
Refills: 0 | Status: DISCONTINUED | OUTPATIENT
Start: 2022-09-29 | End: 2022-09-29

## 2022-09-29 RX ADMIN — LEVALBUTEROL 0.63 MILLIGRAM(S): 1.25 SOLUTION, CONCENTRATE RESPIRATORY (INHALATION) at 03:42

## 2022-09-29 RX ADMIN — Medication 2: at 07:44

## 2022-09-29 RX ADMIN — BUDESONIDE AND FORMOTEROL FUMARATE DIHYDRATE 2 PUFF(S): 160; 4.5 AEROSOL RESPIRATORY (INHALATION) at 09:06

## 2022-09-29 RX ADMIN — Medication 1200 MILLIGRAM(S): at 06:17

## 2022-09-29 RX ADMIN — ENOXAPARIN SODIUM 40 MILLIGRAM(S): 100 INJECTION SUBCUTANEOUS at 06:52

## 2022-09-29 RX ADMIN — LOSARTAN POTASSIUM 100 MILLIGRAM(S): 100 TABLET, FILM COATED ORAL at 06:17

## 2022-09-29 RX ADMIN — Medication 4: at 21:32

## 2022-09-29 RX ADMIN — Medication 12.5 MILLIGRAM(S): at 06:16

## 2022-09-29 RX ADMIN — Medication 60 MILLIGRAM(S): at 17:26

## 2022-09-29 RX ADMIN — LEVALBUTEROL 0.63 MILLIGRAM(S): 1.25 SOLUTION, CONCENTRATE RESPIRATORY (INHALATION) at 08:27

## 2022-09-29 RX ADMIN — LEVALBUTEROL 0.63 MILLIGRAM(S): 1.25 SOLUTION, CONCENTRATE RESPIRATORY (INHALATION) at 22:57

## 2022-09-29 RX ADMIN — Medication 1200 MILLIGRAM(S): at 17:26

## 2022-09-29 RX ADMIN — SODIUM CHLORIDE 125 MILLILITER(S): 9 INJECTION, SOLUTION INTRAVENOUS at 09:06

## 2022-09-29 RX ADMIN — ATORVASTATIN CALCIUM 40 MILLIGRAM(S): 80 TABLET, FILM COATED ORAL at 21:32

## 2022-09-29 RX ADMIN — LEVALBUTEROL 0.63 MILLIGRAM(S): 1.25 SOLUTION, CONCENTRATE RESPIRATORY (INHALATION) at 15:31

## 2022-09-29 RX ADMIN — BUDESONIDE AND FORMOTEROL FUMARATE DIHYDRATE 2 PUFF(S): 160; 4.5 AEROSOL RESPIRATORY (INHALATION) at 21:32

## 2022-09-29 NOTE — PROGRESS NOTE ADULT - PROBLEM SELECTOR PLAN 1
secondary to rhino/enterovirus infection   follows with OP pulmonologist Geena Amaral, did not improve with PO steroids and azithromycin  CXR clear lungs, + entero/rhino-virus  S/p solumedrol 125mg IV in ED  Continue with prednisone 40mg daily - will need taper   c/w Xopenex nebulizers given elevated lactate, q6h  continue to monitor secondary to rhino/enterovirus infection   follows with OP pulmonologist Geena Amaral, did not improve with PO steroids and azithromycin  CXR clear lungs, + entero/rhino-virus  S/p solumedrol 125mg IV in ED  worsened after 40mg of prednisone, now on solumedrol 60 daily   c/w Xopenex nebulizers given elevated lactate, q6h  continue to monitor

## 2022-09-29 NOTE — PROGRESS NOTE ADULT - SUBJECTIVE AND OBJECTIVE BOX
Patient is a 62y old  Female who presents with a chief complaint of asthma exacerbation (28 Sep 2022 14:20)    Ministerio Holland MD   [facility] Division of Hospital Medicine   Pager [phone]  Reachable on Microsoft Teams     SUBJECTIVE / OVERNIGHT EVENTS:  Patient seen and examined today.    MEDICATIONS  (STANDING):  atorvastatin 40 milliGRAM(s) Oral at bedtime  budesonide 160 MICROgram(s)/formoterol 4.5 MICROgram(s) Inhaler 2 Puff(s) Inhalation two times a day  dextrose 5%. 1000 milliLiter(s) (100 mL/Hr) IV Continuous <Continuous>  dextrose 5%. 1000 milliLiter(s) (50 mL/Hr) IV Continuous <Continuous>  dextrose 50% Injectable 25 Gram(s) IV Push once  dextrose 50% Injectable 12.5 Gram(s) IV Push once  dextrose 50% Injectable 25 Gram(s) IV Push once  enoxaparin Injectable 40 milliGRAM(s) SubCutaneous every 24 hours  glucagon  Injectable 1 milliGRAM(s) IntraMuscular once  guaiFENesin ER 1200 milliGRAM(s) Oral every 12 hours  hydrochlorothiazide 12.5 milliGRAM(s) Oral daily  influenza   Vaccine 0.5 milliLiter(s) IntraMuscular once  insulin lispro (ADMELOG) corrective regimen sliding scale   SubCutaneous three times a day before meals  insulin lispro (ADMELOG) corrective regimen sliding scale   SubCutaneous at bedtime  lactated ringers. 1000 milliLiter(s) (75 mL/Hr) IV Continuous <Continuous>  levalbuterol Inhalation 0.63 milliGRAM(s) Inhalation every 6 hours  losartan 100 milliGRAM(s) Oral daily    MEDICATIONS  (PRN):  acetaminophen     Tablet .. 650 milliGRAM(s) Oral every 6 hours PRN Temp greater or equal to 38C (100.4F), Mild Pain (1 - 3)  aluminum hydroxide/magnesium hydroxide/simethicone Suspension 30 milliLiter(s) Oral every 4 hours PRN Dyspepsia  benzonatate 100 milliGRAM(s) Oral every 8 hours PRN Cough  dextrose Oral Gel 15 Gram(s) Oral once PRN Blood Glucose LESS THAN 70 milliGRAM(s)/deciliter  hydrocodone/homatropine Syrup 5 milliLiter(s) Oral every 12 hours PRN Cough  melatonin 3 milliGRAM(s) Oral at bedtime PRN Insomnia  ondansetron Injectable 4 milliGRAM(s) IV Push every 8 hours PRN Nausea and/or Vomiting      Vital Signs Last 24 Hrs  T(C): 36.4 (29 Sep 2022 06:16), Max: 36.8 (28 Sep 2022 21:06)  T(F): 97.6 (29 Sep 2022 06:16), Max: 98.2 (28 Sep 2022 21:06)  HR: 62 (29 Sep 2022 08:27) (62 - 72)  BP: 150/81 (29 Sep 2022 06:16) (144/82 - 150/81)  BP(mean): --  RR: 18 (29 Sep 2022 06:16) (17 - 18)  SpO2: 100% (29 Sep 2022 08:27) (100% - 100%)  CAPILLARY BLOOD GLUCOSE      POCT Blood Glucose.: 191 mg/dL (29 Sep 2022 07:07)  POCT Blood Glucose.: 133 mg/dL (28 Sep 2022 21:00)  POCT Blood Glucose.: 372 mg/dL (28 Sep 2022 18:07)  POCT Blood Glucose.: 242 mg/dL (28 Sep 2022 12:52)    I&O's Summary      General: middle aged woman sitting up in chair appears well in NAD, awake and alert  HENMT:  MMM  Respiratory: No respiratory distress, CTABL, mild expiratory wheezing  Cardiovascular: S1,S2; Regular rate and rhythm; No m/g/r. 2+ peripheral pulses  Gastrointestinal: Soft, Nontender, Nondistended; +BS.   Extremities: No c/c/e; warm to touch  Psych: appropriate mood and affect    LABS:                        12.4   10.96 )-----------( 279      ( 29 Sep 2022 05:20 )             37.7     09-28    140  |  103  |  18  ----------------------------<  146<H>  4.5   |  25  |  0.80    Ca    9.5      28 Sep 2022 06:55  Phos  3.4     09-28  Mg     1.90     09-28                RADIOLOGY & ADDITIONAL TESTS:    Imaging Personally Reviewed:    Consultant(s) Notes Reviewed:      Care Discussed with Consultants/Other Providers:   Patient is a 62y old  Female who presents with a chief complaint of asthma exacerbation (28 Sep 2022 14:20)    Ministerio Holland MD   [facility] of Hospital Medicine   Pager [phone]  Reachable on Microsoft Teams     SUBJECTIVE / OVERNIGHT EVENTS:  Patient seen and examined this morning. She states that she is feeling better today. No shortness of breath and cough has improved.  Still with upper respiratory sounds.     MEDICATIONS  (STANDING):  atorvastatin 40 milliGRAM(s) Oral at bedtime  budesonide 160 MICROgram(s)/formoterol 4.5 MICROgram(s) Inhaler 2 Puff(s) Inhalation two times a day  dextrose 5%. 1000 milliLiter(s) (100 mL/Hr) IV Continuous <Continuous>  dextrose 5%. 1000 milliLiter(s) (50 mL/Hr) IV Continuous <Continuous>  dextrose 50% Injectable 25 Gram(s) IV Push once  dextrose 50% Injectable 12.5 Gram(s) IV Push once  dextrose 50% Injectable 25 Gram(s) IV Push once  enoxaparin Injectable 40 milliGRAM(s) SubCutaneous every 24 hours  glucagon  Injectable 1 milliGRAM(s) IntraMuscular once  guaiFENesin ER 1200 milliGRAM(s) Oral every 12 hours  hydrochlorothiazide 12.5 milliGRAM(s) Oral daily  influenza   Vaccine 0.5 milliLiter(s) IntraMuscular once  insulin lispro (ADMELOG) corrective regimen sliding scale   SubCutaneous three times a day before meals  insulin lispro (ADMELOG) corrective regimen sliding scale   SubCutaneous at bedtime  lactated ringers. 1000 milliLiter(s) (75 mL/Hr) IV Continuous <Continuous>  levalbuterol Inhalation 0.63 milliGRAM(s) Inhalation every 6 hours  losartan 100 milliGRAM(s) Oral daily    MEDICATIONS  (PRN):  acetaminophen     Tablet .. 650 milliGRAM(s) Oral every 6 hours PRN Temp greater or equal to 38C (100.4F), Mild Pain (1 - 3)  aluminum hydroxide/magnesium hydroxide/simethicone Suspension 30 milliLiter(s) Oral every 4 hours PRN Dyspepsia  benzonatate 100 milliGRAM(s) Oral every 8 hours PRN Cough  dextrose Oral Gel 15 Gram(s) Oral once PRN Blood Glucose LESS THAN 70 milliGRAM(s)/deciliter  hydrocodone/homatropine Syrup 5 milliLiter(s) Oral every 12 hours PRN Cough  melatonin 3 milliGRAM(s) Oral at bedtime PRN Insomnia  ondansetron Injectable 4 milliGRAM(s) IV Push every 8 hours PRN Nausea and/or Vomiting      Vital Signs Last 24 Hrs  T(C): 36.4 (29 Sep 2022 06:16), Max: 36.8 (28 Sep 2022 21:06)  T(F): 97.6 (29 Sep 2022 06:16), Max: 98.2 (28 Sep 2022 21:06)  HR: 62 (29 Sep 2022 08:27) (62 - 72)  BP: 150/81 (29 Sep 2022 06:16) (144/82 - 150/81)  BP(mean): --  RR: 18 (29 Sep 2022 06:16) (17 - 18)  SpO2: 100% (29 Sep 2022 08:27) (100% - 100%)  CAPILLARY BLOOD GLUCOSE      POCT Blood Glucose.: 191 mg/dL (29 Sep 2022 07:07)  POCT Blood Glucose.: 133 mg/dL (28 Sep 2022 21:00)  POCT Blood Glucose.: 372 mg/dL (28 Sep 2022 18:07)  POCT Blood Glucose.: 242 mg/dL (28 Sep 2022 12:52)    I&O's Summary      General: middle aged woman sitting up in chair appears well in NAD, awake and alert  HENMT:  MMM  Respiratory: No respiratory distress, CTABL, mild expiratory wheezing  Cardiovascular: S1,S2; Regular rate and rhythm; No m/g/r. 2+ peripheral pulses  Gastrointestinal: Soft, Nontender, Nondistended; +BS.   Extremities: No c/c/e; warm to touch  Psych: appropriate mood and affect    LABS:                        12.4   10.96 )-----------( 279      ( 29 Sep 2022 05:20 )             37.7     09-28    140  |  103  |  18  ----------------------------<  146<H>  4.5   |  25  |  0.80    Ca    9.5      28 Sep 2022 06:55  Phos  3.4     09-28  Mg     1.90     09-28                RADIOLOGY & ADDITIONAL TESTS:    Imaging Personally Reviewed:    Consultant(s) Notes Reviewed:      Care Discussed with Consultants/Other Providers:

## 2022-09-29 NOTE — PROGRESS NOTE ADULT - PROBLEM SELECTOR PLAN 2
LA 4.6 -> 6.0->9.0->6.9 -> 5.9 -> 3.6-> 2.6  5.5 this morning right after nebulizer therapy   appears well and is clinically improving   likely Type B 2/2 albuterol nebulizers, has also been on metformin which is on hold   changed nebulizer to Xopenex given elevated lactate, q6h  c/w LR 75ml/hr  will continue to monitor to normalization LA 4.6 -> 6.0->9.0->6.9 -> 5.9 -> 3.6-> 2.6  5.5 this morning right after nebulizer therapy   appears well and is clinically improving   likely Type B 2/2 albuterol nebulizers, has also been on metformin which is on hold   changed nebulizer to Xopenex given elevated lactate, q6h  LR 125ml/hr  will continue to monitor to normalization

## 2022-09-30 LAB
GLUCOSE BLDC GLUCOMTR-MCNC: 135 MG/DL — HIGH (ref 70–99)
GLUCOSE BLDC GLUCOMTR-MCNC: 205 MG/DL — HIGH (ref 70–99)
GLUCOSE BLDC GLUCOMTR-MCNC: 278 MG/DL — HIGH (ref 70–99)
GLUCOSE BLDC GLUCOMTR-MCNC: 443 MG/DL — HIGH (ref 70–99)
HCG SERPL-ACNC: <5 MIU/ML — SIGNIFICANT CHANGE UP
HCT VFR BLD CALC: 39.6 % — SIGNIFICANT CHANGE UP (ref 34.5–45)
HGB BLD-MCNC: 13 G/DL — SIGNIFICANT CHANGE UP (ref 11.5–15.5)
LACTATE BLDV-MCNC: 6.4 MMOL/L — CRITICAL HIGH (ref 0.5–2)
LACTATE SERPL-SCNC: 5 MMOL/L — CRITICAL HIGH (ref 0.5–2)
LACTATE SERPL-SCNC: 6.1 MMOL/L — CRITICAL HIGH (ref 0.5–2)
MCHC RBC-ENTMCNC: 27.8 PG — SIGNIFICANT CHANGE UP (ref 27–34)
MCHC RBC-ENTMCNC: 32.8 GM/DL — SIGNIFICANT CHANGE UP (ref 32–36)
MCV RBC AUTO: 84.6 FL — SIGNIFICANT CHANGE UP (ref 80–100)
NRBC # BLD: 0 /100 WBCS — SIGNIFICANT CHANGE UP (ref 0–0)
NRBC # FLD: 0 K/UL — SIGNIFICANT CHANGE UP (ref 0–0)
PLATELET # BLD AUTO: 200 K/UL — SIGNIFICANT CHANGE UP (ref 150–400)
RBC # BLD: 4.68 M/UL — SIGNIFICANT CHANGE UP (ref 3.8–5.2)
RBC # FLD: 13.1 % — SIGNIFICANT CHANGE UP (ref 10.3–14.5)
WBC # BLD: 11.34 K/UL — HIGH (ref 3.8–10.5)
WBC # FLD AUTO: 11.34 K/UL — HIGH (ref 3.8–10.5)

## 2022-09-30 PROCEDURE — 99233 SBSQ HOSP IP/OBS HIGH 50: CPT

## 2022-09-30 RX ORDER — INSULIN LISPRO 100/ML
VIAL (ML) SUBCUTANEOUS AT BEDTIME
Refills: 0 | Status: DISCONTINUED | OUTPATIENT
Start: 2022-09-30 | End: 2022-09-30

## 2022-09-30 RX ORDER — SODIUM CHLORIDE 9 MG/ML
700 INJECTION INTRAMUSCULAR; INTRAVENOUS; SUBCUTANEOUS
Refills: 0 | Status: DISCONTINUED | OUTPATIENT
Start: 2022-09-30 | End: 2022-09-30

## 2022-09-30 RX ORDER — LEVALBUTEROL 1.25 MG/.5ML
2 SOLUTION, CONCENTRATE RESPIRATORY (INHALATION) THREE TIMES A DAY
Refills: 0 | Status: DISCONTINUED | OUTPATIENT
Start: 2022-09-30 | End: 2022-10-01

## 2022-09-30 RX ADMIN — Medication 60 MILLIGRAM(S): at 05:23

## 2022-09-30 RX ADMIN — SODIUM CHLORIDE 125 MILLILITER(S): 9 INJECTION, SOLUTION INTRAVENOUS at 05:19

## 2022-09-30 RX ADMIN — Medication 1200 MILLIGRAM(S): at 05:23

## 2022-09-30 RX ADMIN — Medication 1200 MILLIGRAM(S): at 17:13

## 2022-09-30 RX ADMIN — LOSARTAN POTASSIUM 100 MILLIGRAM(S): 100 TABLET, FILM COATED ORAL at 05:22

## 2022-09-30 RX ADMIN — LEVALBUTEROL 0.63 MILLIGRAM(S): 1.25 SOLUTION, CONCENTRATE RESPIRATORY (INHALATION) at 04:15

## 2022-09-30 RX ADMIN — LEVALBUTEROL 2 PUFF(S): 1.25 SOLUTION, CONCENTRATE RESPIRATORY (INHALATION) at 21:15

## 2022-09-30 RX ADMIN — Medication 6: at 11:56

## 2022-09-30 RX ADMIN — Medication 4: at 22:52

## 2022-09-30 RX ADMIN — BUDESONIDE AND FORMOTEROL FUMARATE DIHYDRATE 2 PUFF(S): 160; 4.5 AEROSOL RESPIRATORY (INHALATION) at 21:16

## 2022-09-30 RX ADMIN — BUDESONIDE AND FORMOTEROL FUMARATE DIHYDRATE 2 PUFF(S): 160; 4.5 AEROSOL RESPIRATORY (INHALATION) at 08:58

## 2022-09-30 RX ADMIN — Medication 4: at 07:36

## 2022-09-30 RX ADMIN — ATORVASTATIN CALCIUM 40 MILLIGRAM(S): 80 TABLET, FILM COATED ORAL at 21:16

## 2022-09-30 RX ADMIN — Medication 12.5 MILLIGRAM(S): at 05:22

## 2022-09-30 RX ADMIN — Medication 650 MILLIGRAM(S): at 21:16

## 2022-09-30 RX ADMIN — ENOXAPARIN SODIUM 40 MILLIGRAM(S): 100 INJECTION SUBCUTANEOUS at 06:45

## 2022-09-30 RX ADMIN — Medication 40 MILLIGRAM(S): at 17:12

## 2022-09-30 NOTE — PROGRESS NOTE ADULT - PROBLEM SELECTOR PLAN 2
LA 4.6 -> 6.0->9.0->6.9  ->(changed to Xopenex)-> 5.9 -> 3.6-> 2.6  back up to 5s starting yesterday and now downtrending   appears well and is continues to be clinically improving   likely Type B 2/2 albuterol nebulizers, has also been on metformin which is on hold   changed nebulizer to in inhaler Xopenex   d/c IVF   will continue to monitor to normalization

## 2022-09-30 NOTE — PROGRESS NOTE ADULT - SUBJECTIVE AND OBJECTIVE BOX
Patient is a 62y old  Female who presents with a chief complaint of asthma exacerbation (29 Sep 2022 08:41)    Ministerio Holland MD   [facility] Division of Hospital Medicine   Pager [phone]  Reachable on Microsoft Teams     SUBJECTIVE / OVERNIGHT EVENTS:  Patient seen and examined this morning. She is feeling much better today. She states that wheezing has mostly resolved.   Cough has also significantly improved.     MEDICATIONS  (STANDING):  atorvastatin 40 milliGRAM(s) Oral at bedtime  budesonide 160 MICROgram(s)/formoterol 4.5 MICROgram(s) Inhaler 2 Puff(s) Inhalation two times a day  dextrose 5%. 1000 milliLiter(s) (100 mL/Hr) IV Continuous <Continuous>  dextrose 5%. 1000 milliLiter(s) (50 mL/Hr) IV Continuous <Continuous>  dextrose 50% Injectable 25 Gram(s) IV Push once  dextrose 50% Injectable 12.5 Gram(s) IV Push once  dextrose 50% Injectable 25 Gram(s) IV Push once  enoxaparin Injectable 40 milliGRAM(s) SubCutaneous every 24 hours  glucagon  Injectable 1 milliGRAM(s) IntraMuscular once  guaiFENesin ER 1200 milliGRAM(s) Oral every 12 hours  hydrochlorothiazide 12.5 milliGRAM(s) Oral daily  influenza   Vaccine 0.5 milliLiter(s) IntraMuscular once  insulin lispro (ADMELOG) corrective regimen sliding scale   SubCutaneous three times a day before meals  insulin lispro (ADMELOG) corrective regimen sliding scale   SubCutaneous at bedtime  levalbuterol 45 MICROgram(s) HFA Inhaler 2 Puff(s) Inhalation three times a day  losartan 100 milliGRAM(s) Oral daily  predniSONE   Tablet 40 milliGRAM(s) Oral daily    MEDICATIONS  (PRN):  acetaminophen     Tablet .. 650 milliGRAM(s) Oral every 6 hours PRN Temp greater or equal to 38C (100.4F), Mild Pain (1 - 3)  aluminum hydroxide/magnesium hydroxide/simethicone Suspension 30 milliLiter(s) Oral every 4 hours PRN Dyspepsia  benzonatate 100 milliGRAM(s) Oral every 8 hours PRN Cough  dextrose Oral Gel 15 Gram(s) Oral once PRN Blood Glucose LESS THAN 70 milliGRAM(s)/deciliter  hydrocodone/homatropine Syrup 5 milliLiter(s) Oral every 12 hours PRN Cough  melatonin 3 milliGRAM(s) Oral at bedtime PRN Insomnia  ondansetron Injectable 4 milliGRAM(s) IV Push every 8 hours PRN Nausea and/or Vomiting      Vital Signs Last 24 Hrs  T(C): 36.5 (30 Sep 2022 05:23), Max: 36.8 (29 Sep 2022 20:24)  T(F): 97.7 (30 Sep 2022 05:23), Max: 98.2 (29 Sep 2022 20:24)  HR: 69 (30 Sep 2022 05:23) (65 - 88)  BP: 150/83 (30 Sep 2022 05:23) (139/76 - 150/83)  BP(mean): --  RR: 18 (30 Sep 2022 05:23) (18 - 18)  SpO2: 100% (30 Sep 2022 05:23) (96% - 100%)  CAPILLARY BLOOD GLUCOSE      POCT Blood Glucose.: 278 mg/dL (30 Sep 2022 11:45)  POCT Blood Glucose.: 205 mg/dL (30 Sep 2022 07:24)  POCT Blood Glucose.: 382 mg/dL (29 Sep 2022 22:40)  POCT Blood Glucose.: 404 mg/dL (29 Sep 2022 20:52)  POCT Blood Glucose.: 123 mg/dL (29 Sep 2022 16:32)    I&O's Summary    30 Sep 2022 07:01  -  30 Sep 2022 12:25  --------------------------------------------------------  IN: 0 mL / OUT: 1 mL / NET: -1 mL        General: middle aged woman sitting up in chair appears well in NAD, awake and alert  HENMT:  MMM  Respiratory: No respiratory distress, CTABL, mild expiratory wheezing  Cardiovascular: S1,S2; Regular rate and rhythm; No m/g/r.   Gastrointestinal: Soft, Nontender, Nondistended; +BS.   Extremities: No c/c/e; warm to touch  Psych: appropriate mood and affect      LABS:                        13.0   11.34 )-----------( 200      ( 30 Sep 2022 06:12 )             39.6                     RADIOLOGY & ADDITIONAL TESTS:    Imaging Personally Reviewed:    Consultant(s) Notes Reviewed:      Care Discussed with Consultants/Other Providers: ACP

## 2022-09-30 NOTE — PROGRESS NOTE ADULT - PROBLEM SELECTOR PLAN 1
secondary to rhino/enterovirus infection   follows with OP pulmonologist Geena Amaral, did not improve with PO steroids and azithromycin  CXR clear lungs, + entero/rhino-virus  s/p 4 days of IV solumedrol, transition to prednisone 40 today   if patient continues to improve can continue with taper prescribed by her pulmonologist -> Decrease 5mg every 5 days.   c/w Xopenex nebs changed to inhalers today given persistently elevated lactic acid  continue to monitor

## 2022-10-01 LAB
ANION GAP SERPL CALC-SCNC: 16 MMOL/L — HIGH (ref 7–14)
BUN SERPL-MCNC: 28 MG/DL — HIGH (ref 7–23)
CALCIUM SERPL-MCNC: 9.2 MG/DL — SIGNIFICANT CHANGE UP (ref 8.4–10.5)
CHLORIDE SERPL-SCNC: 93 MMOL/L — LOW (ref 98–107)
CK SERPL-CCNC: 28 U/L — SIGNIFICANT CHANGE UP (ref 25–170)
CO2 SERPL-SCNC: 23 MMOL/L — SIGNIFICANT CHANGE UP (ref 22–31)
CREAT SERPL-MCNC: 0.76 MG/DL — SIGNIFICANT CHANGE UP (ref 0.5–1.3)
EGFR: 90 ML/MIN/1.73M2 — SIGNIFICANT CHANGE UP
GLUCOSE BLDC GLUCOMTR-MCNC: 158 MG/DL — HIGH (ref 70–99)
GLUCOSE BLDC GLUCOMTR-MCNC: 243 MG/DL — HIGH (ref 70–99)
GLUCOSE BLDC GLUCOMTR-MCNC: 300 MG/DL — HIGH (ref 70–99)
GLUCOSE BLDC GLUCOMTR-MCNC: 375 MG/DL — HIGH (ref 70–99)
GLUCOSE SERPL-MCNC: 434 MG/DL — HIGH (ref 70–99)
HCT VFR BLD CALC: 37.5 % — SIGNIFICANT CHANGE UP (ref 34.5–45)
HGB BLD-MCNC: 12.3 G/DL — SIGNIFICANT CHANGE UP (ref 11.5–15.5)
HIV 1+2 AB+HIV1 P24 AG SERPL QL IA: SIGNIFICANT CHANGE UP
LACTATE SERPL-SCNC: 2.7 MMOL/L — HIGH (ref 0.5–2)
LACTATE SERPL-SCNC: 3.6 MMOL/L — HIGH (ref 0.5–2)
LACTATE SERPL-SCNC: 4.6 MMOL/L — CRITICAL HIGH (ref 0.5–2)
LACTATE SERPL-SCNC: 6.4 MMOL/L — CRITICAL HIGH (ref 0.5–2)
MAGNESIUM SERPL-MCNC: 2 MG/DL — SIGNIFICANT CHANGE UP (ref 1.6–2.6)
MCHC RBC-ENTMCNC: 27.8 PG — SIGNIFICANT CHANGE UP (ref 27–34)
MCHC RBC-ENTMCNC: 32.8 GM/DL — SIGNIFICANT CHANGE UP (ref 32–36)
MCV RBC AUTO: 84.7 FL — SIGNIFICANT CHANGE UP (ref 80–100)
NRBC # BLD: 0 /100 WBCS — SIGNIFICANT CHANGE UP (ref 0–0)
NRBC # FLD: 0 K/UL — SIGNIFICANT CHANGE UP (ref 0–0)
PHOSPHATE SERPL-MCNC: 3.5 MG/DL — SIGNIFICANT CHANGE UP (ref 2.5–4.5)
PLATELET # BLD AUTO: 300 K/UL — SIGNIFICANT CHANGE UP (ref 150–400)
POTASSIUM SERPL-MCNC: 5 MMOL/L — SIGNIFICANT CHANGE UP (ref 3.5–5.3)
POTASSIUM SERPL-SCNC: 5 MMOL/L — SIGNIFICANT CHANGE UP (ref 3.5–5.3)
RBC # BLD: 4.43 M/UL — SIGNIFICANT CHANGE UP (ref 3.8–5.2)
RBC # FLD: 12.7 % — SIGNIFICANT CHANGE UP (ref 10.3–14.5)
SODIUM SERPL-SCNC: 132 MMOL/L — LOW (ref 135–145)
WBC # BLD: 16.21 K/UL — HIGH (ref 3.8–10.5)
WBC # FLD AUTO: 16.21 K/UL — HIGH (ref 3.8–10.5)

## 2022-10-01 PROCEDURE — 99233 SBSQ HOSP IP/OBS HIGH 50: CPT

## 2022-10-01 RX ORDER — THIAMINE MONONITRATE (VIT B1) 100 MG
100 TABLET ORAL THREE TIMES A DAY
Refills: 0 | Status: COMPLETED | OUTPATIENT
Start: 2022-10-01 | End: 2022-10-02

## 2022-10-01 RX ORDER — INSULIN GLARGINE 100 [IU]/ML
5 INJECTION, SOLUTION SUBCUTANEOUS AT BEDTIME
Refills: 0 | Status: DISCONTINUED | OUTPATIENT
Start: 2022-10-01 | End: 2022-10-03

## 2022-10-01 RX ADMIN — Medication 10: at 17:25

## 2022-10-01 RX ADMIN — Medication 40 MILLIGRAM(S): at 05:25

## 2022-10-01 RX ADMIN — Medication 1200 MILLIGRAM(S): at 17:51

## 2022-10-01 RX ADMIN — BUDESONIDE AND FORMOTEROL FUMARATE DIHYDRATE 2 PUFF(S): 160; 4.5 AEROSOL RESPIRATORY (INHALATION) at 21:42

## 2022-10-01 RX ADMIN — Medication 6: at 12:28

## 2022-10-01 RX ADMIN — ATORVASTATIN CALCIUM 40 MILLIGRAM(S): 80 TABLET, FILM COATED ORAL at 21:43

## 2022-10-01 RX ADMIN — Medication 12.5 MILLIGRAM(S): at 05:26

## 2022-10-01 RX ADMIN — Medication 1200 MILLIGRAM(S): at 05:25

## 2022-10-01 RX ADMIN — Medication 4: at 07:59

## 2022-10-01 RX ADMIN — LOSARTAN POTASSIUM 100 MILLIGRAM(S): 100 TABLET, FILM COATED ORAL at 05:25

## 2022-10-01 RX ADMIN — ENOXAPARIN SODIUM 40 MILLIGRAM(S): 100 INJECTION SUBCUTANEOUS at 06:42

## 2022-10-01 RX ADMIN — INSULIN GLARGINE 5 UNIT(S): 100 INJECTION, SOLUTION SUBCUTANEOUS at 21:42

## 2022-10-01 RX ADMIN — Medication 100 MILLIGRAM(S): at 21:43

## 2022-10-01 NOTE — PROGRESS NOTE ADULT - PROBLEM SELECTOR PLAN 2
secondary to rhino/enterovirus infection   follows with OP pulmonologist Geena Amaral, did not improve with PO steroids and azithromycin  CXR clear lungs, + entero/rhino-virus  s/p 4 days of IV solumedrol, now on prednisone 40   if patient continues to improve can continue with taper prescribed by her pulmonologist -> Decrease 5mg every 5 days.   c/w symbicort  continue to monitor

## 2022-10-01 NOTE — PROGRESS NOTE ADULT - PROBLEM SELECTOR PLAN 1
Lactate levels has been up and down during hospitalization most recently 6.4. Does not appear to have any other signs of shock, no cardiac history, no liver dysfunction, no abdominal tenderness or other signs of ischemia. On metformin at home but this has been held since admission, 9/26, nebs changed to xopenex- will stop as well    No signs of type A lactic acidosis, most likely type B, possibly from B agonist- will stop xopenex but still on LAMA/ICS inhaler  -would attempt add on of thiamine levels, and will treat empirically for thiamine deficiency with IV thiamine  -check CTA of chest to r/o PE, holding off on CTA abdomen as benign abdominal exam and no signs of bowel ischemia  -repeat lactate tomorrow

## 2022-10-01 NOTE — PROGRESS NOTE ADULT - SUBJECTIVE AND OBJECTIVE BOX
[facility] Division of Hospital Medicine  Rina Gasca MD  Available via MS Teams  Pager: [phone]    SUBJECTIVE / OVERNIGHT EVENTS:  No events. Patient report feels improved. Denies any abdominal pain, denies any chest pain. No nausea/vomiting/diarrhea.     ADDITIONAL REVIEW OF SYSTEMS:  negative 10 point review of systems    MEDICATIONS  (STANDING):  atorvastatin 40 milliGRAM(s) Oral at bedtime  budesonide 160 MICROgram(s)/formoterol 4.5 MICROgram(s) Inhaler 2 Puff(s) Inhalation two times a day  dextrose 5%. 1000 milliLiter(s) (100 mL/Hr) IV Continuous <Continuous>  dextrose 5%. 1000 milliLiter(s) (50 mL/Hr) IV Continuous <Continuous>  dextrose 50% Injectable 25 Gram(s) IV Push once  dextrose 50% Injectable 12.5 Gram(s) IV Push once  dextrose 50% Injectable 25 Gram(s) IV Push once  enoxaparin Injectable 40 milliGRAM(s) SubCutaneous every 24 hours  glucagon  Injectable 1 milliGRAM(s) IntraMuscular once  guaiFENesin ER 1200 milliGRAM(s) Oral every 12 hours  hydrochlorothiazide 12.5 milliGRAM(s) Oral daily  influenza   Vaccine 0.5 milliLiter(s) IntraMuscular once  insulin lispro (ADMELOG) corrective regimen sliding scale   SubCutaneous three times a day before meals  insulin lispro (ADMELOG) corrective regimen sliding scale   SubCutaneous at bedtime  levalbuterol 45 MICROgram(s) HFA Inhaler 2 Puff(s) Inhalation three times a day  losartan 100 milliGRAM(s) Oral daily  predniSONE   Tablet 40 milliGRAM(s) Oral daily  thiamine Injectable 100 milliGRAM(s) IV Push three times a day    MEDICATIONS  (PRN):  acetaminophen     Tablet .. 650 milliGRAM(s) Oral every 6 hours PRN Temp greater or equal to 38C (100.4F), Mild Pain (1 - 3)  aluminum hydroxide/magnesium hydroxide/simethicone Suspension 30 milliLiter(s) Oral every 4 hours PRN Dyspepsia  benzonatate 100 milliGRAM(s) Oral every 8 hours PRN Cough  dextrose Oral Gel 15 Gram(s) Oral once PRN Blood Glucose LESS THAN 70 milliGRAM(s)/deciliter  hydrocodone/homatropine Syrup 5 milliLiter(s) Oral every 12 hours PRN Cough  melatonin 3 milliGRAM(s) Oral at bedtime PRN Insomnia  ondansetron Injectable 4 milliGRAM(s) IV Push every 8 hours PRN Nausea and/or Vomiting      I&O's Summary    30 Sep 2022 07:01  -  01 Oct 2022 07:00  --------------------------------------------------------  IN: 0 mL / OUT: 1 mL / NET: -1 mL        PHYSICAL EXAM:  Vital Signs Last 24 Hrs  T(C): 36.7 (01 Oct 2022 05:20), Max: 36.7 (01 Oct 2022 05:20)  T(F): 98 (01 Oct 2022 05:20), Max: 98 (01 Oct 2022 05:20)  HR: 71 (01 Oct 2022 05:20) (71 - 75)  BP: 150/84 (01 Oct 2022 05:20) (150/84 - 160/90)  BP(mean): --  RR: 18 (01 Oct 2022 05:20) (18 - 18)  SpO2: 99% (01 Oct 2022 05:20) (98% - 99%)    Parameters below as of 01 Oct 2022 05:20  Patient On (Oxygen Delivery Method): room air      CONSTITUTIONAL: NAD, well-developed, well-groomed  EYES: PERRLA; conjunctiva and sclera clear  ENMT: Moist oral mucosa, no pharyngeal injection or exudates  RESPIRATORY: Normal respiratory effort; lungs are clear to auscultation bilaterally  CARDIOVASCULAR: Regular rate and rhythm, normal S1 and S2, no murmur/rub/gallop; No lower extremity edema; Peripheral pulses are 2+ bilaterally  ABDOMEN: Nontender to palpation, normoactive bowel sounds, no rebound/guarding; No hepatosplenomegaly  MUSCULOSKELETAL: no clubbing or cyanosis of digits; no joint swelling or tenderness to palpation  PSYCH: A+O to person, place, and time; affect appropriate  NEUROLOGY: CN 2-12 are intact and symmetric; no gross sensory deficits   SKIN: No rashes; no palpable lesions    LABS:                        12.3   16.21 )-----------( 300      ( 01 Oct 2022 13:04 )             37.5     10-01    132<L>  |  93<L>  |  28<H>  ----------------------------<  434<H>  5.0   |  23  |  0.76    Ca    9.2      01 Oct 2022 13:04  Phos  3.5     10-01  Mg     2.00     10-01                COVID-19 PCR: NotDetec (26 Sep 2022 16:05)  SARS-CoV-2: NotDetec (26 Sep 2022 16:05)      RADIOLOGY & ADDITIONAL TESTS:  CXR 9/26  FINDINGS:  The cardiomediastinal silhouette is similar.  The lungs are clear.  There is no pneumothorax or pleural effusion.  No acute bony abnormality      COMMUNICATION:  Care Discussed with Consultants/Other Providers and Details of Discussion: plan discussed with PA  Discussions with Patient/Family: plan of care discussed with patient and  at bedside

## 2022-10-02 DIAGNOSIS — M25.571 PAIN IN RIGHT ANKLE AND JOINTS OF RIGHT FOOT: ICD-10-CM

## 2022-10-02 LAB
ANION GAP SERPL CALC-SCNC: 17 MMOL/L — HIGH (ref 7–14)
BLOOD GAS VENOUS COMPREHENSIVE RESULT: SIGNIFICANT CHANGE UP
BUN SERPL-MCNC: 21 MG/DL — SIGNIFICANT CHANGE UP (ref 7–23)
CALCIUM SERPL-MCNC: 9.9 MG/DL — SIGNIFICANT CHANGE UP (ref 8.4–10.5)
CHLORIDE SERPL-SCNC: 97 MMOL/L — LOW (ref 98–107)
CO2 SERPL-SCNC: 26 MMOL/L — SIGNIFICANT CHANGE UP (ref 22–31)
CREAT SERPL-MCNC: 0.83 MG/DL — SIGNIFICANT CHANGE UP (ref 0.5–1.3)
EGFR: 81 ML/MIN/1.73M2 — SIGNIFICANT CHANGE UP
GLUCOSE BLDC GLUCOMTR-MCNC: 147 MG/DL — HIGH (ref 70–99)
GLUCOSE BLDC GLUCOMTR-MCNC: 154 MG/DL — HIGH (ref 70–99)
GLUCOSE BLDC GLUCOMTR-MCNC: 348 MG/DL — HIGH (ref 70–99)
GLUCOSE BLDC GLUCOMTR-MCNC: 376 MG/DL — HIGH (ref 70–99)
GLUCOSE BLDC GLUCOMTR-MCNC: 516 MG/DL — CRITICAL HIGH (ref 70–99)
GLUCOSE BLDC GLUCOMTR-MCNC: 532 MG/DL — CRITICAL HIGH (ref 70–99)
GLUCOSE SERPL-MCNC: 164 MG/DL — HIGH (ref 70–99)
HCT VFR BLD CALC: 47.2 % — HIGH (ref 34.5–45)
HGB BLD-MCNC: 15.2 G/DL — SIGNIFICANT CHANGE UP (ref 11.5–15.5)
LACTATE SERPL-SCNC: 2.9 MMOL/L — HIGH (ref 0.5–2)
MAGNESIUM SERPL-MCNC: 2.2 MG/DL — SIGNIFICANT CHANGE UP (ref 1.6–2.6)
MCHC RBC-ENTMCNC: 28.1 PG — SIGNIFICANT CHANGE UP (ref 27–34)
MCHC RBC-ENTMCNC: 32.2 GM/DL — SIGNIFICANT CHANGE UP (ref 32–36)
MCV RBC AUTO: 87.2 FL — SIGNIFICANT CHANGE UP (ref 80–100)
NRBC # BLD: 0 /100 WBCS — SIGNIFICANT CHANGE UP (ref 0–0)
NRBC # FLD: 0 K/UL — SIGNIFICANT CHANGE UP (ref 0–0)
PHOSPHATE SERPL-MCNC: 3.4 MG/DL — SIGNIFICANT CHANGE UP (ref 2.5–4.5)
PLATELET # BLD AUTO: 298 K/UL — SIGNIFICANT CHANGE UP (ref 150–400)
POTASSIUM SERPL-MCNC: 3.9 MMOL/L — SIGNIFICANT CHANGE UP (ref 3.5–5.3)
POTASSIUM SERPL-SCNC: 3.9 MMOL/L — SIGNIFICANT CHANGE UP (ref 3.5–5.3)
RBC # BLD: 5.41 M/UL — HIGH (ref 3.8–5.2)
RBC # FLD: 12.8 % — SIGNIFICANT CHANGE UP (ref 10.3–14.5)
SODIUM SERPL-SCNC: 140 MMOL/L — SIGNIFICANT CHANGE UP (ref 135–145)
URATE SERPL-MCNC: 5.7 MG/DL — SIGNIFICANT CHANGE UP (ref 2.5–7)
WBC # BLD: 14.92 K/UL — HIGH (ref 3.8–10.5)
WBC # FLD AUTO: 14.92 K/UL — HIGH (ref 3.8–10.5)

## 2022-10-02 PROCEDURE — 71275 CT ANGIOGRAPHY CHEST: CPT | Mod: 26

## 2022-10-02 PROCEDURE — 99232 SBSQ HOSP IP/OBS MODERATE 35: CPT

## 2022-10-02 PROCEDURE — 73610 X-RAY EXAM OF ANKLE: CPT | Mod: 26,RT

## 2022-10-02 RX ORDER — OXYCODONE HYDROCHLORIDE 5 MG/1
5 TABLET ORAL ONCE
Refills: 0 | Status: DISCONTINUED | OUTPATIENT
Start: 2022-10-02 | End: 2022-10-02

## 2022-10-02 RX ORDER — THIAMINE MONONITRATE (VIT B1) 100 MG
100 TABLET ORAL DAILY
Refills: 0 | Status: DISCONTINUED | OUTPATIENT
Start: 2022-10-03 | End: 2022-10-04

## 2022-10-02 RX ADMIN — Medication 650 MILLIGRAM(S): at 00:10

## 2022-10-02 RX ADMIN — Medication 40 MILLIGRAM(S): at 06:19

## 2022-10-02 RX ADMIN — BUDESONIDE AND FORMOTEROL FUMARATE DIHYDRATE 2 PUFF(S): 160; 4.5 AEROSOL RESPIRATORY (INHALATION) at 21:52

## 2022-10-02 RX ADMIN — Medication 4: at 21:49

## 2022-10-02 RX ADMIN — OXYCODONE HYDROCHLORIDE 5 MILLIGRAM(S): 5 TABLET ORAL at 07:50

## 2022-10-02 RX ADMIN — Medication 100 MILLIGRAM(S): at 13:10

## 2022-10-02 RX ADMIN — Medication 0: at 08:05

## 2022-10-02 RX ADMIN — INSULIN GLARGINE 5 UNIT(S): 100 INJECTION, SOLUTION SUBCUTANEOUS at 21:50

## 2022-10-02 RX ADMIN — BUDESONIDE AND FORMOTEROL FUMARATE DIHYDRATE 2 PUFF(S): 160; 4.5 AEROSOL RESPIRATORY (INHALATION) at 09:20

## 2022-10-02 RX ADMIN — Medication 1200 MILLIGRAM(S): at 18:16

## 2022-10-02 RX ADMIN — Medication 8: at 12:03

## 2022-10-02 RX ADMIN — Medication 12.5 MILLIGRAM(S): at 06:18

## 2022-10-02 RX ADMIN — Medication 1200 MILLIGRAM(S): at 06:18

## 2022-10-02 RX ADMIN — ATORVASTATIN CALCIUM 40 MILLIGRAM(S): 80 TABLET, FILM COATED ORAL at 21:52

## 2022-10-02 RX ADMIN — LOSARTAN POTASSIUM 100 MILLIGRAM(S): 100 TABLET, FILM COATED ORAL at 06:19

## 2022-10-02 RX ADMIN — OXYCODONE HYDROCHLORIDE 5 MILLIGRAM(S): 5 TABLET ORAL at 08:31

## 2022-10-02 RX ADMIN — Medication 100 MILLIGRAM(S): at 06:19

## 2022-10-02 RX ADMIN — ENOXAPARIN SODIUM 40 MILLIGRAM(S): 100 INJECTION SUBCUTANEOUS at 07:20

## 2022-10-02 NOTE — PROGRESS NOTE ADULT - SUBJECTIVE AND OBJECTIVE BOX
[facility] Division of Hospital Medicine  Rina Gasca MD  Available via MS Teams  Pager: [phone]    SUBJECTIVE / OVERNIGHT EVENTS:  CTA negative for PE. OVernight patient had sudden right ankle pain and swelling. Attempted to stand to ambulate and unable to bear weight due to pain. Pain is worse with standing, moving the foot. Has never had similar pain, but endorses remote surgery history. Pain medication given overnight improved the pain. Denies any other joint pain.     ADDITIONAL REVIEW OF SYSTEMS:  denies fever/chills/ other joint pain    MEDICATIONS  (STANDING):  atorvastatin 40 milliGRAM(s) Oral at bedtime  budesonide 160 MICROgram(s)/formoterol 4.5 MICROgram(s) Inhaler 2 Puff(s) Inhalation two times a day  dextrose 5%. 1000 milliLiter(s) (50 mL/Hr) IV Continuous <Continuous>  dextrose 5%. 1000 milliLiter(s) (100 mL/Hr) IV Continuous <Continuous>  dextrose 50% Injectable 25 Gram(s) IV Push once  dextrose 50% Injectable 12.5 Gram(s) IV Push once  dextrose 50% Injectable 25 Gram(s) IV Push once  enoxaparin Injectable 40 milliGRAM(s) SubCutaneous every 24 hours  glucagon  Injectable 1 milliGRAM(s) IntraMuscular once  guaiFENesin ER 1200 milliGRAM(s) Oral every 12 hours  hydrochlorothiazide 12.5 milliGRAM(s) Oral daily  influenza   Vaccine 0.5 milliLiter(s) IntraMuscular once  insulin glargine Injectable (LANTUS) 5 Unit(s) SubCutaneous at bedtime  insulin lispro (ADMELOG) corrective regimen sliding scale   SubCutaneous three times a day before meals  insulin lispro (ADMELOG) corrective regimen sliding scale   SubCutaneous at bedtime  losartan 100 milliGRAM(s) Oral daily  predniSONE   Tablet 40 milliGRAM(s) Oral daily  thiamine Injectable 100 milliGRAM(s) IV Push three times a day    MEDICATIONS  (PRN):  acetaminophen     Tablet .. 650 milliGRAM(s) Oral every 6 hours PRN Temp greater or equal to 38C (100.4F), Mild Pain (1 - 3)  aluminum hydroxide/magnesium hydroxide/simethicone Suspension 30 milliLiter(s) Oral every 4 hours PRN Dyspepsia  benzonatate 100 milliGRAM(s) Oral every 8 hours PRN Cough  dextrose Oral Gel 15 Gram(s) Oral once PRN Blood Glucose LESS THAN 70 milliGRAM(s)/deciliter  hydrocodone/homatropine Syrup 5 milliLiter(s) Oral every 12 hours PRN Cough  melatonin 3 milliGRAM(s) Oral at bedtime PRN Insomnia  ondansetron Injectable 4 milliGRAM(s) IV Push every 8 hours PRN Nausea and/or Vomiting      I&O's Summary    01 Oct 2022 07:01  -  02 Oct 2022 07:00  --------------------------------------------------------  IN: 520 mL / OUT: 0 mL / NET: 520 mL        PHYSICAL EXAM:  Vital Signs Last 24 Hrs  T(C): 36.6 (02 Oct 2022 11:00), Max: 36.9 (01 Oct 2022 17:33)  T(F): 97.9 (02 Oct 2022 11:00), Max: 98.4 (01 Oct 2022 17:33)  HR: 66 (02 Oct 2022 11:00) (64 - 78)  BP: 112/68 (02 Oct 2022 11:00) (112/68 - 150/89)  BP(mean): --  RR: 18 (02 Oct 2022 11:00) (16 - 18)  SpO2: 94% (02 Oct 2022 11:00) (94% - 99%)    Parameters below as of 02 Oct 2022 11:00  Patient On (Oxygen Delivery Method): room air      CONSTITUTIONAL: NAD, well-developed, well-groomed  EYES: PERRLA; conjunctiva and sclera clear  ENMT: Moist oral mucosa, no pharyngeal injection or exudates  RESPIRATORY: Normal respiratory effort; lungs are clear to auscultation bilaterally  CARDIOVASCULAR: Regular rate and rhythm, normal S1 and S2, no murmur/rub/gallop; No lower extremity edema; Peripheral pulses are 2+ bilaterally  ABDOMEN: Nontender to palpation, normoactive bowel sounds, no rebound/guarding  MUSCULOSKELETAL:  no clubbing or cyanosis of digits; right ankle swelling, tenderness to light palpation   PSYCH: A+O to person, place, and time; affect appropriate  NEUROLOGY: CN 2-12 are intact and symmetric; no gross sensory deficits   SKIN: No rashes; no palpable lesions    LABS:                        15.2   14.92 )-----------( 298      ( 02 Oct 2022 06:11 )             47.2     10-02    140  |  97<L>  |  21  ----------------------------<  164<H>  3.9   |  26  |  0.83    Ca    9.9      02 Oct 2022 06:11  Phos  3.4     10-02  Mg     2.20     10-02        CARDIAC MARKERS ( 01 Oct 2022 13:04 )  x     / x     / 28 U/L / x     / x              COVID-19 PCR: NotDetec (26 Sep 2022 16:05)  SARS-CoV-2: NotDetec (26 Sep 2022 16:05)      RADIOLOGY & ADDITIONAL TESTS:  New Results Reviewed Today:   < from: CT Angio Chest PE Protocol w/ IV Cont (10.02.22 @ 03:20) >  IMPRESSION:  No pulmonary embolus.    Clear lungs.    < end of copied text >

## 2022-10-02 NOTE — PROGRESS NOTE ADULT - PROBLEM SELECTOR PLAN 5
with steroid induced hyperglycemia   A1c 7.1   Hold home metformin  moderate ISS with diabetic diet with steroid induced hyperglycemia   A1c 7.1   Hold home metformin  moderate ISS with diabetic diet, started lantus 5units qhs while in the hospital  -discharge off metformin due to lactic acidosis, will discharge on GLP-1 with steroid induced hyperglycemia   A1c 7.1   Hold home metformin  moderate ISS with diabetic diet, started lantus 5units qhs while in the hospital  -discharge off metformin due to lactic acidosis, will discharge on SGLT-2

## 2022-10-02 NOTE — PROGRESS NOTE ADULT - PROBLEM SELECTOR PLAN 1
Acute onset while on bed, denies trauma. Right ankle is swollen and diffusely tender. No other joint involvement.   -follow up ankle x-ray  -Rest, ice, elevation  -tylenol PRN for pain

## 2022-10-02 NOTE — PROGRESS NOTE ADULT - PROBLEM SELECTOR PLAN 2
Lactate levels has been up and down during hospitalization most recently 6.4. Does not appear to have any other signs of shock, no cardiac history, no liver dysfunction, no abdominal tenderness or other signs of ischemia. On metformin at home but this has been held since admission. CTA chest negative for PE; holding off on CTA abdomen as benign abdominal exam and no signs of bowel ischemia.  No signs of type A lactic acidosis, most likely type B, possibly from B agonist- albuterol has been stopped but still on LAMA/ICS inhaler  -treating empirically for thiamine deficiency with IV thiamine, then Po thiamine on discharge  -lactate now down to 2.9, will stop checking  -plan to discharge off metformin, will prescribe GLP-1 instead with OP follow up Lactate levels has been up and down during hospitalization most recently 6.4. Does not appear to have any other signs of shock, no cardiac history, no liver dysfunction, no abdominal tenderness or other signs of ischemia. On metformin at home but this has been held since admission. CTA chest negative for PE; holding off on CTA abdomen as benign abdominal exam and no signs of bowel ischemia.  No signs of type A lactic acidosis, most likely type B, possibly from B agonist- albuterol has been stopped but still on LAMA/ICS inhaler  -treating empirically for thiamine deficiency with IV thiamine, then Po thiamine on discharge  -lactate now down to 2.9, will stop checking  -plan to discharge off metformin, will prescribe another oral agent instead with OP follow up

## 2022-10-03 ENCOUNTER — NON-APPOINTMENT (OUTPATIENT)
Age: 62
End: 2022-10-03

## 2022-10-03 DIAGNOSIS — R73.9 HYPERGLYCEMIA, UNSPECIFIED: ICD-10-CM

## 2022-10-03 LAB
ANION GAP SERPL CALC-SCNC: 12 MMOL/L — SIGNIFICANT CHANGE UP (ref 7–14)
ANION GAP SERPL CALC-SCNC: 18 MMOL/L — HIGH (ref 7–14)
B-OH-BUTYR SERPL-SCNC: <0 MMOL/L — SIGNIFICANT CHANGE UP (ref 0–0.4)
BUN SERPL-MCNC: 26 MG/DL — HIGH (ref 7–23)
BUN SERPL-MCNC: 31 MG/DL — HIGH (ref 7–23)
CALCIUM SERPL-MCNC: 9 MG/DL — SIGNIFICANT CHANGE UP (ref 8.4–10.5)
CALCIUM SERPL-MCNC: 9.2 MG/DL — SIGNIFICANT CHANGE UP (ref 8.4–10.5)
CHLORIDE SERPL-SCNC: 93 MMOL/L — LOW (ref 98–107)
CHLORIDE SERPL-SCNC: 99 MMOL/L — SIGNIFICANT CHANGE UP (ref 98–107)
CO2 SERPL-SCNC: 21 MMOL/L — LOW (ref 22–31)
CO2 SERPL-SCNC: 27 MMOL/L — SIGNIFICANT CHANGE UP (ref 22–31)
CREAT SERPL-MCNC: 0.96 MG/DL — SIGNIFICANT CHANGE UP (ref 0.5–1.3)
CREAT SERPL-MCNC: 1.21 MG/DL — SIGNIFICANT CHANGE UP (ref 0.5–1.3)
EGFR: 51 ML/MIN/1.73M2 — LOW
EGFR: 68 ML/MIN/1.73M2 — SIGNIFICANT CHANGE UP
GLUCOSE BLDC GLUCOMTR-MCNC: 114 MG/DL — HIGH (ref 70–99)
GLUCOSE BLDC GLUCOMTR-MCNC: 125 MG/DL — HIGH (ref 70–99)
GLUCOSE BLDC GLUCOMTR-MCNC: 132 MG/DL — HIGH (ref 70–99)
GLUCOSE BLDC GLUCOMTR-MCNC: 144 MG/DL — HIGH (ref 70–99)
GLUCOSE BLDC GLUCOMTR-MCNC: 314 MG/DL — HIGH (ref 70–99)
GLUCOSE BLDC GLUCOMTR-MCNC: 321 MG/DL — HIGH (ref 70–99)
GLUCOSE BLDC GLUCOMTR-MCNC: 467 MG/DL — CRITICAL HIGH (ref 70–99)
GLUCOSE SERPL-MCNC: 120 MG/DL — HIGH (ref 70–99)
GLUCOSE SERPL-MCNC: 499 MG/DL — CRITICAL HIGH (ref 70–99)
HCT VFR BLD CALC: 41 % — SIGNIFICANT CHANGE UP (ref 34.5–45)
HGB BLD-MCNC: 13.2 G/DL — SIGNIFICANT CHANGE UP (ref 11.5–15.5)
LACTATE SERPL-SCNC: 3.2 MMOL/L — HIGH (ref 0.5–2)
MAGNESIUM SERPL-MCNC: 2.1 MG/DL — SIGNIFICANT CHANGE UP (ref 1.6–2.6)
MAGNESIUM SERPL-MCNC: 2.2 MG/DL — SIGNIFICANT CHANGE UP (ref 1.6–2.6)
MCHC RBC-ENTMCNC: 27.9 PG — SIGNIFICANT CHANGE UP (ref 27–34)
MCHC RBC-ENTMCNC: 32.2 GM/DL — SIGNIFICANT CHANGE UP (ref 32–36)
MCV RBC AUTO: 86.7 FL — SIGNIFICANT CHANGE UP (ref 80–100)
NRBC # BLD: 0 /100 WBCS — SIGNIFICANT CHANGE UP (ref 0–0)
NRBC # FLD: 0 K/UL — SIGNIFICANT CHANGE UP (ref 0–0)
PHOSPHATE SERPL-MCNC: 2.1 MG/DL — LOW (ref 2.5–4.5)
PHOSPHATE SERPL-MCNC: 3.1 MG/DL — SIGNIFICANT CHANGE UP (ref 2.5–4.5)
PLATELET # BLD AUTO: 268 K/UL — SIGNIFICANT CHANGE UP (ref 150–400)
POTASSIUM SERPL-MCNC: 3.6 MMOL/L — SIGNIFICANT CHANGE UP (ref 3.5–5.3)
POTASSIUM SERPL-MCNC: 4.5 MMOL/L — SIGNIFICANT CHANGE UP (ref 3.5–5.3)
POTASSIUM SERPL-SCNC: 3.6 MMOL/L — SIGNIFICANT CHANGE UP (ref 3.5–5.3)
POTASSIUM SERPL-SCNC: 4.5 MMOL/L — SIGNIFICANT CHANGE UP (ref 3.5–5.3)
RBC # BLD: 4.73 M/UL — SIGNIFICANT CHANGE UP (ref 3.8–5.2)
RBC # FLD: 12.8 % — SIGNIFICANT CHANGE UP (ref 10.3–14.5)
SARS-COV-2 RNA SPEC QL NAA+PROBE: SIGNIFICANT CHANGE UP
SODIUM SERPL-SCNC: 132 MMOL/L — LOW (ref 135–145)
SODIUM SERPL-SCNC: 138 MMOL/L — SIGNIFICANT CHANGE UP (ref 135–145)
WBC # BLD: 15.35 K/UL — HIGH (ref 3.8–10.5)
WBC # FLD AUTO: 15.35 K/UL — HIGH (ref 3.8–10.5)

## 2022-10-03 PROCEDURE — 99232 SBSQ HOSP IP/OBS MODERATE 35: CPT

## 2022-10-03 PROCEDURE — 99223 1ST HOSP IP/OBS HIGH 75: CPT

## 2022-10-03 RX ORDER — INSULIN LISPRO 100/ML
5 VIAL (ML) SUBCUTANEOUS ONCE
Refills: 0 | Status: DISCONTINUED | OUTPATIENT
Start: 2022-10-03 | End: 2022-10-03

## 2022-10-03 RX ORDER — INSULIN GLARGINE 100 [IU]/ML
5 INJECTION, SOLUTION SUBCUTANEOUS ONCE
Refills: 0 | Status: COMPLETED | OUTPATIENT
Start: 2022-10-03 | End: 2022-10-03

## 2022-10-03 RX ORDER — INSULIN GLARGINE 100 [IU]/ML
10 INJECTION, SOLUTION SUBCUTANEOUS AT BEDTIME
Refills: 0 | Status: DISCONTINUED | OUTPATIENT
Start: 2022-10-03 | End: 2022-10-03

## 2022-10-03 RX ORDER — INSULIN LISPRO 100/ML
6 VIAL (ML) SUBCUTANEOUS
Refills: 0 | Status: DISCONTINUED | OUTPATIENT
Start: 2022-10-03 | End: 2022-10-03

## 2022-10-03 RX ORDER — INSULIN LISPRO 100/ML
3 VIAL (ML) SUBCUTANEOUS
Refills: 0 | Status: DISCONTINUED | OUTPATIENT
Start: 2022-10-03 | End: 2022-10-03

## 2022-10-03 RX ORDER — INSULIN LISPRO 100/ML
3 VIAL (ML) SUBCUTANEOUS
Refills: 0 | Status: DISCONTINUED | OUTPATIENT
Start: 2022-10-03 | End: 2022-10-04

## 2022-10-03 RX ORDER — INSULIN LISPRO 100/ML
VIAL (ML) SUBCUTANEOUS AT BEDTIME
Refills: 0 | Status: DISCONTINUED | OUTPATIENT
Start: 2022-10-03 | End: 2022-10-04

## 2022-10-03 RX ORDER — INSULIN LISPRO 100/ML
VIAL (ML) SUBCUTANEOUS
Refills: 0 | Status: DISCONTINUED | OUTPATIENT
Start: 2022-10-03 | End: 2022-10-04

## 2022-10-03 RX ORDER — INSULIN GLARGINE 100 [IU]/ML
8 INJECTION, SOLUTION SUBCUTANEOUS AT BEDTIME
Refills: 0 | Status: DISCONTINUED | OUTPATIENT
Start: 2022-10-03 | End: 2022-10-04

## 2022-10-03 RX ORDER — INSULIN LISPRO 100/ML
VIAL (ML) SUBCUTANEOUS
Refills: 0 | Status: DISCONTINUED | OUTPATIENT
Start: 2022-10-03 | End: 2022-10-03

## 2022-10-03 RX ORDER — INSULIN LISPRO 100/ML
VIAL (ML) SUBCUTANEOUS AT BEDTIME
Refills: 0 | Status: DISCONTINUED | OUTPATIENT
Start: 2022-10-03 | End: 2022-10-03

## 2022-10-03 RX ORDER — INSULIN LISPRO 100/ML
VIAL (ML) SUBCUTANEOUS ONCE
Refills: 0 | Status: COMPLETED | OUTPATIENT
Start: 2022-10-03 | End: 2022-10-03

## 2022-10-03 RX ADMIN — Medication 8: at 02:40

## 2022-10-03 RX ADMIN — BUDESONIDE AND FORMOTEROL FUMARATE DIHYDRATE 2 PUFF(S): 160; 4.5 AEROSOL RESPIRATORY (INHALATION) at 22:01

## 2022-10-03 RX ADMIN — Medication 3 UNIT(S): at 11:27

## 2022-10-03 RX ADMIN — INSULIN GLARGINE 8 UNIT(S): 100 INJECTION, SOLUTION SUBCUTANEOUS at 21:58

## 2022-10-03 RX ADMIN — Medication 40 MILLIGRAM(S): at 05:52

## 2022-10-03 RX ADMIN — INSULIN GLARGINE 5 UNIT(S): 100 INJECTION, SOLUTION SUBCUTANEOUS at 02:38

## 2022-10-03 RX ADMIN — Medication 100 MILLIGRAM(S): at 11:34

## 2022-10-03 RX ADMIN — Medication 3 UNIT(S): at 07:44

## 2022-10-03 RX ADMIN — Medication 8: at 11:28

## 2022-10-03 RX ADMIN — LOSARTAN POTASSIUM 100 MILLIGRAM(S): 100 TABLET, FILM COATED ORAL at 05:51

## 2022-10-03 RX ADMIN — ENOXAPARIN SODIUM 40 MILLIGRAM(S): 100 INJECTION SUBCUTANEOUS at 07:46

## 2022-10-03 RX ADMIN — Medication 1200 MILLIGRAM(S): at 05:52

## 2022-10-03 RX ADMIN — Medication 650 MILLIGRAM(S): at 17:50

## 2022-10-03 RX ADMIN — Medication 3 UNIT(S): at 16:52

## 2022-10-03 RX ADMIN — Medication 1200 MILLIGRAM(S): at 17:23

## 2022-10-03 RX ADMIN — ATORVASTATIN CALCIUM 40 MILLIGRAM(S): 80 TABLET, FILM COATED ORAL at 21:59

## 2022-10-03 RX ADMIN — Medication 12.5 MILLIGRAM(S): at 05:52

## 2022-10-03 RX ADMIN — Medication 650 MILLIGRAM(S): at 16:52

## 2022-10-03 NOTE — PROGRESS NOTE ADULT - PROBLEM SELECTOR PROBLEM 6
HLD (hyperlipidemia)
Prophylactic measure
HLD (hyperlipidemia)
Prophylactic measure

## 2022-10-03 NOTE — DIETITIAN INITIAL EVALUATION ADULT - OTHER INFO
A&Ox4. States she is >50% of her meals this admission. No reported GI issues (nausea/vomiting/diarrhea/constipation.) Denies any chewing or swallowing difficulties at this time. NKFA. -148 pounds. Pt noted with steroid-induced hyperglycemia this admission (BG>400 yesterday). Endo is managing insulin. Pt amenable to diet education. She also would like to try Ensure Max to improve protein intake.

## 2022-10-03 NOTE — PROGRESS NOTE ADULT - PROBLEM SELECTOR PROBLEM 1
Arthralgia of ankle, right
Acute asthma exacerbation
Elevated lactic acid level
Arthralgia of ankle, right
Acute asthma exacerbation

## 2022-10-03 NOTE — PROVIDER CONTACT NOTE (OTHER) - ACTION/TREATMENT ORDERED:
Provider notified, will await further orders
Give sliding scale insulin, recheck, and notify provider of results.
Provider aware, STAT blood work
Xray RT ankle complete 3 views urgent

## 2022-10-03 NOTE — DIETITIAN INITIAL EVALUATION ADULT - REASON
no overt signs of muscle and fat wasting.  Adjustment disorder with anxiety Deferred condition on axis II

## 2022-10-03 NOTE — DIETITIAN INITIAL EVALUATION ADULT - ORAL INTAKE PTA/DIET HISTORY
states she does not eat meat. monitors her portions. had colon cancer last year and since then has multiple BMs/day.

## 2022-10-03 NOTE — PROGRESS NOTE ADULT - PROBLEM SELECTOR PLAN 2
Lactate levels has been up and down during hospitalization most recently 6.4. Does not appear to have any other signs of shock, no cardiac history, no liver dysfunction, no abdominal tenderness or other signs of ischemia. On metformin at home but this has been held since admission. CTA chest negative for PE; holding off on CTA abdomen as benign abdominal exam and no signs of bowel ischemia.  No signs of type A lactic acidosis, most likely type B, possibly from B agonist- albuterol has been stopped but still on LAMA/ICS inhaler  -treating empirically for thiamine deficiency with IV thiamine, then Po thiamine on discharge  -lactate now down to 2.9, will stop checking  -As per endocrine no contraindication for metformin

## 2022-10-03 NOTE — PROVIDER CONTACT NOTE (CRITICAL VALUE NOTIFICATION) - TEST AND RESULT REPORTED:
Lactate 5.9
Lactate 5.2
VBG lactate 6.9
BGV - lactate 4.6, Glucose 536
Lactate: 6.4
Lactate 5.5
Lactate 7.9
Lactate 4.7

## 2022-10-03 NOTE — CONSULT NOTE ADULT - ASSESSMENT
62 yr old female with a pmh of asthma (never intubated), remote breast and colon CA, HTN, HLD, T2DM, who presents with wheezing and SOB since Sunday. Admitted with asthma exacerbation secondary to rhinovirus on high dose prednisone with hyperglycemia. Patient takes metformin at home. Endocrinology consulted for management of diabetes and steroid induced hyperglycemia.    Well-controlled T2DM with steroid-induced hyperglycemia  A1c 7.1. Received methylprednisolone 125mg x1, 40mg x1, 60mg daily x2, and is now on prednisone 40mg daily.  Endocrinologist: Dr. Tika Suero.  Home DM meds: Metformin 1g BID, was taken off Lantus in 2021.  -Hold oral DM agents while inpatient  -Start Lantus 10 units at bedtime. DO NOT HOLD IF NPO.  -Start Admelog 6 units TID pre-meal. HOLD IF NPO.  -Use low dose Admelog correction scale pre-meal  -Use low dose Admelog correction scale at bedtime  -Fingerstick BG before meals and bedtime  -Goal -180  -Carbohydrate consistent diet  Discharge plan:  -Likely to discharge patient home on metformin 1g BID. Does have some lactate elevation but liver and renal profile are acceptable. Final regimen pending clinical course and plan for steroids.  -Recommend routine outpatient ophthalmology, podiatry and endocrinology f/u.    HTN  -Outpatient goal BP <130/80. Management per primary team.    HLD  -On atorvastatin 40mg daily    Azar Benavidez DO, Endocrinology Fellow  [phone] 62 yr old female with a pmh of asthma (never intubated), remote breast and colon CA, HTN, HLD, T2DM, who presents with wheezing and SOB since Sunday. Admitted with asthma exacerbation secondary to rhinovirus on high dose prednisone with hyperglycemia. Patient takes metformin at home. Endocrinology consulted for management of diabetes and steroid induced hyperglycemia.    Well-controlled T2DM with steroid-induced hyperglycemia  A1c 7.1. Received methylprednisolone 125mg x1, 40mg x1, 60mg daily x2, and finally was on prednisone 40mg daily. Stopped today.  Endocrinologist: Dr. Tika Suero.  Home DM meds: Metformin 1g BID, was taken off Lantus in 2021.  -Hold oral DM agents while inpatient  -Reduce Lantus to 8 units at bedtime. DO NOT HOLD IF NPO.  -Continue with Admelog 3 units TID pre-meal. HOLD IF NPO.  -Use low dose Admelog correction scale pre-meal  -Use low dose Admelog correction scale at bedtime  -Fingerstick BG before meals and bedtime  -Goal -180  -Carbohydrate consistent diet  Discharge plan:  -Likely to discharge patient home on GLP-1 agonist rather than metformin given persistently elevated lactate. Final regimen pending clinical course and plan for steroids.  -Recommend routine outpatient ophthalmology, podiatry and endocrinology f/u.    HTN  -Outpatient goal BP <130/80. Management per primary team.    HLD  -On atorvastatin 40mg daily    Azar Benavidez DO, Endocrinology Fellow  [phone] 62 yr old female with a pmh of asthma (never intubated), remote breast and colon CA, HTN, HLD, T2DM, who presents with wheezing and SOB since Sunday. Admitted with asthma exacerbation secondary to rhinovirus on high dose prednisone with hyperglycemia. Patient takes metformin at home. Endocrinology consulted for management of diabetes and steroid induced hyperglycemia.    Well-controlled T2DM with steroid-induced hyperglycemia  A1c 7.1. Received methylprednisolone 125mg x1, 40mg x1, 60mg daily x2, and finally was on prednisone 40mg daily. Stopped today.  Endocrinologist: Dr. Tika Suero.  Home DM meds: Metformin 1g BID, was taken off Lantus in 2021.  -Hold oral DM agents while inpatient  -Reduce Lantus to 8 units at bedtime. DO NOT HOLD IF NPO.  -Continue with Admelog 3 units TID pre-meal. HOLD IF NPO.  -Use low dose Admelog correction scale pre-meal  -Use low dose Admelog correction scale at bedtime  -Fingerstick BG before meals and bedtime  -Goal -180  -Carbohydrate consistent diet  Discharge plan:  -Likely to discharge patient home on once weekly injectable GLP-1 agonist rather than metformin given persistently elevated lactate. Final regimen pending clinical course and plan for steroids. Has self injected previously.  -Recommend routine outpatient ophthalmology, podiatry and endocrinology f/u.    HTN  -Outpatient goal BP <130/80. Management per primary team.    HLD  -On atorvastatin 40mg daily    Azar Benavidez DO, Endocrinology Fellow  [phone]

## 2022-10-03 NOTE — PROVIDER CONTACT NOTE (OTHER) - BACKGROUND
Asthma exacerbation, Breast Cancer, HTN, DM2,H/O RT foot surgery
Asthma exacerbation, Breast Cancer, HTN, DM2
Asthma exacerbation, Breast Cancer, HTN, DM2,H/O RT foot surgery
Pt admitted for asthma exacerbation.

## 2022-10-03 NOTE — PROGRESS NOTE ADULT - PROBLEM SELECTOR PLAN 7
DVTppx: LMWH   Diet: CC, DASH  Dispo: Home
DVTppx: LMWH   Diet: CC, DASH  Dispo: Home pending normalization of lactate

## 2022-10-03 NOTE — DIETITIAN INITIAL EVALUATION ADULT - PERSON TAUGHT/METHOD
- no juice, no soda   - pairing carbs with protein   - goal -180/verbal instruction/patient instructed

## 2022-10-03 NOTE — PROVIDER CONTACT NOTE (CRITICAL VALUE NOTIFICATION) - BACKGROUND
pt admitted for SOB, asthma exacerbation
patient was admitted for asthma with PMHx of breast cancer, HTN, DM2
62F PMHx asthma, breast and colon CA admitted for SOB and cough, positive for rhinovirus.
p/w SOB, cough. Admitted for asthma exacerbation. +enterorhinovirus
patient was admitted for asthma with PMHx of breast cancer, HTN, DM2
p/w SOB, cough. Admitted for asthma exacerbation. +enterorhinovirus

## 2022-10-03 NOTE — DIETITIAN INITIAL EVALUATION ADULT - REASON FOR ADMISSION
Asthma with acute exacerbation  62 yr old female presenting with an asthma exacerbation secondary to rhinovirus infection. Hospitalization complicated by elevated lactate, now downtrending and monoarticular arthritis, pending x-ray.

## 2022-10-03 NOTE — DIETITIAN INITIAL EVALUATION ADULT - PERTINENT LABORATORY DATA
10-03    138  |  99  |  26<H>  ----------------------------<  120<H>  3.6   |  27  |  0.96    Ca    9.2      03 Oct 2022 05:42  Phos  3.1     10-03  Mg     2.20     10-03    POCT Blood Glucose.: 321 mg/dL (10-03-22 @ 11:12)  A1C with Estimated Average Glucose Result: 7.1 % (09-27-22 @ 06:10)

## 2022-10-03 NOTE — PROGRESS NOTE ADULT - PROBLEM SELECTOR PLAN 1
Acute onset while on bed, denies trauma. Right ankle TTP, improved since yesterday  -ankle x-ray no fracture   -Rest, ice, elevation  -tylenol PRN for pain

## 2022-10-03 NOTE — PROGRESS NOTE ADULT - PROBLEM SELECTOR PLAN 4
with steriod induced hyperglycemia   A1c 7.1   Hold home metformin  moderate ISS with diabetic diet  steroid dose  today, will monitor trend today and adjust as needed
with steroid induced hyperglycemia   A1c 7.1   Hold home metformin  moderate ISS with diabetic diet
BP at goal  Continue home losartan 100mg and hctz 12.5mg daily   Monitor
with steroid induced hyperglycemia   A1c 7.1   Hold home metformin  moderate ISS with diabetic diet
BP at goal  Continue home losartan 100mg and hctz 12.5mg daily   Monitor
with steroid induced hyperglycemia   A1c 7.1   Hold home metformin  moderate ISS with diabetic diet
with steroid induced hyperglycemia   A1c 7.1   Hold home metformin  moderate ISS with diabetic diet

## 2022-10-03 NOTE — PROGRESS NOTE ADULT - PROBLEM SELECTOR PROBLEM 2
Elevated lactic acid level
Acute asthma exacerbation
Elevated lactic acid level

## 2022-10-03 NOTE — PROVIDER CONTACT NOTE (CRITICAL VALUE NOTIFICATION) - ACTION/TREATMENT ORDERED:
provider aware, awaiting for response
No new orders at this time. Continue to monitor
No interventions at this time
Continue to monitor
PA made aware.
provider aware  5 Units of Lantus to be given, recheck FS 2am using moderate correction scale.
Lactated ringers 250 mL over 30 min bolus. Repeat lactate ordered for midnight
No new orders at this time. Continue to monitor

## 2022-10-03 NOTE — PROVIDER CONTACT NOTE (CRITICAL VALUE NOTIFICATION) - ASSESSMENT
A&Ox4. VSS as per flowsheet. No complaints at this time
A&Ox4. VSS as per flowsheet. No complaints at this time
patient resting on bed, patient is asymptomatic
Patient shows no s/s of acute distress.
A&Ox4. VSS as per flowsheet. No complaints at this time
A&Ox4. VSS as per flowsheet. No complaints at this time
patient resting in bed, patient is asymptomatic
pt AxOx4. VSS. Pt denies chest pain, SOB, pain/discomfort. No acute distress noted, safety maintained.

## 2022-10-03 NOTE — PROGRESS NOTE ADULT - SUBJECTIVE AND OBJECTIVE BOX
[facility] Division of Hospital Medicine  Sury Pierce MD  Pager [phone]      Patient is a 62y old  Female who presents with a chief complaint of asthma exacerbation (03 Oct 2022 13:39)      SUBJECTIVE / OVERNIGHT EVENTS:    pt was found to have FS>500 o/n. received additional dose insulin. this am reports R ankle pain slightly improved. SOB has resolved     ADDITIONAL REVIEW OF SYSTEMS:    RESPIRATORY: No cough, wheezing, chills or hemoptysis; No shortness of breath  CARDIOVASCULAR: No chest pain, palpitations, dizziness, or leg swelling  GASTROINTESTINAL: No abdominal or epigastric pain. No nausea, vomiting, or hematemesis; No diarrhea or constipation. No melena or hematochezia.      MEDICATIONS  (STANDING):  atorvastatin 40 milliGRAM(s) Oral at bedtime  budesonide 160 MICROgram(s)/formoterol 4.5 MICROgram(s) Inhaler 2 Puff(s) Inhalation two times a day  dextrose 5%. 1000 milliLiter(s) (50 mL/Hr) IV Continuous <Continuous>  dextrose 5%. 1000 milliLiter(s) (100 mL/Hr) IV Continuous <Continuous>  dextrose 50% Injectable 25 Gram(s) IV Push once  dextrose 50% Injectable 12.5 Gram(s) IV Push once  dextrose 50% Injectable 25 Gram(s) IV Push once  enoxaparin Injectable 40 milliGRAM(s) SubCutaneous every 24 hours  glucagon  Injectable 1 milliGRAM(s) IntraMuscular once  guaiFENesin ER 1200 milliGRAM(s) Oral every 12 hours  hydrochlorothiazide 12.5 milliGRAM(s) Oral daily  influenza   Vaccine 0.5 milliLiter(s) IntraMuscular once  insulin glargine Injectable (LANTUS) 5 Unit(s) SubCutaneous at bedtime  insulin lispro (ADMELOG) corrective regimen sliding scale   SubCutaneous three times a day before meals  insulin lispro (ADMELOG) corrective regimen sliding scale   SubCutaneous at bedtime  insulin lispro Injectable (ADMELOG) 3 Unit(s) SubCutaneous three times a day before meals  losartan 100 milliGRAM(s) Oral daily  thiamine 100 milliGRAM(s) Oral daily    MEDICATIONS  (PRN):  acetaminophen     Tablet .. 650 milliGRAM(s) Oral every 6 hours PRN Temp greater or equal to 38C (100.4F), Mild Pain (1 - 3)  aluminum hydroxide/magnesium hydroxide/simethicone Suspension 30 milliLiter(s) Oral every 4 hours PRN Dyspepsia  benzonatate 100 milliGRAM(s) Oral every 8 hours PRN Cough  dextrose Oral Gel 15 Gram(s) Oral once PRN Blood Glucose LESS THAN 70 milliGRAM(s)/deciliter  hydrocodone/homatropine Syrup 5 milliLiter(s) Oral every 12 hours PRN Cough  melatonin 3 milliGRAM(s) Oral at bedtime PRN Insomnia  ondansetron Injectable 4 milliGRAM(s) IV Push every 8 hours PRN Nausea and/or Vomiting      CAPILLARY BLOOD GLUCOSE      POCT Blood Glucose.: 321 mg/dL (03 Oct 2022 11:12)  POCT Blood Glucose.: 144 mg/dL (03 Oct 2022 07:22)  POCT Blood Glucose.: 114 mg/dL (03 Oct 2022 05:47)  POCT Blood Glucose.: 314 mg/dL (03 Oct 2022 02:07)  POCT Blood Glucose.: 467 mg/dL (03 Oct 2022 00:12)  POCT Blood Glucose.: 532 mg/dL (02 Oct 2022 21:38)  POCT Blood Glucose.: 516 mg/dL (02 Oct 2022 21:36)    I&O's Summary      PHYSICAL EXAM:  Vital Signs Last 24 Hrs  T(C): 36.8 (03 Oct 2022 10:40), Max: 37 (02 Oct 2022 17:36)  T(F): 98.3 (03 Oct 2022 10:40), Max: 98.6 (02 Oct 2022 17:36)  HR: 76 (03 Oct 2022 10:40) (67 - 84)  BP: 101/71 (03 Oct 2022 10:40) (101/71 - 134/82)  BP(mean): --  RR: 19 (03 Oct 2022 10:40) (18 - 19)  SpO2: 99% (03 Oct 2022 10:40) (98% - 100%)    Parameters below as of 03 Oct 2022 10:40  Patient On (Oxygen Delivery Method): room air        CONSTITUTIONAL: NAD,  EYES: PERRLA; conjunctiva and sclera clear  ENMT: Moist oral mucosa, no pharyngeal injection or exudates;   NECK: Supple, no palpable masses;  RESPIRATORY: Normal respiratory effort; lungs are clear to auscultation bilaterally  CARDIOVASCULAR: Regular rate and rhythm, normal S1 and S2, no murmur/rub/gallop; No lower extremity edema; Peripheral pulses are 2+ bilaterally  ABDOMEN: Nontender to palpation, normoactive bowel sounds, no rebound/guarding;   MUSCLOSKELETAL:   no clubbing or cyanosis of digits; R ankle TTP, mild swelling  PSYCH: A+O to person, place, and time; affect appropriate  NEUROLOGY: CN 2-12 are intact and symmetric; no gross sensory deficits;   SKIN: No rashes;     LABS:                        13.2   15.35 )-----------( 268      ( 03 Oct 2022 05:42 )             41.0     10-03    138  |  99  |  26<H>  ----------------------------<  120<H>  3.6   |  27  |  0.96    Ca    9.2      03 Oct 2022 05:42  Phos  3.1     10-03  Mg     2.20     10-03                  RADIOLOGY & ADDITIONAL TESTS:  Results Reviewed:   Imaging Personally Reviewed:  Electrocardiogram Personally Reviewed:    COORDINATION OF CARE:  Care Discussed with Consultants/Other Providers [Y/N]:  Prior or Outpatient Records Reviewed [Y/N]:

## 2022-10-03 NOTE — PROGRESS NOTE ADULT - PROBLEM SELECTOR PROBLEM 3
Benign essential HTN
Acute asthma exacerbation
Benign essential HTN
Acute asthma exacerbation
Benign essential HTN

## 2022-10-03 NOTE — PROGRESS NOTE ADULT - PROBLEM SELECTOR PLAN 6
DVTppx: LMWH   Diet: CC, DASH  Dispo: Home pending normalization of lactate
continue atorvastatin 40mg daily
DVTppx: LMWH   Diet: CC, DASH  Dispo: Home pending improvement
continue atorvastatin 40mg daily

## 2022-10-03 NOTE — PROGRESS NOTE ADULT - PROBLEM SELECTOR PROBLEM 4
T2DM (type 2 diabetes mellitus)
Benign essential HTN
T2DM (type 2 diabetes mellitus)
Benign essential HTN

## 2022-10-03 NOTE — PROGRESS NOTE ADULT - PROBLEM SELECTOR PLAN 5
with steroid induced hyperglycemia   A1c 7.1   Hold home metformin  c/w basal/bolus insulin while inpt as per endo   f/u endo for discharge recs

## 2022-10-03 NOTE — PROVIDER CONTACT NOTE (CRITICAL VALUE NOTIFICATION) - NAME OF MD/NP/PA/DO NOTIFIED:
Marybeth Sharma (ACP)
Miguel A Diana
[place], [place]
Christiana Mackey (ANDREA)
Keily Arthur, [id number]
Lily Estrella ([facility])
GIOVANNI North
Marybeth Sharma (ACP)

## 2022-10-03 NOTE — DIETITIAN INITIAL EVALUATION ADULT - PERTINENT MEDS FT
MEDICATIONS  (STANDING):  atorvastatin 40 milliGRAM(s) Oral at bedtime  budesonide 160 MICROgram(s)/formoterol 4.5 MICROgram(s) Inhaler 2 Puff(s) Inhalation two times a day  dextrose 5%. 1000 milliLiter(s) (50 mL/Hr) IV Continuous <Continuous>  dextrose 5%. 1000 milliLiter(s) (100 mL/Hr) IV Continuous <Continuous>  dextrose 50% Injectable 25 Gram(s) IV Push once  dextrose 50% Injectable 12.5 Gram(s) IV Push once  dextrose 50% Injectable 25 Gram(s) IV Push once  enoxaparin Injectable 40 milliGRAM(s) SubCutaneous every 24 hours  glucagon  Injectable 1 milliGRAM(s) IntraMuscular once  guaiFENesin ER 1200 milliGRAM(s) Oral every 12 hours  hydrochlorothiazide 12.5 milliGRAM(s) Oral daily  influenza   Vaccine 0.5 milliLiter(s) IntraMuscular once  insulin glargine Injectable (LANTUS) 5 Unit(s) SubCutaneous at bedtime  insulin lispro (ADMELOG) corrective regimen sliding scale   SubCutaneous three times a day before meals  insulin lispro (ADMELOG) corrective regimen sliding scale   SubCutaneous at bedtime  insulin lispro Injectable (ADMELOG) 3 Unit(s) SubCutaneous three times a day before meals  losartan 100 milliGRAM(s) Oral daily  thiamine 100 milliGRAM(s) Oral daily    MEDICATIONS  (PRN):  acetaminophen     Tablet .. 650 milliGRAM(s) Oral every 6 hours PRN Temp greater or equal to 38C (100.4F), Mild Pain (1 - 3)  aluminum hydroxide/magnesium hydroxide/simethicone Suspension 30 milliLiter(s) Oral every 4 hours PRN Dyspepsia  benzonatate 100 milliGRAM(s) Oral every 8 hours PRN Cough  dextrose Oral Gel 15 Gram(s) Oral once PRN Blood Glucose LESS THAN 70 milliGRAM(s)/deciliter  hydrocodone/homatropine Syrup 5 milliLiter(s) Oral every 12 hours PRN Cough  melatonin 3 milliGRAM(s) Oral at bedtime PRN Insomnia  ondansetron Injectable 4 milliGRAM(s) IV Push every 8 hours PRN Nausea and/or Vomiting

## 2022-10-03 NOTE — PROVIDER CONTACT NOTE (OTHER) - ASSESSMENT
Pt  recheck 536, Patient laying in bed asymptomatic, VS stable. Pt recently ate salmonrice, vegetable and Thad drink without receiving insulin prior.
No visible injuries noted,pt denies any trauma or hit
Patient asymptomatic V/S as per flowsheet
A&O X4 and VSS. pt has no complaints and shows no s/s of discomfort.

## 2022-10-03 NOTE — PROVIDER CONTACT NOTE (OTHER) - SITUATION
Pt has a bedtime BG of 404. Parameter is to notify provider if BG over 400.
Patient bedtime FS was 443. Patient states she "ate chips and cake over an hour ago".
Pt  recheck 530
Sudden onset of pain over right ankle

## 2022-10-03 NOTE — DIETITIAN INITIAL EVALUATION ADULT - NS FNS DIET ORDER
Diet, Regular:   Consistent Carbohydrate {Evening Snack} (CSTCHOSN)  DASH/TLC {Sodium & Cholesterol Restricted} (DASH) (09-26-22 @ 19:06)

## 2022-10-03 NOTE — PROGRESS NOTE ADULT - PROBLEM SELECTOR PROBLEM 5
HLD (hyperlipidemia)
T2DM (type 2 diabetes mellitus)
HLD (hyperlipidemia)
HLD (hyperlipidemia)
T2DM (type 2 diabetes mellitus)

## 2022-10-03 NOTE — PROGRESS NOTE ADULT - PROBLEM SELECTOR PLAN 3
BP at goal  Continue home losartan 100mg and hctz 12.5mg daily   Monitor
BP at goal  Continue home losartan 100mg and hctz 12.5mg daily   Monitor
BP above goal  Continue home losartan 100mg and hctz 12.5mg daily   may consider adding CCB if remains elevated   Monitor
secondary to rhino/enterovirus infection   follows with OP pulmonologist Geena Amaral, did not improve with PO steroids and azithromycin  CXR clear lungs, + entero/rhino-virus  s/p 4 days of IV solumedrol, received prednisone 40 today. will stop  c/w symbicort  continue to monitor
BP at goal  Continue home losartan 100mg and hctz 12.5mg daily   Monitor
BP at goal  Continue home losartan 100mg and hctz 12.5mg daily   Monitor
secondary to rhino/enterovirus infection   follows with OP pulmonologist Geena Amaral, did not improve with PO steroids and azithromycin  CXR clear lungs, + entero/rhino-virus  s/p 4 days of IV solumedrol, now on prednisone 40   if patient continues to improve can continue with taper prescribed by her pulmonologist -> Decrease 5mg every 5 days.   c/w symbicort  continue to monitor

## 2022-10-03 NOTE — CONSULT NOTE ADULT - SUBJECTIVE AND OBJECTIVE BOX
HPI:  62 yr old female with a pmh of asthma (never intubated), remote breast and colon CA, HTN, HLD, T2DM, who presents with wheezing and SOB since . Admitted with asthma exacerbation secondary to rhinovirus on high dose prednisone with hyperglycemia. Patient takes metformin at home. Endocrinology consulted for management of diabetes and steroid induced hyperglycemia.    Received methylprednisolone 125mg x1, 40mg x1, 60mg daily x2, and is now on prednisone 40mg daily.    DM diagnosis:   Last A1c: 7.1  Endocrinologist: Dr. Tika Suero  Home DM meds: Metformin 1g BID, was taken off Lantus in   Microvascular complications: None  Macrovascular complications: None  SMBx/day, 90s-130s  Symptoms: No sxs of high or low BG.  Diet at home: No meat, follows ADA diet.  Appetite in the hospital: Finishing meals.  PMHx: As above. No known hx of HF, pancreatitis or UTIs.  On ROS, reporting right lateral ankle pain and foot swelling.        PAST MEDICAL & SURGICAL HISTORY:  Asthma  (no hx/o intubation)      Hypertension      DM2 (diabetes mellitus, type 2)      Former smoker, stopped smoking many years ago  (Quit ~32years ago; used to Nicotine patch  Informed pt of the various negative side effects of smoking including risk of COPD, Lung Ca etc  Strongly recommended that pt stops smoking and pt given various options of smoking cessation tools such as NRT&#x27;s and other pharmacotherapies 0.3 ppd x ~10 years.)      Breast cancer  R breast 10/ 2019      Uterus Problem  &quot;was getting cramps so they did ablation? enometriosis  10/2012      Bunion  b/l       delivery NOS  x1      H/O foot surgery  right      S/P lumpectomy, right breast  10/2019          FAMILY HISTORY:  Family history of MI (myocardial infarction)  Father    Family history of lung cancer (Father)  (primary lung cancer)    No known FHx of thyroid cancer.  Mother possibly with DM.    Social History: Former tobacco use, no EtOH use.    Outpatient Medications: Home Medications:  ALBUTEROL 0.083%(2.5MG/3ML) 60X3ML: USE 1 VIAL VIA NEBULIZER EVERY 4 HOURS AS NEEDED (26 Sep 2022 19:00)  ATORVASTATIN 40MG TABLETS: TAKE 1 TABLET BY MOUTH AT BEDTIME (26 Sep 2022 19:00)  budesonide-formoterol 160 mcg-4.5 mcg/inh inhalation aerosol: 2 puff(s) inhaled 2 times a day (26 Sep 2022 19:00)  LOSARTAN/HCTZ 100/12.5MG TABLETS: TAKE 1 TABLET BY MOUTH EVERY DAY (26 Sep 2022 19:00)      MEDICATIONS  (STANDING):  atorvastatin 40 milliGRAM(s) Oral at bedtime  budesonide 160 MICROgram(s)/formoterol 4.5 MICROgram(s) Inhaler 2 Puff(s) Inhalation two times a day  dextrose 5%. 1000 milliLiter(s) (50 mL/Hr) IV Continuous <Continuous>  dextrose 5%. 1000 milliLiter(s) (100 mL/Hr) IV Continuous <Continuous>  dextrose 50% Injectable 25 Gram(s) IV Push once  dextrose 50% Injectable 12.5 Gram(s) IV Push once  dextrose 50% Injectable 25 Gram(s) IV Push once  enoxaparin Injectable 40 milliGRAM(s) SubCutaneous every 24 hours  glucagon  Injectable 1 milliGRAM(s) IntraMuscular once  guaiFENesin ER 1200 milliGRAM(s) Oral every 12 hours  hydrochlorothiazide 12.5 milliGRAM(s) Oral daily  influenza   Vaccine 0.5 milliLiter(s) IntraMuscular once  insulin glargine Injectable (LANTUS) 5 Unit(s) SubCutaneous at bedtime  insulin lispro (ADMELOG) corrective regimen sliding scale   SubCutaneous three times a day before meals  insulin lispro (ADMELOG) corrective regimen sliding scale   SubCutaneous at bedtime  insulin lispro Injectable (ADMELOG) 3 Unit(s) SubCutaneous three times a day before meals  losartan 100 milliGRAM(s) Oral daily  thiamine 100 milliGRAM(s) Oral daily    MEDICATIONS  (PRN):  acetaminophen     Tablet .. 650 milliGRAM(s) Oral every 6 hours PRN Temp greater or equal to 38C (100.4F), Mild Pain (1 - 3)  aluminum hydroxide/magnesium hydroxide/simethicone Suspension 30 milliLiter(s) Oral every 4 hours PRN Dyspepsia  benzonatate 100 milliGRAM(s) Oral every 8 hours PRN Cough  dextrose Oral Gel 15 Gram(s) Oral once PRN Blood Glucose LESS THAN 70 milliGRAM(s)/deciliter  hydrocodone/homatropine Syrup 5 milliLiter(s) Oral every 12 hours PRN Cough  melatonin 3 milliGRAM(s) Oral at bedtime PRN Insomnia  ondansetron Injectable 4 milliGRAM(s) IV Push every 8 hours PRN Nausea and/or Vomiting      Allergies    AValox (Unknown)  NSAIDs (Unknown)    Intolerances      Review of Systems:  Constitutional:  No fever, No chills.  Eye:  No eye pain, No blurring.   Ear/Nose/Mouth/Throat:  No nasal congestion, No sore throat.   Respiratory:  No shortness of breath, No cough.   Cardiovascular:  No chest pain, No palpitations.   Gastrointestinal:  No nausea, No vomiting, No diarrhea.   Genitourinary:  No dysuria, No hematuria.   Endocrine:  No excessive thirst, No polyuria.   Musculoskeletal:  No back pain, No decreased range of motion. + right ankle pain.  Integumentary:  No rash. Dark lesion on right lateral ankle, painful but not draining.  Neurologic:  Alert and oriented X4, No confusion, No numbness, No tingling.   Additional ROS reviewed and negative except as indicated in HPI.        PHYSICAL EXAM:  VITALS: T(C): 36.8 (10-03-22 @ 10:40)  T(F): 98.3 (10-03-22 @ 10:40), Max: 98.6 (10-02-22 @ 17:36)  HR: 76 (10-03-22 @ 10:40) (67 - 84)  BP: 101/71 (10-03-22 @ 10:40) (101/71 - 134/82)  RR:  (18 - 19)  SpO2:  (98% - 100%)  Wt(kg): --  General: Well-developed female, No acute distress, Speaking full sentences.   Eye:  Extraocular movements are intact, No proptosis or lid lag.   HENT:  Normocephalic.   Neck:  Supple, Non-tender.   Respiratory:  Respirations are non-labored, Symmetric chest wall expansion, Breath sounds are equal.   Cardiovascular:  Normal rate, Regular rhythm, No edema.  Gastrointestinal:  Soft, Non-tender, Non-distended.   Musculoskeletal:  Normal range of motion, No gross joint swelling.   Feet:  Normal by visual exam, Normal pulses, No ulcers. Swelling right heel.  Integumentary:  Warm, dry. Right lateral ankle with dark area with small opening, no drainage.  Mental Status Exam:  Orientedx4, Speech clear and coherent.   Neurologic:  Alert, Orientedx4, Normal motor function, No focal deficits, Cranial Nerves II-XII are grossly intact bilaterally.   Psychiatric:  Cooperative, Appropriate mood & affect.    POCT Blood Glucose.: 321 mg/dL (10-03-22 @ 11:12)  POCT Blood Glucose.: 144 mg/dL (10-03-22 @ 07:22)  POCT Blood Glucose.: 114 mg/dL (10-03-22 @ 05:47)  POCT Blood Glucose.: 314 mg/dL (10-03-22 @ 02:07)  POCT Blood Glucose.: 467 mg/dL (10-03-22 @ 00:12)  POCT Blood Glucose.: 532 mg/dL (10-02-22 @ 21:38)  POCT Blood Glucose.: 516 mg/dL (10-02-22 @ 21:36)  POCT Blood Glucose.: 154 mg/dL (10-02-22 @ 15:38)  POCT Blood Glucose.: 348 mg/dL (10-02-22 @ 11:44)  POCT Blood Glucose.: 376 mg/dL (10-02-22 @ 11:00)  POCT Blood Glucose.: 147 mg/dL (10-02-22 @ 08:01)  POCT Blood Glucose.: 158 mg/dL (10-01-22 @ 21:38)  POCT Blood Glucose.: 375 mg/dL (10-01-22 @ 17:15)  POCT Blood Glucose.: 300 mg/dL (10-01-22 @ 11:16)  POCT Blood Glucose.: 243 mg/dL (10-01-22 @ 07:45)  POCT Blood Glucose.: 443 mg/dL (22 @ 22:28)  POCT Blood Glucose.: 135 mg/dL (22 @ 16:32)                            13.2   15.35 )-----------( 268      ( 03 Oct 2022 05:42 )             41.0       10-03    138  |  99  |  26<H>  ----------------------------<  120<H>  3.6   |  27  |  0.96    eGFR: 68    Ca    9.2      10-03  Mg     2.20     10-03  Phos  3.1     10-03        Thyroid Function Tests:       Chol 147 Direct LDL -- LDL calculated 31  Trig 81,  Chol 171 Direct LDL -- LDL calculated 51 HDL 97 Trig 115        Radiology:              HPI:  62 yr old female with a pmh of asthma (never intubated), remote breast and colon CA, HTN, HLD, T2DM, who presents with wheezing and SOB since . Admitted with asthma exacerbation secondary to rhinovirus on high dose prednisone with hyperglycemia. Patient takes metformin at home. Endocrinology consulted for management of diabetes and steroid induced hyperglycemia.    Received methylprednisolone 125mg x1, 40mg x1, 60mg daily x2, and finally was on prednisone 40mg daily. Stopped today.    DM diagnosis:   Last A1c: 7.1  Endocrinologist: Dr. Tika Suero  Home DM meds: Metformin 1g BID, was taken off Lantus in   Microvascular complications: None  Macrovascular complications: None  SMBx/day, 90s-130s  Symptoms: No sxs of high or low BG.  Diet at home: No meat, follows ADA diet.  Appetite in the hospital: Finishing meals.  PMHx: As above. No known hx of HF, pancreatitis or UTIs.  On ROS, reporting right lateral ankle pain and foot swelling.        PAST MEDICAL & SURGICAL HISTORY:  Asthma  (no hx/o intubation)      Hypertension      DM2 (diabetes mellitus, type 2)      Former smoker, stopped smoking many years ago  (Quit ~32years ago; used to Nicotine patch  Informed pt of the various negative side effects of smoking including risk of COPD, Lung Ca etc  Strongly recommended that pt stops smoking and pt given various options of smoking cessation tools such as NRT&#x27;s and other pharmacotherapies 0.3 ppd x ~10 years.)      Breast cancer  R breast 10/ 2019      Uterus Problem  &quot;was getting cramps so they did ablation? enometriosis  10/2012      Bunion  b/l       delivery NOS  x1      H/O foot surgery  right      S/P lumpectomy, right breast  10/2019          FAMILY HISTORY:  Family history of MI (myocardial infarction)  Father    Family history of lung cancer (Father)  (primary lung cancer)    No known FHx of thyroid cancer.  Mother possibly with DM.    Social History: Former tobacco use, no EtOH use.    Outpatient Medications: Home Medications:  ALBUTEROL 0.083%(2.5MG/3ML) 60X3ML: USE 1 VIAL VIA NEBULIZER EVERY 4 HOURS AS NEEDED (26 Sep 2022 19:00)  ATORVASTATIN 40MG TABLETS: TAKE 1 TABLET BY MOUTH AT BEDTIME (26 Sep 2022 19:00)  budesonide-formoterol 160 mcg-4.5 mcg/inh inhalation aerosol: 2 puff(s) inhaled 2 times a day (26 Sep 2022 19:00)  LOSARTAN/HCTZ 100/12.5MG TABLETS: TAKE 1 TABLET BY MOUTH EVERY DAY (26 Sep 2022 19:00)      MEDICATIONS  (STANDING):  atorvastatin 40 milliGRAM(s) Oral at bedtime  budesonide 160 MICROgram(s)/formoterol 4.5 MICROgram(s) Inhaler 2 Puff(s) Inhalation two times a day  dextrose 5%. 1000 milliLiter(s) (50 mL/Hr) IV Continuous <Continuous>  dextrose 5%. 1000 milliLiter(s) (100 mL/Hr) IV Continuous <Continuous>  dextrose 50% Injectable 25 Gram(s) IV Push once  dextrose 50% Injectable 12.5 Gram(s) IV Push once  dextrose 50% Injectable 25 Gram(s) IV Push once  enoxaparin Injectable 40 milliGRAM(s) SubCutaneous every 24 hours  glucagon  Injectable 1 milliGRAM(s) IntraMuscular once  guaiFENesin ER 1200 milliGRAM(s) Oral every 12 hours  hydrochlorothiazide 12.5 milliGRAM(s) Oral daily  influenza   Vaccine 0.5 milliLiter(s) IntraMuscular once  insulin glargine Injectable (LANTUS) 5 Unit(s) SubCutaneous at bedtime  insulin lispro (ADMELOG) corrective regimen sliding scale   SubCutaneous three times a day before meals  insulin lispro (ADMELOG) corrective regimen sliding scale   SubCutaneous at bedtime  insulin lispro Injectable (ADMELOG) 3 Unit(s) SubCutaneous three times a day before meals  losartan 100 milliGRAM(s) Oral daily  thiamine 100 milliGRAM(s) Oral daily    MEDICATIONS  (PRN):  acetaminophen     Tablet .. 650 milliGRAM(s) Oral every 6 hours PRN Temp greater or equal to 38C (100.4F), Mild Pain (1 - 3)  aluminum hydroxide/magnesium hydroxide/simethicone Suspension 30 milliLiter(s) Oral every 4 hours PRN Dyspepsia  benzonatate 100 milliGRAM(s) Oral every 8 hours PRN Cough  dextrose Oral Gel 15 Gram(s) Oral once PRN Blood Glucose LESS THAN 70 milliGRAM(s)/deciliter  hydrocodone/homatropine Syrup 5 milliLiter(s) Oral every 12 hours PRN Cough  melatonin 3 milliGRAM(s) Oral at bedtime PRN Insomnia  ondansetron Injectable 4 milliGRAM(s) IV Push every 8 hours PRN Nausea and/or Vomiting      Allergies    AValox (Unknown)  NSAIDs (Unknown)    Intolerances      Review of Systems:  Constitutional:  No fever, No chills.  Eye:  No eye pain, No blurring.   Ear/Nose/Mouth/Throat:  No nasal congestion, No sore throat.   Respiratory:  No shortness of breath, No cough.   Cardiovascular:  No chest pain, No palpitations.   Gastrointestinal:  No nausea, No vomiting, No diarrhea.   Genitourinary:  No dysuria, No hematuria.   Endocrine:  No excessive thirst, No polyuria.   Musculoskeletal:  No back pain, No decreased range of motion. + right ankle pain.  Integumentary:  No rash. Dark lesion on right lateral ankle, painful but not draining.  Neurologic:  Alert and oriented X4, No confusion, No numbness, No tingling.   Additional ROS reviewed and negative except as indicated in HPI.        PHYSICAL EXAM:  VITALS: T(C): 36.8 (10-03-22 @ 10:40)  T(F): 98.3 (10-03-22 @ 10:40), Max: 98.6 (10-02-22 @ 17:36)  HR: 76 (10-03-22 @ 10:40) (67 - 84)  BP: 101/71 (10-03-22 @ 10:40) (101/71 - 134/82)  RR:  (18 - 19)  SpO2:  (98% - 100%)  Wt(kg): --  General: Well-developed female, No acute distress, Speaking full sentences.   Eye:  Extraocular movements are intact, No proptosis or lid lag.   HENT:  Normocephalic.   Neck:  Supple, Non-tender.   Respiratory:  Respirations are non-labored, Symmetric chest wall expansion, Breath sounds are equal.   Cardiovascular:  Normal rate, Regular rhythm, No edema.  Gastrointestinal:  Soft, Non-tender, Non-distended.   Musculoskeletal:  Normal range of motion, No gross joint swelling.   Feet:  Normal by visual exam, Normal pulses, No ulcers. Swelling right heel.  Integumentary:  Warm, dry. Right lateral ankle with dark area with small opening, no drainage.  Mental Status Exam:  Orientedx4, Speech clear and coherent.   Neurologic:  Alert, Orientedx4, Normal motor function, No focal deficits, Cranial Nerves II-XII are grossly intact bilaterally.   Psychiatric:  Cooperative, Appropriate mood & affect.    POCT Blood Glucose.: 321 mg/dL (10-03-22 @ 11:12)  POCT Blood Glucose.: 144 mg/dL (10-03-22 @ 07:22)  POCT Blood Glucose.: 114 mg/dL (10-03-22 @ 05:47)  POCT Blood Glucose.: 314 mg/dL (10-03-22 @ 02:07)  POCT Blood Glucose.: 467 mg/dL (10-03-22 @ 00:12)  POCT Blood Glucose.: 532 mg/dL (10-02-22 @ 21:38)  POCT Blood Glucose.: 516 mg/dL (10-02-22 @ 21:36)  POCT Blood Glucose.: 154 mg/dL (10-02-22 @ 15:38)  POCT Blood Glucose.: 348 mg/dL (10-02-22 @ 11:44)  POCT Blood Glucose.: 376 mg/dL (10-02-22 @ 11:00)  POCT Blood Glucose.: 147 mg/dL (10-02-22 @ 08:01)  POCT Blood Glucose.: 158 mg/dL (10-01-22 @ 21:38)  POCT Blood Glucose.: 375 mg/dL (10-01-22 @ 17:15)  POCT Blood Glucose.: 300 mg/dL (10-01-22 @ 11:16)  POCT Blood Glucose.: 243 mg/dL (10-01-22 @ 07:45)  POCT Blood Glucose.: 443 mg/dL (22 @ 22:28)  POCT Blood Glucose.: 135 mg/dL (22 @ 16:32)                            13.2   15.35 )-----------( 268      ( 03 Oct 2022 05:42 )             41.0       10-03    138  |  99  |  26<H>  ----------------------------<  120<H>  3.6   |  27  |  0.96    eGFR: 68    Ca    9.2      10-03  Mg     2.20     10-03  Phos  3.1     10-03        Thyroid Function Tests:       Chol 147 Direct LDL -- LDL calculated 31  Trig 81,  Chol 171 Direct LDL -- LDL calculated 51 HDL 97 Trig 115        Radiology:

## 2022-10-03 NOTE — PROGRESS NOTE ADULT - REASON FOR ADMISSION
asthma exacerbation

## 2022-10-03 NOTE — PROGRESS NOTE ADULT - ASSESSMENT
62 yr old female presenting with an asthma exacerbation secondary to rhinovirus infection, now with persistent elevated lactate. 
62 yr old female presenting with an asthma exacerbation secondary to rhinovirus infection. Hospitalization complicated by elevated lactate, now downtrending and monoarticular arthritis, pending x-ray.
62 yr old female presenting with an asthma exacerbation secondary to rhinovirus infection.

## 2022-10-03 NOTE — PROVIDER CONTACT NOTE (CRITICAL VALUE NOTIFICATION) - PERSON GIVING RESULT:
POLO Negron (Lab)
LISETTE Escoto / jesus
LAURE Cosme  (Lab)
PARRIS Eddy
PARRIS Escoto (Lab)
PARRIS López (Lab)
Carmine/ Lab
PARRIS Escoto

## 2022-10-03 NOTE — PROVIDER CONTACT NOTE (CRITICAL VALUE NOTIFICATION) - SITUATION
Lactate 4.7
Lactate 5.9
critical value report:  lactate 4.6, Glucose 536
VBG lactate 6.9
critical value report: Lactate: 6.4
Lactate 5.5
Lactate 7.9
Lactate 5.2

## 2022-10-03 NOTE — PROVIDER CONTACT NOTE (OTHER) - REASON
Sudden onset of pain over right ankle
Patient Glucose level 443
Sudden onset of pain over right ankle

## 2022-10-03 NOTE — CONSULT NOTE ADULT - ATTENDING COMMENTS
62F DM2 with steroid exacerbated hyperglycemia up to 500s. At home on metformin only (in the past used insulin). Lactate persistently elevated can stop metformin at dc and replace with GLP1RA. Check coverage for Trulicity 0.75mg SQ weekly or Ozempic 0.25 mg SQ weekly.  Endocrine team consulted for uncontrolled diabetes. Patient is high risk with high level decision making due to uncontrolled diabetes which places patient at high risk for cardiovascular and cerebrovascular events. Patient with lability of glucose requiring close monitoring and insulin adjustments.    Jarred Rojas MD  Division of Endocrinology  Pager: [phone]    If after 6PM or before 9AM, or on weekends/holidays, please call endocrine answering service for assistance ([phone]).  For nonurgent matters email [email or web address] for assistance.

## 2022-10-03 NOTE — CHART NOTE - NSCHARTNOTEFT_GEN_A_CORE
Contacted by RN regarding elevated BG >500. Patient did not receive bedtime sliding scale as was refusing to eat per day RN however ending up eating. Night RN gave lantus and 4 U of corrected. Lactate elevated at baseline however downtrending, BHB negative. Likely elevated 2/2 to steroid use. Discussed with on call Endocrinologist, Contacted by RN regarding elevated BG >500. Patient did not receive bedtime sliding scale as was refusing to eat per day RN however ending up eating. Night RN gave lantus and 4 U of corrected. Lactate elevated at baseline however downtrending, BHB negative. Likely elevated 2/2 to steroid use. Discussed with on call EndOCRINOL Contacted by RN regarding elevated BG >500. Patient did not receive bedtime sliding scale as was refusing to eat per day RN however ending up eating. Night RN gave lantus and 4 U of corrected. Lactate elevated at baseline however downtrending, BHB negative. Likely elevated 2/2 to steroid use. Discussed with on call Endocrinologist,  who suggested additional 5 U Lantus be given now. POCT be rechecked at 2 AM ( along with moderate correction scale). Patient placed on 3 U admelog TID, moderate correction scale. Endocrinology consult to follow tomorrow. RN educated on plan. Reinforced how sliding scale should not be held regardless of PO status.

## 2022-10-03 NOTE — CHART NOTE - NSCHARTNOTEFT_GEN_A_CORE
Ms. Cuevas is a 62 year old female with a PMHx of T2DM, asthma who presents with asthma exacerbation secondary to rhinovirus on high dose prednisone with hyperglycemia. Patient takes metformin at home. Endocrinology consulted for management of diabetes and steroid induced hyperglycemia.    Per primary team, may have been mix up when patient receiving sliding scale as reportedly the patient did not eat and sliding scale in the evening was held and then when the patient ate, sliding scale was given.    Currently BG in 400s, AG 23, bicarb 17, BHB 0, pH 7.38, lactate 4.6 (patient receiving albuterol per medicine which may be partially contributing), could be mild DKA. Patient received 5 units lantus at 9:38 and 4 units admelog given at 9:49pm.. Primary team obtained weight 68kg. A1c 7.1    Recommend  1) Additional 5 units lantus now   2) check 2am fingerstick with moderate ademlog correction scale (4 hours after last dose given)  3) admelog 3 units with meals  4) moderate correction scale tid with meals, DO NOT HOLD IF NPO  5) moderate correction scale at bedtime, DO NOT HOLD IF NPO  6) fingersticks qid with meals and bedtime  7) target -180  8) carb consistent diet  9) hypoglycemia protocol prn    Full consult during the day.    Ceasar Romano MD  Endocrinology Fellow

## 2022-10-03 NOTE — PROVIDER CONTACT NOTE (OTHER) - RECOMMENDATIONS
Notify provider  4 units of insulin given with scheduled 5 units of Lantus.   Will continue to monitor
Given sliding scale insulin and recheck.
provider came and assessed pt at bedside,ordered Xray RT ankle complete 3 views urgent

## 2022-10-04 ENCOUNTER — NON-APPOINTMENT (OUTPATIENT)
Age: 62
End: 2022-10-04

## 2022-10-04 ENCOUNTER — APPOINTMENT (OUTPATIENT)
Dept: PULMONOLOGY | Facility: CLINIC | Age: 62
End: 2022-10-04

## 2022-10-04 ENCOUNTER — TRANSCRIPTION ENCOUNTER (OUTPATIENT)
Age: 62
End: 2022-10-04

## 2022-10-04 VITALS
HEIGHT: 60 IN | OXYGEN SATURATION: 98 % | BODY MASS INDEX: 29.25 KG/M2 | HEART RATE: 77 BPM | WEIGHT: 149 LBS | SYSTOLIC BLOOD PRESSURE: 118 MMHG | DIASTOLIC BLOOD PRESSURE: 82 MMHG

## 2022-10-04 VITALS
OXYGEN SATURATION: 99 % | SYSTOLIC BLOOD PRESSURE: 130 MMHG | TEMPERATURE: 98 F | HEART RATE: 75 BPM | RESPIRATION RATE: 17 BRPM | DIASTOLIC BLOOD PRESSURE: 72 MMHG

## 2022-10-04 DIAGNOSIS — R06.00 DYSPNEA, UNSPECIFIED: ICD-10-CM

## 2022-10-04 DIAGNOSIS — Z87.39 PERSONAL HISTORY OF OTHER DISEASES OF THE MUSCULOSKELETAL SYSTEM AND CONNECTIVE TISSUE: ICD-10-CM

## 2022-10-04 DIAGNOSIS — M10.9 GOUT, UNSPECIFIED: ICD-10-CM

## 2022-10-04 LAB
ANION GAP SERPL CALC-SCNC: 13 MMOL/L — SIGNIFICANT CHANGE UP (ref 7–14)
BASOPHILS # BLD AUTO: 0.01 K/UL — SIGNIFICANT CHANGE UP (ref 0–0.2)
BASOPHILS NFR BLD AUTO: 0.1 % — SIGNIFICANT CHANGE UP (ref 0–2)
BUN SERPL-MCNC: 22 MG/DL — SIGNIFICANT CHANGE UP (ref 7–23)
CALCIUM SERPL-MCNC: 9.3 MG/DL — SIGNIFICANT CHANGE UP (ref 8.4–10.5)
CHLORIDE SERPL-SCNC: 95 MMOL/L — LOW (ref 98–107)
CO2 SERPL-SCNC: 28 MMOL/L — SIGNIFICANT CHANGE UP (ref 22–31)
CREAT SERPL-MCNC: 0.84 MG/DL — SIGNIFICANT CHANGE UP (ref 0.5–1.3)
EGFR: 80 ML/MIN/1.73M2 — SIGNIFICANT CHANGE UP
EOSINOPHIL # BLD AUTO: 0.05 K/UL — SIGNIFICANT CHANGE UP (ref 0–0.5)
EOSINOPHIL NFR BLD AUTO: 0.4 % — SIGNIFICANT CHANGE UP (ref 0–6)
GLUCOSE BLDC GLUCOMTR-MCNC: 125 MG/DL — HIGH (ref 70–99)
GLUCOSE BLDC GLUCOMTR-MCNC: 211 MG/DL — HIGH (ref 70–99)
GLUCOSE SERPL-MCNC: 117 MG/DL — HIGH (ref 70–99)
HCT VFR BLD CALC: 39.9 % — SIGNIFICANT CHANGE UP (ref 34.5–45)
HGB BLD-MCNC: 13.3 G/DL — SIGNIFICANT CHANGE UP (ref 11.5–15.5)
IANC: 7.72 K/UL — HIGH (ref 1.8–7.4)
IMM GRANULOCYTES NFR BLD AUTO: 0.6 % — SIGNIFICANT CHANGE UP (ref 0–0.9)
LACTATE SERPL-SCNC: 2.5 MMOL/L — HIGH (ref 0.5–2)
LYMPHOCYTES # BLD AUTO: 37.2 % — SIGNIFICANT CHANGE UP (ref 13–44)
LYMPHOCYTES # BLD AUTO: 5.13 K/UL — HIGH (ref 1–3.3)
MAGNESIUM SERPL-MCNC: 2.2 MG/DL — SIGNIFICANT CHANGE UP (ref 1.6–2.6)
MCHC RBC-ENTMCNC: 27.9 PG — SIGNIFICANT CHANGE UP (ref 27–34)
MCHC RBC-ENTMCNC: 33.3 GM/DL — SIGNIFICANT CHANGE UP (ref 32–36)
MCV RBC AUTO: 83.8 FL — SIGNIFICANT CHANGE UP (ref 80–100)
MONOCYTES # BLD AUTO: 0.8 K/UL — SIGNIFICANT CHANGE UP (ref 0–0.9)
MONOCYTES NFR BLD AUTO: 5.8 % — SIGNIFICANT CHANGE UP (ref 2–14)
NEUTROPHILS # BLD AUTO: 7.72 K/UL — HIGH (ref 1.8–7.4)
NEUTROPHILS NFR BLD AUTO: 55.9 % — SIGNIFICANT CHANGE UP (ref 43–77)
NRBC # BLD: 0 /100 WBCS — SIGNIFICANT CHANGE UP (ref 0–0)
NRBC # FLD: 0 K/UL — SIGNIFICANT CHANGE UP (ref 0–0)
PHOSPHATE SERPL-MCNC: 3.8 MG/DL — SIGNIFICANT CHANGE UP (ref 2.5–4.5)
PLATELET # BLD AUTO: 269 K/UL — SIGNIFICANT CHANGE UP (ref 150–400)
POTASSIUM SERPL-MCNC: 3.9 MMOL/L — SIGNIFICANT CHANGE UP (ref 3.5–5.3)
POTASSIUM SERPL-SCNC: 3.9 MMOL/L — SIGNIFICANT CHANGE UP (ref 3.5–5.3)
RBC # BLD: 4.76 M/UL — SIGNIFICANT CHANGE UP (ref 3.8–5.2)
RBC # FLD: 13 % — SIGNIFICANT CHANGE UP (ref 10.3–14.5)
SODIUM SERPL-SCNC: 136 MMOL/L — SIGNIFICANT CHANGE UP (ref 135–145)
WBC # BLD: 13.79 K/UL — HIGH (ref 3.8–10.5)
WBC # FLD AUTO: 13.79 K/UL — HIGH (ref 3.8–10.5)

## 2022-10-04 PROCEDURE — 99214 OFFICE O/P EST MOD 30 MIN: CPT

## 2022-10-04 PROCEDURE — 99239 HOSP IP/OBS DSCHRG MGMT >30: CPT

## 2022-10-04 RX ORDER — THIAMINE MONONITRATE (VIT B1) 100 MG
1 TABLET ORAL
Qty: 30 | Refills: 0
Start: 2022-10-04 | End: 2022-11-02

## 2022-10-04 RX ORDER — COLCHICINE 0.6 MG/1
0.6 TABLET ORAL DAILY
Qty: 30 | Refills: 0 | Status: ACTIVE | COMMUNITY
Start: 2022-10-04 | End: 1900-01-01

## 2022-10-04 RX ORDER — ACETAMINOPHEN 500 MG
2 TABLET ORAL
Qty: 0 | Refills: 0 | DISCHARGE
Start: 2022-10-04

## 2022-10-04 RX ORDER — DULAGLUTIDE 4.5 MG/.5ML
0.75 INJECTION, SOLUTION SUBCUTANEOUS
Qty: 1 | Refills: 0
Start: 2022-10-04 | End: 2022-11-02

## 2022-10-04 RX ORDER — LANOLIN ALCOHOL/MO/W.PET/CERES
1 CREAM (GRAM) TOPICAL
Qty: 0 | Refills: 0 | DISCHARGE
Start: 2022-10-04

## 2022-10-04 RX ADMIN — Medication 3 UNIT(S): at 11:48

## 2022-10-04 RX ADMIN — Medication 3 UNIT(S): at 07:43

## 2022-10-04 RX ADMIN — Medication 650 MILLIGRAM(S): at 06:01

## 2022-10-04 RX ADMIN — ENOXAPARIN SODIUM 40 MILLIGRAM(S): 100 INJECTION SUBCUTANEOUS at 07:45

## 2022-10-04 RX ADMIN — Medication 1200 MILLIGRAM(S): at 05:25

## 2022-10-04 RX ADMIN — Medication 2: at 11:48

## 2022-10-04 RX ADMIN — LOSARTAN POTASSIUM 100 MILLIGRAM(S): 100 TABLET, FILM COATED ORAL at 05:28

## 2022-10-04 RX ADMIN — Medication 650 MILLIGRAM(S): at 05:24

## 2022-10-04 RX ADMIN — Medication 12.5 MILLIGRAM(S): at 05:25

## 2022-10-04 NOTE — DISCHARGE NOTE NURSING/CASE MANAGEMENT/SOCIAL WORK - PATIENT PORTAL LINK FT
You can access the FollowMyHealth Patient Portal offered by [facility] by registering at the following website: [email or web address]. By joining [facility]’s FollowMyHealth portal, you will also be able to view your health information using other applications (apps) compatible with our system.

## 2022-10-04 NOTE — CHART NOTE - NSCHARTNOTEFT_GEN_A_CORE
pt seen and examined. vitals, labs reviewed. pt reports no sob. leg pain improved. Notified pt endocrine rec Trulicity or Ozempic for DM but both requires prior auth. pt agrees to continue metformin for now and f/u PCP to determine if changing of medication is required.     pt is stable for discharge home today. total time spent on dc 37min

## 2022-10-04 NOTE — DISCHARGE NOTE PROVIDER - CARE PROVIDERS DIRECT ADDRESSES
,DirectAddress_Unknown,[email or web address]

## 2022-10-04 NOTE — DISCHARGE NOTE PROVIDER - NSDCFUSCHEDAPPT_GEN_ALL_CORE_FT
Geena Amaral  [facility]  [id number]  Scheduled Appointment: 10/04/2022

## 2022-10-04 NOTE — DISCHARGE NOTE PROVIDER - CARE PROVIDER_API CALL
ADRIANA SWANSON  [facility]  [address] [place] [address]  Phone: [phone]  Fax: [phone]  Established Patient  Follow Up Time:     Geena Amaral)  Critical Care Medicine; Internal Medicine; Pulmonary Disease  [address] [place] [address]  Phone: [phone]  Fax: [phone]  Established Patient  Follow Up Time:

## 2022-10-04 NOTE — DISCHARGE NOTE PROVIDER - HOSPITAL COURSE
The patient is a 62 year old female presenting with an asthma exacerbation secondary to rhinovirus infection.      Acute asthma exacerbation.   Patient follows with OP Pulm Dr Amaral, however, did not improve with PO Steroids and Azithromycin. The patient had an x-ray and was found to have clear lungs. Subsequently, testing found the patient to be positive for rhino/entero virus infection likely precipitating asthma exacerbation. Patient was treated with 4 days of IV steroids and PO steroids with improvement of symptoms. Patient is to continue Symbicort inhaler and follow up with her pulmonologist.     Arthralgia of ankle, right.   Acute onset while in bed, no trauma reported. X-ray negative for fracture. Recommended to rest, ice, elevate, Tylenol as needed.     Elevated lactic acid level.   Patient found to have elevated lactate during admission with downtrending values. No other signs of signs of shock, no cardiac history, no liver dysfunction, no abdominal tenderness or other signs of ischemia. CTA Chest negative for PE. CTA Abdomen was deferred in setting of benign abdominal exam and no signs of bowel ischemia. Likely type B lactic acidosis possible from B agonist - albuterol which was stopped and LAMA/ICS inhaler was continued.  Patient was treated empirically for thiamine deficiency with IV Thiamine and then PO Thiamine on discharge. Per endocrine there is no contraindication to continuing Metformin.     Benign essential HTN.   Continue with home medication regimen.    T2DM (type 2 diabetes mellitus)  Metformin held while inpatient. Most recent A1c of 7.1. No contraindication to Metformin on discharge per Endocrinology.     HLD (hyperlipidemia).   Continue atorvastatin 40mg daily.    Patient is medically stable for discharge. Discussed and reviewed with Dr Pierce. All medications sent to a mutually agreed upon pharmacy. Discharge plan discussed with patient who agrees with plan and follow up for continued care. The patient is a 62 year old female presenting with an asthma exacerbation secondary to rhinovirus infection.    Acute asthma exacerbation.   Patient follows with OP Pulm Dr Amaral, however, did not improve with PO Steroids and Azithromycin. The patient had an x-ray and was found to have clear lungs. Subsequently, testing found the patient to be positive for rhino/entero virus infection likely precipitating asthma exacerbation. Patient was treated with 4 days of IV steroids and PO steroids with improvement of symptoms. Patient is to continue Symbicort inhaler and follow up with her pulmonologist.     Arthralgia of ankle, right.   Acute onset while in bed, no trauma reported. X-ray negative for fracture. Recommended to rest, ice, elevate, Tylenol as needed.     Elevated lactic acid level.   Patient found to have elevated lactate during admission with downtrending values. No other signs of signs of shock, no cardiac history, no liver dysfunction, no abdominal tenderness or other signs of ischemia. CTA Chest negative for PE. CTA Abdomen was deferred in setting of benign abdominal exam and no signs of bowel ischemia. Likely type B lactic acidosis possible from B agonist - albuterol which was stopped and LAMA/ICS inhaler was continued.  Patient was treated empirically for thiamine deficiency with IV Thiamine and then PO Thiamine on discharge. Per endocrine there is no contraindication to continuing Metformin.     Benign essential HTN.   Continue with home medication regimen.    T2DM (type 2 diabetes mellitus)  Metformin held while inpatient. Most recent A1c of 7.1. No contraindication to Metformin on discharge per Endocrinology.     HLD (hyperlipidemia).   Continue atorvastatin 40mg daily.    Patient is medically stable for discharge. Discussed and reviewed with Dr Pierce. All medications sent to a mutually agreed upon pharmacy. Discharge plan discussed with patient who agrees with plan and follow up for continued care.

## 2022-10-04 NOTE — DISCHARGE NOTE PROVIDER - PROVIDER TOKENS
PROVIDER:[TOKEN:[[id number]],ESTABLISHEDPATIENT:[T]],PROVIDER:[TOKEN:[[id number]],ESTABLISHEDPATIENT:[T]]

## 2022-10-04 NOTE — DISCHARGE NOTE PROVIDER - NSDCCPCAREPLAN_GEN_ALL_CORE_FT
PRINCIPAL DISCHARGE DIAGNOSIS  Diagnosis: Acute asthma exacerbation  Assessment and Plan of Treatment: You were seen in the hospital for an asthma exacerbation. This was likely due to an underlying viral infection. You were treated with steroids and inhalers with improvement of your symptoms. Please continue to comply with inhalers and follow up with your pulmonologist.      SECONDARY DISCHARGE DIAGNOSES  Diagnosis: Elevated lactic acid level  Assessment and Plan of Treatment: Your lactate levels were elevated during your admission. This could have been due to your albuterol medication. The lactate levels decreased during your stay in the hospital. Please follow up with your PCP and pulmonology.    Diagnosis: T2DM (type 2 diabetes mellitus)  Assessment and Plan of Treatment: Your most recent A1c is 7.1. Please continue to comply with your Metformin and follow up with your PCP and endocrinologist.    Diagnosis: HLD (hyperlipidemia)  Assessment and Plan of Treatment: Continue prescribed medications to control your cholesterol levels and a DASH (Low fat/salt) diet. Follow up with your primary care provider upon discharge for further management and monitoring of cholesterol levels.    Diagnosis: Arthralgia of ankle, right  Assessment and Plan of Treatment: Your x-ray of the right ankle was negative for fracture. Please rest, ice, elevated and take Tylenol as needed for pain. Follow up with your PCP if symptoms persist.       Diagnosis: Benign essential HTN  Assessment and Plan of Treatment: Continue blood pressure medication regimen as directed. Monitor for any visual changes, headaches or dizziness.  Monitor blood pressure regularly.  Follow up with your primary care provider for further management for high blood pressure.

## 2022-10-04 NOTE — DISCHARGE NOTE PROVIDER - NSDCMRMEDTOKEN_GEN_ALL_CORE_FT
ALBUTEROL 0.083%(2.5MG/3ML) 60X3ML: USE 1 VIAL VIA NEBULIZER EVERY 4 HOURS AS NEEDED  albuterol 90 mcg/inh inhalation aerosol: 2 puff(s) inhaled every 4 hours, As Needed -for bronchospasm   alcohol swabs: Use two times daily dispense two boxes  ATORVASTATIN 40MG TABLETS: TAKE 1 TABLET BY MOUTH AT BEDTIME  budesonide-formoterol 160 mcg-4.5 mcg/inh inhalation aerosol: 2 puff(s) inhaled 2 times a day  glucometer as per insurance: Use two times daily  lancets: Use Daily    Pharmacist: Dispense 2 boxes  LOSARTAN/HCTZ 100/12.5MG TABLETS: TAKE 1 TABLET BY MOUTH EVERY DAY  metFORMIN 1000 mg oral tablet: 1 tab(s) orally 2 times a day   Pen needles as per insurance: 1 application subcutaneous once a day   test strips for glucometer as per patient&#x27;s insurance: Use two times daily  Trulicity Pen 0.75 mg/0.5 mL subcutaneous solution: 0.75 milligram(s) subcutaneously once a week    **Price Check Pager [phone]**    acetaminophen 325 mg oral tablet: 2 tab(s) orally every 6 hours, As needed, Temp greater or equal to 38C (100.4F), Mild Pain (1 - 3)  ALBUTEROL 0.083%(2.5MG/3ML) 60X3ML: USE 1 VIAL VIA NEBULIZER EVERY 4 HOURS AS NEEDED  albuterol 90 mcg/inh inhalation aerosol: 2 puff(s) inhaled every 4 hours, As Needed -for bronchospasm   ATORVASTATIN 40MG TABLETS: TAKE 1 TABLET BY MOUTH AT BEDTIME  budesonide-formoterol 160 mcg-4.5 mcg/inh inhalation aerosol: 2 puff(s) inhaled 2 times a day  LOSARTAN/HCTZ 100/12.5MG TABLETS: TAKE 1 TABLET BY MOUTH EVERY DAY  melatonin 3 mg oral tablet: 1 tab(s) orally once a day (at bedtime), As needed, Insomnia  metFORMIN 1000 mg oral tablet: 1 tab(s) orally 2 times a day   thiamine 100 mg oral tablet: 1 tab(s) orally once a day

## 2022-10-04 NOTE — DISCHARGE NOTE NURSING/CASE MANAGEMENT/SOCIAL WORK - NSDCPEFALRISK_GEN_ALL_CORE
For information on Fall & Injury Prevention, visit: [email or web address] OR  [email or web address] OR  https://www.cdc.gov/steadi/patient.html

## 2022-10-04 NOTE — DISCHARGE NOTE PROVIDER - NSDCFUADDAPPT_GEN_ALL_CORE_FT
Follow up with your PCP and Pulmonologist.    Please remember to see an eye doctor, foot doctor and endocrinologist as part of your Diabetes care.

## 2022-10-10 ENCOUNTER — APPOINTMENT (OUTPATIENT)
Dept: PULMONOLOGY | Facility: CLINIC | Age: 62
End: 2022-10-10

## 2022-10-10 VITALS
DIASTOLIC BLOOD PRESSURE: 87 MMHG | SYSTOLIC BLOOD PRESSURE: 136 MMHG | HEIGHT: 60 IN | WEIGHT: 151 LBS | BODY MASS INDEX: 29.64 KG/M2 | HEART RATE: 81 BPM | OXYGEN SATURATION: 97 %

## 2022-10-10 DIAGNOSIS — G47.00 INSOMNIA, UNSPECIFIED: ICD-10-CM

## 2022-10-10 PROCEDURE — 99214 OFFICE O/P EST MOD 30 MIN: CPT

## 2022-10-10 RX ORDER — CLONAZEPAM 0.5 MG/1
0.5 TABLET ORAL
Qty: 30 | Refills: 0 | Status: ACTIVE | COMMUNITY
Start: 2022-10-10 | End: 1900-01-01

## 2022-10-10 NOTE — HISTORY OF PRESENT ILLNESS
[TextBox_4] : The patient continues to have severe right ankle pain and was unable to sleep.  She saw no improvement with colchicine.  Then she saw podiatrist who thought she had an infection in the ankle and was given antibiotics.  She has brought a picture of her right ankle which shows swelling over the ankle with deep pigmentation.  She says she had an abscess in the right ankle area which was incised this morning.  The area is now covered with a dressing.  The patient has not slept in 2 weeks.  Still unable to walk on her right ankle.  She walks with the help of a cane.\par Her shortness of breath and cough have completely resolved.  But her ankle pain has caused a significant disability.\par \par 10/4/22\par Patient had severe acute attack of asthma which was treated about 2 weeks back in the office.  She continued to remain very symptomatic even at rest.  After 3 days of office visit she had to be taken to the hospital because of severe persistent asthma.  She was admitted to [facility] for 1 week.  The test revealed that she had infection with rhino and enterovirus.  Acute exacerbation of asthma seems to have been complicated by severe viral infection.  Patient was given IV and oral steroids.  She had a chest CT scan and abdominal CT scan which did not reveal any significant abnormality.  Patient took a long time to improve.  Over the last 2 days patient developed severe right ankle pain.  She is unable to stand on her feet because of severe pain in the ankle.  An x-ray of the ankle was done which revealed no fracture.  The patient was given Tylenol as she is allergic to NSAIDs.  The patient is in tears because of severe ankle pain.  Her dyspnea has improved.  Her cough is improved.\par \par 9/22/22\par Ms. Alka Cuevas returns with acute shortness of breath wheezing and cough.  She was seen in the emergency room yesterday and was given 40 mg twice a day of prednisone.  She has been taking nebulized albuterol several times a day.  She has been taking Symbicort twice daily.  She was well till yesterday morning when she had some juice following which she developed severe shortness of breath.\par She has severe recurrent asthma.  She was given Dupixent injection in April and there was no follow-up.  She states that she was well till now.  The patient comes in moderately severe distress.  Unable to bring up the phlegm.  She has burning in the chest.  She has headache when she coughs.  She was unable to sleep because of the cough.\par \par 4/12/22\par The patient is a 61-year-old lady with history of bronchial here with increasing cough shortness of breath and wheezing for the last few days.  She has had several recent acute exacerbation of bronchial asthma and required steroids.  Today she has intense itching all over.\par She has been taking Symbicort 2 puffs 4-5 times a day.  In addition she is using nebulized albuterol.  Patient was recently treated with oral steroids.

## 2022-10-10 NOTE — PHYSICAL EXAM
[No Acute Distress] : no acute distress [Normal Oropharynx] : normal oropharynx [Normal Appearance] : normal appearance [No Neck Mass] : no neck mass [Normal Rate/Rhythm] : normal rate/rhythm [Normal S1, S2] : normal s1, s2 [No Murmurs] : no murmurs [No Resp Distress] : no resp distress [Clear to Auscultation Bilaterally] : clear to auscultation bilaterally [No Abnormalities] : no abnormalities [Benign] : benign [Normal Gait] : normal gait [No Clubbing] : no clubbing [No Cyanosis] : no cyanosis [No Edema] : no edema [FROM] : FROM [Normal Color/ Pigmentation] : normal color/ pigmentation [No Focal Deficits] : no focal deficits [Oriented x3] : oriented x3 [Normal Affect] : normal affect [TextBox_2] : Patient walks with a cane [TextBox_99] : Right ankle covered with dressing

## 2022-10-10 NOTE — DISCUSSION/SUMMARY
[FreeTextEntry1] : Patient had abscess over the right ankle area.  It has been incised this morning.  It was not gouty arthritis as was suspected.\par Her asthma has been stable on Symbicort.  She was advised to continue Symbicort.\par She will be treated with clonazepam at night so that she can sleep for the next few days.

## 2022-10-10 NOTE — HISTORY OF PRESENT ILLNESS
[TextBox_4] : Patient had severe acute attack of asthma which was treated about 2 weeks back in the office.  She continued to remain very symptomatic even at rest.  After 3 days of office visit she had to be taken to the hospital because of severe persistent asthma.  She was admitted to [facility] for 1 week.  The test revealed that she had infection with rhino and enterovirus.  Acute exacerbation of asthma seems to have been complicated by severe viral infection.  Patient was given IV and oral steroids.  She had a chest CT scan and abdominal CT scan which did not reveal any significant abnormality.  Patient took a long time to improve.  Over the last 2 days patient developed severe ankle pain.  She is unable to stand on her feet because of severe pain in the ankle.  An x-ray of the ankle was done which revealed no fracture.  The patient was given Tylenol as she is allergic to NSAIDs.  The patient is in tears because of severe ankle pain.  Her dyspnea has improved.  Her cough is improved.\par \par 9/22/22\par Ms. Alka Cuevas returns with acute shortness of breath wheezing and cough.  She was seen in the emergency room yesterday and was given 40 mg twice a day of prednisone.  She has been taking nebulized albuterol several times a day.  She has been taking Symbicort twice daily.  She was well till yesterday morning when she had some juice following which she developed severe shortness of breath.\par She has severe recurrent asthma.  She was given Dupixent injection in April and there was no follow-up.  She states that she was well till now.  The patient comes in moderately severe distress.  Unable to bring up the phlegm.  She has burning in the chest.  She has headache when she coughs.  She was unable to sleep because of the cough.\par \par 4/12/22\par The patient is a 61-year-old lady with history of bronchial here with increasing cough shortness of breath and wheezing for the last few days.  She has had several recent acute exacerbation of bronchial asthma and required steroids.  Today she has intense itching all over.\par She has been taking Symbicort 2 puffs 4-5 times a day.  In addition she is using nebulized albuterol.  Patient was recently treated with oral steroids.

## 2022-10-10 NOTE — PHYSICAL EXAM
[No Acute Distress] : no acute distress [Normal Oropharynx] : normal oropharynx [Normal Appearance] : normal appearance [No Neck Mass] : no neck mass [Normal Rate/Rhythm] : normal rate/rhythm [Normal S1, S2] : normal s1, s2 [No Murmurs] : no murmurs [No Resp Distress] : no resp distress [Clear to Auscultation Bilaterally] : clear to auscultation bilaterally [No Abnormalities] : no abnormalities [Benign] : benign [Normal Gait] : normal gait [No Clubbing] : no clubbing [No Cyanosis] : no cyanosis [No Edema] : no edema [FROM] : FROM [Normal Color/ Pigmentation] : normal color/ pigmentation [No Focal Deficits] : no focal deficits [Oriented x3] : oriented x3 [Normal Affect] : normal affect [TextBox_99] : Lateral ankle swelling with excruciating tenderness over the ankles.  No evidence of cellulitis. [TextBox_105] : Right ankle swelling.  There is no pedal edema.

## 2022-10-22 ENCOUNTER — INPATIENT (INPATIENT)
Facility: HOSPITAL | Age: 62
LOS: 2 days | Discharge: HOME | End: 2022-10-25
Attending: STUDENT IN AN ORGANIZED HEALTH CARE EDUCATION/TRAINING PROGRAM | Admitting: STUDENT IN AN ORGANIZED HEALTH CARE EDUCATION/TRAINING PROGRAM

## 2022-10-22 VITALS
OXYGEN SATURATION: 100 % | DIASTOLIC BLOOD PRESSURE: 79 MMHG | RESPIRATION RATE: 20 BRPM | HEIGHT: 60 IN | SYSTOLIC BLOOD PRESSURE: 128 MMHG | HEART RATE: 89 BPM | TEMPERATURE: 98 F

## 2022-10-22 DIAGNOSIS — Z98.890 OTHER SPECIFIED POSTPROCEDURAL STATES: Chronic | ICD-10-CM

## 2022-10-22 DIAGNOSIS — Z98.89 OTHER SPECIFIED POSTPROCEDURAL STATES: Chronic | ICD-10-CM

## 2022-10-22 DIAGNOSIS — J45.901 UNSPECIFIED ASTHMA WITH (ACUTE) EXACERBATION: ICD-10-CM

## 2022-10-22 LAB
ALBUMIN SERPL ELPH-MCNC: 4.6 G/DL — SIGNIFICANT CHANGE UP (ref 3.3–5)
ALP SERPL-CCNC: 103 U/L — SIGNIFICANT CHANGE UP (ref 40–120)
ALT FLD-CCNC: 44 U/L — HIGH (ref 4–33)
ANION GAP SERPL CALC-SCNC: 17 MMOL/L — HIGH (ref 7–14)
AST SERPL-CCNC: 43 U/L — HIGH (ref 4–32)
B PERT DNA SPEC QL NAA+PROBE: SIGNIFICANT CHANGE UP
B PERT+PARAPERT DNA PNL SPEC NAA+PROBE: SIGNIFICANT CHANGE UP
BASE EXCESS BLDV CALC-SCNC: -2.8 MMOL/L — LOW (ref -2–3)
BASE EXCESS BLDV CALC-SCNC: -3.3 MMOL/L — LOW (ref -2–3)
BASE EXCESS BLDV CALC-SCNC: -3.3 MMOL/L — LOW (ref -2–3)
BASOPHILS # BLD AUTO: 0.01 K/UL — SIGNIFICANT CHANGE UP (ref 0–0.2)
BASOPHILS NFR BLD AUTO: 0.2 % — SIGNIFICANT CHANGE UP (ref 0–2)
BILIRUB SERPL-MCNC: 0.3 MG/DL — SIGNIFICANT CHANGE UP (ref 0.2–1.2)
BLOOD GAS VENOUS COMPREHENSIVE RESULT: SIGNIFICANT CHANGE UP
BORDETELLA PARAPERTUSSIS (RAPRVP): SIGNIFICANT CHANGE UP
BUN SERPL-MCNC: 11 MG/DL — SIGNIFICANT CHANGE UP (ref 7–23)
C PNEUM DNA SPEC QL NAA+PROBE: SIGNIFICANT CHANGE UP
CALCIUM SERPL-MCNC: 10.1 MG/DL — SIGNIFICANT CHANGE UP (ref 8.4–10.5)
CHLORIDE BLDV-SCNC: 102 MMOL/L — SIGNIFICANT CHANGE UP (ref 96–108)
CHLORIDE BLDV-SCNC: 103 MMOL/L — SIGNIFICANT CHANGE UP (ref 96–108)
CHLORIDE BLDV-SCNC: 103 MMOL/L — SIGNIFICANT CHANGE UP (ref 96–108)
CHLORIDE SERPL-SCNC: 102 MMOL/L — SIGNIFICANT CHANGE UP (ref 98–107)
CO2 BLDV-SCNC: 24.6 MMOL/L — SIGNIFICANT CHANGE UP (ref 22–26)
CO2 BLDV-SCNC: 25.1 MMOL/L — SIGNIFICANT CHANGE UP (ref 22–26)
CO2 BLDV-SCNC: 25.1 MMOL/L — SIGNIFICANT CHANGE UP (ref 22–26)
CO2 SERPL-SCNC: 21 MMOL/L — LOW (ref 22–31)
CREAT SERPL-MCNC: 0.88 MG/DL — SIGNIFICANT CHANGE UP (ref 0.5–1.3)
EGFR: 75 ML/MIN/1.73M2 — SIGNIFICANT CHANGE UP
EOSINOPHIL # BLD AUTO: 0.23 K/UL — SIGNIFICANT CHANGE UP (ref 0–0.5)
EOSINOPHIL NFR BLD AUTO: 3.5 % — SIGNIFICANT CHANGE UP (ref 0–6)
FLUAV SUBTYP SPEC NAA+PROBE: SIGNIFICANT CHANGE UP
FLUBV RNA SPEC QL NAA+PROBE: SIGNIFICANT CHANGE UP
GAS PNL BLDV: 136 MMOL/L — SIGNIFICANT CHANGE UP (ref 136–145)
GAS PNL BLDV: 138 MMOL/L — SIGNIFICANT CHANGE UP (ref 136–145)
GAS PNL BLDV: 138 MMOL/L — SIGNIFICANT CHANGE UP (ref 136–145)
GAS PNL BLDV: SIGNIFICANT CHANGE UP
GAS PNL BLDV: SIGNIFICANT CHANGE UP
GLUCOSE BLDC GLUCOMTR-MCNC: 264 MG/DL — HIGH (ref 70–99)
GLUCOSE BLDV-MCNC: 159 MG/DL — HIGH (ref 70–99)
GLUCOSE BLDV-MCNC: 172 MG/DL — HIGH (ref 70–99)
GLUCOSE BLDV-MCNC: 182 MG/DL — HIGH (ref 70–99)
GLUCOSE SERPL-MCNC: 167 MG/DL — HIGH (ref 70–99)
HADV DNA SPEC QL NAA+PROBE: SIGNIFICANT CHANGE UP
HCO3 BLDV-SCNC: 23 MMOL/L — SIGNIFICANT CHANGE UP (ref 22–29)
HCO3 BLDV-SCNC: 24 MMOL/L — SIGNIFICANT CHANGE UP (ref 22–29)
HCO3 BLDV-SCNC: 24 MMOL/L — SIGNIFICANT CHANGE UP (ref 22–29)
HCOV 229E RNA SPEC QL NAA+PROBE: SIGNIFICANT CHANGE UP
HCOV HKU1 RNA SPEC QL NAA+PROBE: SIGNIFICANT CHANGE UP
HCOV NL63 RNA SPEC QL NAA+PROBE: SIGNIFICANT CHANGE UP
HCOV OC43 RNA SPEC QL NAA+PROBE: SIGNIFICANT CHANGE UP
HCT VFR BLD CALC: 38.8 % — SIGNIFICANT CHANGE UP (ref 34.5–45)
HCT VFR BLDA CALC: 37 % — SIGNIFICANT CHANGE UP (ref 34.5–46.5)
HCT VFR BLDA CALC: 38 % — SIGNIFICANT CHANGE UP (ref 34.5–46.5)
HCT VFR BLDA CALC: 40 % — SIGNIFICANT CHANGE UP (ref 34.5–46.5)
HGB BLD CALC-MCNC: 12.3 G/DL — SIGNIFICANT CHANGE UP (ref 11.5–15.5)
HGB BLD CALC-MCNC: 12.7 G/DL — SIGNIFICANT CHANGE UP (ref 11.5–15.5)
HGB BLD CALC-MCNC: 13.2 G/DL — SIGNIFICANT CHANGE UP (ref 11.5–15.5)
HGB BLD-MCNC: 12.9 G/DL — SIGNIFICANT CHANGE UP (ref 11.5–15.5)
HMPV RNA SPEC QL NAA+PROBE: SIGNIFICANT CHANGE UP
HPIV1 RNA SPEC QL NAA+PROBE: DETECTED
HPIV2 RNA SPEC QL NAA+PROBE: SIGNIFICANT CHANGE UP
HPIV3 RNA SPEC QL NAA+PROBE: SIGNIFICANT CHANGE UP
HPIV4 RNA SPEC QL NAA+PROBE: SIGNIFICANT CHANGE UP
IANC: 2.96 K/UL — SIGNIFICANT CHANGE UP (ref 1.8–7.4)
IMM GRANULOCYTES NFR BLD AUTO: 0.2 % — SIGNIFICANT CHANGE UP (ref 0–0.9)
LACTATE BLDV-MCNC: 5.3 MMOL/L — CRITICAL HIGH (ref 0.5–2)
LACTATE BLDV-MCNC: 5.7 MMOL/L — CRITICAL HIGH (ref 0.5–2)
LACTATE BLDV-MCNC: 6 MMOL/L — CRITICAL HIGH (ref 0.5–2)
LYMPHOCYTES # BLD AUTO: 2.99 K/UL — SIGNIFICANT CHANGE UP (ref 1–3.3)
LYMPHOCYTES # BLD AUTO: 45 % — HIGH (ref 13–44)
M PNEUMO DNA SPEC QL NAA+PROBE: SIGNIFICANT CHANGE UP
MCHC RBC-ENTMCNC: 28.2 PG — SIGNIFICANT CHANGE UP (ref 27–34)
MCHC RBC-ENTMCNC: 33.2 GM/DL — SIGNIFICANT CHANGE UP (ref 32–36)
MCV RBC AUTO: 84.7 FL — SIGNIFICANT CHANGE UP (ref 80–100)
MONOCYTES # BLD AUTO: 0.44 K/UL — SIGNIFICANT CHANGE UP (ref 0–0.9)
MONOCYTES NFR BLD AUTO: 6.6 % — SIGNIFICANT CHANGE UP (ref 2–14)
NEUTROPHILS # BLD AUTO: 2.96 K/UL — SIGNIFICANT CHANGE UP (ref 1.8–7.4)
NEUTROPHILS NFR BLD AUTO: 44.5 % — SIGNIFICANT CHANGE UP (ref 43–77)
NRBC # BLD: 0 /100 WBCS — SIGNIFICANT CHANGE UP (ref 0–0)
NRBC # FLD: 0 K/UL — SIGNIFICANT CHANGE UP (ref 0–0)
PCO2 BLDV: 44 MMHG — HIGH (ref 39–42)
PCO2 BLDV: 49 MMHG — HIGH (ref 39–42)
PCO2 BLDV: 49 MMHG — HIGH (ref 39–42)
PH BLDV: 7.29 — LOW (ref 7.32–7.43)
PH BLDV: 7.29 — LOW (ref 7.32–7.43)
PH BLDV: 7.33 — SIGNIFICANT CHANGE UP (ref 7.32–7.43)
PLATELET # BLD AUTO: 310 K/UL — SIGNIFICANT CHANGE UP (ref 150–400)
PO2 BLDV: 52 MMHG — SIGNIFICANT CHANGE UP
PO2 BLDV: 59 MMHG — SIGNIFICANT CHANGE UP
PO2 BLDV: 64 MMHG — SIGNIFICANT CHANGE UP
POTASSIUM BLDV-SCNC: 3.6 MMOL/L — SIGNIFICANT CHANGE UP (ref 3.5–5.1)
POTASSIUM BLDV-SCNC: 4.1 MMOL/L — SIGNIFICANT CHANGE UP (ref 3.5–5.1)
POTASSIUM BLDV-SCNC: 5 MMOL/L — SIGNIFICANT CHANGE UP (ref 3.5–5.1)
POTASSIUM SERPL-MCNC: 4.2 MMOL/L — SIGNIFICANT CHANGE UP (ref 3.5–5.3)
POTASSIUM SERPL-SCNC: 4.2 MMOL/L — SIGNIFICANT CHANGE UP (ref 3.5–5.3)
PROT SERPL-MCNC: 7.4 G/DL — SIGNIFICANT CHANGE UP (ref 6–8.3)
RAPID RVP RESULT: DETECTED
RBC # BLD: 4.58 M/UL — SIGNIFICANT CHANGE UP (ref 3.8–5.2)
RBC # FLD: 12.9 % — SIGNIFICANT CHANGE UP (ref 10.3–14.5)
RSV RNA SPEC QL NAA+PROBE: SIGNIFICANT CHANGE UP
RV+EV RNA SPEC QL NAA+PROBE: SIGNIFICANT CHANGE UP
SAO2 % BLDV: 85.7 % — SIGNIFICANT CHANGE UP
SAO2 % BLDV: 88.3 % — SIGNIFICANT CHANGE UP
SAO2 % BLDV: 93.1 % — SIGNIFICANT CHANGE UP
SARS-COV-2 RNA SPEC QL NAA+PROBE: SIGNIFICANT CHANGE UP
SODIUM SERPL-SCNC: 140 MMOL/L — SIGNIFICANT CHANGE UP (ref 135–145)
TROPONIN T, HIGH SENSITIVITY RESULT: 8 NG/L — SIGNIFICANT CHANGE UP
WBC # BLD: 6.64 K/UL — SIGNIFICANT CHANGE UP (ref 3.8–10.5)
WBC # FLD AUTO: 6.64 K/UL — SIGNIFICANT CHANGE UP (ref 3.8–10.5)

## 2022-10-22 PROCEDURE — 71045 X-RAY EXAM CHEST 1 VIEW: CPT | Mod: 26

## 2022-10-22 PROCEDURE — 71275 CT ANGIOGRAPHY CHEST: CPT | Mod: 26,MA

## 2022-10-22 PROCEDURE — 99291 CRITICAL CARE FIRST HOUR: CPT

## 2022-10-22 PROCEDURE — 99292 CRITICAL CARE ADDL 30 MIN: CPT

## 2022-10-22 PROCEDURE — 99233 SBSQ HOSP IP/OBS HIGH 50: CPT | Mod: GC

## 2022-10-22 RX ORDER — ENOXAPARIN SODIUM 100 MG/ML
40 INJECTION SUBCUTANEOUS EVERY 24 HOURS
Refills: 0 | Status: DISCONTINUED | OUTPATIENT
Start: 2022-10-22 | End: 2022-10-25

## 2022-10-22 RX ORDER — HYDROCHLOROTHIAZIDE 25 MG
12.5 TABLET ORAL DAILY
Refills: 0 | Status: DISCONTINUED | OUTPATIENT
Start: 2022-10-22 | End: 2022-10-25

## 2022-10-22 RX ORDER — DEXTROSE 50 % IN WATER 50 %
25 SYRINGE (ML) INTRAVENOUS ONCE
Refills: 0 | Status: DISCONTINUED | OUTPATIENT
Start: 2022-10-22 | End: 2022-10-25

## 2022-10-22 RX ORDER — SODIUM CHLORIDE 9 MG/ML
500 INJECTION INTRAMUSCULAR; INTRAVENOUS; SUBCUTANEOUS ONCE
Refills: 0 | Status: COMPLETED | OUTPATIENT
Start: 2022-10-22 | End: 2022-10-22

## 2022-10-22 RX ORDER — SODIUM CHLORIDE 9 MG/ML
1000 INJECTION, SOLUTION INTRAVENOUS
Refills: 0 | Status: DISCONTINUED | OUTPATIENT
Start: 2022-10-22 | End: 2022-10-25

## 2022-10-22 RX ORDER — ATORVASTATIN CALCIUM 80 MG/1
20 TABLET, FILM COATED ORAL AT BEDTIME
Refills: 0 | Status: DISCONTINUED | OUTPATIENT
Start: 2022-10-22 | End: 2022-10-25

## 2022-10-22 RX ORDER — DEXTROSE 50 % IN WATER 50 %
15 SYRINGE (ML) INTRAVENOUS ONCE
Refills: 0 | Status: DISCONTINUED | OUTPATIENT
Start: 2022-10-22 | End: 2022-10-25

## 2022-10-22 RX ORDER — EPINEPHRINE 0.3 MG/.3ML
0.3 INJECTION INTRAMUSCULAR; SUBCUTANEOUS ONCE
Refills: 0 | Status: COMPLETED | OUTPATIENT
Start: 2022-10-22 | End: 2022-10-22

## 2022-10-22 RX ORDER — THIAMINE MONONITRATE (VIT B1) 100 MG
500 TABLET ORAL ONCE
Refills: 0 | Status: COMPLETED | OUTPATIENT
Start: 2022-10-22 | End: 2022-10-22

## 2022-10-22 RX ORDER — INSULIN LISPRO 100/ML
VIAL (ML) SUBCUTANEOUS
Refills: 0 | Status: DISCONTINUED | OUTPATIENT
Start: 2022-10-22 | End: 2022-10-25

## 2022-10-22 RX ORDER — LOSARTAN POTASSIUM 100 MG/1
100 TABLET, FILM COATED ORAL DAILY
Refills: 0 | Status: DISCONTINUED | OUTPATIENT
Start: 2022-10-22 | End: 2022-10-25

## 2022-10-22 RX ORDER — INSULIN LISPRO 100/ML
VIAL (ML) SUBCUTANEOUS AT BEDTIME
Refills: 0 | Status: DISCONTINUED | OUTPATIENT
Start: 2022-10-22 | End: 2022-10-25

## 2022-10-22 RX ORDER — GLUCAGON INJECTION, SOLUTION 0.5 MG/.1ML
1 INJECTION, SOLUTION SUBCUTANEOUS ONCE
Refills: 0 | Status: DISCONTINUED | OUTPATIENT
Start: 2022-10-22 | End: 2022-10-25

## 2022-10-22 RX ORDER — IPRATROPIUM/ALBUTEROL SULFATE 18-103MCG
3 AEROSOL WITH ADAPTER (GRAM) INHALATION EVERY 6 HOURS
Refills: 0 | Status: DISCONTINUED | OUTPATIENT
Start: 2022-10-22 | End: 2022-10-24

## 2022-10-22 RX ORDER — ATORVASTATIN CALCIUM 80 MG/1
0 TABLET, FILM COATED ORAL
Qty: 0 | Refills: 3 | DISCHARGE

## 2022-10-22 RX ORDER — MAGNESIUM SULFATE 500 MG/ML
2 VIAL (ML) INJECTION ONCE
Refills: 0 | Status: COMPLETED | OUTPATIENT
Start: 2022-10-22 | End: 2022-10-22

## 2022-10-22 RX ORDER — ALBUTEROL 90 UG/1
2.5 AEROSOL, METERED ORAL ONCE
Refills: 0 | Status: COMPLETED | OUTPATIENT
Start: 2022-10-22 | End: 2022-10-22

## 2022-10-22 RX ORDER — IPRATROPIUM/ALBUTEROL SULFATE 18-103MCG
3 AEROSOL WITH ADAPTER (GRAM) INHALATION
Refills: 0 | Status: COMPLETED | OUTPATIENT
Start: 2022-10-22 | End: 2022-10-22

## 2022-10-22 RX ORDER — ACETAMINOPHEN 500 MG
650 TABLET ORAL EVERY 6 HOURS
Refills: 0 | Status: DISCONTINUED | OUTPATIENT
Start: 2022-10-22 | End: 2022-10-25

## 2022-10-22 RX ORDER — BUDESONIDE AND FORMOTEROL FUMARATE DIHYDRATE 160; 4.5 UG/1; UG/1
2 AEROSOL RESPIRATORY (INHALATION)
Refills: 0 | Status: DISCONTINUED | OUTPATIENT
Start: 2022-10-22 | End: 2022-10-25

## 2022-10-22 RX ORDER — INSULIN GLARGINE 100 [IU]/ML
8 INJECTION, SOLUTION SUBCUTANEOUS AT BEDTIME
Refills: 0 | Status: DISCONTINUED | OUTPATIENT
Start: 2022-10-23 | End: 2022-10-25

## 2022-10-22 RX ORDER — DEXTROSE 50 % IN WATER 50 %
12.5 SYRINGE (ML) INTRAVENOUS ONCE
Refills: 0 | Status: DISCONTINUED | OUTPATIENT
Start: 2022-10-22 | End: 2022-10-25

## 2022-10-22 RX ORDER — INSULIN LISPRO 100/ML
3 VIAL (ML) SUBCUTANEOUS
Refills: 0 | Status: DISCONTINUED | OUTPATIENT
Start: 2022-10-22 | End: 2022-10-25

## 2022-10-22 RX ADMIN — Medication 105 MILLIGRAM(S): at 19:19

## 2022-10-22 RX ADMIN — EPINEPHRINE 0.3 MILLIGRAM(S): 0.3 INJECTION INTRAMUSCULAR; SUBCUTANEOUS at 13:07

## 2022-10-22 RX ADMIN — Medication 3 MILLILITER(S): at 13:06

## 2022-10-22 RX ADMIN — SODIUM CHLORIDE 500 MILLILITER(S): 9 INJECTION INTRAMUSCULAR; INTRAVENOUS; SUBCUTANEOUS at 19:20

## 2022-10-22 RX ADMIN — Medication 125 MILLIGRAM(S): at 13:06

## 2022-10-22 RX ADMIN — Medication 150 GRAM(S): at 13:07

## 2022-10-22 RX ADMIN — ALBUTEROL 2.5 MILLIGRAM(S): 90 AEROSOL, METERED ORAL at 21:33

## 2022-10-22 RX ADMIN — Medication 3 MILLILITER(S): at 13:13

## 2022-10-22 RX ADMIN — Medication 3 MILLILITER(S): at 13:14

## 2022-10-22 RX ADMIN — Medication 2 GRAM(S): at 13:27

## 2022-10-22 NOTE — ED ADULT NURSE REASSESSMENT NOTE - NS ED NURSE REASSESS COMMENT FT1
break rn: pt resting comfortably in bed, denies complaints at this time. remains nsr on cardiac monitor. bipap dc'd at 340 by ICU resident, MD called to bedside for eval. pt sating 100% on RA. pt placed back on bipap at this time. audible wheezing noted, no labored breathing. awaiting reassessment. will continue to monitor. rpt trop collected and sent.

## 2022-10-22 NOTE — ED PROVIDER NOTE - OBJECTIVE STATEMENT
62 year old F with PMH asthma (never intubated, no ICU stays), remote breast and colon CA, HTN, HLD, T2DM presenting with SOB and wheezing. Patient recently admitted (9/26-10/4) for asthma exacerbation 2/2 rhinoenterovirus requiring IV steroids. On 10/17, developed URI symptoms. Since then, has been using inhalers with little relief, has audible wheeze. Did not seek care because her 's mother is in the hospital on hospice, so she wanted to be there to support him. Feels chest tightness, but no severe pain, also has active cough. Denies fevers, N/V/D, abdominal pain.

## 2022-10-22 NOTE — ED PROVIDER NOTE - CLINICAL SUMMARY MEDICAL DECISION MAKING FREE TEXT BOX
62 year old F with PMH asthma (never intubated, no ICU stays), remote breast and colon CA, HTN, HLD, T2DM presenting with SOB and wheezing. Audible wheeze, poor aeration apices to bases. Exam consistent with severe asthma exacerbation likely 2/2 URI. Will give epi, magnesium, duonebs, steroids, put on BIPAP (first time). Labs, CXR, MICU consult. Admit.

## 2022-10-22 NOTE — ED ADULT NURSE REASSESSMENT NOTE - NS ED NURSE REASSESS COMMENT FT1
Pt received from day RN. pt A&O4, returned from CT, VS as noted, audible wheezes noted, pt states symptoms have improved since arrival, does not want oxygen at present. In no apparent distress. Sinus tachycardia on monitor. Awaiting for dispo

## 2022-10-22 NOTE — ED PROVIDER NOTE - ATTENDING CONTRIBUTION TO CARE
I have personally performed a face to face medical and diagnostic evaluation of the patient. I have discussed with and reviewed the Resident's note and agree with the History, ROS, Physical Exam and MDM unless otherwise indicated. A brief summary of my personal evaluation and impression can be found below.    Ethan BURGOS: 62F hx of asthma, no icu no intubations breast and colon ca, HTN HLD DM2 presents with a cc of SOB and wheezing feels c/w prior episodes and flares of asthma squiring admission. recently developed URI symptoms using steroids and inhalers with little relief notes diffuse chest tightness sob and pedro. +cough productive, no prior hx of dvt or pe.     ROS limited secondary to critical pt condition requiring intervention.    VITALS: Initial triage and subsequent vitals have been reviewed by me.  GEN APPEARANCE: WDWN, alert, non-toxic, NAD  HEAD: Atraumatic.  EYES: PERRLa, EOMI, vision grossly intact.   NECK: Supple  CV: RRR, S1S2, no c/r/m/g. Cap refill <2 seconds. No bruits.   LUNGS: diffuse audible wheezing w poor air movement b/l   ABDOMEN: Soft, NTND. No guarding or rebound.   MSK/EXT: No spinal or extremity point tenderness. No CVA ttp. Pelvis stable. No peripheral edema.  NEURO: Alert, follows commands.  Speech normal. Sensation and motor normal x4 extremities.   SKIN: Warm, dry and intact. No rash.  PSYCH: Appropriate    Plan/MDM: boby BURGOS: 62F hx of asthma, no icu no intubations breast and colon ca, HTN HLD DM2 presents with a cc of SOB and wheezing feels c/w prior episodes and flares of asthma squiring admission. recently developed URI symptoms using steroids and inhalers with little relief notes diffuse chest tightness sob and pedro. +cough productive, no prior hx of dvt or pe. exam ill appearing  w diffuse wheezing and poor air movement b/l ddx c/f likely severe asthma exacerbation likely in setting of uri, less c/f pe, will aggressively treat w bipap, nebs, steroids, epi, mag discuss w micu, check ekg cardiacs labs cta eval for pe, admit.

## 2022-10-22 NOTE — CONSULT NOTE ADULT - SUBJECTIVE AND OBJECTIVE BOX
CHIEF COMPLAINT:    HPI:  62 year old F with PMH asthma (never intubated, no ICU stays), remote breast and colon CA, HTN, HLD, T2DM presenting with SOB and wheezing. Patient recently admitted (-10/4) for asthma exacerbation 2/2 rhinoenterovirus requiring IV steroids. On 10/17, developed URI symptoms. Since then, has been using inhalers with little relief, has audible wheeze. Did not seek care because her 's mother is in the hospital on hospice, so she wanted to be there to support him. Feels chest tightness, but no severe pain, also has active cough. Denies fevers, N/V/D, abdominal pain.    In ED, VSS. Labs notable for mild transaminitis but overall unremarkable. She received DuoNebs x 3, Epi 0.3 mg IVP x 1, Mg x 1, Solumedrol 125mg IVP for asthma exacerbation. RVP + for parainfluenza.     PAST MEDICAL & SURGICAL HISTORY:  Asthma  (no hx/o intubation)      Hypertension      DM2 (diabetes mellitus, type 2)      Former smoker, stopped smoking many years ago  (Quit ~32years ago; used to Nicotine patch  Informed pt of the various negative side effects of smoking including risk of COPD, Lung Ca etc  Strongly recommended that pt stops smoking and pt given various options of smoking cessation tools such as NRT&#x27;s and other pharmacotherapies 0.3 ppd x ~10 years.)      Breast cancer  R breast 10/ 2019      Uterus Problem  &quot;was getting cramps so they did ablation? enometriosis  10/2012      Bunion  b/l       delivery NOS  x1      H/O foot surgery  right      S/P lumpectomy, right breast  10/2019          FAMILY HISTORY:  Family history of MI (myocardial infarction)  Father    Family history of lung cancer (Father)  (primary lung cancer)        SOCIAL HISTORY:  Quit smoking >30 years ago  No current toxic habits.     Allergies    AValox (Unknown)  NSAIDs (Unknown)    Intolerances        HOME MEDICATIONS:    REVIEW OF SYSTEMS:    CONSTITUTIONAL: No weakness, fevers or chills  EYES: no blurry vision or eye pain.   ENT: No throat pain. No dysphagia.    NECK: No pain or stiffness  RESPIRATORY: No cough, wheezing, hemoptysis; No shortness of breath  CARDIOVASCULAR: No chest pain or palpitations.  GASTROINTESTINAL: No abdominal pain. No nausea or vomiting; No diarrhea or constipation. No melena or hematochezia.  GENITOURINARY: No dysuria, frequency or hematuria  NEUROLOGICAL: No numbness or weakness. No dizziness or falls.   SKIN: No itching, burning, rashes, or lesions.   LYMPHATIC: No masses or swelling.   All other review of systems is negative unless indicated above.    OBJECTIVE:  ICU Vital Signs Last 24 Hrs  T(C): 36.9 (22 Oct 2022 13:13), Max: 36.9 (22 Oct 2022 13:13)  T(F): 98.4 (22 Oct 2022 13:13), Max: 98.4 (22 Oct 2022 13:13)  HR: 79 (22 Oct 2022 15:03) (79 - 93)  BP: 126/71 (22 Oct 2022 15:03) (126/71 - 141/92)  BP(mean): --  ABP: --  ABP(mean): --  RR: 17 (22 Oct 2022 15:03) (17 - 22)  SpO2: 100% (22 Oct 2022 15:03) (99% - 100%)    O2 Parameters below as of 22 Oct 2022 15:03  Patient On (Oxygen Delivery Method): BiPAP/CPAP              CAPILLARY BLOOD GLUCOSE      POCT Blood Glucose.: 128 mg/dL (22 Oct 2022 11:39)    T(C): 36.9 (10-22-22 @ 13:13), Max: 36.9 (10-22-22 @ 13:13)  HR: 84 (10-22-22 @ 16:03) (79 - 93)  BP: 126/71 (10-22-22 @ 15:03) (126/71 - 141/92)  RR: 17 (10-22-22 @ 15:03) (17 - 22)  SpO2: 100% (10-22-22 @ 16:03) (99% - 100%)  General: NAD, comfortable  Eyes: no conjunctival erythema  ENT: MMM  Neck: Neck supple, No JVD  Respiratory: CTA B/L, No wheezing, rales, rhonchi  CV: RRR no murmurs  Abdominal: Soft, NT, ND +BS  MSK: no focal weakness  Extremities: No edema, 2+ peripheral pulses  Neurology: A&Ox3, nonfocal, CAMERON x 4  Skin: No Rashes, Hematoma, Ecchymosis  Psych: Calm and appropriate      HOSPITAL MEDICATIONS:  MEDICATIONS  (STANDING):    MEDICATIONS  (PRN):      LABS:                        12.9   6.64  )-----------( 310      ( 22 Oct 2022 12:45 )             38.8     10-22    140  |  102  |  11  ----------------------------<  167<H>  4.2   |  21<L>  |  0.88    Ca    10.1      22 Oct 2022 12:45    TPro  7.4  /  Alb  4.6  /  TBili  0.3  /  DBili  x   /  AST  43<H>  /  ALT  44<H>  /  AlkPhos  103  10-22          Venous Blood Gas:  10-22 @ 15:00  7.29/49/59/24/88.3  VBG Lactate: 5.7  Venous Blood Gas:  10-22 @ 12:45  7.33/44/64/23/93.1  VBG Lactate: 5.3      MICROBIOLOGY:     RADIOLOGY:  [ ] Reviewed and interpreted by me    EKG: CHIEF COMPLAINT:    HPI:  62 year old F with PMH asthma (never intubated, no ICU stays), remote breast and colon CA, HTN, HLD, T2DM presenting with SOB and wheezing. Patient recently admitted (-10/4) for asthma exacerbation 2/2 rhinoenterovirus requiring IV steroids. On 10/17, developed URI symptoms. Since then, has been using inhalers with little relief, has audible wheeze. Did not seek care because her 's mother is in the hospital on hospice, so she wanted to be there to support him. Feels chest tightness, but no severe pain, also has active cough. Denies fevers, N/V/D, abdominal pain.    In ED, VSS. Labs notable for mild transaminitis but overall unremarkable. She received DuoNebs x 3, Epi 0.3 mg IM x 1, Mg x 1, Solumedrol 125mg IVP for asthma exacerbation. RVP + for parainfluenza. Pt started on BiPAP for increased respiratory efforts and MICU consulted for new BiPAP requirement.     PAST MEDICAL & SURGICAL HISTORY:  Asthma  (no hx/o intubation)      Hypertension      DM2 (diabetes mellitus, type 2)      Former smoker, stopped smoking many years ago  (Quit ~32years ago; used to Nicotine patch  Informed pt of the various negative side effects of smoking including risk of COPD, Lung Ca etc  Strongly recommended that pt stops smoking and pt given various options of smoking cessation tools such as NRT&#x27;s and other pharmacotherapies 0.3 ppd x ~10 years.)      Breast cancer  R breast 10/ 2019      Uterus Problem  &quot;was getting cramps so they did ablation? enometriosis  10/2012      Bunion  b/l       delivery NOS  x1      H/O foot surgery  right      S/P lumpectomy, right breast  10/2019          FAMILY HISTORY:  Family history of MI (myocardial infarction)  Father    Family history of lung cancer (Father)  (primary lung cancer)        SOCIAL HISTORY:  Quit smoking >30 years ago  No current toxic habits.     Allergies    AValox (Unknown)  NSAIDs (Unknown)    Intolerances        HOME MEDICATIONS:    REVIEW OF SYSTEMS:    CONSTITUTIONAL: No weakness, fevers or chills  EYES: no blurry vision or eye pain.   ENT: No throat pain. No dysphagia.    NECK: No pain or stiffness  RESPIRATORY: No cough, wheezing, hemoptysis; No shortness of breath  CARDIOVASCULAR: No chest pain or palpitations.  GASTROINTESTINAL: No abdominal pain. No nausea or vomiting; No diarrhea or constipation. No melena or hematochezia.  GENITOURINARY: No dysuria, frequency or hematuria  NEUROLOGICAL: No numbness or weakness. No dizziness or falls.   SKIN: No itching, burning, rashes, or lesions.   LYMPHATIC: No masses or swelling.   All other review of systems is negative unless indicated above.    OBJECTIVE:  ICU Vital Signs Last 24 Hrs  T(C): 36.9 (22 Oct 2022 13:13), Max: 36.9 (22 Oct 2022 13:13)  T(F): 98.4 (22 Oct 2022 13:13), Max: 98.4 (22 Oct 2022 13:13)  HR: 79 (22 Oct 2022 15:03) (79 - 93)  BP: 126/71 (22 Oct 2022 15:03) (126/71 - 141/92)  BP(mean): --  ABP: --  ABP(mean): --  RR: 17 (22 Oct 2022 15:03) (17 - 22)  SpO2: 100% (22 Oct 2022 15:03) (99% - 100%)    O2 Parameters below as of 22 Oct 2022 15:03  Patient On (Oxygen Delivery Method): BiPAP/CPAP              CAPILLARY BLOOD GLUCOSE      POCT Blood Glucose.: 128 mg/dL (22 Oct 2022 11:39)    T(C): 36.9 (10-22-22 @ 13:13), Max: 36.9 (10-22-22 @ 13:13)  HR: 84 (10-22-22 @ 16:03) (79 - 93)  BP: 126/71 (10-22-22 @ 15:03) (126/71 - 141/92)  RR: 17 (10-22-22 @ 15:03) (17 - 22)  SpO2: 100% (10-22-22 @ 16:03) (99% - 100%)  General: NAD, comfortable  Eyes: no conjunctival erythema  ENT: MMM  Neck: Neck supple, No JVD  Respiratory: CTA B/L, No wheezing, rales, rhonchi  CV: RRR no murmurs  Abdominal: Soft, NT, ND +BS  MSK: no focal weakness  Extremities: No edema, 2+ peripheral pulses  Neurology: A&Ox3, nonfocal, CAMERON x 4  Skin: No Rashes, Hematoma, Ecchymosis  Psych: Calm and appropriate      HOSPITAL MEDICATIONS:  MEDICATIONS  (STANDING):    MEDICATIONS  (PRN):      LABS:                        12.9   6.64  )-----------( 310      ( 22 Oct 2022 12:45 )             38.8     10-22    140  |  102  |  11  ----------------------------<  167<H>  4.2   |  21<L>  |  0.88    Ca    10.1      22 Oct 2022 12:45    TPro  7.4  /  Alb  4.6  /  TBili  0.3  /  DBili  x   /  AST  43<H>  /  ALT  44<H>  /  AlkPhos  103  10-22          Venous Blood Gas:  10-22 @ 15:00  7.29/49/59/24/88.3  VBG Lactate: 5.7  Venous Blood Gas:  10-22 @ 12:45  7.33/44/64/23/93.1  VBG Lactate: 5.3      MICROBIOLOGY:     RADIOLOGY:  [ ] Reviewed and interpreted by me    EKG: CHIEF COMPLAINT:    HPI:  62 year old F with PMH asthma (never intubated, no ICU stays), remote breast and colon CA, HTN, HLD, T2DM presenting with SOB and wheezing. Patient recently admitted (-10/4) for asthma exacerbation 2/2 rhinoenterovirus requiring IV steroids. On 10/17, developed URI symptoms. Since then, has been using inhalers with little relief, has audible wheeze. Did not seek care because her 's mother is in the hospital on hospice, so she wanted to be there to support him. Feels chest tightness, but no severe pain, also has active cough. Denies fevers, N/V/D, abdominal pain.    In ED, VSS. Labs notable for mild transaminitis but overall unremarkable. She received DuoNebs x 3, Epi 0.3 mg IM x 1, Mg x 1, Solumedrol 125mg IVP for asthma exacerbation. RVP + for parainfluenza. Pt started on BiPAP for increased respiratory efforts and MICU consulted for new BiPAP requirement. At time of exam, BiPAP removed and patient speaking in complete sentences, reports feeling comfortable.     PAST MEDICAL & SURGICAL HISTORY:  Asthma  (no hx/o intubation)      Hypertension      DM2 (diabetes mellitus, type 2)      Former smoker, stopped smoking many years ago  (Quit ~32years ago; used to Nicotine patch  Informed pt of the various negative side effects of smoking including risk of COPD, Lung Ca etc  Strongly recommended that pt stops smoking and pt given various options of smoking cessation tools such as NRT&#x27;s and other pharmacotherapies 0.3 ppd x ~10 years.)      Breast cancer  R breast 10/ 2019      Uterus Problem  &quot;was getting cramps so they did ablation? enometriosis  10/2012      Bunion  b/l       delivery NOS  x1      H/O foot surgery  right      S/P lumpectomy, right breast  10/2019          FAMILY HISTORY:  Family history of MI (myocardial infarction)  Father    Family history of lung cancer (Father)  (primary lung cancer)        SOCIAL HISTORY:  Quit smoking >30 years ago  No current toxic habits.     Allergies    AValox (Unknown)  NSAIDs (Unknown)    Intolerances        HOME MEDICATIONS:    REVIEW OF SYSTEMS:    CONSTITUTIONAL: No weakness, fevers or chills  EYES: no blurry vision or eye pain.   ENT: No throat pain. No dysphagia.    NECK: No pain or stiffness  RESPIRATORY: +cough, wheezing, no hemoptysis; +No shortness of breath  CARDIOVASCULAR: No chest pain or palpitations.  GASTROINTESTINAL: No abdominal pain. No nausea or vomiting; No diarrhea or constipation. No melena or hematochezia.  GENITOURINARY: No dysuria, frequency or hematuria  NEUROLOGICAL: No numbness or weakness. No dizziness or falls.   SKIN: No itching, burning, rashes, or lesions.   LYMPHATIC: No masses or swelling.   All other review of systems is negative unless indicated above.    OBJECTIVE:  ICU Vital Signs Last 24 Hrs  T(C): 36.9 (22 Oct 2022 13:13), Max: 36.9 (22 Oct 2022 13:13)  T(F): 98.4 (22 Oct 2022 13:13), Max: 98.4 (22 Oct 2022 13:13)  HR: 79 (22 Oct 2022 15:03) (79 - 93)  BP: 126/71 (22 Oct 2022 15:03) (126/71 - 141/92)  BP(mean): --  ABP: --  ABP(mean): --  RR: 17 (22 Oct 2022 15:03) (17 - 22)  SpO2: 100% (22 Oct 2022 15:03) (99% - 100%)    O2 Parameters below as of 22 Oct 2022 15:03  Patient On (Oxygen Delivery Method): BiPAP/CPAP              CAPILLARY BLOOD GLUCOSE      POCT Blood Glucose.: 128 mg/dL (22 Oct 2022 11:39)    T(C): 36.9 (10-22-22 @ 13:13), Max: 36.9 (10-22-22 @ 13:13)  HR: 84 (10-22-22 @ 16:03) (79 - 93)  BP: 126/71 (10-22-22 @ 15:03) (126/71 - 141/92)  RR: 17 (10-22-22 @ 15:03) (17 - 22)  SpO2: 100% (10-22-22 @ 16:03) (99% - 100%)  General: NAD, comfortable  Eyes: no conjunctival erythema  ENT: MMM  Neck: Neck supple, No JVD  Respiratory: CTA B/L, faint upper airway expiratory wheezing  CV: RRR no murmurs  Abdominal: Soft, NT, ND +BS  MSK: no focal weakness  Extremities: No edema, 2+ peripheral pulses  Neurology: A&Ox3, nonfocal, CAMERON x 4  Skin: No Rashes, Hematoma, Ecchymosis  Psych: Calm and appropriate      HOSPITAL MEDICATIONS:  MEDICATIONS  (STANDING):    MEDICATIONS  (PRN):      LABS:                        12.9   6.64  )-----------( 310      ( 22 Oct 2022 12:45 )             38.8     10-22    140  |  102  |  11  ----------------------------<  167<H>  4.2   |  21<L>  |  0.88    Ca    10.1      22 Oct 2022 12:45    TPro  7.4  /  Alb  4.6  /  TBili  0.3  /  DBili  x   /  AST  43<H>  /  ALT  44<H>  /  AlkPhos  103  10-22          Venous Blood Gas:  10-22 @ 15:00  7.29/49/59/24/88.3  VBG Lactate: 5.7  Venous Blood Gas:  10-22 @ 12:45  7.33/44/64/23/93.1  VBG Lactate: 5.3

## 2022-10-22 NOTE — ED ADULT NURSE NOTE - OBJECTIVE STATEMENT
pt arrive with c /o asthma exacerbation, reports she was discharged 10/4 after admission for asthma, was doing well up until earlier this week. since has been using home albuterol treatments but no improvement. denies fevers. + cough with occasional phlegm production. auditory wheeze/stridor present.

## 2022-10-22 NOTE — ED ADULT NURSE NOTE - NS ED NOTE ABUSE SUSPICION NEGLECT YN
Subjective:      Lela Olivas is a 34 y.o. female who presents with Cough (x1wk, cough, fever, body aches, headache, chest pain when coughing)      Influenza   This is a new problem. The current episode started in the past 7 days (5 days). The problem occurs constantly. The problem has been gradually improving. Associated symptoms include chills, congestion, coughing, fatigue, a fever, headaches, myalgias and a sore throat. Pertinent negatives include no abdominal pain, anorexia, change in bowel habit, chest pain, nausea, rash or vomiting. Nothing aggravates the symptoms. She has tried acetaminophen for the symptoms. The treatment provided mild relief.       Review of Systems   Constitutional: Positive for chills, fatigue, fever and malaise/fatigue.   HENT: Positive for congestion and sore throat. Negative for ear pain.    Eyes: Negative for discharge and redness.   Respiratory: Positive for cough. Negative for sputum production, shortness of breath and wheezing.    Cardiovascular: Negative for chest pain and palpitations.   Gastrointestinal: Negative for abdominal pain, anorexia, change in bowel habit, diarrhea, nausea and vomiting.   Musculoskeletal: Positive for myalgias.   Skin: Negative for rash.   Neurological: Positive for headaches. Negative for dizziness.       PMH:  has a past medical history of Anxiety, Insomnia, Migraine, and No known health problems.  MEDS:   Current Outpatient Medications:   •  Acetaminophen (TYLENOL 8 HOUR ARTHRITIS PAIN PO), Take  by mouth., Disp: , Rfl:   •  OMEPRAZOLE PO, Take  by mouth., Disp: , Rfl:   •  Prenatal MV-Min-Fe Fum-FA-DHA (PRENATAL 1 PO), Take  by mouth., Disp: , Rfl:   ALLERGIES:   Allergies   Allergen Reactions   • Meat Extract      Pt eats VEGAN diet, NO MEAT     SURGHX:   Past Surgical History:   Procedure Laterality Date   • OTHER      cyst removed   • OTHER      not sure, soft tissue or skin lesion      SOCHX:  reports that she has never smoked. She has  "never used smokeless tobacco. She reports that she does not drink alcohol or use drugs.  FH: Family history was reviewed, no pertinent findings to report     Objective:     /70 (BP Location: Left arm, Patient Position: Sitting, BP Cuff Size: Adult)   Pulse 64   Temp 36.5 °C (97.7 °F) (Temporal)   Resp 16   Ht 1.575 m (5' 2\")   Wt 61.2 kg (135 lb)   SpO2 100%   BMI 24.69 kg/m²      Physical Exam  Constitutional:       Appearance: She is well-developed.   HENT:      Head: Normocephalic and atraumatic.      Right Ear: Tympanic membrane, ear canal and external ear normal.      Left Ear: Tympanic membrane, ear canal and external ear normal.      Nose: Mucosal edema and congestion present. No rhinorrhea.      Right Sinus: No maxillary sinus tenderness or frontal sinus tenderness.      Left Sinus: No maxillary sinus tenderness or frontal sinus tenderness.      Mouth/Throat:      Lips: Pink.      Mouth: Mucous membranes are moist.      Pharynx: Oropharynx is clear.   Eyes:      Conjunctiva/sclera: Conjunctivae normal.      Pupils: Pupils are equal, round, and reactive to light.   Neck:      Musculoskeletal: Normal range of motion.   Cardiovascular:      Rate and Rhythm: Normal rate and regular rhythm.      Heart sounds: Normal heart sounds. No murmur.   Pulmonary:      Effort: Pulmonary effort is normal.      Breath sounds: Normal breath sounds. No decreased breath sounds, wheezing, rhonchi or rales.   Lymphadenopathy:      Cervical: Cervical adenopathy present.      Right cervical: Superficial cervical adenopathy present.      Left cervical: Superficial cervical adenopathy present.   Skin:     General: Skin is warm and dry.      Capillary Refill: Capillary refill takes less than 2 seconds.   Neurological:      Mental Status: She is alert and oriented to person, place, and time.   Psychiatric:         Behavior: Behavior normal.         Judgment: Judgment normal.            Assessment/Plan:       1. " Influenza-like illness  - OTC cold/flu medications  - PO fluids  - Rest  - Tylenol or ibuprofen as needed for fever > 100.4 F  Patient started order for Tamiflu.  Symptoms are consistent with influenza but symptoms have been gradually improving.  Reiterated supportive care.          Differential Diagnosis, natural history, and supportive care discussed. Return to the Urgent Care or follow up with your PCP if symptoms fail to resolve, or for any new or worsening symptoms. Emergency room precautions discussed. Patient and/or family appears understanding of information.     No SSKI Counseling:  I discussed with the patient the risks of SSKI including but not limited to thyroid abnormalities, metallic taste, GI upset, fever, headache, acne, arthralgias, paraesthesias, lymphadenopathy, easy bleeding, arrhythmias, and allergic reaction.

## 2022-10-22 NOTE — ED ADULT NURSE NOTE - NSFALLRSKUNASSIST_ED_ALL_ED
Verified Results  (1) COMPREHENSIVE METABOLIC PANEL [id number] 68:05CH Brian Deem Order Number: [id number]     Test Name Result Flag Reference   SODIUM 138 mmol/L  136-145   POTASSIUM 4 3 mmol/L  3 5-5 3   CHLORIDE 103 mmol/L  100-108   CARBON DIOXIDE 26 mmol/L  21-32   ANION GAP (CALC) 9 mmol/L  4-13   BLOOD UREA NITROGEN 16 mg/dL  5-25   CREATININE 0 77 mg/dL  0 60-1 30   Standardized to IDMS reference method   CALCIUM 9 1 mg/dL  8 3-10 1   BILI, TOTAL 0 50 mg/dL  0 20-1 00   ALK PHOSPHATAS 82 U/L     ALT (SGPT) 18 U/L  12-78   Specimen collection should occur prior to Sulfasalazine and/or Sulfapyridine administration due to the potential for falsely depressed results  AST(SGOT) 12 U/L  5-45   Specimen collection should occur prior to Sulfasalazine administration due to the potential for falsely depressed results  ALBUMIN 4 1 g/dL  3 5-5 0   TOTAL PROTEIN 7 3 g/dL  6 4-8 2   eGFR 97 ml/min/1 73sq m     National Kidney Disease Education Program recommendations are as follows:  GFR calculation is accurate only with a steady state creatinine  Chronic Kidney disease less than 60 ml/min/1 73 sq  meters  Kidney failure less than 15 ml/min/1 73 sq  meters  GLUCOSE FASTING 114 mg/dL H 65-99   Specimen collection should occur prior to Sulfasalazine administration due to the potential for falsely depressed results  Specimen collection should occur prior to Sulfapyridine administration due to the potential for falsely elevated results  no

## 2022-10-22 NOTE — ED ADULT NURSE REASSESSMENT NOTE - GENERAL PATIENT STATE
comfortable appearance/improvement verbalized
pt sitting up on stretcher knitting. bilat breath sounds diminished, no wheeze at this time. accessional dry cough./family/SO at bedside
comfortable appearance/improvement verbalized/family/SO at bedside

## 2022-10-22 NOTE — ED ADULT TRIAGE NOTE - MODE OF ARRIVAL
[facility]  General Surgery  Progress Note    Subjective:     History of Present Illness:  52 y.o. male with history of longstanding chronic combined HF, NICM, tobacco and etOH use, prior DVT and stroke currently admitted to [facility] [place] since 12/15/2017 with cardiogenic shock with IABP in place associated with acute liver and renal failure, AF with RVR. Over the past few days his LFTs have been improving and he has been weaned off pressor support with plans for balloon pump removal in near future. Is on a heparin gtt which was held today for some concern regarding hemoptysis.   Surgery consulted today for abdominal distension, nausea and one episode of emesis leading to a KUB revealing acute gastric distension.  The patient currently is also on a valium taper making conversation / history difficult. Denies abdominal pain and is not currently nauseated. Last BM recorded 12/16/2017. Prior to emesis had been tolerating a diet.    Post-Op Info:  Procedure(s) (LRB):  LORETTA (N/A)   5 Days Post-Op     Interval History: Had ~2100mL from NGT since it has been placed. Stomach decompressed on imaging.     Medications:  Continuous Infusions:   epinephrine infusion Stopped (12/18/17 1521)    esmolol 50 mcg/kg/min (12/20/17 1000)    heparin (porcine) in 5 % dex 600 Units/hr (12/20/17 1000)    vasopressin (PITRESSIN) infusion Stopped (12/18/17 0000)     Scheduled Meds:   albuterol-ipratropium 2.5mg-0.5mg/3mL  3 mL Nebulization Q6H    diazePAM  5 mg Intravenous Q8H    sodium chloride 0.9%  3 mL Intravenous Q8H     PRN Meds:sodium chloride 0.9%, sodium chloride, acetaminophen, atropine, dextrose 50%, dextrose 50%, glucagon (human recombinant), glucose, glucose, hydrocodone-acetaminophen 5-325mg, insulin aspart, lorazepam, magnesium sulfate IVPB, morphine, ondansetron, ondansetron, oxyCODONE, potassium, sodium phosphates, potassium, sodium phosphates, potassium, sodium phosphates, sodium chloride 0.9%     Review of  patient's allergies indicates:  No Known Allergies  Objective:     Vital Signs (Most Recent):  Temp: 98.4 °F (36.9 °C) (12/20/17 0734)  Pulse: (!) 135 (12/20/17 1000)  Resp: (!) 25 (12/20/17 1000)  BP: 111/67 (12/16/17 0315)  SpO2: 100 % (12/20/17 1000) Vital Signs (24h Range):  Temp:  [96.7 °F (35.9 °C)-98.4 °F (36.9 °C)] 98.4 °F (36.9 °C)  Pulse:  [128-170] 135  Resp:  [18-37] 25  SpO2:  [31 %-100 %] 100 %  Arterial Line BP: (100-154)/(46-79) 101/54     Weight: 126 kg (277 lb 12.5 oz)  Body mass index is 35.66 kg/m².    Intake/Output - Last 3 Shifts       12/18 0700 - 12/19 0659 12/19 0700 - 12/20 0659 12/20 0700 - 12/21 0659    P.O. 710      I.V. (mL/kg) 5308.8 (42.1) 3495.4 (27.7) 884 (7)    IV Piggyback 100      Total Intake(mL/kg) 6118.8 (48.6) 3495.4 (27.7) 884 (7)    Urine (mL/kg/hr) 125 (0) 95 (0) 5 (0)    Drains  2100 (0.7) 900 (1.8)    Other 9358 (3.1) 7838 (2.6) 1402 (2.8)    Total Output 9483 58478 2301    Net -3364.2 -6537.6 -1423                 Physical Exam   Constitutional: He appears well-developed and well-nourished.   HENT:   Head: Normocephalic and atraumatic.   Eyes: Scleral icterus is present.   Neck: Normal range of motion. Neck supple.   Cardiovascular: An irregular rhythm present. Tachycardia present.    Pulmonary/Chest: Effort normal. No respiratory distress.   Abdominal: Soft. He exhibits no distension and no mass. There is no guarding. No hernia.   Bowel sounds present; no significant tenderness this morning   Neurological:   Awakens to voice and answers some questions   Skin: Skin is warm.       Significant Labs:  CBC:   Recent Labs  Lab 12/20/17 0442   WBC 11.34   RBC 4.49*   HGB 13.1*   HCT 38.3*   PLT 68*   MCV 85   MCH 29.2   MCHC 34.2     CMP:   Recent Labs  Lab 12/20/17 0442   GLU 83  83   CALCIUM 9.0  9.0   ALBUMIN 2.0*  2.0*   PROT 4.9*     136   K 4.5  4.5   CO2 29  29     102   BUN 8  8   CREATININE 1.4  1.4   ALKPHOS 230*   ALT 1,099*   *    BILITOT 8.6*       Significant Diagnostics:  I have reviewed all pertinent imaging results/findings within the past 24 hours.    Assessment/Plan:     Acute gastric dilatation    52 y.o. male with multiple medical comorbidities currently with cardiogenic shock complicated by resolving ALI and GULSHAN on CRRT, now with acute gastric dilation that has improved after appropriate decompression    - Continue to keep NGT in place and to LWIS  - Correct electrolyte abnormalities; keep Mg>2, Phos>3, K>4, normocalcemia  - Strict NPO  - Please replace GI losses; will third space with ileus  - Will continue to follow; please call with questions            Axel Toney MD  General Surgery  [facility]   [employer]

## 2022-10-22 NOTE — ED PROVIDER NOTE - PHYSICAL EXAMINATION
GENERAL: Awake, alert, NAD  HEENT: NC/AT, moist mucous membranes, PERRL, EOMI  LUNGS: Wheeze audible without stethoscope, lungs tight, poor aeration apices to bases. Non hypoxic on RA. Increased WOB.   CARDIAC: RRR, no m/r/g  ABDOMEN: Soft, normal BS, non tender, non distended, no rebound, no guarding  BACK: No midline spinal tenderness, no CVA tenderness  EXT: No edema, no calf tenderness, 2+ DP pulses bilaterally, no deformities.  NEURO: A&Ox3. Moving all extremities.  SKIN: Warm and dry. No rash.  PSYCH: Normal affect.

## 2022-10-22 NOTE — CONSULT NOTE ADULT - ASSESSMENT
62 y.o. F with PMH of asthma (recently admission for exacerbation 2/2 rhinovirus 9/26-10/4), remote breast and colon CA, HTN, HLD, T2DM, who presents with wheezing and SOB c/f asthma exacerbation, placed on BiPAP and MICU consulted for new BiPAP requirement.    # Asthma exacerbation 2/2 parainfluenza   - Non toxic appearing. Improving on BiPAP, pulling adequate volumes    - DuoNebs standing ATC   - Solumedrol 40mg qD   - C/w BiPAP, wean as tolerated. Keep SpO2 92% and above    Patient is not a MICU candidate at this time. Please reconsult should clinical conditions change           62 y.o. F with PMH of asthma (recently admission for exacerbation 2/2 rhinovirus 9/26-10/4), remote breast and colon CA, HTN, HLD, T2DM, who presents with wheezing and SOB c/f asthma exacerbation, placed on BiPAP and MICU consulted for new BiPAP requirement.    # Asthma exacerbation 2/2 parainfluenza   - Non toxic appearing. Improved on BiPAP, pulling adequate volumes; no on RA  - DuoNebs standing ATC   - Solumedrol 40mg qD   - C/w BiPAP, wean as tolerated. Keep SpO2 92% and above  - Consult patient's pulmonologist Dr Noel   - supportive care for parainfluenza    Patient is not a MICU candidate at this time. Please reconsult should clinical conditions change.    Case discussed w/ Dr. Yanez

## 2022-10-22 NOTE — ED ADULT NURSE REASSESSMENT NOTE - NS ED NURSE REASSESS COMMENT FT1
report given to DEDE Jimenez at [address] [place] [address]. Pt in no apparent distress, remains at % on room air. Normal sinus on monitor at present. Awaiting for transport.

## 2022-10-22 NOTE — ED PROVIDER NOTE - NS ED ROS FT
CONST: no fevers, no chills  EYES: no pain, no vision changes  ENT: no sore throat, no ear pain, no change in hearing  CV: no chest pain, no leg swelling  RESP: + shortness of breath, + cough, + wheeze  ABD: no abdominal pain, no nausea, no vomiting, no diarrhea  : no dysuria, no flank pain, no hematuria  MSK: no back pain, no extremity pain  NEURO: no headache or additional neurologic complaints  HEME: no easy bleeding  SKIN:  no rash

## 2022-10-22 NOTE — ED PROVIDER NOTE - PROGRESS NOTE DETAILS
Yuliet Luna PGY-3: Reassessed patient. Is on BIPAP. Feels improved subjectively. Anterior chest exam with scant wheeze, moving air better. TBA. Yuliet Luna PGY-3: Reassessed. Resting comfortably. VSS. No longer wheezing, moving air much better, sounds less tight. Feels subjectively improved. To consult MICU given new BIPAP requirement. Yuliet Luna PGY-3: MICU to see patient. Yuliet Luna PGY-3: Patient evaluated by MICU, recommendations pending. Was taken off BIPAP, this was not communicated to ED team. Upon reassessment, patient with increased WOB, audible wheezing, poor air movement. Placed back on BIPAP. To get repeat VBG at 430. Yuliet Luna PGY-3: MICU state no ICU needs at this time. Taken off BIPAP. Suggest prednisone 40mg QD. Patient still TBA. Yuliet Luna PGY-3: Informed by RN patient audibly wheezing again off BIPAP. Still tight, scant wheeze. Patient feels better on BIPAP. Will trial Charlotte STEELE, CTA pending.

## 2022-10-22 NOTE — ED PROVIDER NOTE - COVID-19 RESULT
Subjective:     Patient ID: Juanis Carpenter is a 70 y.o. female.    CC:   Chief Complaint   Patient presents with   • Gait Problem       HPI:   History of Present Illness   This is a very pleasant 70-year-old female who presents for initial neurological evaluation of inability to walk without the use of her cane over the past 18 or more months.  She tells me that she thinks her symptoms may have began prior to her right knee replacement but when she had her right knee replacement on 1/29/2018 she really notices significant change in her gait and balance.  She tells me that she has really had poor balance and difficulty with getting her feet to move over the past 18 months.  She is using her cane at all times.  She tells me following her right knee replacement she has had constant numbness over the right knee region but none in her feet or lower extremities below the knee.  She did complete and home physical therapy as well as outpatient physical therapy and felt like she could never get her balance and gait back to normal.  She tells me she has no dizziness or feeling of falling.  She just feels off balance and feels like she has to hold onto walls at times to keep her balance.  She can use a rolling walker and that seems to improve her gait.  She tells me she has had no falls since her knee replacement surgery.  She has seen multiple providers Dr. Gaurang Gonzalez orthopedics, her primary care provider, another orthopedic specialist as well as an orthopedic spine specialist.  She did undergo a nerve and muscle study of the lower extremities and this was completed on 8/9/2018 and showed a normal study of the bilateral lower extremities with no evidence of neuropathy or muscle damage.  She has had additional x-rays of her knee which showed no acute findings.  She also underwent an MRI of the lumbar spine on 12/28/2018 and that showed multilevel degenerative disc changes most prominent at L4-L5 with some stenosis.  She  tells me that she really does not have a lot of lower back pain and when she is standing for long period of time in one spot.  She denies radicular symptoms.  She also underwent an MRI of the thoracic spine even more recently on 6/3/2019 and this showed some mild degenerative disc changes with moderate osteophytes from T11-T12 and some moderate foraminal narrowing.  She tells me she has seen orthopedic spine center and if they did not recommend intervention.  She has not done any recent physical therapy and would be willing to work with a physical therapist again.  She tells me she just feels like she cannot make her feet move.  She has a very minimal and very intermittent tremor if she is writing or holding above but no other tremor noted.  She has no difficulty with swallowing, loss of smell or constipation.  No Parkinson's disease in her family.  She has not seen a neurosurgeon or pain management for epidurals.    The following portions of the patient's history were reviewed and updated as appropriate: allergies, current medications, past family history, past medical history, past social history, past surgical history and problem list.    Past Medical History:   Diagnosis Date   • Anxiety    • Arthritis    • Arthritis    • Hypertension    • Sleep apnea        Past Surgical History:   Procedure Laterality Date   • OOPHORECTOMY      one side removed       Social History     Socioeconomic History   • Marital status:      Spouse name: Not on file   • Number of children: Not on file   • Years of education: Not on file   • Highest education level: Not on file   Tobacco Use   • Smoking status: Never Smoker   Substance and Sexual Activity   • Alcohol use: No     Frequency: Never   • Sexual activity: Defer       Family History   Problem Relation Age of Onset   • Cancer Mother    • Hypertension Mother    • Dementia Father    • Stroke Father    • Cancer Father    • Hypertension Father    • Migraines Maternal Aunt     • Breast cancer Neg Hx    • Ovarian cancer Neg Hx         Review of Systems   Constitutional: Negative for chills, fatigue, fever and unexpected weight change.   HENT: Negative for ear pain, hearing loss, nosebleeds, rhinorrhea and sore throat.    Eyes: Negative for photophobia, pain, discharge, itching and visual disturbance.   Respiratory: Negative for cough, chest tightness, shortness of breath and wheezing.    Cardiovascular: Negative for chest pain, palpitations and leg swelling.   Gastrointestinal: Positive for constipation. Negative for abdominal pain, blood in stool, diarrhea, nausea and vomiting.   Genitourinary: Negative for dysuria, frequency, hematuria and urgency.   Musculoskeletal: Positive for gait problem. Negative for arthralgias, back pain, joint swelling, myalgias, neck pain and neck stiffness.   Skin: Negative for rash and wound.   Allergic/Immunologic: Negative for environmental allergies and food allergies.   Neurological: Negative for dizziness, tremors, seizures, syncope, speech difficulty, weakness, light-headedness, numbness and headaches.   Hematological: Negative for adenopathy. Bruises/bleeds easily.   Psychiatric/Behavioral: Negative for agitation, confusion, decreased concentration, hallucinations, sleep disturbance and suicidal ideas. The patient is nervous/anxious.    All other systems reviewed and are negative.       Objective:    Neurologic Exam     Mental Status   Oriented to person, place, and time.   Registration: recalls 3 of 3 objects. Recall at 5 minutes: recalls 3 of 3 objects. Follows 3 step commands.   Attention: normal. Concentration: normal.   Speech: speech is normal   Level of consciousness: alert  Knowledge: good and consistent with education. Able to perform simple calculations.   Able to name object. Able to read. Able to repeat. Able to write. Normal comprehension.     Cranial Nerves   Cranial nerves II through XII intact.     Motor Exam   Muscle bulk:  normal  Overall muscle tone: normal    Strength   Strength 5/5 except as noted.   Right strength: Decreased ROM Right hip/knee but no weakness    Sensory Exam   Light touch normal.   Vibration normal.   Proprioception normal.   Pinprick normal.   Decreased light and pinprick to right knee area but otherwise normal.     Gait, Coordination, and Reflexes     Gait  Gait: wide-based (antalgic, slow gait but no shuffling noted and good arm swing, hesitates to lift and move right leg/foot but no foot drop. grabs wall when walking halls to keep balance)    Coordination   Romberg: negative  Finger to nose coordination: normal  Heel to shin coordination: normal  Tandem walking coordination: abnormal    Tremor   Resting tremor: absent  Intention tremor: present (very minimal, intermittent, kinetic tremor both hands, no resting tremor.)  Action tremor: absent    Reflexes   Right brachioradialis: 2+  Left brachioradialis: 2+  Right biceps: 2+  Left biceps: 2+  Right triceps: 2+  Left triceps: 2+  Right patellar: 2+  Left patellar: 2+  Right achilles: 2+  Left achilles: 2+  Right : 2+  Left : 2+  Right plantar: normal  Left plantar: normal  Right Loyola: absent  Left Loyola: absent  Right ankle clonus: absent  Left ankle clonus: absent  Right pendular knee jerk: absent  Left pendular knee jerk: absentNormal finger and heel tapping bilaterally.       Physical Exam   Constitutional: She is oriented to person, place, and time. She appears well-developed and well-nourished.   Eyes:   Fundoscopic exam:       The right eye shows red reflex.        The left eye shows red reflex.   Neck: Carotid bruit is not present.   Cardiovascular: Normal rate, regular rhythm, S1 normal and S2 normal.   Murmur (known murmur per pt) heard.   Systolic murmur is present with a grade of 3/6.  Pulmonary/Chest: Effort normal and breath sounds normal.   Musculoskeletal:        Right hip: She exhibits decreased range of motion. She exhibits no  tenderness and no swelling.        Right knee: She exhibits decreased range of motion. She exhibits no swelling. No tenderness found.   Neurological: She is oriented to person, place, and time. She has an abnormal Tandem Gait Test. She has a normal Finger-Nose-Finger Test, a normal Heel to Florian Test and a normal Romberg Test.   Reflex Scores:       Tricep reflexes are 2+ on the right side and 2+ on the left side.       Bicep reflexes are 2+ on the right side and 2+ on the left side.       Brachioradialis reflexes are 2+ on the right side and 2+ on the left side.       Patellar reflexes are 2+ on the right side and 2+ on the left side.       Achilles reflexes are 2+ on the right side and 2+ on the left side.  Psychiatric: Her speech is normal and behavior is normal. Judgment and thought content normal. Her mood appears anxious. Cognition and memory are normal.       Assessment/Plan:       Juanis was seen today for gait problem.    Diagnoses and all orders for this visit:    Ataxia  -     Ambulatory Referral to Physical Therapy Evaluate and treat  -     MRI Brain Without Contrast; Future    Gait abnormality  -     Ambulatory Referral to Physical Therapy Evaluate and treat    DDD (degenerative disc disease), lumbar  Comments:  could consider epidurals with pain management in future. some stenosis on mri l spine but no high grade. if not better with PT consider neurosurgery consult    DDD (degenerative disc disease), thoracic  Comments:  could consider epidurals with pain management in future. some stenosis on mri t spine but no high grade. if not better with PT consider neurosurgery consult         I have recommended we retry physical therapy as well as ordering an MRI of the brain without contrast to evaluate her difficulty with balance.  The MRI of the lumbar and thoracic spine does show some stenosis so she is not improving I would consider neurosurgical consultation.  I would also recommend consultation with pain  management for epidurals.  We are going to have her follow-up in 6 weeks for reevaluation if she is not improving in her MRI of the brain is clear we will move forward with neurosurgery and pain management. Reviewed medications, potential side effects and signs and symptoms to report. Discussed risk versus benefits of treatment plan with patient and/or family-including medications, labs and radiology that may be ordered. Addressed questions and concerns during visit. Patient and/or family verbalized understanding and agree with plan.    During this visit the following were done:  Labs Reviewed []    Labs Ordered []    Radiology Reports Reviewed [x]    Radiology Ordered [x]    PCP Records Reviewed [x]    Referring Provider Records Reviewed [x]    ER Records Reviewed []    Hospital Records Reviewed []    History Obtained From Family []    Radiology Images Reviewed [x]    Other Reviewed [x]    Records Requested []      EMR Dragon/Transcription Disclaimer:  Much of this encounter note is an electronic transcription of spoken language to printed text. Electronic transcription of spoken language may permit erroneous words or phrases to be inadvertently transcribed. Although I have reviewed the note for such errors, some may still exist in this documentation.      Dianna Santamaria, APRN  8/2/2019         NEGATIVE

## 2022-10-23 DIAGNOSIS — J45.901 UNSPECIFIED ASTHMA WITH (ACUTE) EXACERBATION: ICD-10-CM

## 2022-10-23 DIAGNOSIS — S91.301A UNSPECIFIED OPEN WOUND, RIGHT FOOT, INITIAL ENCOUNTER: ICD-10-CM

## 2022-10-23 DIAGNOSIS — I10 ESSENTIAL (PRIMARY) HYPERTENSION: ICD-10-CM

## 2022-10-23 DIAGNOSIS — Z29.9 ENCOUNTER FOR PROPHYLACTIC MEASURES, UNSPECIFIED: ICD-10-CM

## 2022-10-23 DIAGNOSIS — E11.65 TYPE 2 DIABETES MELLITUS WITH HYPERGLYCEMIA: ICD-10-CM

## 2022-10-23 DIAGNOSIS — E78.5 HYPERLIPIDEMIA, UNSPECIFIED: ICD-10-CM

## 2022-10-23 DIAGNOSIS — R79.89 OTHER SPECIFIED ABNORMAL FINDINGS OF BLOOD CHEMISTRY: ICD-10-CM

## 2022-10-23 DIAGNOSIS — E87.20 ACIDOSIS, UNSPECIFIED: ICD-10-CM

## 2022-10-23 DIAGNOSIS — B34.8 OTHER VIRAL INFECTIONS OF UNSPECIFIED SITE: ICD-10-CM

## 2022-10-23 LAB
ALBUMIN SERPL ELPH-MCNC: 4.6 G/DL — SIGNIFICANT CHANGE UP (ref 3.3–5)
ALP SERPL-CCNC: 99 U/L — SIGNIFICANT CHANGE UP (ref 40–120)
ALT FLD-CCNC: 38 U/L — HIGH (ref 4–33)
ANION GAP SERPL CALC-SCNC: 18 MMOL/L — HIGH (ref 7–14)
AST SERPL-CCNC: 23 U/L — SIGNIFICANT CHANGE UP (ref 4–32)
BASE EXCESS BLDV CALC-SCNC: -4.5 MMOL/L — LOW (ref -2–3)
BILIRUB DIRECT SERPL-MCNC: <0.2 MG/DL — SIGNIFICANT CHANGE UP (ref 0–0.3)
BILIRUB INDIRECT FLD-MCNC: >0 MG/DL — SIGNIFICANT CHANGE UP (ref 0–1)
BILIRUB SERPL-MCNC: 0.2 MG/DL — SIGNIFICANT CHANGE UP (ref 0.2–1.2)
BLOOD GAS VENOUS COMPREHENSIVE RESULT: SIGNIFICANT CHANGE UP
BUN SERPL-MCNC: 13 MG/DL — SIGNIFICANT CHANGE UP (ref 7–23)
CALCIUM SERPL-MCNC: 10.2 MG/DL — SIGNIFICANT CHANGE UP (ref 8.4–10.5)
CHLORIDE BLDV-SCNC: 102 MMOL/L — SIGNIFICANT CHANGE UP (ref 96–108)
CHLORIDE SERPL-SCNC: 99 MMOL/L — SIGNIFICANT CHANGE UP (ref 98–107)
CO2 BLDV-SCNC: 23 MMOL/L — SIGNIFICANT CHANGE UP (ref 22–26)
CO2 SERPL-SCNC: 18 MMOL/L — LOW (ref 22–31)
CREAT SERPL-MCNC: 0.77 MG/DL — SIGNIFICANT CHANGE UP (ref 0.5–1.3)
EGFR: 88 ML/MIN/1.73M2 — SIGNIFICANT CHANGE UP
GAS PNL BLDV: 133 MMOL/L — LOW (ref 136–145)
GAS PNL BLDV: SIGNIFICANT CHANGE UP
GLUCOSE BLDC GLUCOMTR-MCNC: 165 MG/DL — HIGH (ref 70–99)
GLUCOSE BLDC GLUCOMTR-MCNC: 251 MG/DL — HIGH (ref 70–99)
GLUCOSE BLDC GLUCOMTR-MCNC: 305 MG/DL — HIGH (ref 70–99)
GLUCOSE BLDC GLUCOMTR-MCNC: 335 MG/DL — HIGH (ref 70–99)
GLUCOSE BLDC GLUCOMTR-MCNC: 347 MG/DL — HIGH (ref 70–99)
GLUCOSE BLDV-MCNC: 247 MG/DL — HIGH (ref 70–99)
GLUCOSE SERPL-MCNC: 237 MG/DL — HIGH (ref 70–99)
HCO3 BLDV-SCNC: 22 MMOL/L — SIGNIFICANT CHANGE UP (ref 22–29)
HCT VFR BLD CALC: 36.3 % — SIGNIFICANT CHANGE UP (ref 34.5–45)
HCT VFR BLDA CALC: 38 % — SIGNIFICANT CHANGE UP (ref 34.5–46.5)
HGB BLD CALC-MCNC: 12.7 G/DL — SIGNIFICANT CHANGE UP (ref 11.5–15.5)
HGB BLD-MCNC: 12.2 G/DL — SIGNIFICANT CHANGE UP (ref 11.5–15.5)
LACTATE BLDV-MCNC: 6.1 MMOL/L — CRITICAL HIGH (ref 0.5–2)
LACTATE SERPL-SCNC: 6.9 MMOL/L — CRITICAL HIGH (ref 0.5–2)
MCHC RBC-ENTMCNC: 28.4 PG — SIGNIFICANT CHANGE UP (ref 27–34)
MCHC RBC-ENTMCNC: 33.6 GM/DL — SIGNIFICANT CHANGE UP (ref 32–36)
MCV RBC AUTO: 84.6 FL — SIGNIFICANT CHANGE UP (ref 80–100)
NRBC # BLD: 0 /100 WBCS — SIGNIFICANT CHANGE UP (ref 0–0)
NRBC # FLD: 0 K/UL — SIGNIFICANT CHANGE UP (ref 0–0)
PCO2 BLDV: 43 MMHG — HIGH (ref 39–42)
PH BLDV: 7.31 — LOW (ref 7.32–7.43)
PLATELET # BLD AUTO: 327 K/UL — SIGNIFICANT CHANGE UP (ref 150–400)
PO2 BLDV: 45 MMHG — SIGNIFICANT CHANGE UP
POTASSIUM BLDV-SCNC: 5 MMOL/L — SIGNIFICANT CHANGE UP (ref 3.5–5.1)
POTASSIUM SERPL-MCNC: 5 MMOL/L — SIGNIFICANT CHANGE UP (ref 3.5–5.3)
POTASSIUM SERPL-SCNC: 5 MMOL/L — SIGNIFICANT CHANGE UP (ref 3.5–5.3)
PROT SERPL-MCNC: 7.5 G/DL — SIGNIFICANT CHANGE UP (ref 6–8.3)
RBC # BLD: 4.29 M/UL — SIGNIFICANT CHANGE UP (ref 3.8–5.2)
RBC # FLD: 13.1 % — SIGNIFICANT CHANGE UP (ref 10.3–14.5)
SAO2 % BLDV: 78.7 % — SIGNIFICANT CHANGE UP
SODIUM SERPL-SCNC: 135 MMOL/L — SIGNIFICANT CHANGE UP (ref 135–145)
WBC # BLD: 8.7 K/UL — SIGNIFICANT CHANGE UP (ref 3.8–10.5)
WBC # FLD AUTO: 8.7 K/UL — SIGNIFICANT CHANGE UP (ref 3.8–10.5)

## 2022-10-23 PROCEDURE — 99223 1ST HOSP IP/OBS HIGH 75: CPT

## 2022-10-23 PROCEDURE — 12345: CPT | Mod: NC

## 2022-10-23 RX ORDER — INSULIN GLARGINE 100 [IU]/ML
8 INJECTION, SOLUTION SUBCUTANEOUS ONCE
Refills: 0 | Status: COMPLETED | OUTPATIENT
Start: 2022-10-22 | End: 2022-10-22

## 2022-10-23 RX ORDER — INFLUENZA VIRUS VACCINE 15; 15; 15; 15 UG/.5ML; UG/.5ML; UG/.5ML; UG/.5ML
0.5 SUSPENSION INTRAMUSCULAR ONCE
Refills: 0 | Status: DISCONTINUED | OUTPATIENT
Start: 2022-10-23 | End: 2022-10-25

## 2022-10-23 RX ADMIN — Medication 40 MILLIGRAM(S): at 12:55

## 2022-10-23 RX ADMIN — BUDESONIDE AND FORMOTEROL FUMARATE DIHYDRATE 2 PUFF(S): 160; 4.5 AEROSOL RESPIRATORY (INHALATION) at 08:18

## 2022-10-23 RX ADMIN — Medication 600 MILLIGRAM(S): at 17:23

## 2022-10-23 RX ADMIN — LOSARTAN POTASSIUM 100 MILLIGRAM(S): 100 TABLET, FILM COATED ORAL at 06:20

## 2022-10-23 RX ADMIN — Medication 2: at 02:32

## 2022-10-23 RX ADMIN — Medication 3 MILLILITER(S): at 11:05

## 2022-10-23 RX ADMIN — Medication 2: at 21:04

## 2022-10-23 RX ADMIN — Medication 3 MILLILITER(S): at 21:40

## 2022-10-23 RX ADMIN — Medication 3 UNIT(S): at 17:20

## 2022-10-23 RX ADMIN — BUDESONIDE AND FORMOTEROL FUMARATE DIHYDRATE 2 PUFF(S): 160; 4.5 AEROSOL RESPIRATORY (INHALATION) at 21:04

## 2022-10-23 RX ADMIN — Medication 12.5 MILLIGRAM(S): at 06:20

## 2022-10-23 RX ADMIN — Medication 1: at 08:17

## 2022-10-23 RX ADMIN — ENOXAPARIN SODIUM 40 MILLIGRAM(S): 100 INJECTION SUBCUTANEOUS at 08:16

## 2022-10-23 RX ADMIN — Medication 3: at 12:57

## 2022-10-23 RX ADMIN — Medication 3 UNIT(S): at 08:17

## 2022-10-23 RX ADMIN — Medication 600 MILLIGRAM(S): at 06:19

## 2022-10-23 RX ADMIN — INSULIN GLARGINE 8 UNIT(S): 100 INJECTION, SOLUTION SUBCUTANEOUS at 21:05

## 2022-10-23 RX ADMIN — Medication 3 MILLILITER(S): at 15:42

## 2022-10-23 RX ADMIN — Medication 4: at 17:21

## 2022-10-23 RX ADMIN — ATORVASTATIN CALCIUM 20 MILLIGRAM(S): 80 TABLET, FILM COATED ORAL at 21:03

## 2022-10-23 RX ADMIN — INSULIN GLARGINE 8 UNIT(S): 100 INJECTION, SOLUTION SUBCUTANEOUS at 02:37

## 2022-10-23 RX ADMIN — Medication 3 UNIT(S): at 12:56

## 2022-10-23 NOTE — H&P ADULT - NSHPPHYSICALEXAM_GEN_ALL_CORE
PHYSICAL EXAM:  Vital Signs Last 24 Hrs  T(C): 36.7 (10-22-22 @ 20:33)  T(F): 98.1 (10-22-22 @ 20:33), Max: 98.4 (10-22-22 @ 13:13)  HR: 96 (10-22-22 @ 23:18) (79 - 102)  BP: 142/76 (10-22-22 @ 20:33)  BP(mean): --  RR: 18 (10-22-22 @ 23:18) (16 - 22)  SpO2: 99% (10-22-22 @ 23:18) (99% - 100%)  Wt(kg): --    Constitutional: NAD, awake and alert, well developed  EYES: EOMI, conjunctiva clear  ENT:  Normal Hearing, no tonsillar exudates   Neck: Soft and supple , no thyromegaly   Respiratory: wheezing throughout, No rales or rhonchi, no tachypnea, no accessory muscle use. Comfortable on RA, 100%  Cardiovascular: S1 and S2, regular rate and rhythm, no Murmurs, gallops or rubs, no JVD, no leg edema  Gastrointestinal: Bowel Sounds present, soft, nontender, nondistended, no guarding, no rebound  Extremities: No cyanosis or clubbing; warm to touch  Vascular: 2+ peripheral pulses lower ex  Neurological: No focal deficits, CN II-XII intact bilaterally, sensation to light touch intact in all extremities.   Musculoskeletal: 5/5 strength b/l upper and lower extremities; no joint swelling.  Skin: R lateral ankle healing wound, no drainage, no purulence

## 2022-10-23 NOTE — H&P ADULT - NSHPLABSRESULTS_GEN_ALL_CORE
I have personally reviewed this patient's labs below:                        12.9   6.64  )-----------( 310      ( 22 Oct 2022 12:45 )             38.8     10-22-22 @ 12:45    140  |  102  |  11             --------------------------< 167<H>     4.2  |  21<L>  | 0.88    eGFR AA: --  eGFR N-AA: --    Calcium: 10.1  Phosphorus: --  Magnesium: --    AST: 43<H>    ALT: 44<H>  AlkPhos: 103  Protein: 7.4  Albumin: 4.6  TBili: 0.3  D-Bili: --    VBG - ( 22 Oct 2022 17:02 )  pH: 7.29  /  pCO2: 49    /  pO2: 52    / HCO3: 24    / Base Excess: -3.3  /  SvO2: 85.7  / Lactate: 6.0            I have personally reviewed this patient's EKG and my independent interpretation is NSR, no STD or SAMEER    I have personally reviewed this patient's CXR and my independent interpretation is clear lungs    Imaging reviewed below:  CTA chest:  IMPRESSION:  No pulmonary embolism or other acute intrathoracic pathology.

## 2022-10-23 NOTE — H&P ADULT - ASSESSMENT
62F with PMHx asthma, hx R breast cancer s/p lumpectomy 2019 and chemo/RT, colon cancer s/p resection, HTN, HLD, DM2 presenting with SOB x1 week. Admitted with asthma exacerbation likely 2/2 parinfluenza

## 2022-10-23 NOTE — H&P ADULT - PROBLEM SELECTOR PLAN 7
S/p lanceting by outpatient podiatry. S/p doxy and cipro  No longer appears infected and no drainage. Can weightbear

## 2022-10-23 NOTE — PROGRESS NOTE ADULT - SUBJECTIVE AND OBJECTIVE BOX
[facility] Division of Hospital Medicine  Rina Gasca MD  Available via MS Teams  Pager: [phone]    SUBJECTIVE / OVERNIGHT EVENTS:    ADDITIONAL REVIEW OF SYSTEMS:    MEDICATIONS  (STANDING):  albuterol/ipratropium for Nebulization 3 milliLiter(s) Nebulizer every 6 hours  atorvastatin 20 milliGRAM(s) Oral at bedtime  budesonide 160 MICROgram(s)/formoterol 4.5 MICROgram(s) Inhaler 2 Puff(s) Inhalation two times a day  dextrose 5%. 1000 milliLiter(s) (100 mL/Hr) IV Continuous <Continuous>  dextrose 5%. 1000 milliLiter(s) (50 mL/Hr) IV Continuous <Continuous>  dextrose 50% Injectable 25 Gram(s) IV Push once  dextrose 50% Injectable 12.5 Gram(s) IV Push once  dextrose 50% Injectable 25 Gram(s) IV Push once  enoxaparin Injectable 40 milliGRAM(s) SubCutaneous every 24 hours  glucagon  Injectable 1 milliGRAM(s) IntraMuscular once  guaiFENesin  milliGRAM(s) Oral every 12 hours  hydrochlorothiazide 12.5 milliGRAM(s) Oral daily  influenza   Vaccine 0.5 milliLiter(s) IntraMuscular once  insulin glargine Injectable (LANTUS) 8 Unit(s) SubCutaneous at bedtime  insulin lispro (ADMELOG) corrective regimen sliding scale   SubCutaneous three times a day before meals  insulin lispro (ADMELOG) corrective regimen sliding scale   SubCutaneous at bedtime  insulin lispro Injectable (ADMELOG) 3 Unit(s) SubCutaneous three times a day before meals  losartan 100 milliGRAM(s) Oral daily  methylPREDNISolone sodium succinate Injectable 40 milliGRAM(s) IV Push daily    MEDICATIONS  (PRN):  acetaminophen     Tablet .. 650 milliGRAM(s) Oral every 6 hours PRN Temp greater or equal to 38C (100.4F), Mild Pain (1 - 3)  dextrose Oral Gel 15 Gram(s) Oral once PRN Blood Glucose LESS THAN 70 milliGRAM(s)/deciliter      I&O's Summary      PHYSICAL EXAM:  Vital Signs Last 24 Hrs  T(C): 36.4 (23 Oct 2022 09:30), Max: 36.9 (22 Oct 2022 13:13)  T(F): 97.6 (23 Oct 2022 09:30), Max: 98.4 (22 Oct 2022 13:13)  HR: 91 (23 Oct 2022 09:30) (79 - 102)  BP: 144/84 (23 Oct 2022 09:30) (116/74 - 152/90)  BP(mean): --  RR: 18 (23 Oct 2022 09:30) (16 - 20)  SpO2: 98% (23 Oct 2022 09:30) (98% - 100%)    Parameters below as of 23 Oct 2022 09:30  Patient On (Oxygen Delivery Method): room air      CONSTITUTIONAL: NAD, well-developed, well-groomed  EYES: PERRLA; conjunctiva and sclera clear  ENMT: Moist oral mucosa, no pharyngeal injection or exudates; normal dentition  NECK: Supple, no palpable masses; no thyromegaly  RESPIRATORY: Normal respiratory effort; lungs are clear to auscultation bilaterally  CARDIOVASCULAR: Regular rate and rhythm, normal S1 and S2, no murmur/rub/gallop; No lower extremity edema; Peripheral pulses are 2+ bilaterally  ABDOMEN: Nontender to palpation, normoactive bowel sounds, no rebound/guarding; No hepatosplenomegaly  MUSCULOSKELETAL:  Normal gait; no clubbing or cyanosis of digits; no joint swelling or tenderness to palpation  PSYCH: A+O to person, place, and time; affect appropriate  NEUROLOGY: CN 2-12 are intact and symmetric; no gross sensory deficits   SKIN: No rashes; no palpable lesions    LABS:                        12.2   8.70  )-----------( 327      ( 23 Oct 2022 06:00 )             36.3     10-23    135  |  99  |  13  ----------------------------<  237<H>  5.0   |  18<L>  |  0.77    Ca    10.2      23 Oct 2022 06:00    TPro  7.5  /  Alb  4.6  /  TBili  0.2  /  DBili  <0.2  /  AST  23  /  ALT  38<H>  /  AlkPhos  99  10-23              SARS-CoV-2: NotDetec (22 Oct 2022 13:52)  COVID-19 PCR: NotDetec (03 Oct 2022 06:11)  COVID-19 PCR: NotDetec (26 Sep 2022 16:05)  SARS-CoV-2: NotDetec (26 Sep 2022 16:05)      RADIOLOGY & ADDITIONAL TESTS:  New Results Reviewed Today:   < from: CT Angio Chest PE Protocol w/ IV Cont (10.22.22 @ 20:17) >  IMPRESSION:  No pulmonary embolism or other acute intrathoracic pathology.    < end of copied text >           [facility] Division of Hospital Medicine  Rina Gasca MD  Available via MS Teams  Pager: [phone]    SUBJECTIVE / OVERNIGHT EVENTS:  Patient endorses feeling slightly better with nebs treatments. Continues to feel tight and wheezing.     ADDITIONAL REVIEW OF SYSTEMS:  no fever, no sputum production, no abdominal pain, no nausea or vomiting    MEDICATIONS  (STANDING):  albuterol/ipratropium for Nebulization 3 milliLiter(s) Nebulizer every 6 hours  atorvastatin 20 milliGRAM(s) Oral at bedtime  budesonide 160 MICROgram(s)/formoterol 4.5 MICROgram(s) Inhaler 2 Puff(s) Inhalation two times a day  dextrose 5%. 1000 milliLiter(s) (100 mL/Hr) IV Continuous <Continuous>  dextrose 5%. 1000 milliLiter(s) (50 mL/Hr) IV Continuous <Continuous>  dextrose 50% Injectable 25 Gram(s) IV Push once  dextrose 50% Injectable 12.5 Gram(s) IV Push once  dextrose 50% Injectable 25 Gram(s) IV Push once  enoxaparin Injectable 40 milliGRAM(s) SubCutaneous every 24 hours  glucagon  Injectable 1 milliGRAM(s) IntraMuscular once  guaiFENesin  milliGRAM(s) Oral every 12 hours  hydrochlorothiazide 12.5 milliGRAM(s) Oral daily  influenza   Vaccine 0.5 milliLiter(s) IntraMuscular once  insulin glargine Injectable (LANTUS) 8 Unit(s) SubCutaneous at bedtime  insulin lispro (ADMELOG) corrective regimen sliding scale   SubCutaneous three times a day before meals  insulin lispro (ADMELOG) corrective regimen sliding scale   SubCutaneous at bedtime  insulin lispro Injectable (ADMELOG) 3 Unit(s) SubCutaneous three times a day before meals  losartan 100 milliGRAM(s) Oral daily  methylPREDNISolone sodium succinate Injectable 40 milliGRAM(s) IV Push daily    MEDICATIONS  (PRN):  acetaminophen     Tablet .. 650 milliGRAM(s) Oral every 6 hours PRN Temp greater or equal to 38C (100.4F), Mild Pain (1 - 3)  dextrose Oral Gel 15 Gram(s) Oral once PRN Blood Glucose LESS THAN 70 milliGRAM(s)/deciliter      I&O's Summary      PHYSICAL EXAM:  Vital Signs Last 24 Hrs  T(C): 36.4 (23 Oct 2022 09:30), Max: 36.9 (22 Oct 2022 13:13)  T(F): 97.6 (23 Oct 2022 09:30), Max: 98.4 (22 Oct 2022 13:13)  HR: 91 (23 Oct 2022 09:30) (79 - 102)  BP: 144/84 (23 Oct 2022 09:30) (116/74 - 152/90)  BP(mean): --  RR: 18 (23 Oct 2022 09:30) (16 - 20)  SpO2: 98% (23 Oct 2022 09:30) (98% - 100%)    Parameters below as of 23 Oct 2022 09:30  Patient On (Oxygen Delivery Method): room air      CONSTITUTIONAL: NAD, well-developed, well-groomed  EYES: PERRLA; conjunctiva and sclera clear  RESPIRATORY: Normal respiratory effort; poor air movement bilaterally  CARDIOVASCULAR: Regular rate and rhythm, normal S1 and S2, no murmur/rub/gallop; No lower extremity edema; Peripheral pulses are 2+ bilaterally  ABDOMEN: Nontender to palpation, normoactive bowel sounds, no rebound/guarding  MUSCULOSKELETAL:  no clubbing or cyanosis of digits; no joint swelling or tenderness to palpation  PSYCH: A+O to person, place, and time; affect appropriate  NEUROLOGY: CN 2-12 are intact and symmetric; no gross sensory deficits   SKIN: No rashes; no palpable lesions    LABS:                        12.2   8.70  )-----------( 327      ( 23 Oct 2022 06:00 )             36.3     10-23    135  |  99  |  13  ----------------------------<  237<H>  5.0   |  18<L>  |  0.77    Ca    10.2      23 Oct 2022 06:00    TPro  7.5  /  Alb  4.6  /  TBili  0.2  /  DBili  <0.2  /  AST  23  /  ALT  38<H>  /  AlkPhos  99  10-23              SARS-CoV-2: NotDetec (22 Oct 2022 13:52)  COVID-19 PCR: NotDetec (03 Oct 2022 06:11)  COVID-19 PCR: NotDetec (26 Sep 2022 16:05)  SARS-CoV-2: NotDetec (26 Sep 2022 16:05)      RADIOLOGY & ADDITIONAL TESTS:  New Results Reviewed Today:   < from: CT Angio Chest PE Protocol w/ IV Cont (10.22.22 @ 20:17) >  IMPRESSION:  No pulmonary embolism or other acute intrathoracic pathology.    < end of copied text >

## 2022-10-23 NOTE — PROGRESS NOTE ADULT - PROBLEM SELECTOR PLAN 3
patient with AG metabolic acidosis, elevated lactate 6.6, Ph 7.3  -previous workup on last admission unrevealing  -hold metformin, less likely to be the culprit  -could be 2/2 albuterol nebs vs malignancy  -will check CT abdomen, to r/o ischemic bowel although no acute abdomen, and to eval for malignancy in the s/o history of colon cancer  -check B1 level  -repeat lactate and VBG this afternoon patient with AG metabolic acidosis, elevated lactate 6.9, Ph 7.3  -previous workup on last admission unrevealing  -hold metformin, less likely to be the culprit  -could be 2/2 albuterol nebs vs malignancy  -will check CT abdomen, to r/o ischemic bowel although no acute abdomen, and to eval for malignancy in the s/o history of colon cancer  -check B1 level  -repeat lactate and VBG this afternoon

## 2022-10-23 NOTE — H&P ADULT - NSHPREVIEWOFSYSTEMS_GEN_ALL_CORE
ROS:    Constitutional: [ ] fevers [ ] chills   HEENT:  [ ] postnasal drip [ ] nasal congestion  CV: [ ] chest pain [ ] orthopnea [ ] palpitations   Resp: [x ] cough [x ] shortness of breath [x ] dyspnea [x ] wheezing  GI: [ ] nausea [ ] vomiting [ ] diarrhea [ ] constipation [ ] abd pain   : [ ] dysuria  [ ] increased urinary frequency  Musculoskeletal: [ ] back pain [ ] myalgias [ ] arthralgias   Skin: [ ] rash [ ] itch  Neurological: [ ] headache [ ] dizziness [ ] syncope   Endocrine: [ x] diabetes [ ] thyroid problem  Hematologic/Lymphatic: [ ] anemia [ ] bleeding problem  [x ] All other systems negative

## 2022-10-23 NOTE — H&P ADULT - PROBLEM SELECTOR PLAN 1
Wheezing, hypercarbia, SOB in setting of paraflu infection. C/w asthma exacerbation  -duonebs q6  -methylpred 40 IV qd, convert to pred when significantly improved  -symbicort BID  -cough suppressants  -pulm c/s in AM if does not improve  -cont pulse ox  -now off BIPAP, continue to monitor need  -VBG in AM

## 2022-10-23 NOTE — PATIENT PROFILE ADULT - FALL HARM RISK - HARM RISK INTERVENTIONS
Assistance with ambulation/Assistance OOB with selected safe patient handling equipment/Communicate Risk of Fall with Harm to all staff/Monitor gait and stability/Reinforce activity limits and safety measures with patient and family/Sit up slowly, dangle for a short time, stand at bedside before walking/Tailored Fall Risk Interventions/Visual Cue: Yellow wristband and red socks/Bed in lowest position, wheels locked, appropriate side rails in place/Call bell, personal items and telephone in reach/Instruct patient to call for assistance before getting out of bed or chair/Non-slip footwear when patient is out of bed/[place] to call system/Physically safe environment - no spills, clutter or unnecessary equipment/Purposeful Proactive Rounding/Room/bathroom lighting operational, light cord in reach

## 2022-10-23 NOTE — H&P ADULT - PROBLEM SELECTOR PLAN 4
Similar to prior admission. Likely in setting of excess albuterol use and metformin  F/u VBG in AM  No overt signs of sepsis and without abdominal, extremity pain. Pulses 2+

## 2022-10-23 NOTE — H&P ADULT - PROBLEM SELECTOR PLAN 3
FSG now 264 after steroids. On last admission had steroid induced hyperglycemia  -lantus 8u qhs, admelog 3u TID, low dose ISS as on last admission  -hold metformin
Stable

## 2022-10-23 NOTE — H&P ADULT - HISTORY OF PRESENT ILLNESS
62F with PMHx asthma, hx R breast cancer s/p lumpectomy 2019 and chemo/RT, colon cancer s/p resection, HTN, HLD, DM2 presenting with SOB x1 week. Pt was admitted 9/26-10/4 for asthma exacerbation and managed for steroid induced hyperglycemia and seen by endo. Pt notes body aches one week ago and chills. She also has productive cough as well. Earlier this week she began having SOB and wheezing. This was not resolved with albuterol and worsened over time. She denies CP, abdominal pain, vomiting. After discharge she followed with a podiatrist for R foot redness/swelling. She was placed on doxycycline for 9 days and then a pus filled area formed. This was lanceted by podiatry and she was then placed on cipro for 7 days. This pain and swelling have resolved. No longer with purulence.    In ED given duonebs, methylpred, epi  Was on BIPAP then removed by MICU. Then placed back on BIPAP. Once again removed now by patient. She feels comfortable on RA

## 2022-10-24 LAB
ANION GAP SERPL CALC-SCNC: 19 MMOL/L — HIGH (ref 7–14)
BASE EXCESS BLDV CALC-SCNC: -2.8 MMOL/L — LOW (ref -2–3)
BUN SERPL-MCNC: 19 MG/DL — SIGNIFICANT CHANGE UP (ref 7–23)
CA-I SERPL-SCNC: 1.27 MMOL/L — SIGNIFICANT CHANGE UP (ref 1.15–1.33)
CALCIUM SERPL-MCNC: 10.1 MG/DL — SIGNIFICANT CHANGE UP (ref 8.4–10.5)
CHLORIDE BLDV-SCNC: 99 MMOL/L — SIGNIFICANT CHANGE UP (ref 96–108)
CHLORIDE SERPL-SCNC: 96 MMOL/L — LOW (ref 98–107)
CO2 BLDV-SCNC: 24.6 MMOL/L — SIGNIFICANT CHANGE UP (ref 22–26)
CO2 SERPL-SCNC: 20 MMOL/L — LOW (ref 22–31)
CREAT SERPL-MCNC: 0.79 MG/DL — SIGNIFICANT CHANGE UP (ref 0.5–1.3)
EGFR: 86 ML/MIN/1.73M2 — SIGNIFICANT CHANGE UP
GAS PNL BLDV: 133 MMOL/L — LOW (ref 136–145)
GAS PNL BLDV: SIGNIFICANT CHANGE UP
GLUCOSE BLDC GLUCOMTR-MCNC: 157 MG/DL — HIGH (ref 70–99)
GLUCOSE BLDC GLUCOMTR-MCNC: 189 MG/DL — HIGH (ref 70–99)
GLUCOSE BLDC GLUCOMTR-MCNC: 212 MG/DL — HIGH (ref 70–99)
GLUCOSE BLDC GLUCOMTR-MCNC: 336 MG/DL — HIGH (ref 70–99)
GLUCOSE BLDV-MCNC: 402 MG/DL — HIGH (ref 70–99)
GLUCOSE SERPL-MCNC: 385 MG/DL — HIGH (ref 70–99)
HCO3 BLDV-SCNC: 23 MMOL/L — SIGNIFICANT CHANGE UP (ref 22–29)
HCT VFR BLD CALC: 37.5 % — SIGNIFICANT CHANGE UP (ref 34.5–45)
HCT VFR BLDA CALC: 38 % — SIGNIFICANT CHANGE UP (ref 34.5–46.5)
HGB BLD CALC-MCNC: 12.8 G/DL — SIGNIFICANT CHANGE UP (ref 11.5–15.5)
HGB BLD-MCNC: 12.4 G/DL — SIGNIFICANT CHANGE UP (ref 11.5–15.5)
LACTATE BLDV-MCNC: 7.3 MMOL/L — CRITICAL HIGH (ref 0.5–2)
LDH SERPL L TO P-CCNC: 161 U/L — SIGNIFICANT CHANGE UP (ref 135–225)
MAGNESIUM SERPL-MCNC: 2 MG/DL — SIGNIFICANT CHANGE UP (ref 1.6–2.6)
MCHC RBC-ENTMCNC: 28.1 PG — SIGNIFICANT CHANGE UP (ref 27–34)
MCHC RBC-ENTMCNC: 33.1 GM/DL — SIGNIFICANT CHANGE UP (ref 32–36)
MCV RBC AUTO: 84.8 FL — SIGNIFICANT CHANGE UP (ref 80–100)
NRBC # BLD: 0 /100 WBCS — SIGNIFICANT CHANGE UP (ref 0–0)
NRBC # FLD: 0 K/UL — SIGNIFICANT CHANGE UP (ref 0–0)
PCO2 BLDV: 44 MMHG — HIGH (ref 39–42)
PH BLDV: 7.33 — SIGNIFICANT CHANGE UP (ref 7.32–7.43)
PHOSPHATE SERPL-MCNC: 3 MG/DL — SIGNIFICANT CHANGE UP (ref 2.5–4.5)
PLATELET # BLD AUTO: 310 K/UL — SIGNIFICANT CHANGE UP (ref 150–400)
PO2 BLDV: 45 MMHG — SIGNIFICANT CHANGE UP
POTASSIUM BLDV-SCNC: 4.2 MMOL/L — SIGNIFICANT CHANGE UP (ref 3.5–5.1)
POTASSIUM SERPL-MCNC: 4.2 MMOL/L — SIGNIFICANT CHANGE UP (ref 3.5–5.3)
POTASSIUM SERPL-SCNC: 4.2 MMOL/L — SIGNIFICANT CHANGE UP (ref 3.5–5.3)
RBC # BLD: 4.42 M/UL — SIGNIFICANT CHANGE UP (ref 3.8–5.2)
RBC # FLD: 13.2 % — SIGNIFICANT CHANGE UP (ref 10.3–14.5)
SAO2 % BLDV: 76.9 % — SIGNIFICANT CHANGE UP
SODIUM SERPL-SCNC: 135 MMOL/L — SIGNIFICANT CHANGE UP (ref 135–145)
WBC # BLD: 9.51 K/UL — SIGNIFICANT CHANGE UP (ref 3.8–10.5)
WBC # FLD AUTO: 9.51 K/UL — SIGNIFICANT CHANGE UP (ref 3.8–10.5)

## 2022-10-24 PROCEDURE — 99233 SBSQ HOSP IP/OBS HIGH 50: CPT

## 2022-10-24 RX ORDER — SODIUM CHLORIDE 9 MG/ML
1000 INJECTION INTRAMUSCULAR; INTRAVENOUS; SUBCUTANEOUS
Refills: 0 | Status: DISCONTINUED | OUTPATIENT
Start: 2022-10-24 | End: 2022-10-24

## 2022-10-24 RX ORDER — IPRATROPIUM/ALBUTEROL SULFATE 18-103MCG
3 AEROSOL WITH ADAPTER (GRAM) INHALATION EVERY 4 HOURS
Refills: 0 | Status: DISCONTINUED | OUTPATIENT
Start: 2022-10-24 | End: 2022-10-25

## 2022-10-24 RX ADMIN — Medication 3 MILLILITER(S): at 08:45

## 2022-10-24 RX ADMIN — BUDESONIDE AND FORMOTEROL FUMARATE DIHYDRATE 2 PUFF(S): 160; 4.5 AEROSOL RESPIRATORY (INHALATION) at 08:05

## 2022-10-24 RX ADMIN — ENOXAPARIN SODIUM 40 MILLIGRAM(S): 100 INJECTION SUBCUTANEOUS at 10:18

## 2022-10-24 RX ADMIN — SODIUM CHLORIDE 75 MILLILITER(S): 9 INJECTION INTRAMUSCULAR; INTRAVENOUS; SUBCUTANEOUS at 11:02

## 2022-10-24 RX ADMIN — Medication 600 MILLIGRAM(S): at 06:17

## 2022-10-24 RX ADMIN — Medication 3 MILLILITER(S): at 03:30

## 2022-10-24 RX ADMIN — Medication 1: at 08:05

## 2022-10-24 RX ADMIN — Medication 40 MILLIGRAM(S): at 05:50

## 2022-10-24 RX ADMIN — Medication 600 MILLIGRAM(S): at 17:34

## 2022-10-24 RX ADMIN — BUDESONIDE AND FORMOTEROL FUMARATE DIHYDRATE 2 PUFF(S): 160; 4.5 AEROSOL RESPIRATORY (INHALATION) at 21:16

## 2022-10-24 RX ADMIN — Medication 4: at 12:23

## 2022-10-24 RX ADMIN — Medication 3 UNIT(S): at 08:04

## 2022-10-24 RX ADMIN — INSULIN GLARGINE 8 UNIT(S): 100 INJECTION, SOLUTION SUBCUTANEOUS at 21:16

## 2022-10-24 RX ADMIN — Medication 3 MILLILITER(S): at 20:05

## 2022-10-24 RX ADMIN — Medication 3 MILLILITER(S): at 15:31

## 2022-10-24 RX ADMIN — Medication 3 UNIT(S): at 17:34

## 2022-10-24 RX ADMIN — LOSARTAN POTASSIUM 100 MILLIGRAM(S): 100 TABLET, FILM COATED ORAL at 05:50

## 2022-10-24 RX ADMIN — Medication 12.5 MILLIGRAM(S): at 05:50

## 2022-10-24 RX ADMIN — ATORVASTATIN CALCIUM 20 MILLIGRAM(S): 80 TABLET, FILM COATED ORAL at 21:16

## 2022-10-24 RX ADMIN — Medication 3 UNIT(S): at 12:23

## 2022-10-24 RX ADMIN — Medication 2: at 17:34

## 2022-10-24 NOTE — PROVIDER CONTACT NOTE (CRITICAL VALUE NOTIFICATION) - SITUATION
Lactate 7.3
Patient admitted for asthma with acute exacerbation. Patient A&Ox4. Vital signs within normal patient limits. Breathing unlabored and on room air. Patient denies chest pain and is experiencing no shortness of breath.
Lactate elevated to 6.9

## 2022-10-24 NOTE — PROGRESS NOTE ADULT - SUBJECTIVE AND OBJECTIVE BOX
Hospitalist Progress Note  Felicia Ibarra MD Pager # [phone]    OVERNIGHT EVENTS: NU    SUBJECTIVE / INTERVAL HPI: Patient seen and examined at bedside. Pt. reports that she does not feel better since she came to the hospital. She is still feeling SOB and wheezy. She does not think she is ready to go home yet.     VITAL SIGNS:  Vital Signs Last 24 Hrs  T(C): 36.5 (24 Oct 2022 12:28), Max: 36.8 (23 Oct 2022 16:50)  T(F): 97.7 (24 Oct 2022 12:28), Max: 98.2 (23 Oct 2022 16:50)  HR: 81 (24 Oct 2022 12:28) (65 - 92)  BP: 119/70 (24 Oct 2022 12:28) (119/70 - 148/82)  BP(mean): --  RR: 18 (24 Oct 2022 12:28) (17 - 19)  SpO2: 97% (24 Oct 2022 12:28) (95% - 99%)    Parameters below as of 24 Oct 2022 12:28  Patient On (Oxygen Delivery Method): room air        PHYSICAL EXAM:    General: WDWN female resting in bed   HEENT: NC/AT; PERRL, anicteric sclera; MMM  Neck: supple  Cardiovascular: +S1/S2; RRR  Respiratory: poor air movement diffusely and expiratory wheezing heard diffusely through all lung fields.   Gastrointestinal: soft, NT/ND; +BSx4  Extremities: WWP; no edema, clubbing or cyanosis  Vascular: 2+ radial, DP/PT pulses B/L  Neurological: AAOx3; no focal deficits    MEDICATIONS:  MEDICATIONS  (STANDING):  albuterol/ipratropium for Nebulization 3 milliLiter(s) Nebulizer every 4 hours  atorvastatin 20 milliGRAM(s) Oral at bedtime  budesonide 160 MICROgram(s)/formoterol 4.5 MICROgram(s) Inhaler 2 Puff(s) Inhalation two times a day  dextrose 5%. 1000 milliLiter(s) (100 mL/Hr) IV Continuous <Continuous>  dextrose 5%. 1000 milliLiter(s) (50 mL/Hr) IV Continuous <Continuous>  dextrose 50% Injectable 25 Gram(s) IV Push once  dextrose 50% Injectable 12.5 Gram(s) IV Push once  dextrose 50% Injectable 25 Gram(s) IV Push once  enoxaparin Injectable 40 milliGRAM(s) SubCutaneous every 24 hours  glucagon  Injectable 1 milliGRAM(s) IntraMuscular once  guaiFENesin  milliGRAM(s) Oral every 12 hours  hydrochlorothiazide 12.5 milliGRAM(s) Oral daily  influenza   Vaccine 0.5 milliLiter(s) IntraMuscular once  insulin glargine Injectable (LANTUS) 8 Unit(s) SubCutaneous at bedtime  insulin lispro (ADMELOG) corrective regimen sliding scale   SubCutaneous three times a day before meals  insulin lispro (ADMELOG) corrective regimen sliding scale   SubCutaneous at bedtime  insulin lispro Injectable (ADMELOG) 3 Unit(s) SubCutaneous three times a day before meals  losartan 100 milliGRAM(s) Oral daily  methylPREDNISolone sodium succinate Injectable 40 milliGRAM(s) IV Push daily    MEDICATIONS  (PRN):  acetaminophen     Tablet .. 650 milliGRAM(s) Oral every 6 hours PRN Temp greater or equal to 38C (100.4F), Mild Pain (1 - 3)  dextrose Oral Gel 15 Gram(s) Oral once PRN Blood Glucose LESS THAN 70 milliGRAM(s)/deciliter      ALLERGIES:  Allergies    AValox (Unknown)  NSAIDs (Unknown)    Intolerances        LABS:                        12.4   9.51  )-----------( 310      ( 24 Oct 2022 09:58 )             37.5     10-24    135  |  96<L>  |  19  ----------------------------<  385<H>  4.2   |  20<L>  |  0.79    Ca    10.1      24 Oct 2022 09:58  Phos  3.0     10-24  Mg     2.00     10-24    TPro  7.5  /  Alb  4.6  /  TBili  0.2  /  DBili  <0.2  /  AST  23  /  ALT  38<H>  /  AlkPhos  99  10-23        CAPILLARY BLOOD GLUCOSE      POCT Blood Glucose.: 336 mg/dL (24 Oct 2022 11:58)      RADIOLOGY & ADDITIONAL TESTS: Reviewed.    ASSESSMENT:    PLAN:

## 2022-10-24 NOTE — PROVIDER CONTACT NOTE (CRITICAL VALUE NOTIFICATION) - ASSESSMENT
Patient A&Ox4. Vital signs within normal patient limits.  Breathing unlabored and on room air. Patient denies chest pain and is experiencing no shortness of breath.
Pt is A&Ox4 on RA and no labored breathing. Denies SOB
Pt is A&Ox4 on RA and no labored breathing. Denies SOB

## 2022-10-24 NOTE — PROVIDER CONTACT NOTE (CRITICAL VALUE NOTIFICATION) - BACKGROUND
Past medical history of breast cancer, hypertension, asthma
Pt is a 62 year old female. Hx of asthma, breast and colon cancer, HTN, DM2. Admitted with SOB, cough and chest tightness. R/O parainfluenza
Pt is a 62 year old female. Hx of asthma, breast and colon cancer, HTN, DM2. Admitted with SOB, cough and chest tightness. R/O parainfluenza

## 2022-10-24 NOTE — PROGRESS NOTE ADULT - PROBLEM SELECTOR PLAN 3
patient with AG metabolic acidosis, elevated lactate 6.9, Ph 7.3. Lactate still persistently elevated.   -previous workup on last admission unrevealing  -hold metformin, less likely to be the culprit  -could be 2/2 albuterol nebs vs malignancy  -will check CT abdomen, to r/o ischemic bowel although no acute abdomen, and to eval for malignancy in the s/o history of colon cancer  -check B1 level patient with AG metabolic acidosis, elevated lactate 6.9, Ph 7.3. Lactate still persistently elevated.   -previous workup on last admission unrevealing  -hold metformin, less likely to be the culprit  -could be 2/2 albuterol nebs vs malignancy  -will check CT abdomen, to r/o ischemic bowel although no acute abdomen, and to eval for malignancy in the s/o history of colon cancer  -check B1 level  - Check echo - no echocardiogram on record

## 2022-10-25 ENCOUNTER — TRANSCRIPTION ENCOUNTER (OUTPATIENT)
Age: 62
End: 2022-10-25

## 2022-10-25 VITALS — OXYGEN SATURATION: 99 %

## 2022-10-25 DIAGNOSIS — R73.9 HYPERGLYCEMIA, UNSPECIFIED: ICD-10-CM

## 2022-10-25 DIAGNOSIS — E11.9 TYPE 2 DIABETES MELLITUS WITHOUT COMPLICATIONS: ICD-10-CM

## 2022-10-25 DIAGNOSIS — E78.5 HYPERLIPIDEMIA, UNSPECIFIED: ICD-10-CM

## 2022-10-25 LAB
A1C WITH ESTIMATED AVERAGE GLUCOSE RESULT: 7.3 % — HIGH (ref 4–5.6)
ANION GAP SERPL CALC-SCNC: 15 MMOL/L — HIGH (ref 7–14)
BUN SERPL-MCNC: 26 MG/DL — HIGH (ref 7–23)
CALCIUM SERPL-MCNC: 9.8 MG/DL — SIGNIFICANT CHANGE UP (ref 8.4–10.5)
CHLORIDE SERPL-SCNC: 102 MMOL/L — SIGNIFICANT CHANGE UP (ref 98–107)
CO2 SERPL-SCNC: 22 MMOL/L — SIGNIFICANT CHANGE UP (ref 22–31)
CREAT SERPL-MCNC: 0.79 MG/DL — SIGNIFICANT CHANGE UP (ref 0.5–1.3)
EGFR: 86 ML/MIN/1.73M2 — SIGNIFICANT CHANGE UP
ESTIMATED AVERAGE GLUCOSE: 163 — SIGNIFICANT CHANGE UP
GLUCOSE BLDC GLUCOMTR-MCNC: 191 MG/DL — HIGH (ref 70–99)
GLUCOSE BLDC GLUCOMTR-MCNC: 293 MG/DL — HIGH (ref 70–99)
GLUCOSE BLDC GLUCOMTR-MCNC: 399 MG/DL — HIGH (ref 70–99)
GLUCOSE BLDC GLUCOMTR-MCNC: 417 MG/DL — HIGH (ref 70–99)
GLUCOSE SERPL-MCNC: 162 MG/DL — HIGH (ref 70–99)
HCT VFR BLD CALC: 35.4 % — SIGNIFICANT CHANGE UP (ref 34.5–45)
HGB BLD-MCNC: 11.6 G/DL — SIGNIFICANT CHANGE UP (ref 11.5–15.5)
LACTATE SERPL-SCNC: 3.8 MMOL/L — HIGH (ref 0.5–2)
MAGNESIUM SERPL-MCNC: 2 MG/DL — SIGNIFICANT CHANGE UP (ref 1.6–2.6)
MCHC RBC-ENTMCNC: 28.2 PG — SIGNIFICANT CHANGE UP (ref 27–34)
MCHC RBC-ENTMCNC: 32.8 GM/DL — SIGNIFICANT CHANGE UP (ref 32–36)
MCV RBC AUTO: 86.1 FL — SIGNIFICANT CHANGE UP (ref 80–100)
NRBC # BLD: 0 /100 WBCS — SIGNIFICANT CHANGE UP (ref 0–0)
NRBC # FLD: 0 K/UL — SIGNIFICANT CHANGE UP (ref 0–0)
PHOSPHATE SERPL-MCNC: 3.6 MG/DL — SIGNIFICANT CHANGE UP (ref 2.5–4.5)
PLATELET # BLD AUTO: 320 K/UL — SIGNIFICANT CHANGE UP (ref 150–400)
POTASSIUM SERPL-MCNC: 4.1 MMOL/L — SIGNIFICANT CHANGE UP (ref 3.5–5.3)
POTASSIUM SERPL-SCNC: 4.1 MMOL/L — SIGNIFICANT CHANGE UP (ref 3.5–5.3)
RBC # BLD: 4.11 M/UL — SIGNIFICANT CHANGE UP (ref 3.8–5.2)
RBC # FLD: 13.2 % — SIGNIFICANT CHANGE UP (ref 10.3–14.5)
SODIUM SERPL-SCNC: 139 MMOL/L — SIGNIFICANT CHANGE UP (ref 135–145)
WBC # BLD: 9.84 K/UL — SIGNIFICANT CHANGE UP (ref 3.8–10.5)
WBC # FLD AUTO: 9.84 K/UL — SIGNIFICANT CHANGE UP (ref 3.8–10.5)

## 2022-10-25 PROCEDURE — 99223 1ST HOSP IP/OBS HIGH 75: CPT

## 2022-10-25 PROCEDURE — 74174 CTA ABD&PLVS W/CONTRAST: CPT | Mod: 26

## 2022-10-25 PROCEDURE — 93306 TTE W/DOPPLER COMPLETE: CPT | Mod: 26

## 2022-10-25 PROCEDURE — 99239 HOSP IP/OBS DSCHRG MGMT >30: CPT

## 2022-10-25 RX ORDER — HUMAN INSULIN 100 [IU]/ML
20 INJECTION, SUSPENSION SUBCUTANEOUS
Qty: 15 | Refills: 0
Start: 2022-10-25 | End: 2022-11-23

## 2022-10-25 RX ORDER — GLUCAGON INJECTION, SOLUTION 0.5 MG/.1ML
1 INJECTION, SOLUTION SUBCUTANEOUS
Qty: 1 | Refills: 0
Start: 2022-10-25 | End: 2022-11-23

## 2022-10-25 RX ORDER — ISOPROPYL ALCOHOL, BENZOCAINE .7; .06 ML/ML; ML/ML
1 SWAB TOPICAL
Qty: 100 | Refills: 1
Start: 2022-10-25 | End: 2022-12-13

## 2022-10-25 RX ADMIN — BUDESONIDE AND FORMOTEROL FUMARATE DIHYDRATE 2 PUFF(S): 160; 4.5 AEROSOL RESPIRATORY (INHALATION) at 09:16

## 2022-10-25 RX ADMIN — Medication 3: at 18:02

## 2022-10-25 RX ADMIN — Medication 600 MILLIGRAM(S): at 05:06

## 2022-10-25 RX ADMIN — ENOXAPARIN SODIUM 40 MILLIGRAM(S): 100 INJECTION SUBCUTANEOUS at 10:17

## 2022-10-25 RX ADMIN — Medication 40 MILLIGRAM(S): at 05:05

## 2022-10-25 RX ADMIN — Medication 3 UNIT(S): at 08:39

## 2022-10-25 RX ADMIN — Medication 1: at 08:40

## 2022-10-25 RX ADMIN — Medication 600 MILLIGRAM(S): at 18:00

## 2022-10-25 RX ADMIN — Medication 5: at 13:00

## 2022-10-25 RX ADMIN — Medication 3 MILLILITER(S): at 04:48

## 2022-10-25 RX ADMIN — LOSARTAN POTASSIUM 100 MILLIGRAM(S): 100 TABLET, FILM COATED ORAL at 05:06

## 2022-10-25 RX ADMIN — Medication 3 MILLILITER(S): at 16:00

## 2022-10-25 RX ADMIN — Medication 3 MILLILITER(S): at 07:49

## 2022-10-25 RX ADMIN — Medication 3 UNIT(S): at 18:01

## 2022-10-25 RX ADMIN — Medication 12.5 MILLIGRAM(S): at 05:05

## 2022-10-25 RX ADMIN — Medication 3 UNIT(S): at 12:59

## 2022-10-25 NOTE — PROGRESS NOTE ADULT - ASSESSMENT
62F with PMHx asthma, hx R breast cancer s/p lumpectomy 2019 and chemo/RT, colon cancer s/p resection, HTN, HLD, DM2 presenting with SOB x1 week. Admitted with asthma exacerbation likely 2/2 parinfluenza
TOKEN:'[id number]'

## 2022-10-25 NOTE — DISCHARGE NOTE PROVIDER - PROVIDER TOKENS
PROVIDER:[TOKEN:[[id number]],FOLLOWUP:[1 week]],FREE:[LAST:[Dr Suero],PHONE:[phone]],FAX:[(   )    -],ADDRESS:[[address] [place] [address]],FOLLOWUP:[2 weeks]]

## 2022-10-25 NOTE — PROGRESS NOTE ADULT - PROBLEM SELECTOR PLAN 3
patient with AG metabolic acidosis, elevated lactate 6.9, Ph 7.3. Lactate still persistently elevated in the past. Improved today.   -previous workup on last admission unrevealing  -hold metformin, less likely to be the culprit  -could be 2/2 albuterol nebs vs malignancy  - Ischemic bowel ruled out - CT negative.   - Echo performed, pending read

## 2022-10-25 NOTE — PROGRESS NOTE ADULT - SUBJECTIVE AND OBJECTIVE BOX
Hospitalist Progress Note  Felicia Ibarra MD Pager # [phone]    OVERNIGHT EVENTS: NU    SUBJECTIVE / INTERVAL HPI: Patient seen and examined at bedside. Pt. reports that her breathing has improved however she is still wheezing. She denies shortness of breath with ambulation. She reports coughing more and she is getting up more sputum. No fevers/chills. All other ROS negative.     VITAL SIGNS:  Vital Signs Last 24 Hrs  T(C): 36.8 (25 Oct 2022 13:04), Max: 36.8 (25 Oct 2022 01:44)  T(F): 98.3 (25 Oct 2022 13:04), Max: 98.3 (25 Oct 2022 13:04)  HR: 84 (25 Oct 2022 13:04) (65 - 104)  BP: 151/89 (25 Oct 2022 13:04) (118/69 - 151/89)  BP(mean): --  RR: 18 (25 Oct 2022 13:04) (16 - 18)  SpO2: 98% (25 Oct 2022 13:04) (97% - 100%)    Parameters below as of 25 Oct 2022 13:04  Patient On (Oxygen Delivery Method): room air        PHYSICAL EXAM:    General: WDWN female resting in bed   HEENT: NC/AT; PERRL, anicteric sclera; MMM  Neck: supple  Cardiovascular: +S1/S2; RRR  Respiratory: Improved air movement - breath sounds heard throughout all lung fields. Pt. still with end expiratory wheezing diffusely. No respiratory distress or accessory  muscle use.   Gastrointestinal: soft, NT/ND; +BSx4  Extremities: WWP; no edema, clubbing or cyanosis  Vascular: 2+ radial, DP/PT pulses B/L  Neurological: AAOx3; no focal deficits    MEDICATIONS:  MEDICATIONS  (STANDING):  albuterol/ipratropium for Nebulization 3 milliLiter(s) Nebulizer every 4 hours  atorvastatin 20 milliGRAM(s) Oral at bedtime  budesonide 160 MICROgram(s)/formoterol 4.5 MICROgram(s) Inhaler 2 Puff(s) Inhalation two times a day  dextrose 5%. 1000 milliLiter(s) (50 mL/Hr) IV Continuous <Continuous>  dextrose 5%. 1000 milliLiter(s) (100 mL/Hr) IV Continuous <Continuous>  dextrose 50% Injectable 25 Gram(s) IV Push once  dextrose 50% Injectable 25 Gram(s) IV Push once  dextrose 50% Injectable 12.5 Gram(s) IV Push once  enoxaparin Injectable 40 milliGRAM(s) SubCutaneous every 24 hours  glucagon  Injectable 1 milliGRAM(s) IntraMuscular once  guaiFENesin  milliGRAM(s) Oral every 12 hours  hydrochlorothiazide 12.5 milliGRAM(s) Oral daily  influenza   Vaccine 0.5 milliLiter(s) IntraMuscular once  insulin glargine Injectable (LANTUS) 8 Unit(s) SubCutaneous at bedtime  insulin lispro (ADMELOG) corrective regimen sliding scale   SubCutaneous three times a day before meals  insulin lispro (ADMELOG) corrective regimen sliding scale   SubCutaneous at bedtime  insulin lispro Injectable (ADMELOG) 3 Unit(s) SubCutaneous three times a day before meals  losartan 100 milliGRAM(s) Oral daily  methylPREDNISolone sodium succinate Injectable 40 milliGRAM(s) IV Push daily    MEDICATIONS  (PRN):  acetaminophen     Tablet .. 650 milliGRAM(s) Oral every 6 hours PRN Temp greater or equal to 38C (100.4F), Mild Pain (1 - 3)  dextrose Oral Gel 15 Gram(s) Oral once PRN Blood Glucose LESS THAN 70 milliGRAM(s)/deciliter      ALLERGIES:  Allergies    AValox (Unknown)  NSAIDs (Unknown)    Intolerances        LABS:                        11.6   9.84  )-----------( 320      ( 25 Oct 2022 06:00 )             35.4     10-25    139  |  102  |  26<H>  ----------------------------<  162<H>  4.1   |  22  |  0.79    Ca    9.8      25 Oct 2022 06:00  Phos  3.6     10-25  Mg     2.00     10-25          CAPILLARY BLOOD GLUCOSE      POCT Blood Glucose.: 399 mg/dL (25 Oct 2022 12:57)      RADIOLOGY & ADDITIONAL TESTS: Reviewed.    ASSESSMENT:    PLAN:

## 2022-10-25 NOTE — DISCHARGE NOTE PROVIDER - NSDCFUSCHEDAPPT_GEN_ALL_CORE_FT
Geena Amaral  [facility]  [phone]  Scheduled Appointment: 11/10/2022

## 2022-10-25 NOTE — CONSULT NOTE ADULT - ASSESSMENT
62F with PMHx asthma, hx R breast cancer s/p lumpectomy 2019 and chemo/RT, colon cancer s/p resection, HTN, HLD, DM2 presenting with SOB x1 week    #Steroid Induced Hyperglycemia in setting of well controlled T2DM  A1c 7.1% Goal < 7%  Inpatient FS goal 140-180  Home regimen: Metformin 1g BID  Current steroid regimen:     Recs:   -Lantus _  qHS   -Admelog _ qAC. HOLD if NPO  -moderate dose correctional scale before each meal and moderate dose correctional scale at bedtime  -consistent carb diet  -FS qAC and qHS    For d/c:   -Metformin 1g BID  -Can fu with Dr. Tika Suero at Endocrine Practice at [address] [facility], [address], [place] [address]; Ph # [phone]    #HTN  BP Goal < 130/80  Continue management per primary team    #HLD  LDL goal < 70  Statin if no contraindications    Halima Coyne MD  Endocrine Fellow  Can be reached via teams.     For all urgent matters, can call answering service at [phone] (weekdays); [phone] (nights/weekends)  Any non-urgent consults or questions can be emailed to [email or web address] or [email or web address]     62F with PMHx asthma, hx R breast cancer s/p lumpectomy 2019 and chemo/RT, colon cancer s/p resection, HTN, HLD, DM2 presenting with SOB x1 week    #Steroid Induced Hyperglycemia in setting of well controlled T2DM  A1c 7.1% Goal < 7%  Inpatient FS goal 140-180  Home regimen: Metformin 1g BID  Current steroid regimen:   10/23-10/25: Solumedrol 40 mg IV (equal to Prednisone 50 mg daily)  10/26-10/27: Prednisone 40 mg PO daily     Recs:   -Lantus 8 units qHS   -Admelog _ qAC. HOLD if NPO  -moderate dose correctional scale before each meal and moderate dose correctional scale at bedtime  -consistent carb diet  -FS qAC and qHS    For d/c:   -Metformin 1g BID  -Can fu with Dr. Tika Suero at Endocrine Practice at [address] [place] [address], [address], [place] [address]; Ph # [phone]    #HTN  BP Goal < 130/80  Continue management per primary team    #HLD  LDL goal < 70  Statin if no contraindications    Halima Coyne MD  Endocrine Fellow  Can be reached via teams.     For all urgent matters, can call answering service at [phone] (weekdays); [phone] (nights/weekends)  Any non-urgent consults or questions can be emailed to [email or web address] or [email or web address]     62F with PMHx asthma, hx R breast cancer s/p lumpectomy 2019 and chemo/RT, colon cancer s/p resection, HTN, HLD, DM2 presenting with SOB x1 week    #Steroid Induced Hyperglycemia in setting of well controlled T2DM  A1c 7.1% Goal < 7%  Inpatient FS goal 140-180  Home regimen: Metformin 1g BID  Current steroid regimen:   10/23-10/25: Solumedrol 40 mg IV (equal to Prednisone 50 mg daily)  10/26-10/27: Prednisone 40 mg PO daily     Recs:   -Lantus 8 units qHS   -Admelog 8 units qAC. HOLD if NPO  -moderate dose correctional scale before each meal and moderate dose correctional scale at bedtime  -consistent carb diet  -FS qAC and qHS    For d/c:   -If d/c today: NPH 20 units once a day with steroid dose (Humulin N or Novolin N pen with pen needles). Can decrease to 5 units once a day when steroids are complete. Would not resume Metformin 1g BID until a lactate is repeated as an outpatient. Once lactate is repeated and normal, NPH can be stopped and Metformin 1g BID can be resumed   -Can fu with Dr. Tika Suero at Endocrine Practice at [address] [facility], [address], [place] [address]; Ph # [phone]    #HTN  BP Goal < 130/80  Continue management per primary team    #HLD  LDL goal < 70  Statin if no contraindications    Halima Coyne MD  Endocrine Fellow  Can be reached via teams.     For all urgent matters, can call answering service at [phone] (weekdays); [phone] (nights/weekends)  Any non-urgent consults or questions can be emailed to [email or web address] or [email or web address]     62F with PMHx asthma, hx R breast cancer s/p lumpectomy 2019 and chemo/RT, colon cancer s/p resection, HTN, HLD, DM2 presenting with SOB x1 week    #Steroid Induced Hyperglycemia in setting of well controlled T2DM  A1c 7.1% Goal < 7%  Inpatient FS goal 140-180  Home regimen: Metformin 1g BID  Current steroid regimen:   10/23-10/25: Solumedrol 40 mg IV (equal to Prednisone 50 mg daily)  10/26-10/27: Prednisone 40 mg PO daily     Recs:   -Lantus 8 units qHS   -Admelog 8 units qAC. HOLD if NPO  -moderate dose correctional scale before each meal and moderate dose correctional scale at bedtime  -consistent carb diet  -FS qAC and qHS    For d/c:   -If d/c today: NPH 20 units once a day with steroid dose (Humulin N or Novolin N pen with pen needles). Can decrease to 5 units once a day when steroids are complete. Would not resume Metformin 1g BID until a lactate is repeated as an outpatient. Once lactate is repeated and normal, NPH can be stopped and Metformin 1g BID can be resumed   -Patient has used insulin in the past and states she knows how to use it - would reinforce insulin teaching with patient in case.   -Can fu with Dr. Tika Suero at Endocrine Practice at [address] [place] [address], [address], [place] [address]; Ph # [phone]    #HTN  BP Goal < 130/80  Continue management per primary team    #HLD  LDL goal < 70  Statin if no contraindications    Halima Coyne MD  Endocrine Fellow  Can be reached via teams.     For all urgent matters, can call answering service at [phone] (weekdays); [phone] (nights/weekends)  Any non-urgent consults or questions can be emailed to [email or web address] or [email or web address]     62F with PMHx asthma, hx R breast cancer s/p lumpectomy 2019 and chemo/RT, colon cancer s/p resection, HTN, HLD, DM2 presenting with SOB x1 week    #Steroid Induced Hyperglycemia in setting of well controlled T2DM  A1c 7.1% Goal < 7%  Inpatient FS goal 140-180  Home regimen: Metformin 1g BID  Current steroid regimen:   10/23-10/25: Solumedrol 40 mg IV (equal to Prednisone 50 mg daily)  10/26-10/27: Prednisone 40 mg PO daily     Recs:   -Lantus 8 units qHS   -Admelog 8 units qAC. HOLD if NPO  -moderate dose correctional scale before each meal and moderate dose correctional scale at bedtime  -consistent carb diet  -FS qAC and qHS    For d/c:   -If d/c today: NPH (pen) 20 units once a day with steroid dose (Humulin N or Novolin N pen with pen needles). Can decrease to 5 units once a day when steroids are complete. Would not resume Metformin 1g BID until a lactate is repeated as an outpatient. Once lactate is repeated and normal, NPH can be stopped and Metformin 1g BID can be resumed   -Patient has used insulin in the past and states she knows how to use it - would reinforce insulin teaching with patient in case.   -Can fu with Dr. Tika Suero at Endocrine Practice at [address] [facility], [address], [place] [address]; Ph # [phone]    #HTN  BP Goal < 130/80  Continue management per primary team    #HLD  LDL goal < 70  Statin if no contraindications    Halima Coyne MD  Endocrine Fellow  Can be reached via teams.     For all urgent matters, can call answering service at [phone] (weekdays); [phone] (nights/weekends)  Any non-urgent consults or questions can be emailed to [email or web address] or [email or web address]

## 2022-10-25 NOTE — DISCHARGE NOTE PROVIDER - NSDCCPCAREPLAN_GEN_ALL_CORE_FT
PRINCIPAL DISCHARGE DIAGNOSIS  Diagnosis: Asthma flare  Assessment and Plan of Treatment: You had Asthma exacerbation-   Wheezing, hypercarbia, SOB in setting of paraflu infection improved  lung exam today. Pt. with more air movement in the lung and wheezing is improving.   -duonebs q4 - standing   - D/c IV steroids - starting on prednisone for 2 more days (last day 10/27).   -symbicort BID  -cough suppressants  - D/c home today   - Discussed the importance of outpatient pulm follow up.  Need to follow up with Dr Weller within a week      SECONDARY DISCHARGE DIAGNOSES  Diagnosis: Parainfluenza  Assessment and Plan of Treatment: Parainfluenza.   ·  Plan: Triggering asthma exacerbation. Not hypoxic  -tylenol, cough suppressants.      Diagnosis: Diabetes mellitus with lactic acidosis  Assessment and Plan of Treatment: Lactic acidosis.   ·  Plan: patient with AG metabolic acidosis, elevated lactate 6.9,  7.3. now is 3.9Lactate still persistently elevated in the past. Improved today.   -previous workup on last admission unrevealing  -hold metformin, less likely to be the culprit  -could be 2/2 albuterol nebs vs malignancy  - Ischemic bowel ruled out - CT negative.    Diagnosis: Uncontrolled diabetes mellitus with hyperglycemia  Assessment and Plan of Treatment: 2 diabetes mellitus with hyperglycemia, without long-term current use of insulin.   ·  Plan: on home metformin. Sugars are elevated in the setting of steroid use. Lunch FS is 399.  repeated , evening dose insulin given and came down to 290.   Whenever the patient goes on steroids for asthma exacerbation she has hyperglycemia.  Endocrine has recommendations as followed If d/c today: NPH 20 units once a day with steroid dose (Humulin N or Novolin N pen with pen needles). Can decrease to 5 units once a day when steroids are complete. Would not resume Metformin 1g BID until a lactate is repeated as an outpatient. Once lactate is repeated and normal, NPH can be stopped and Metformin 1g BID can be resumed   -Patient has used insulin in the past and states she knows how to use it - would reinforce insulin teaching with patient in case.   -Can fu with Dr. Tika Suero as tolerated and with assistance Endocrine Practice as tolerated and with assistance [address] [facility], [address], [place] [address]; Ph # [phone]  -hold metformin.

## 2022-10-25 NOTE — DISCHARGE NOTE PROVIDER - CARE PROVIDER_API CALL
Geean Amaral)  Critical Care Medicine; Internal Medicine; Pulmonary Disease  [address] [place] [address]  Phone: [phone]  Fax: [phone]  Follow Up Time: 1 week    Dr Suero,   [address] [place] [address]  Phone: [phone]  Fax: (   )    -  Follow Up Time: 2 weeks

## 2022-10-25 NOTE — PROGRESS NOTE ADULT - PROBLEM SELECTOR PLAN 2
Triggering asthma exacerbation. Not hypoxic  -tylenol, cough suppressants

## 2022-10-25 NOTE — DISCHARGE NOTE PROVIDER - NSDCMRMEDTOKEN_GEN_ALL_CORE_FT
ALBUTEROL 0.083%(2.5MG/3ML) 60X3ML: USE 1 VIAL VIA NEBULIZER EVERY 4 HOURS AS NEEDED  albuterol 90 mcg/inh inhalation aerosol: 2 puff(s) inhaled every 4 hours, As Needed -for bronchospasm   alcohol swabs : Apply topically to affected area 4 times a day   atorvastatin 20 mg oral tablet: 1 tab(s) orally once a day  budesonide-formoterol 160 mcg-4.5 mcg/inh inhalation aerosol: 2 puff(s) inhaled 2 times a day  Glucagon Emergency Kit for Low Blood Sugar 1 mg injection: 1 milligram(s) intramuscular once as needed for severe hypoglycemia, then call 911.  Insulin Pen Needles, 4mm: 1 application subcutaneously 4 times a day. ** Use with insulin pen **   LOSARTAN/HCTZ 100/12.5MG TABLETS: TAKE 1 TABLET BY MOUTH EVERY DAY  NovoLIN N FlexPen 100 units/mL subcutaneous suspension: 20 unit(s) subcutaneous once a day   doses to be given with Prednisone doese.  May decrease to 5 units daily after prednisone dosees are completed  predniSONE 20 mg oral tablet: 2 tab(s) orally every 24 hours  10/26 &amp; 10/27  dose to be given with Novolin N aT the same time   ALBUTEROL 0.083%(2.5MG/3ML) 60X3ML: USE 1 VIAL VIA NEBULIZER EVERY 4 HOURS AS NEEDED  albuterol 90 mcg/inh inhalation aerosol: 2 puff(s) inhaled every 4 hours, As Needed -for bronchospasm   alcohol swabs : Apply topically to affected area 4 times a day   atorvastatin 20 mg oral tablet: 1 tab(s) orally once a day  budesonide-formoterol 160 mcg-4.5 mcg/inh inhalation aerosol: 2 puff(s) inhaled 2 times a day  Glucagon Emergency Kit for Low Blood Sugar 1 mg injection: 1 milligram(s) intramuscular once as needed for severe hypoglycemia, then call 911.  HumuLIN N KwikPen 100 units/mL subcutaneous suspension: 20 unit(s) subcutaneous once a day   please give with prednisone dose aT the same time, decrease to 5 units daily after prednisone doses are completed.  Insulin Pen Needles, 4mm: 1 application subcutaneously 4 times a day. ** Use with insulin pen **   LOSARTAN/HCTZ 100/12.5MG TABLETS: TAKE 1 TABLET BY MOUTH EVERY DAY  predniSONE 20 mg oral tablet: 2 tab(s) orally every 24 hours  10/26 &amp; 10/27  dose to be given with Novolin N aT the same time

## 2022-10-25 NOTE — PROGRESS NOTE ADULT - PROBLEM SELECTOR PLAN 4
on home metformin. Sugars are elevated in the setting of steroid use. Lunch FS is 399. Whenever the patient goes on steroids for asthma exacerbation she has hyperglycemia. Will monitor.   -lantus 8u qhs, admelog 3u TID, low dose ISS as on last admission  -hold metformin
on home metformin  -lantus 8u qhs, admelog 3u TID, low dose ISS as on last admission  -hold metformin
on home metformin  -lantus 8u qhs, admelog 3u TID, low dose ISS as on last admission  -hold metformin

## 2022-10-25 NOTE — DISCHARGE NOTE PROVIDER - CARE PROVIDERS DIRECT ADDRESSES
[email or web address]

## 2022-10-25 NOTE — DISCHARGE NOTE PROVIDER - NSDCFUADDINST_GEN_ALL_CORE_FT
Please give Novolin N 20Units with Prednisone doses 10/26 & 10/27.  Then give decrease Novolin N to 5 units when prednisone doses are completed.  Please repeat lactate with your primary care physician before you may resume Metformin. Please give Novolin N 20Units with Prednisone doses 10/26 & 10/27.  Then give decrease Novolin N to 5 units when prednisone doses are completed.  Please repeat lactate with your primary care physician before you may resume Metformin.  Please follow up with wound care of foot

## 2022-10-25 NOTE — PROGRESS NOTE ADULT - PROBLEM SELECTOR PLAN 1
Wheezing, hypercarbia, SOB in setting of paraflu infection. Asthma exacerbation. Improving lung exam today. Pt. with more air movement in the lung and wheezing is improving.   -duonebs q4 - standing   - D/c IV steroids - starting on prednisone for 2 more days (last day 10/27).   -symbicort BID  -cough suppressants  - D/c home today   - Discussed the importance of outpatient pulm follow up.
Wheezing, hypercarbia, SOB in setting of paraflu infection. C/w asthma exacerbation. Comfortable on RA but pt. with poor air movement and diffuse wheezing on exam.   -duonebs q4 - standing   -methylpred 40 IV qd, convert to pred when significantly improved  -symbicort BID  -cough suppressants  VBG with normal PCO2
Wheezing, hypercarbia, SOB in setting of paraflu infection. C/w asthma exacerbation  -duonebs q6  -methylpred 40 IV qd, convert to pred when significantly improved  -symbicort BID  -cough suppressants  -initially on Bipap now at RA  VBG with normal PCO2

## 2022-10-25 NOTE — CONSULT NOTE ADULT - SUBJECTIVE AND OBJECTIVE BOX
HPI:  62F with PMHx asthma, hx R breast cancer s/p lumpectomy  and chemo/RT, colon cancer s/p resection, HTN, HLD, DM2 presenting with SOB x1 week. Pt was admitted -10/4 for asthma exacerbation and managed for steroid induced hyperglycemia and seen by endo. Pt notes body aches one week ago and chills. She also has productive cough as well. Earlier this week she began having SOB and wheezing. This was not resolved with albuterol and worsened over time. She denies CP, abdominal pain, vomiting. After discharge she followed with a podiatrist for R foot redness/swelling. She was placed on doxycycline for 9 days and then a pus filled area formed. This was lanceted by podiatry and she was then placed on cipro for 7 days. This pain and swelling have resolved. No longer with purulence.    In ED given duonebs, methylpred, epi  Was on BIPAP then removed by MICU. Then placed back on BIPAP. Once again removed now by patient. She feels comfortable on RA (23 Oct 2022 00:01)    Consulted for: Steroid induced hyperglycemia in setting of T2DM    Diabetes History:   Most recent A1c 7.1%  -Diagnosed 2017  -Endocrinologist Dr. Tika Suero at [address] [place] [address]  -Current Regimen - Metformin 1g BID  -Past Regimens - was previously on insulin  -FS at home - well controlled  -Diet - well balanced  -Complications/Diabetes HCM - none   -Hospitalizations for DKA/Diabetes? - hospitalized for FS in the 500s in the past when she was on a steroid taper       PAST MEDICAL & SURGICAL HISTORY:  Asthma  (no hx/o intubation)      Hypertension      DM2 (diabetes mellitus, type 2)      Former smoker, stopped smoking many years ago  (Quit ~32years ago; used to Nicotine patch  Informed pt of the various negative side effects of smoking including risk of COPD, Lung Ca etc  Strongly recommended that pt stops smoking and pt given various options of smoking cessation tools such as NRT&#x27;s and other pharmacotherapies 0.3 ppd x ~10 years.)      Breast cancer  R breast 10/ 2019      Uterus Problem  &quot;was getting cramps so they did ablation? enometriosis  10/2012      Bunion  b/l       delivery NOS  x1      H/O foot surgery  right      S/P lumpectomy, right breast  10/2019          FAMILY HISTORY:  Family history of MI (myocardial infarction)  Father    Family history of lung cancer (Father)  (primary lung cancer)        Social History: no tobacco use    Home Medications:  ALBUTEROL 0.083%(2.5MG/3ML) 60X3ML: USE 1 VIAL VIA NEBULIZER EVERY 4 HOURS AS NEEDED (22 Oct 2022 23:54)  atorvastatin 20 mg oral tablet: 1 tab(s) orally once a day (22 Oct 2022 23:54)  budesonide-formoterol 160 mcg-4.5 mcg/inh inhalation aerosol: 2 puff(s) inhaled 2 times a day (22 Oct 2022 23:54)  LOSARTAN/HCTZ 100/12.5MG TABLETS: TAKE 1 TABLET BY MOUTH EVERY DAY (22 Oct 2022 23:54)      MEDICATIONS  (STANDING):  albuterol/ipratropium for Nebulization 3 milliLiter(s) Nebulizer every 4 hours  atorvastatin 20 milliGRAM(s) Oral at bedtime  budesonide 160 MICROgram(s)/formoterol 4.5 MICROgram(s) Inhaler 2 Puff(s) Inhalation two times a day  dextrose 5%. 1000 milliLiter(s) (50 mL/Hr) IV Continuous <Continuous>  dextrose 5%. 1000 milliLiter(s) (100 mL/Hr) IV Continuous <Continuous>  dextrose 50% Injectable 25 Gram(s) IV Push once  dextrose 50% Injectable 25 Gram(s) IV Push once  dextrose 50% Injectable 12.5 Gram(s) IV Push once  enoxaparin Injectable 40 milliGRAM(s) SubCutaneous every 24 hours  glucagon  Injectable 1 milliGRAM(s) IntraMuscular once  guaiFENesin  milliGRAM(s) Oral every 12 hours  hydrochlorothiazide 12.5 milliGRAM(s) Oral daily  influenza   Vaccine 0.5 milliLiter(s) IntraMuscular once  insulin glargine Injectable (LANTUS) 8 Unit(s) SubCutaneous at bedtime  insulin lispro (ADMELOG) corrective regimen sliding scale   SubCutaneous three times a day before meals  insulin lispro (ADMELOG) corrective regimen sliding scale   SubCutaneous at bedtime  insulin lispro Injectable (ADMELOG) 3 Unit(s) SubCutaneous three times a day before meals  losartan 100 milliGRAM(s) Oral daily    MEDICATIONS  (PRN):  acetaminophen     Tablet .. 650 milliGRAM(s) Oral every 6 hours PRN Temp greater or equal to 38C (100.4F), Mild Pain (1 - 3)  dextrose Oral Gel 15 Gram(s) Oral once PRN Blood Glucose LESS THAN 70 milliGRAM(s)/deciliter      Allergies    AValox (Unknown)  NSAIDs (Unknown)    Intolerances      Review of Systems:  Constitutional: No fever  Eyes: No blurry vision  Neuro: No tremors  HEENT: No pain  Cardiovascular: No chest pain, palpitations  Respiratory: No SOB, no cough  GI: No nausea, vomiting, abdominal pain  : No dysuria  Skin: no rash  Psych: no depression  Endocrine: no polyuria, polydipsia  Hem/lymph: no swelling  Osteoporosis: no fractures    PHYSICAL EXAM:  VITALS: T(C): 36.8 (10-25-22 @ 13:04)  T(F): 98.3 (10-25-22 @ 13:04), Max: 98.3 (10-25-22 @ 13:04)  HR: 81 (10-25-22 @ 15:50) (65 - 104)  BP: 151/89 (10-25-22 @ 13:04) (118/69 - 151/89)  RR:  (16 - 18)  SpO2:  (97% - 100%)  Wt(kg): --  GENERAL: NAD  EYES: No proptosis, no lid lag, anicteric  THYROID: Normal size, no palpable nodules  RESPIRATORY: Clear to auscultation bilaterally  CARDIOVASCULAR: Regular rate and rhythm  GI: Soft, nontender, non distended  EXT: b/l feet without wounds; 2+ pulses  PSYCH: Alert and oriented x 3, reactive mood    POCT Blood Glucose.: 417 mg/dL (10-25-22 @ 15:41)  POCT Blood Glucose.: 399 mg/dL (10-25-22 @ 12:57)  POCT Blood Glucose.: 191 mg/dL (10-25-22 @ 07:44)  POCT Blood Glucose.: 189 mg/dL (10-24-22 @ 20:47)  POCT Blood Glucose.: 212 mg/dL (10-24-22 @ 16:54)  POCT Blood Glucose.: 336 mg/dL (10-24-22 @ 11:58)  POCT Blood Glucose.: 157 mg/dL (10-24-22 @ 07:39)  POCT Blood Glucose.: 335 mg/dL (10-23-22 @ 20:45)  POCT Blood Glucose.: 347 mg/dL (10-23-22 @ 17:12)  POCT Blood Glucose.: 251 mg/dL (10-23-22 @ 12:20)  POCT Blood Glucose.: 165 mg/dL (10-23-22 @ 08:06)  POCT Blood Glucose.: 305 mg/dL (10-23-22 @ 01:37)  POCT Blood Glucose.: 264 mg/dL (10-22-22 @ 23:24)                            11.6   9.84  )-----------( 320      ( 25 Oct 2022 06:00 )             35.4       10-25    139  |  102  |  26<H>  ----------------------------<  162<H>  4.1   |  22  |  0.79    eGFR: 86    Ca    9.8      10-25  Mg     2.00     10-25  Phos  3.6     10-25    TPro  7.5  /  Alb  4.6  /  TBili  0.2  /  DBili  <0.2  /  AST  23  /  ALT  38<H>  /  AlkPhos  99  10-23      Thyroid Function Tests:      A1C with Estimated Average Glucose Result: 7.1 % (22 @ 06:10)       Chol 147 Direct LDL -- LDL calculated 31  Trig 81,  Chol 171 Direct LDL -- LDL calculated 51 HDL 97 Trig 115    Radiology:

## 2022-10-25 NOTE — PROGRESS NOTE ADULT - PROBLEM SELECTOR PLAN 5
Resume losartan 100mg qd and HCTZ 12.5mg qd  BP stable

## 2022-10-25 NOTE — DISCHARGE NOTE NURSING/CASE MANAGEMENT/SOCIAL WORK - PATIENT PORTAL LINK FT
You can access the FollowMyHealth Patient Portal offered by [facility] by registering at the following website: [email or web address]. By joining [employer]’s FollowMyHealth portal, you will also be able to view your health information using other applications (apps) compatible with our system.

## 2022-10-25 NOTE — PROGRESS NOTE ADULT - PROBLEM SELECTOR PLAN 7
S/p lanceting by outpatient podiatry. S/p doxy and cipro  No longer appears infected and no drainage. Can weightbear
S/p lanceting by outpatient podiatry. S/p doxy and cipro  No longer appears infected and no drainage. Can weight bear  - Wound care consult.
S/p lanceting by outpatient podiatry. S/p doxy and cipro  No longer appears infected and no drainage. Can weight bear  - Wound care consult.

## 2022-10-25 NOTE — CONSULT NOTE ADULT - ATTENDING COMMENTS
This is a 63 y/o F with PMHx of asthma, remote h/o breast and colon CA, HTN, HLD, T2DM who presents with acute onset SOB and wheezing and found to be in an asthma exacerbation 2/2 parainfluenza. Recently admitted for asthma exacerbation 2/2 enterorhinovirus. Given steroids, nebs and IM epi in ED and placed on BiPAP in ED for increased work of breathing. MICU consulted for new BiPAP.    On exam, patient is speaking in full sentences and is saturating 99% on room air. Moving air well on exam. She is in no respiratory distress. Labs notable for leukocytosis and increased lactate, likely 2/2 beta agonist administration.    #Asthma exacerbation 2/2 parainfluenza infection    Recommendations:  - solumedrol vs prednisone 40 mg daily, can increase to bid if needed for persistent symptoms  - duonebs q6h, symbicort bid  - no need for NIV at this time    Not a MICU candidate at this time. Please call with any questions.
DM2 exacerbated by steroid. Leaving today 2 more days of prednisone - will use NPH pen 20 units qAM with prednisone. Then off prednisone lower NPH to 5 units qAM until can repeat labs to check lactate and decide about restarting metformin, to be followed by Dr. Suero as outpatient.  Endocrine team consulted for uncontrolled diabetes. Patient is high risk with high level decision making due to uncontrolled diabetes which places patient at high risk for cardiovascular and cerebrovascular events. Patient with lability of glucose requiring close monitoring and insulin adjustments.    Jarred Rojas MD  Division of Endocrinology  Pager: [phone]    If after 6PM or before 9AM, or on weekends/holidays, please call endocrine answering service for assistance ([phone]).  For nonurgent matters email [email or web address] for assistance.

## 2022-10-25 NOTE — DISCHARGE NOTE PROVIDER - HOSPITAL COURSE
61YO F w/ h/o Aasthma never intubated remote breast & colon CA presented to ER with chest tightness, SOB, dyspnea with wheezes.    Asthma exacerbation-   ·  Plan: Wheezing, hypercarbia, SOB in setting of paraflu infection. Asthma exacerbation. Improving lung exam today. Pt. with more air movement in the lung and wheezing is improving.   -duonebs q4 - standing   - D/c IV steroids - starting on prednisone for 2 more days (last day 10/27).   -symbicort BID  -cough suppressants  - D/c home today   - Discussed the importance of outpatient pulm follow up.    Parainfluenza.   ·  Plan: Triggering asthma exacerbation. Not hypoxic  -tylenol, cough suppressants.    Lactic acidosis.   ·  Plan: patient with AG metabolic acidosis, elevated lactate 6.9, Ph 7.3. Lactate still persistently elevated in the past. Improved today.   -previous workup on last admission unrevealing  -hold metformin, less likely to be the culprit  -could be 2/2 albuterol nebs vs malignancy  - Ischemic bowel ruled out - CT negative.       Uncontrolled Diabetes  Type 2 diabetes mellitus with hyperglycemia, without long-term current use of insulin.   ·  Plan: on home metformin. Sugars are elevated in the setting of steroid use. Lunch FS is 399.  repeated , evening dose insulin given and came down to 290.   Whenever the patient goes on steroids for asthma exacerbation she has hyperglycemia.  Endocrine has recommendations as followed If d/c today: NPH 20 units once a day with steroid dose (Humulin N or Novolin N pen with pen needles). Can decrease to 5 units once a day when steroids are complete. Would not resume Metformin 1g BID until a lactate is repeated as an outpatient. Once lactate is repeated and normal, NPH can be stopped and Metformin 1g BID can be resumed   -Patient has used insulin in the past and states she knows how to use it - would reinforce insulin teaching with patient in case.   -Can fu with Dr. Tika Suero at [facility] Practice at [address] [place] [address], [address], [place] [address]; Ph # [phone]  -hold metformin.    Patient is hemodynamically stable and without complaints and patient is ready for discharge.  Case discussed and medications reviewed with patient and PMD.  Agreed with above.  Medications needed sent to pharmacy.

## 2022-10-25 NOTE — DISCHARGE NOTE NURSING/CASE MANAGEMENT/SOCIAL WORK - NSDCPEFALRISK_GEN_ALL_CORE
For information on Fall & Injury Prevention, visit: [email or web address] OR  [email or web address] OR  https://www.cdc.gov/steadi/patient.html

## 2022-10-25 NOTE — PROGRESS NOTE ADULT - PROBLEM SELECTOR PLAN 8
Lovenox 40mg qd  DASH/TLC CC diet
Lovenox 40mg qd  DASH/TLC CC diet
Lovenox 40mg qd  DASH/TLC CC diet    Dispo: home no needs - monitor sugars. If still elevated later today and A1C is elevated may need to stay for endocrine evaluation given the patient requires two more days of treatment with steroids. In the past, the patient had very elevated levels of glucose while on steroids despite having well controlled diabetes on metformin at home.

## 2022-11-10 ENCOUNTER — APPOINTMENT (OUTPATIENT)
Dept: PULMONOLOGY | Facility: CLINIC | Age: 62
End: 2022-11-10
Payer: COMMERCIAL

## 2022-11-10 VITALS
BODY MASS INDEX: 29.84 KG/M2 | HEIGHT: 60 IN | OXYGEN SATURATION: 97 % | HEART RATE: 97 BPM | SYSTOLIC BLOOD PRESSURE: 123 MMHG | DIASTOLIC BLOOD PRESSURE: 79 MMHG | WEIGHT: 152 LBS

## 2022-11-10 PROCEDURE — G0008: CPT

## 2022-11-10 PROCEDURE — 90686 IIV4 VACC NO PRSV 0.5 ML IM: CPT

## 2022-11-10 PROCEDURE — 99214 OFFICE O/P EST MOD 30 MIN: CPT | Mod: 25

## 2022-11-10 NOTE — DISCUSSION/SUMMARY
[FreeTextEntry1] : The patient will return on Monday for injections of Dupixent.  Initial dose of two 300 mg subcutaneous injections.\par She will get flu vaccine.\par She will continue to take Symbicort 2 puffs twice a day.  She will continue to maintain her level of activity.\par She will have pulmonary function testing prior to the injection of Dupixent.

## 2022-11-10 NOTE — HISTORY OF PRESENT ILLNESS
[TextBox_4] : Ms. Alka Cuevas returns for follow-up of severe asthma.  Since the last visit 4 weeks back patient was admitted to the hospital again with acute exacerbation of asthma requiring hospitalization for few days.  She had infection with parainfluenza and rhinovirus which exacerbated her asthma.  Currently patient feels weak.  But dyspnea has improved.  She is currently taking Symbicort 2 puffs twice a day.  Her foot and ankle up issues have resolved.  She is able to walk without pain.\par We had sought an approval for Dupixent which was approved.  She did not get it yet.\par She sleeps well at present.  She is eating well.  She is maintaining a lower level of activity at home.\par She wants to go on disability as she gets frequent infections when she goes to work.  She has required several hospitalizations for acute exacerbation of asthma.\par In addition she is being treated for breast cancer and colon cancer which have been both treated and she follows up with an oncologist.\par \par 10/10/22\par Patient had severe acute attack of asthma which was treated about 2 weeks back in the office.  She continued to remain very symptomatic even at rest.  After 3 days of office visit she had to be taken to the hospital because of severe persistent asthma.  She was admitted to [facility] for 1 week.  The test revealed that she had infection with rhino and enterovirus.  Acute exacerbation of asthma seems to have been complicated by severe viral infection.  Patient was given IV and oral steroids.  She had a chest CT scan and abdominal CT scan which did not reveal any significant abnormality.  Patient took a long time to improve.  Over the last 2 days patient developed severe ankle pain.  She is unable to stand on her feet because of severe pain in the ankle.  An x-ray of the ankle was done which revealed no fracture.  The patient was given Tylenol as she is allergic to NSAIDs.  The patient is in tears because of severe ankle pain.  Her dyspnea has improved.  Her cough is improved.\par \par 9/22/22\par Ms. Alka Cuevas returns with acute shortness of breath wheezing and cough.  She was seen in the emergency room yesterday and was given 40 mg twice a day of prednisone.  She has been taking nebulized albuterol several times a day.  She has been taking Symbicort twice daily.  She was well till yesterday morning when she had some juice following which she developed severe shortness of breath.\par She has severe recurrent asthma.  She was given Dupixent injection in April and there was no follow-up.  She states that she was well till now.  The patient comes in moderately severe distress.  Unable to bring up the phlegm.  She has burning in the chest.  She has headache when she coughs.  She was unable to sleep because of the cough.\par \par 4/12/22\par The patient is a 61-year-old lady with history of bronchial here with increasing cough shortness of breath and wheezing for the last few days.  She has had several recent acute exacerbation of bronchial asthma and required steroids.  Today she has intense itching all over.\par She has been taking Symbicort 2 puffs 4-5 times a day.  In addition she is using nebulized albuterol.  Patient was recently treated with oral steroids.

## 2022-11-14 ENCOUNTER — APPOINTMENT (OUTPATIENT)
Dept: PULMONOLOGY | Facility: CLINIC | Age: 62
End: 2022-11-14

## 2022-11-14 VITALS
SYSTOLIC BLOOD PRESSURE: 150 MMHG | HEART RATE: 63 BPM | OXYGEN SATURATION: 98 % | DIASTOLIC BLOOD PRESSURE: 98 MMHG | RESPIRATION RATE: 16 BRPM

## 2022-11-14 VITALS
SYSTOLIC BLOOD PRESSURE: 158 MMHG | DIASTOLIC BLOOD PRESSURE: 93 MMHG | OXYGEN SATURATION: 98 % | RESPIRATION RATE: 16 BRPM | HEART RATE: 65 BPM

## 2022-11-14 VITALS
HEART RATE: 66 BPM | SYSTOLIC BLOOD PRESSURE: 129 MMHG | OXYGEN SATURATION: 96 % | DIASTOLIC BLOOD PRESSURE: 75 MMHG | RESPIRATION RATE: 16 BRPM

## 2022-11-14 VITALS
RESPIRATION RATE: 18 BRPM | HEART RATE: 71 BPM | TEMPERATURE: 98.1 F | OXYGEN SATURATION: 97 % | SYSTOLIC BLOOD PRESSURE: 137 MMHG | DIASTOLIC BLOOD PRESSURE: 84 MMHG

## 2022-11-14 VITALS
SYSTOLIC BLOOD PRESSURE: 147 MMHG | HEART RATE: 70 BPM | OXYGEN SATURATION: 97 % | RESPIRATION RATE: 16 BRPM | DIASTOLIC BLOOD PRESSURE: 87 MMHG

## 2022-11-14 VITALS
OXYGEN SATURATION: 96 % | RESPIRATION RATE: 16 BRPM | HEART RATE: 64 BPM | DIASTOLIC BLOOD PRESSURE: 86 MMHG | SYSTOLIC BLOOD PRESSURE: 151 MMHG

## 2022-11-14 DIAGNOSIS — T78.40XA ALLERGY, UNSPECIFIED, INITIAL ENCOUNTER: ICD-10-CM

## 2022-11-14 PROCEDURE — 99215 OFFICE O/P EST HI 40 MIN: CPT | Mod: 25

## 2022-11-14 PROCEDURE — 96401 CHEMO ANTI-NEOPL SQ/IM: CPT

## 2022-11-14 PROCEDURE — 94729 DIFFUSING CAPACITY: CPT

## 2022-11-14 PROCEDURE — 94010 BREATHING CAPACITY TEST: CPT

## 2022-11-14 RX ORDER — BUDESONIDE AND FORMOTEROL FUMARATE DIHYDRATE 160; 4.5 UG/1; UG/1
160-4.5 AEROSOL RESPIRATORY (INHALATION) TWICE DAILY
Qty: 3 | Refills: 3 | Status: ACTIVE | COMMUNITY
Start: 1900-01-01 | End: 1900-01-01

## 2022-11-14 RX ORDER — METHYLPREDNISOLONE 4 MG/1
4 TABLET ORAL
Qty: 1 | Refills: 0 | Status: ACTIVE | COMMUNITY
Start: 2022-11-14 | End: 1900-01-01

## 2022-11-14 RX ORDER — NEBULIZER ACCESSORIES
KIT MISCELLANEOUS
Qty: 1 | Refills: 0 | Status: ACTIVE | COMMUNITY
Start: 2022-11-14 | End: 1900-01-01

## 2022-11-14 RX ORDER — ALBUTEROL SULFATE 2.5 MG/.5ML
2.5 SOLUTION RESPIRATORY (INHALATION)
Qty: 180 | Refills: 3 | Status: ACTIVE | COMMUNITY
Start: 2022-11-14 | End: 1900-01-01

## 2022-11-14 RX ORDER — BUDESONIDE 0.5 MG/2ML
0.5 INHALANT ORAL
Qty: 180 | Refills: 1 | Status: ACTIVE | COMMUNITY
Start: 2022-11-14 | End: 1900-01-01

## 2022-11-14 NOTE — HISTORY OF PRESENT ILLNESS
[TextBox_4] : Patient with severe asthma came back today for further first injections of Dupixent.  She received 300 mg of subcutaneous Dupixent.  After  she received a second dose of 300 mg, the patient developed swelling of the lower lip.  This occurred within 30 minutes of the second dose of Dupixent.  The patient was then given Benadryl.  Vitals were taken.  She has been observed for the last 4 hours after taking the Dupixent.\par Her swelling has improved.  He has been sleeping after getting the Benadryl.  There is no shortness of breath.

## 2022-11-14 NOTE — PHYSICAL EXAM
[No Acute Distress] : no acute distress [Normal Oropharynx] : normal oropharynx [Normal Appearance] : normal appearance [No Neck Mass] : no neck mass [Normal Rate/Rhythm] : normal rate/rhythm [Normal S1, S2] : normal s1, s2 [No Murmurs] : no murmurs [No Resp Distress] : no resp distress [Clear to Auscultation Bilaterally] : clear to auscultation bilaterally [No Abnormalities] : no abnormalities [Benign] : benign [Normal Gait] : normal gait [No Clubbing] : no clubbing [No Cyanosis] : no cyanosis [No Edema] : no edema [FROM] : FROM [Normal Color/ Pigmentation] : normal color/ pigmentation [No Focal Deficits] : no focal deficits [Oriented x3] : oriented x3 [Normal Affect] : normal affect [TextBox_11] : Swollen lower lip

## 2022-11-14 NOTE — DISCUSSION/SUMMARY
[FreeTextEntry1] : Patient is allergic to Dupixent.  Unfortunately cannot give her this.\par She has had repeated admissions to the hospital for severe asthma.  The patient will be given nebulizer.  She will be given nebulized albuterol and budesonide that she can use when she gets worse.  She will also be given a prescription for Medrol pack that she can use if she gets worse.  All these are in an effort to prevent hospitalizations.

## 2022-11-14 NOTE — PROCEDURE
[FreeTextEntry1] : The patient was observed for 4 hours.  Patient improved and laying of the lips resolved.  Her spirometry has improved compared to her last perimetry.\par Vital signs have been stable.

## 2022-11-21 ENCOUNTER — RX RENEWAL (OUTPATIENT)
Age: 62
End: 2022-11-21

## 2022-12-01 ENCOUNTER — APPOINTMENT (OUTPATIENT)
Dept: PULMONOLOGY | Facility: CLINIC | Age: 62
End: 2022-12-01

## 2022-12-01 VITALS
HEART RATE: 74 BPM | WEIGHT: 146 LBS | SYSTOLIC BLOOD PRESSURE: 132 MMHG | BODY MASS INDEX: 28.66 KG/M2 | TEMPERATURE: 97.3 F | HEIGHT: 60 IN | OXYGEN SATURATION: 97 % | DIASTOLIC BLOOD PRESSURE: 87 MMHG

## 2022-12-01 DIAGNOSIS — Z87.09 PERSONAL HISTORY OF OTHER DISEASES OF THE RESPIRATORY SYSTEM: ICD-10-CM

## 2022-12-01 DIAGNOSIS — J40 BRONCHITIS, NOT SPECIFIED AS ACUTE OR CHRONIC: ICD-10-CM

## 2022-12-01 PROCEDURE — 99214 OFFICE O/P EST MOD 30 MIN: CPT | Mod: 25

## 2022-12-01 PROCEDURE — 94060 EVALUATION OF WHEEZING: CPT

## 2022-12-01 RX ORDER — PREDNISONE 10 MG/1
10 TABLET ORAL
Qty: 75 | Refills: 0 | Status: ACTIVE | COMMUNITY
Start: 2022-12-01 | End: 1900-01-01

## 2022-12-01 RX ORDER — DOXYCYCLINE 100 MG/1
100 TABLET, FILM COATED ORAL
Qty: 10 | Refills: 0 | Status: ACTIVE | COMMUNITY
Start: 2022-12-01 | End: 1900-01-01

## 2022-12-01 NOTE — HISTORY OF PRESENT ILLNESS
[TextBox_4] : Patient returns with severe cough and wheezing for the last 1 week.  She took her Medrol pack.  It did not help her.  She is not able to sleep at night because of excessive cough.\par She has been taking nebulized albuterol every 4 hours.  She has been taking Symbicort regularly.  She is going out scanty whitish sputum every time she coughs.  There is no fever.

## 2022-12-01 NOTE — PROCEDURE
[FreeTextEntry1] : Pulmonary function testing reveals no change in her FEV1 compared to her baseline.

## 2022-12-01 NOTE — PHYSICAL EXAM
[No Acute Distress] : no acute distress [Normal Oropharynx] : normal oropharynx [Normal Appearance] : normal appearance [No Neck Mass] : no neck mass [Normal Rate/Rhythm] : normal rate/rhythm [Normal S1, S2] : normal s1, s2 [No Murmurs] : no murmurs [No Resp Distress] : no resp distress [Clear to Auscultation Bilaterally] : clear to auscultation bilaterally [Wheeze] : wheeze [No Abnormalities] : no abnormalities [Benign] : benign [Normal Gait] : normal gait [No Clubbing] : no clubbing [No Cyanosis] : no cyanosis [No Edema] : no edema [FROM] : FROM [Normal Color/ Pigmentation] : normal color/ pigmentation [No Focal Deficits] : no focal deficits [Oriented x3] : oriented x3 [Normal Affect] : normal affect [TextBox_68] : Bilateral extensive wheezing and rhonchi heard.

## 2022-12-01 NOTE — DISCUSSION/SUMMARY
[FreeTextEntry1] : The patient appears to have severe bronchitis.  We will test her for RSV and influenza.\par Will give her oral steroids which can be tapered over the next 1 month.\par Patient has history of chronic persistent asthma and allergic to Dupixent.  She is taking Symbicort regularly.

## 2022-12-05 LAB
RAPID RVP RESULT: DETECTED
RSV RNA SPEC QL NAA+PROBE: DETECTED
SARS-COV-2 RNA PNL RESP NAA+PROBE: NOT DETECTED

## 2022-12-19 ENCOUNTER — APPOINTMENT (OUTPATIENT)
Dept: PULMONOLOGY | Facility: CLINIC | Age: 62
End: 2022-12-19

## 2022-12-20 NOTE — H&P ADULT - PROBLEM SELECTOR PROBLEM 6
Principal Discharge DX:	Palpitation  Secondary Diagnosis:	SOB (shortness of breath)   1 HLD (hyperlipidemia)

## 2022-12-29 ENCOUNTER — APPOINTMENT (OUTPATIENT)
Dept: PULMONOLOGY | Facility: CLINIC | Age: 62
End: 2022-12-29

## 2022-12-29 VITALS
OXYGEN SATURATION: 99 % | WEIGHT: 144 LBS | SYSTOLIC BLOOD PRESSURE: 125 MMHG | HEIGHT: 60 IN | BODY MASS INDEX: 28.27 KG/M2 | HEART RATE: 71 BPM | DIASTOLIC BLOOD PRESSURE: 81 MMHG

## 2022-12-29 DIAGNOSIS — J45.909 UNSPECIFIED ASTHMA, UNCOMPLICATED: ICD-10-CM

## 2022-12-29 DIAGNOSIS — R05.9 COUGH, UNSPECIFIED: ICD-10-CM

## 2022-12-29 PROCEDURE — 99213 OFFICE O/P EST LOW 20 MIN: CPT

## 2022-12-29 RX ORDER — ALBUTEROL SULFATE 2.5 MG/.5ML
2.5 SOLUTION RESPIRATORY (INHALATION) 4 TIMES DAILY
Qty: 360 | Refills: 0 | Status: ACTIVE | COMMUNITY
Start: 2022-12-29 | End: 1900-01-01

## 2022-12-29 RX ORDER — BUDESONIDE 0.5 MG/2ML
0.5 INHALANT ORAL
Qty: 180 | Refills: 1 | Status: ACTIVE | COMMUNITY
Start: 2022-12-29 | End: 1900-01-01

## 2022-12-29 NOTE — REVIEW OF SYSTEMS
[Fatigue] : fatigue [Recent Wt Loss (___ Lbs)] : ~T recent [unfilled] lb weight loss [Cough] : cough [Sputum] : sputum [Dyspnea] : dyspnea [Constipation] : constipation [Negative] : Endocrine

## 2022-12-29 NOTE — PHYSICAL EXAM
[No Acute Distress] : no acute distress [Normal Oropharynx] : normal oropharynx [Normal Appearance] : normal appearance [No Neck Mass] : no neck mass [Normal Rate/Rhythm] : normal rate/rhythm [Normal S1, S2] : normal s1, s2 [No Murmurs] : no murmurs [No Resp Distress] : no resp distress [Clear to Auscultation Bilaterally] : clear to auscultation bilaterally [No Abnormalities] : no abnormalities [Benign] : benign [Normal Gait] : normal gait [No Clubbing] : no clubbing [No Cyanosis] : no cyanosis [No Edema] : no edema [FROM] : FROM [Normal Color/ Pigmentation] : normal color/ pigmentation [No Focal Deficits] : no focal deficits [Oriented x3] : oriented x3 [Normal Affect] : normal affect [TextBox_68] : Bilateral rhonchi and wheezing heard.

## 2022-12-29 NOTE — DISCUSSION/SUMMARY
[FreeTextEntry1] : The patient will be given nebulized albuterol and budesonide to take in addition to Symbicort 2 puffs twice a day.  She does not want to take oral prednisone.  She is concerned about her diabetes with taking repeated prednisone.

## 2022-12-29 NOTE — HISTORY OF PRESENT ILLNESS
[TextBox_4] : Patient with history of chronic severe asthma who was unable to tolerate Dupixent is here for follow-up.  She was hospitalized since her last visit.  She had diabetic ketoacidosis and influenza pneumonia and was admitted to ICU.  She was discharged on 22nd December off prednisone.  She was given insulin to take for 4 days postdischarge.  She is currently on metformin.  She has slight cough and wheezing.  She was not able to sleep last night because of hemorrhoids.  She feels tired currently today.  She is taking Symbicort 2 puffs twice a day for asthma.  She continues to wheeze with this.\par \par 12/1/22\par Patient returns with severe cough and wheezing for the last 1 week.  She took her Medrol pack.  It did not help her.  She is not able to sleep at night because of excessive cough.\par She has been taking nebulized albuterol every 4 hours.  She has been taking Symbicort regularly.  She is going out scanty whitish sputum every time she coughs.  There is no fever.

## 2023-01-19 ENCOUNTER — APPOINTMENT (OUTPATIENT)
Dept: ENDOCRINOLOGY | Facility: CLINIC | Age: 63
End: 2023-01-19
Payer: COMMERCIAL

## 2023-01-19 VITALS
HEIGHT: 60 IN | WEIGHT: 137 LBS | SYSTOLIC BLOOD PRESSURE: 128 MMHG | HEART RATE: 86 BPM | DIASTOLIC BLOOD PRESSURE: 78 MMHG | OXYGEN SATURATION: 99 % | BODY MASS INDEX: 26.9 KG/M2

## 2023-01-19 DIAGNOSIS — E11.65 TYPE 2 DIABETES MELLITUS WITH HYPERGLYCEMIA: ICD-10-CM

## 2023-01-19 DIAGNOSIS — E55.9 VITAMIN D DEFICIENCY, UNSPECIFIED: ICD-10-CM

## 2023-01-19 DIAGNOSIS — E78.5 HYPERLIPIDEMIA, UNSPECIFIED: ICD-10-CM

## 2023-01-19 DIAGNOSIS — I10 ESSENTIAL (PRIMARY) HYPERTENSION: ICD-10-CM

## 2023-01-19 PROCEDURE — 99214 OFFICE O/P EST MOD 30 MIN: CPT

## 2023-01-19 RX ORDER — ATORVASTATIN CALCIUM 20 MG/1
20 TABLET, FILM COATED ORAL
Qty: 30 | Refills: 3 | Status: DISCONTINUED | COMMUNITY
End: 2023-01-19

## 2023-01-19 NOTE — ASSESSMENT
[FreeTextEntry1] : Patient is a 61 F with breast cancer s/p lumpectomy and chemo, T2DM, HTN, HLD here for follow up. Last visit 08/2022. \par \par  1.T2D\par - HgB A1C: 6.9--> today 13 12/19/2022 due to being on prednisone intermittently for the past 3 months. \par - Blood pressure: 128/78\par - Complications:  none\par - Aspirin: no\par - Most recent urine microalbumin   repeat today   . ACE-I/ARB: yes losartan \par - Most recent LDL: 65 08/2022  . On statin:  atorvastatin 40 mg daily \par - Opthalmology up to date:  no advised for yearly check up\par - Podiatry up to date:  no  advised for yearly check up\par - Patient to call for persistent glucose < 70 or > 300\par - Patient counseled on the importance of consistent carbohydrate diet and regular physical activity \par - Advised patient to continue checking FSG and to bring meter to all visits \par - Medications changes: \par - Now off prednisone. A1C is reflective of her being on prednisone in the past 3 months \par - Glucose now at home usually 150-160, no postprandial glucose. Professional CGM put on today to see what patient's glucose level is off prednisone. If glucose persistently >300, we will need to start her on insulin\par - We will start Ozempic 0.25 mg weekly x4 doses and then increase to 0.5 mg weekly thereafter. For GLP1A, I discussed potential benefits for patients with clinical ASCVD as well as reduced the risk of cardiovascular mortality.  I discussed side effects of the GLP-1 agonist including pancreatitis, nausea or vomiting, injection site reactions and benefits including potential for weight loss and glycemic benefits. Patient confirmed that they have no history of pancreatitis and no family history of  thyroid cancer. \par - continue with metformin 1000 mg BID (renewed)\par - I discussed with the patient that if she is to go back on Prednisone, let me know as she will need insulin \par \par 2. HTN\par - /78\par - Recommend continue Losartan 100/12.5 mg QD\par \par 3. HLD\par - Last LDL 65 08/2022\par - Continue Atorvastatin 40 mg QHS for risk reduction\par \par 4. Vitamin D deficiency \par - Continue with vitamin D 2000 IU daily \par \par FOLLOW UP: I recommend that next visit for diabetes care be in 3 month\par  \par Tika Suero MD\par Endocrinology, diabetes and metabolism\par

## 2023-01-19 NOTE — HISTORY OF PRESENT ILLNESS
[FreeTextEntry1] : Patient is a 61 F with breast cancer s/p lumpectomy and chemo, T2DM, HTN, HLD here for follow up.  Last visit with me 08/19/2022. \par \par FH: mother has diabetes\par SH:quit smoking 30 years ago. Denies alcohol use. Working with mentally challenged adults, [employer]\par History of radiation exposure. \par \par T2DM:\par Diabetes history:\par Diagnosed in 2017. \par \par Most recent A1C 13 12/19/2022\par Previously A1c 7.7% in March 2021, A1c in April was 10.5%. \par \par Diabetes complications:\par Neuropathy: At age 21, she had breast cancer s/p lumpectomy and chemo with residual neuropathy. Also has R-foot wound below ankle, previous surgical site, it has drained fluid in the past.\par Retinopathy: last eye exam last year\par Nephropathy: ACR negative 10/2021 \par CAD/MI: none\par DKA/Hospitalization: April 2021 for hyperglycemia s/s with FS 500s. Had taken Prednisone taper for 10 days prior to that.  \par \par Current DM medications: \par Metformin ER 1000 mg BID\par \par Past DM medications: \par Stopped Lantus since early Sept 2021\par \par Finger sticks: \par Off prednisone \par Fasting 110-150\par \par Diet: \par Breakfast: 10am - oatmeal, smoothies, tea\par Lunch: 1-2pm -- salad or wrap, something light\par Dinner: 6pm - salad or salmon\par Snacks: fruit\par Drinks: no sodas or juice\par Exercise: 2-3 times per week go to the gym, has a new bike \par \par # HTN\par /78\par Losartan/HCTZ 100/12.5 mg QD\par \par # HLD\par LDL 65 08/22/2022\par On atorvastatin 20 mg daily \par \par 1/19/2023:\par Admitted at [facility] Oct 25 for SOB found to have asthma exacerbation and started on Prednisone, seen by inpatient endocrine team and was started on NPH. 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 Her asthma got exacerbated every time and prednisone started. Feels very tired. Now breathing is better. Now finished prednisone for 1 month.

## 2023-01-26 RX ORDER — DULAGLUTIDE 0.75 MG/.5ML
0.75 INJECTION, SOLUTION SUBCUTANEOUS
Qty: 1 | Refills: 3 | Status: ACTIVE | COMMUNITY
Start: 2023-01-26 | End: 1900-01-01

## 2023-01-26 RX ORDER — SEMAGLUTIDE 1.34 MG/ML
2 INJECTION, SOLUTION SUBCUTANEOUS
Qty: 3 | Refills: 2 | Status: DISCONTINUED | COMMUNITY
Start: 2023-01-19 | End: 2023-01-26

## 2023-02-17 ENCOUNTER — APPOINTMENT (OUTPATIENT)
Dept: ENDOCRINOLOGY | Facility: CLINIC | Age: 63
End: 2023-02-17

## 2023-04-07 NOTE — PROGRESS NOTE ADULT - PROBLEM/PLAN-8
DISPLAY PLAN FREE TEXT
Metronidazole Counseling:  I discussed with the patient the risks of metronidazole including but not limited to seizures, nausea/vomiting, a metallic taste in the mouth, nausea/vomiting and severe allergy.

## 2023-04-28 ENCOUNTER — APPOINTMENT (OUTPATIENT)
Dept: ENDOCRINOLOGY | Facility: CLINIC | Age: 63
End: 2023-04-28

## 2023-08-11 NOTE — ED ADULT NURSE NOTE - CHIEF COMPLAINT
The patient is a 60y Female complaining of  Valtrex Pregnancy And Lactation Text: this medication is Pregnancy Category B and is considered safe during pregnancy. This medication is not directly found in breast milk but it's metabolite acyclovir is present.

## 2023-10-04 NOTE — ED ADULT NURSE NOTE - NSFALLRSKOUTCOME_ED_ALL_ED
No care due was identified.  [facility] Embedded Care Due Messages. Reference number: [id number].   10/04/2023 9:55:58 AM CDT  
Refill Routing Note   Medication(s) are not appropriate for processing by [facility] for the following reason(s):      Required labs outdated    [facility] action(s):  Defer Care Due:  None identified            Appointments  past 12m or future 3m with PCP    Date Provider   Last Visit   7/11/2023 Harrison Kerr MD   Next Visit   Visit date not found Harrison Kerr MD   ED visits in past 90 days: 0        Note composed:10:57 AM 10/04/2023          
Universal Safety Interventions

## 2023-10-30 ENCOUNTER — EMERGENCY (EMERGENCY)
Facility: HOSPITAL | Age: 63
LOS: 1 days | Discharge: HOME | End: 2023-10-30
Attending: EMERGENCY MEDICINE | Admitting: EMERGENCY MEDICINE
Payer: COMMERCIAL

## 2023-10-30 VITALS
SYSTOLIC BLOOD PRESSURE: 132 MMHG | TEMPERATURE: 98 F | HEART RATE: 91 BPM | RESPIRATION RATE: 18 BRPM | DIASTOLIC BLOOD PRESSURE: 76 MMHG | OXYGEN SATURATION: 99 %

## 2023-10-30 VITALS
OXYGEN SATURATION: 99 % | HEART RATE: 73 BPM | SYSTOLIC BLOOD PRESSURE: 159 MMHG | RESPIRATION RATE: 22 BRPM | TEMPERATURE: 97 F | DIASTOLIC BLOOD PRESSURE: 97 MMHG

## 2023-10-30 DIAGNOSIS — Z98.89 OTHER SPECIFIED POSTPROCEDURAL STATES: Chronic | ICD-10-CM

## 2023-10-30 DIAGNOSIS — Z98.890 OTHER SPECIFIED POSTPROCEDURAL STATES: Chronic | ICD-10-CM

## 2023-10-30 LAB
ALBUMIN SERPL ELPH-MCNC: 4.5 G/DL — SIGNIFICANT CHANGE UP (ref 3.3–5)
ALBUMIN SERPL ELPH-MCNC: 4.5 G/DL — SIGNIFICANT CHANGE UP (ref 3.3–5)
ALP SERPL-CCNC: 108 U/L — SIGNIFICANT CHANGE UP (ref 40–120)
ALP SERPL-CCNC: 108 U/L — SIGNIFICANT CHANGE UP (ref 40–120)
ALT FLD-CCNC: 26 U/L — SIGNIFICANT CHANGE UP (ref 4–33)
ALT FLD-CCNC: 26 U/L — SIGNIFICANT CHANGE UP (ref 4–33)
ANION GAP SERPL CALC-SCNC: 14 MMOL/L — SIGNIFICANT CHANGE UP (ref 7–14)
ANION GAP SERPL CALC-SCNC: 14 MMOL/L — SIGNIFICANT CHANGE UP (ref 7–14)
AST SERPL-CCNC: 25 U/L — SIGNIFICANT CHANGE UP (ref 4–32)
AST SERPL-CCNC: 25 U/L — SIGNIFICANT CHANGE UP (ref 4–32)
BASOPHILS # BLD AUTO: 0.03 K/UL — SIGNIFICANT CHANGE UP (ref 0–0.2)
BASOPHILS # BLD AUTO: 0.03 K/UL — SIGNIFICANT CHANGE UP (ref 0–0.2)
BASOPHILS NFR BLD AUTO: 0.4 % — SIGNIFICANT CHANGE UP (ref 0–2)
BASOPHILS NFR BLD AUTO: 0.4 % — SIGNIFICANT CHANGE UP (ref 0–2)
BILIRUB SERPL-MCNC: 0.2 MG/DL — SIGNIFICANT CHANGE UP (ref 0.2–1.2)
BILIRUB SERPL-MCNC: 0.2 MG/DL — SIGNIFICANT CHANGE UP (ref 0.2–1.2)
BLOOD GAS VENOUS COMPREHENSIVE RESULT: SIGNIFICANT CHANGE UP
BLOOD GAS VENOUS COMPREHENSIVE RESULT: SIGNIFICANT CHANGE UP
BUN SERPL-MCNC: 10 MG/DL — SIGNIFICANT CHANGE UP (ref 7–23)
BUN SERPL-MCNC: 10 MG/DL — SIGNIFICANT CHANGE UP (ref 7–23)
CALCIUM SERPL-MCNC: 9.9 MG/DL — SIGNIFICANT CHANGE UP (ref 8.4–10.5)
CALCIUM SERPL-MCNC: 9.9 MG/DL — SIGNIFICANT CHANGE UP (ref 8.4–10.5)
CHLORIDE SERPL-SCNC: 102 MMOL/L — SIGNIFICANT CHANGE UP (ref 98–107)
CHLORIDE SERPL-SCNC: 102 MMOL/L — SIGNIFICANT CHANGE UP (ref 98–107)
CO2 SERPL-SCNC: 27 MMOL/L — SIGNIFICANT CHANGE UP (ref 22–31)
CO2 SERPL-SCNC: 27 MMOL/L — SIGNIFICANT CHANGE UP (ref 22–31)
CREAT SERPL-MCNC: 0.78 MG/DL — SIGNIFICANT CHANGE UP (ref 0.5–1.3)
CREAT SERPL-MCNC: 0.78 MG/DL — SIGNIFICANT CHANGE UP (ref 0.5–1.3)
EGFR: 86 ML/MIN/1.73M2 — SIGNIFICANT CHANGE UP
EGFR: 86 ML/MIN/1.73M2 — SIGNIFICANT CHANGE UP
EOSINOPHIL # BLD AUTO: 0.12 K/UL — SIGNIFICANT CHANGE UP (ref 0–0.5)
EOSINOPHIL # BLD AUTO: 0.12 K/UL — SIGNIFICANT CHANGE UP (ref 0–0.5)
EOSINOPHIL NFR BLD AUTO: 1.4 % — SIGNIFICANT CHANGE UP (ref 0–6)
EOSINOPHIL NFR BLD AUTO: 1.4 % — SIGNIFICANT CHANGE UP (ref 0–6)
FLUAV AG NPH QL: SIGNIFICANT CHANGE UP
FLUAV AG NPH QL: SIGNIFICANT CHANGE UP
FLUBV AG NPH QL: SIGNIFICANT CHANGE UP
FLUBV AG NPH QL: SIGNIFICANT CHANGE UP
GLUCOSE SERPL-MCNC: 81 MG/DL — SIGNIFICANT CHANGE UP (ref 70–99)
GLUCOSE SERPL-MCNC: 81 MG/DL — SIGNIFICANT CHANGE UP (ref 70–99)
HCT VFR BLD CALC: 37.5 % — SIGNIFICANT CHANGE UP (ref 34.5–45)
HCT VFR BLD CALC: 37.5 % — SIGNIFICANT CHANGE UP (ref 34.5–45)
HGB BLD-MCNC: 12.6 G/DL — SIGNIFICANT CHANGE UP (ref 11.5–15.5)
HGB BLD-MCNC: 12.6 G/DL — SIGNIFICANT CHANGE UP (ref 11.5–15.5)
IANC: 4.75 K/UL — SIGNIFICANT CHANGE UP (ref 1.8–7.4)
IANC: 4.75 K/UL — SIGNIFICANT CHANGE UP (ref 1.8–7.4)
IMM GRANULOCYTES NFR BLD AUTO: 0.2 % — SIGNIFICANT CHANGE UP (ref 0–0.9)
IMM GRANULOCYTES NFR BLD AUTO: 0.2 % — SIGNIFICANT CHANGE UP (ref 0–0.9)
LIDOCAIN IGE QN: 38 U/L — SIGNIFICANT CHANGE UP (ref 7–60)
LIDOCAIN IGE QN: 38 U/L — SIGNIFICANT CHANGE UP (ref 7–60)
LYMPHOCYTES # BLD AUTO: 3 K/UL — SIGNIFICANT CHANGE UP (ref 1–3.3)
LYMPHOCYTES # BLD AUTO: 3 K/UL — SIGNIFICANT CHANGE UP (ref 1–3.3)
LYMPHOCYTES # BLD AUTO: 35.3 % — SIGNIFICANT CHANGE UP (ref 13–44)
LYMPHOCYTES # BLD AUTO: 35.3 % — SIGNIFICANT CHANGE UP (ref 13–44)
MCHC RBC-ENTMCNC: 28.3 PG — SIGNIFICANT CHANGE UP (ref 27–34)
MCHC RBC-ENTMCNC: 28.3 PG — SIGNIFICANT CHANGE UP (ref 27–34)
MCHC RBC-ENTMCNC: 33.6 GM/DL — SIGNIFICANT CHANGE UP (ref 32–36)
MCHC RBC-ENTMCNC: 33.6 GM/DL — SIGNIFICANT CHANGE UP (ref 32–36)
MCV RBC AUTO: 84.1 FL — SIGNIFICANT CHANGE UP (ref 80–100)
MCV RBC AUTO: 84.1 FL — SIGNIFICANT CHANGE UP (ref 80–100)
MONOCYTES # BLD AUTO: 0.57 K/UL — SIGNIFICANT CHANGE UP (ref 0–0.9)
MONOCYTES # BLD AUTO: 0.57 K/UL — SIGNIFICANT CHANGE UP (ref 0–0.9)
MONOCYTES NFR BLD AUTO: 6.7 % — SIGNIFICANT CHANGE UP (ref 2–14)
MONOCYTES NFR BLD AUTO: 6.7 % — SIGNIFICANT CHANGE UP (ref 2–14)
NEUTROPHILS # BLD AUTO: 4.75 K/UL — SIGNIFICANT CHANGE UP (ref 1.8–7.4)
NEUTROPHILS # BLD AUTO: 4.75 K/UL — SIGNIFICANT CHANGE UP (ref 1.8–7.4)
NEUTROPHILS NFR BLD AUTO: 56 % — SIGNIFICANT CHANGE UP (ref 43–77)
NEUTROPHILS NFR BLD AUTO: 56 % — SIGNIFICANT CHANGE UP (ref 43–77)
NRBC # BLD: 0 /100 WBCS — SIGNIFICANT CHANGE UP (ref 0–0)
NRBC # BLD: 0 /100 WBCS — SIGNIFICANT CHANGE UP (ref 0–0)
NRBC # FLD: 0 K/UL — SIGNIFICANT CHANGE UP (ref 0–0)
NRBC # FLD: 0 K/UL — SIGNIFICANT CHANGE UP (ref 0–0)
PLATELET # BLD AUTO: 294 K/UL — SIGNIFICANT CHANGE UP (ref 150–400)
PLATELET # BLD AUTO: 294 K/UL — SIGNIFICANT CHANGE UP (ref 150–400)
POTASSIUM SERPL-MCNC: 3.9 MMOL/L — SIGNIFICANT CHANGE UP (ref 3.5–5.3)
POTASSIUM SERPL-MCNC: 3.9 MMOL/L — SIGNIFICANT CHANGE UP (ref 3.5–5.3)
POTASSIUM SERPL-SCNC: 3.9 MMOL/L — SIGNIFICANT CHANGE UP (ref 3.5–5.3)
POTASSIUM SERPL-SCNC: 3.9 MMOL/L — SIGNIFICANT CHANGE UP (ref 3.5–5.3)
PROT SERPL-MCNC: 7.3 G/DL — SIGNIFICANT CHANGE UP (ref 6–8.3)
PROT SERPL-MCNC: 7.3 G/DL — SIGNIFICANT CHANGE UP (ref 6–8.3)
RBC # BLD: 4.46 M/UL — SIGNIFICANT CHANGE UP (ref 3.8–5.2)
RBC # BLD: 4.46 M/UL — SIGNIFICANT CHANGE UP (ref 3.8–5.2)
RBC # FLD: 13.1 % — SIGNIFICANT CHANGE UP (ref 10.3–14.5)
RBC # FLD: 13.1 % — SIGNIFICANT CHANGE UP (ref 10.3–14.5)
RSV RNA NPH QL NAA+NON-PROBE: SIGNIFICANT CHANGE UP
RSV RNA NPH QL NAA+NON-PROBE: SIGNIFICANT CHANGE UP
SARS-COV-2 RNA SPEC QL NAA+PROBE: SIGNIFICANT CHANGE UP
SARS-COV-2 RNA SPEC QL NAA+PROBE: SIGNIFICANT CHANGE UP
SODIUM SERPL-SCNC: 143 MMOL/L — SIGNIFICANT CHANGE UP (ref 135–145)
SODIUM SERPL-SCNC: 143 MMOL/L — SIGNIFICANT CHANGE UP (ref 135–145)
TROPONIN T, HIGH SENSITIVITY RESULT: 8 NG/L — SIGNIFICANT CHANGE UP
TROPONIN T, HIGH SENSITIVITY RESULT: 8 NG/L — SIGNIFICANT CHANGE UP
WBC # BLD: 8.49 K/UL — SIGNIFICANT CHANGE UP (ref 3.8–10.5)
WBC # BLD: 8.49 K/UL — SIGNIFICANT CHANGE UP (ref 3.8–10.5)
WBC # FLD AUTO: 8.49 K/UL — SIGNIFICANT CHANGE UP (ref 3.8–10.5)
WBC # FLD AUTO: 8.49 K/UL — SIGNIFICANT CHANGE UP (ref 3.8–10.5)

## 2023-10-30 PROCEDURE — 93010 ELECTROCARDIOGRAM REPORT: CPT

## 2023-10-30 PROCEDURE — 72131 CT LUMBAR SPINE W/O DYE: CPT | Mod: 26,MA

## 2023-10-30 PROCEDURE — 71045 X-RAY EXAM CHEST 1 VIEW: CPT | Mod: 26

## 2023-10-30 PROCEDURE — 74177 CT ABD & PELVIS W/CONTRAST: CPT | Mod: 26,MA

## 2023-10-30 PROCEDURE — 99285 EMERGENCY DEPT VISIT HI MDM: CPT

## 2023-10-30 RX ORDER — DEXTROSE 50 % IN WATER 50 %
25 SYRINGE (ML) INTRAVENOUS ONCE
Refills: 0 | Status: COMPLETED | OUTPATIENT
Start: 2023-10-30 | End: 2023-10-30

## 2023-10-30 RX ORDER — MORPHINE SULFATE 50 MG/1
4 CAPSULE, EXTENDED RELEASE ORAL ONCE
Refills: 0 | Status: DISCONTINUED | OUTPATIENT
Start: 2023-10-30 | End: 2023-10-30

## 2023-10-30 RX ORDER — MAGNESIUM SULFATE 500 MG/ML
2 VIAL (ML) INJECTION ONCE
Refills: 0 | Status: COMPLETED | OUTPATIENT
Start: 2023-10-30 | End: 2023-10-30

## 2023-10-30 RX ORDER — CYCLOBENZAPRINE HYDROCHLORIDE 10 MG/1
5 TABLET, FILM COATED ORAL ONCE
Refills: 0 | Status: COMPLETED | OUTPATIENT
Start: 2023-10-30 | End: 2023-10-30

## 2023-10-30 RX ORDER — ACETAMINOPHEN 500 MG
975 TABLET ORAL ONCE
Refills: 0 | Status: COMPLETED | OUTPATIENT
Start: 2023-10-30 | End: 2023-10-30

## 2023-10-30 RX ORDER — ALBUTEROL 90 UG/1
2.5 AEROSOL, METERED ORAL ONCE
Refills: 0 | Status: COMPLETED | OUTPATIENT
Start: 2023-10-30 | End: 2023-10-30

## 2023-10-30 RX ORDER — CYCLOBENZAPRINE HYDROCHLORIDE 10 MG/1
1 TABLET, FILM COATED ORAL
Qty: 15 | Refills: 0
Start: 2023-10-30 | End: 2023-11-03

## 2023-10-30 RX ORDER — IPRATROPIUM/ALBUTEROL SULFATE 18-103MCG
3 AEROSOL WITH ADAPTER (GRAM) INHALATION
Refills: 0 | Status: COMPLETED | OUTPATIENT
Start: 2023-10-30 | End: 2023-10-30

## 2023-10-30 RX ORDER — LIDOCAINE 4 G/100G
1 CREAM TOPICAL ONCE
Refills: 0 | Status: COMPLETED | OUTPATIENT
Start: 2023-10-30 | End: 2023-10-30

## 2023-10-30 RX ADMIN — ALBUTEROL 2.5 MILLIGRAM(S): 90 AEROSOL, METERED ORAL at 21:14

## 2023-10-30 RX ADMIN — Medication 3 MILLILITER(S): at 17:10

## 2023-10-30 RX ADMIN — Medication 25 MILLILITER(S): at 18:15

## 2023-10-30 RX ADMIN — Medication 3 MILLILITER(S): at 17:30

## 2023-10-30 RX ADMIN — Medication 50 MILLIGRAM(S): at 21:11

## 2023-10-30 RX ADMIN — LIDOCAINE 1 PATCH: 4 CREAM TOPICAL at 21:12

## 2023-10-30 RX ADMIN — CYCLOBENZAPRINE HYDROCHLORIDE 5 MILLIGRAM(S): 10 TABLET, FILM COATED ORAL at 21:12

## 2023-10-30 RX ADMIN — Medication 975 MILLIGRAM(S): at 21:11

## 2023-10-30 RX ADMIN — Medication 3 MILLILITER(S): at 17:15

## 2023-10-30 RX ADMIN — MORPHINE SULFATE 4 MILLIGRAM(S): 50 CAPSULE, EXTENDED RELEASE ORAL at 17:10

## 2023-10-30 RX ADMIN — Medication 25 GRAM(S): at 21:13

## 2023-10-30 NOTE — ED PROVIDER NOTE - CLINICAL SUMMARY MEDICAL DECISION MAKING FREE TEXT BOX
JOSUE:  63-year-old female past medical history of breast cancer, colon cancer, asthma presents for abdominal pain, lumbar back pain, and shortness of breath.  Low concern for spinal epidural abscess, transverse myelitis, acute cord compression, or cauda equina syndrome at this time.  The patient possesses no red flags including fever, chills, history of intravenous drug use, recent spinal instrumentation, predominant nocturnal pain, history of immunosuppression, pelvic or perineal anesthesia or paresthesia, or fecal or urinary incontinence or retention. Wheezes likely consistent with known asthma, low concern for PE overall.  Will obtain labs, CT imaging of abdomen and lumbar spine, chest x-ray, asthma treatment.  Disposition pending.

## 2023-10-30 NOTE — ED ADULT NURSE REASSESSMENT NOTE - NS ED NURSE REASSESS COMMENT FT1
Report received from day RN, patient resting comfortably in bed, meds to be given as per order. Breathing even and unlabored. No complaints at this time

## 2023-10-30 NOTE — ED PROVIDER NOTE - PATIENT PORTAL LINK FT
You can access the FollowMyHealth Patient Portal offered by [facility] by registering at the following website: [email or web address]. By joining [employer]’s FollowMyHealth portal, you will also be able to view your health information using other applications (apps) compatible with our system.

## 2023-10-30 NOTE — ED ADULT TRIAGE NOTE - CHIEF COMPLAINT QUOTE
pt c/o abd and back pain x a few days, went to PMD and sent to the ED for r/o mets. Hx of Colon and Breast CA. also c/o SOB. audible wheezes noted.

## 2023-10-30 NOTE — ED PROVIDER NOTE - PHYSICAL EXAMINATION
GENERAL: Awake, alert, NAD  HEENT: NC/AT, moist mucous membranes, PERRL, EOMI  LUNGS: CTAB, wheezing on lung exam or crackles   CARDIAC: RRR, no m/r/g  ABDOMEN: Soft, RLQ abdominal, paraspinal muscle tenderness, non distended, no rebound, no guarding  BACK: No midline spinal tenderness, no CVA tenderness  EXT: No edema, no calf tenderness, 2+ DP pulses bilaterally, no deformities.  NEURO: A&Ox3. Moving all extremities.  SKIN: Warm and dry. No rash.  PSYCH: Normal affect.

## 2023-10-30 NOTE — ED PROVIDER NOTE - PROGRESS NOTE DETAILS
Patient reassessed after albuterol, magnesium and prednisone.  Also, patient's pain improved.  Will discharge with Flexeril and prednisone. Jean, PGY3: Patient was signed out to my care.  Found to be wheezing on bilateral lung auscultation, and with lower back pain.  Patient's imaging within normal limits.  Will treat asthma exacerbation and back pain with medication and reassess.

## 2023-10-30 NOTE — ED PROVIDER NOTE - ATTENDING CONTRIBUTION TO CARE
Brief HPI:  63-year-old female past medical history of breast cancer, colon cancer, asthma presents for abdominal pain, lumbar back pain, and shortness of breath.  Patient states abdominal and back pain started approximately 2 days ago.  Denies trauma or heavy lifting.  Patient saw her PCP who sent her in for evaluation of possible metastases.  Patient also with audible wheeze during interview, states that she has been having increased shortness of breath and wheeze for the last several days.  Denies fever, chills, chest pain, hemoptysis, leg swelling, numbness, tingling, extremity weakness, bowel or bladder incontinence or retention.  No history of DVT or PE.    Vitals:   Reviewed    Exam:    GEN:  Non-toxic appearing, non-distressed, speaking full sentences, non-diaphoretic, AAOx3  HEENT:  NCAT, neck supple, EOMI, PERRLA, sclera anicteric, no conjunctival pallor or injection, no stridor, normal voice, no tonsillar exudate, uvula midline  CV:  regular rhythm and rate, s1/s2 audible, no murmurs, rubs or gallops, peripheral pulses 2+ and symmetric  PULM:  non-labored respirations, Audible wheezing bilateral lung fields  ABD:  non distended, non-tender, no rebound, no guarding, negative Hugo's sign, bowel sounds normal, no cvat  MSK:  no gross deformity, non-tender extremities and joints, range of motion grossly normal appearing, no extremity edema, extremities warm and well perfused   NEURO:  AAOx3, CN II-XII intact, motor 5/5 in upper and lower extremities bilaterally, sensation grossly intact in extremities and trunk, finger to nose testing wnl, no nystagmus, negative Romberg, no pronator drift, no gait deficit  SKIN:  warm, dry, no rash or vesicles   Spine: No midline cervical, thoracic, lumbar spine tenderness    A/P:  63-year-old female past medical history of breast cancer, colon cancer, asthma presents for abdominal pain, lumbar back pain, and shortness of breath.  Low concern for spinal epidural abscess, transverse myelitis, acute cord compression, or cauda equina syndrome at this time.  The patient possesses no red flags including fever, chills, history of intravenous drug use, recent spinal instrumentation, predominant nocturnal pain, history of immunosuppression, pelvic or perineal anesthesia or paresthesia, or fecal or urinary incontinence or retention. Wheezes likely consistent with known asthma, low concern for PE overall.  Will obtain labs, CT imaging of abdomen and lumbar spine, chest x-ray, asthma treatment.  Disposition pending.

## 2023-10-30 NOTE — ED PROVIDER NOTE - OBJECTIVE STATEMENT
63-year-old female history of breast cancer now in remission 2019,  colon cancer status post resection 2022 presenting for 2 days of lower back pain and abdominal pain.  The patient states that she feels the pain in her right lower quadrant and right lower back and cannot differentiate whether it is abdominal or back pain.  The patient reports that she has never had the symptoms before.  The patient states that the pain has been continuous and getting more severe every day.  The patient has tried to take Tylenol with minimal symptom relief.  The patient went to go see her PMD today and was sent in for malignancy evaluation.  The patient also reports that she has been having shortness of breath.  The patient states that over the past 2 days it is also become more severe.  The patient notes that she has had excessive wheezing as well.  The patient has not taken anything for her symptoms at this time.  The patient denies fevers, chills, chest pain, nausea, vomiting, visual changes, diarrhea, urinary symptoms.

## 2023-10-30 NOTE — ED ADULT NURSE NOTE - OBJECTIVE STATEMENT
Patient received in room 13. Patient A&Ox3 and ambulatory at baseline. Patient referred to the ED from primary care for evaluation of back pain and right abdominal pain. phx breast cancer, right breast lumpectomy, colon cancer, HTN, asthma, DM. Patient states for approximately 3 days she has been having worsening back pain and right sided abdominal pain. Patient endorses shortness of breath too. Patient denies changes in BMs/urine, fever/chills, nausea/vomiting, headache, dizziness, lightheadedness. Airway patent, chest rise equal bilaterally, audible wheezing present, respirations unlabored. Patient able to speak in complete, uninterrupted sentences; denies chest pain and palpitations. Abdomen flat, soft and non-distended; upon palpation patient endorses tenderness to right side of abdomen. Pulse/motor/sensory present and no edema noted to all four extremities. 20G IV placed to left AC, labs collected and sent, medications administered as prescribed. Safety measures in place, family at bedside.

## 2023-10-30 NOTE — ED PROVIDER NOTE - NSFOLLOWUPINSTRUCTIONS_ED_ALL_ED_FT
You were seen for back pain.  And you were also found to have asthma exacerbation during your evaluation.    Flexeril and prednisone was sent to your pharmacy.    Please follow-up with your primary care doctor and pulmonologist for further management.    Please return to the emergency department if you are experiencing worsening shortness of breath, wheezing, back pain or any difficulty ambulating.    For your back pain please continue taking Tylenol and Flexeril.  May also use lidocaine patch at home.

## 2024-01-31 ENCOUNTER — EMERGENCY (EMERGENCY)
Facility: HOSPITAL | Age: 64
LOS: 1 days | Discharge: HOME | End: 2024-01-31
Payer: COMMERCIAL

## 2024-01-31 VITALS
OXYGEN SATURATION: 97 % | DIASTOLIC BLOOD PRESSURE: 60 MMHG | SYSTOLIC BLOOD PRESSURE: 126 MMHG | HEART RATE: 88 BPM | TEMPERATURE: 98 F | RESPIRATION RATE: 20 BRPM

## 2024-01-31 VITALS
WEIGHT: 147.05 LBS | HEIGHT: 64 IN | TEMPERATURE: 98 F | OXYGEN SATURATION: 99 % | RESPIRATION RATE: 19 BRPM | HEART RATE: 85 BPM | SYSTOLIC BLOOD PRESSURE: 158 MMHG | DIASTOLIC BLOOD PRESSURE: 90 MMHG

## 2024-01-31 DIAGNOSIS — Z98.890 OTHER SPECIFIED POSTPROCEDURAL STATES: Chronic | ICD-10-CM

## 2024-01-31 DIAGNOSIS — Z98.89 OTHER SPECIFIED POSTPROCEDURAL STATES: Chronic | ICD-10-CM

## 2024-01-31 PROCEDURE — 82962 GLUCOSE BLOOD TEST: CPT

## 2024-01-31 PROCEDURE — 99284 EMERGENCY DEPT VISIT MOD MDM: CPT

## 2024-01-31 PROCEDURE — 99285 EMERGENCY DEPT VISIT HI MDM: CPT | Mod: 25

## 2024-01-31 PROCEDURE — 94640 AIRWAY INHALATION TREATMENT: CPT

## 2024-01-31 RX ORDER — ACETAMINOPHEN 500 MG
650 TABLET ORAL ONCE
Refills: 0 | Status: COMPLETED | OUTPATIENT
Start: 2024-01-31 | End: 2024-01-31

## 2024-01-31 RX ORDER — IPRATROPIUM/ALBUTEROL SULFATE 18-103MCG
3 AEROSOL WITH ADAPTER (GRAM) INHALATION ONCE
Refills: 0 | Status: COMPLETED | OUTPATIENT
Start: 2024-01-31 | End: 2024-01-31

## 2024-01-31 RX ORDER — AZITHROMYCIN 500 MG/1
500 TABLET, FILM COATED ORAL ONCE
Refills: 0 | Status: COMPLETED | OUTPATIENT
Start: 2024-01-31 | End: 2024-01-31

## 2024-01-31 RX ADMIN — Medication 3 MILLILITER(S): at 16:57

## 2024-01-31 RX ADMIN — Medication 650 MILLIGRAM(S): at 16:56

## 2024-01-31 RX ADMIN — Medication 3 MILLILITER(S): at 17:17

## 2024-01-31 RX ADMIN — Medication 3 MILLILITER(S): at 18:47

## 2024-01-31 RX ADMIN — AZITHROMYCIN 500 MILLIGRAM(S): 500 TABLET, FILM COATED ORAL at 18:48

## 2024-01-31 RX ADMIN — Medication 3 MILLILITER(S): at 17:01

## 2024-01-31 RX ADMIN — Medication 40 MILLIGRAM(S): at 16:57

## 2024-01-31 RX ADMIN — Medication 3 MILLILITER(S): at 18:48

## 2024-01-31 NOTE — ED PROVIDER NOTE - PATIENT PORTAL LINK FT
You can access the FollowMyHealth Patient Portal offered by [facility] by registering at the following website: [email or web address]. By joining [facility]’s FollowMyHealth portal, you will also be able to view your health information using other applications (apps) compatible with our system.

## 2024-01-31 NOTE — ED ADULT NURSE NOTE - NSICDXPASTMEDICALHX_GEN_ALL_CORE_FT
Pt presents to ED for covid swab. +exposure. No complaints at this time  Pt here for testing.
PAST MEDICAL HISTORY:  Asthma (no hx/o intubation)    Breast cancer R breast 10/ 2019    DM2 (diabetes mellitus, type 2)     Former smoker, stopped smoking many years ago (Quit ~32years ago; used to Nicotine patch  Informed pt of the various negative side effects of smoking including risk of COPD, Lung Ca etc  Strongly recommended that pt stops smoking and pt given various options of smoking cessation tools such as NRT's and other pharmacotherapies 0.3 ppd x ~10 years.)    Hypertension

## 2024-01-31 NOTE — ED ADULT NURSE NOTE - OBJECTIVE STATEMENT
64 y/o female presents to ED from home c/o mild headache, asthma exacerbation. Pt is A&O x 4. Breathing even and unlabored. Gross motor and neuro intact. Safety and comfort provided. 62 y/o female presents to ED from home c/o mild headache, asthma exacerbation. Pt has been coughing, feeling like asthma is acting up. Pt is A&O x 4. Breathing even. +Audible wheezing noted. No respiratory distress noted, however. Speaking in full sentences without difficulty. Gross motor and neuro intact. Safety and comfort provided.

## 2024-01-31 NOTE — ED PROVIDER NOTE - PROGRESS NOTE DETAILS
Kyra: pt feels slightly better after nebs, moving a bit more air without significant wheezing appreciated, pt states she typically gets azithromycin when she has asthma exac. Will add azithro.  Meds sent to pharmacy. Giving more nebs and will reassess shortly. Kyra: pt feeling better after more nebs and feeling well enough to go home. Pt moving air better. Pt counseled on prednisone effects on glucose and pt understands and has insulin at home. Safe for dc.

## 2024-01-31 NOTE — ED PROVIDER NOTE - ATTENDING CONTRIBUTION TO CARE
Emergency Medicine Attending MD Fowler: patient seen and evaluated with the EM Fellow. I was present for key portions of the History & Physical, and I agree with the Impression & Plan.    Patient is a 63-year-old female complaining of cough and wheezing.  Medical history of asthma, complaining of feeling like she is beginning to have an asthma exacerbation.  No recent hospitalizations, no ICU admissions or intubations.  Was previously being evaluated in [place] for a work-related complaint noted to have blood pressure in the 160s and told to go to the ED.  Patient has known hypertension and takes her medications accordingly.    Quality of symptoms feel like prior asthma exacerbation.  Patient has had runny nose and cough for the last 3 days.  Feels like it is making her asthma worse.    VS: Mild hypertension appreciated  Gen: Well appearing adult female in NAD  Head: NC/AT  Neck: trachea midline  Resp:  No distress, trace bilateral wheezing throughout  CV: RRR, no RMG  Abd: nondistended  Ext: no deformities  Neuro:  A&Ox4 appears non focal  Skin:  Warm and dry as visualized  Psych:  Normal affect and mood    MDM  H&P consistent with mild uncontrolled hypertension without clinical evidence of endorgan damage.  Lungs would suggest that she does indeed have mild asthma exacerbation at this time.  Plan to administer nebs, azithromycin, reassess.  Anticipate DC home follow-up PMD, strict return precautions.

## 2024-01-31 NOTE — ED PROVIDER NOTE - NSFOLLOWUPINSTRUCTIONS_ED_ALL_ED_FT
- Please follow up with your Primary Care Doctor within 1-2 weeks. Bring your results from today.    - Tylenol up to 650 mg every 6 hours as needed for headache/pain.    - Take nebulizer treatments every 4-6 hrs for the next 24 hours. Then you can take them as needed every 6hrs moving forward.     - Take prescribed medication as indicated (both to be started tomorrow 2/1/24):    Prednisone (note that this may elevate your blood glucose, please do frequent fingersticks to check blood glucose for the next week and administer insulin per sliding scale).    Azithromycin    - Be sure to return to the ED if you develop new, worsening, or any distressing symptoms.

## 2024-01-31 NOTE — ED PROVIDER NOTE - PHYSICAL EXAMINATION
GENERAL: no acute distress, non-toxic appearing  HEENT: normal conjunctiva, oral mucosa moist  CARDIAC: regular rate and regular rhythm, SBP in 150s  PULM: pt without significant wheezing on auscultation though pt seems to not be moving much air, sats well on RA, no significant increased wob  GI: abdomen nondistended, soft, nontender  : no suprapubic tenderness  NEURO: alert and oriented x 3, normal speech, moving all extremities without lateralization  MSK: no visible deformities, no peripheral edema, calf tenderness/redness/swelling  SKIN: no visible rashes  PSYCH: appropriate mood and affect

## 2024-01-31 NOTE — ED ADULT NURSE NOTE - NSFALLUNIVINTERV_ED_ALL_ED
Bed/Stretcher in lowest position, wheels locked, appropriate side rails in place/Call bell, personal items and telephone in reach/Instruct patient to call for assistance before getting out of bed/chair/stretcher/Non-slip footwear applied when patient is off stretcher/[place] to call system/Physically safe environment - no spills, clutter or unnecessary equipment/Purposeful proactive rounding/Room/bathroom lighting operational, light cord in reach

## 2024-01-31 NOTE — ED PROVIDER NOTE - CLINICAL SUMMARY MEDICAL DECISION MAKING FREE TEXT BOX
Kyra: concern for asthma exacerbation, possibly secondary to viral syndrome, low suspicon for emergent cause for pts headache/HTN urgency. Pts BP may be elevated due to lower dose of meds pt reports? Plan for nebs, steroids (pt knows to be more on top of blood glucose/insulin when on prednisone) for asthma exac and apap for headache. Reassess.

## 2024-01-31 NOTE — ED PROVIDER NOTE - ADDITIONAL NOTES AND INSTRUCTIONS:
Please excuse Alka from work as she was seen in the ER Please excuse Mathieu from work as he was accompanying family in the ER

## 2024-02-01 RX ORDER — AZITHROMYCIN 500 MG/1
1 TABLET, FILM COATED ORAL
Qty: 4 | Refills: 0
Start: 2024-02-01 | End: 2024-02-04

## 2024-02-21 ENCOUNTER — RX RENEWAL (OUTPATIENT)
Age: 64
End: 2024-02-21

## 2024-02-22 RX ORDER — METFORMIN HYDROCHLORIDE 1000 MG/1
1000 TABLET, COATED ORAL TWICE DAILY
Qty: 180 | Refills: 0 | Status: ACTIVE | COMMUNITY
Start: 2021-09-27 | End: 1900-01-01

## 2024-03-01 ENCOUNTER — NON-APPOINTMENT (OUTPATIENT)
Age: 64
End: 2024-03-01

## 2024-03-04 ENCOUNTER — OUTPATIENT (OUTPATIENT)
Dept: OUTPATIENT SERVICES | Facility: HOSPITAL | Age: 64
LOS: 1 days | End: 2024-03-04
Payer: COMMERCIAL

## 2024-03-04 ENCOUNTER — TRANSCRIPTION ENCOUNTER (OUTPATIENT)
Age: 64
End: 2024-03-04

## 2024-03-04 ENCOUNTER — RX RENEWAL (OUTPATIENT)
Age: 64
End: 2024-03-04

## 2024-03-04 VITALS
TEMPERATURE: 98 F | WEIGHT: 147.05 LBS | RESPIRATION RATE: 16 BRPM | HEIGHT: 60 IN | DIASTOLIC BLOOD PRESSURE: 74 MMHG | HEART RATE: 72 BPM | SYSTOLIC BLOOD PRESSURE: 120 MMHG | OXYGEN SATURATION: 100 %

## 2024-03-04 VITALS
SYSTOLIC BLOOD PRESSURE: 136 MMHG | OXYGEN SATURATION: 98 % | HEART RATE: 66 BPM | RESPIRATION RATE: 15 BRPM | DIASTOLIC BLOOD PRESSURE: 79 MMHG

## 2024-03-04 DIAGNOSIS — Z98.89 OTHER SPECIFIED POSTPROCEDURAL STATES: Chronic | ICD-10-CM

## 2024-03-04 DIAGNOSIS — Z98.890 OTHER SPECIFIED POSTPROCEDURAL STATES: Chronic | ICD-10-CM

## 2024-03-04 DIAGNOSIS — R94.39 ABNORMAL RESULT OF OTHER CARDIOVASCULAR FUNCTION STUDY: ICD-10-CM

## 2024-03-04 LAB
ANION GAP SERPL CALC-SCNC: 14 MMOL/L — SIGNIFICANT CHANGE UP (ref 5–17)
BUN SERPL-MCNC: 18 MG/DL — SIGNIFICANT CHANGE UP (ref 7–23)
CALCIUM SERPL-MCNC: 9.9 MG/DL — SIGNIFICANT CHANGE UP (ref 8.4–10.5)
CHLORIDE SERPL-SCNC: 105 MMOL/L — SIGNIFICANT CHANGE UP (ref 96–108)
CO2 SERPL-SCNC: 23 MMOL/L — SIGNIFICANT CHANGE UP (ref 22–31)
CREAT SERPL-MCNC: 1.13 MG/DL — SIGNIFICANT CHANGE UP (ref 0.5–1.3)
EGFR: 55 ML/MIN/1.73M2 — LOW
GLUCOSE BLDC GLUCOMTR-MCNC: 144 MG/DL — HIGH (ref 70–99)
GLUCOSE SERPL-MCNC: 156 MG/DL — HIGH (ref 70–99)
HCT VFR BLD CALC: 37.3 % — SIGNIFICANT CHANGE UP (ref 34.5–45)
HGB BLD-MCNC: 12 G/DL — SIGNIFICANT CHANGE UP (ref 11.5–15.5)
MCHC RBC-ENTMCNC: 26.8 PG — LOW (ref 27–34)
MCHC RBC-ENTMCNC: 32.2 GM/DL — SIGNIFICANT CHANGE UP (ref 32–36)
MCV RBC AUTO: 83.4 FL — SIGNIFICANT CHANGE UP (ref 80–100)
NRBC # BLD: 0 /100 WBCS — SIGNIFICANT CHANGE UP (ref 0–0)
PLATELET # BLD AUTO: 294 K/UL — SIGNIFICANT CHANGE UP (ref 150–400)
POTASSIUM SERPL-MCNC: 3.9 MMOL/L — SIGNIFICANT CHANGE UP (ref 3.5–5.3)
POTASSIUM SERPL-SCNC: 3.9 MMOL/L — SIGNIFICANT CHANGE UP (ref 3.5–5.3)
RBC # BLD: 4.47 M/UL — SIGNIFICANT CHANGE UP (ref 3.8–5.2)
RBC # FLD: 12.6 % — SIGNIFICANT CHANGE UP (ref 10.3–14.5)
SODIUM SERPL-SCNC: 142 MMOL/L — SIGNIFICANT CHANGE UP (ref 135–145)
WBC # BLD: 7.31 K/UL — SIGNIFICANT CHANGE UP (ref 3.8–10.5)
WBC # FLD AUTO: 7.31 K/UL — SIGNIFICANT CHANGE UP (ref 3.8–10.5)

## 2024-03-04 PROCEDURE — 93005 ELECTROCARDIOGRAM TRACING: CPT

## 2024-03-04 PROCEDURE — C1769: CPT

## 2024-03-04 PROCEDURE — C1887: CPT

## 2024-03-04 PROCEDURE — 80048 BASIC METABOLIC PNL TOTAL CA: CPT

## 2024-03-04 PROCEDURE — 93010 ELECTROCARDIOGRAM REPORT: CPT

## 2024-03-04 PROCEDURE — 82962 GLUCOSE BLOOD TEST: CPT

## 2024-03-04 PROCEDURE — 93458 L HRT ARTERY/VENTRICLE ANGIO: CPT

## 2024-03-04 PROCEDURE — C1894: CPT

## 2024-03-04 PROCEDURE — 85027 COMPLETE CBC AUTOMATED: CPT

## 2024-03-04 RX ORDER — LOSARTAN/HYDROCHLOROTHIAZIDE 100MG-25MG
0 TABLET ORAL
Qty: 0 | Refills: 1 | DISCHARGE

## 2024-03-04 RX ORDER — INSULIN GLARGINE 100 [IU]/ML
0 INJECTION, SOLUTION SUBCUTANEOUS
Refills: 0 | DISCHARGE

## 2024-03-04 RX ORDER — ALBUTEROL 90 UG/1
0 AEROSOL, METERED ORAL
Qty: 0 | Refills: 0 | DISCHARGE

## 2024-03-04 RX ORDER — SODIUM CHLORIDE 9 MG/ML
1000 INJECTION INTRAMUSCULAR; INTRAVENOUS; SUBCUTANEOUS
Refills: 0 | Status: COMPLETED | OUTPATIENT
Start: 2024-03-04 | End: 2024-03-04

## 2024-03-04 RX ORDER — AMLODIPINE BESYLATE 2.5 MG/1
1 TABLET ORAL
Refills: 0 | DISCHARGE

## 2024-03-04 RX ORDER — METFORMIN HYDROCHLORIDE 850 MG/1
1 TABLET ORAL
Qty: 0 | Refills: 0 | DISCHARGE

## 2024-03-04 RX ORDER — TIZANIDINE 4 MG/1
2 TABLET ORAL
Refills: 0 | DISCHARGE

## 2024-03-04 RX ORDER — ATORVASTATIN CALCIUM 40 MG/1
40 TABLET, FILM COATED ORAL
Qty: 90 | Refills: 3 | Status: ACTIVE | COMMUNITY
Start: 2022-08-22 | End: 1900-01-01

## 2024-03-04 RX ORDER — ATORVASTATIN CALCIUM 80 MG/1
1 TABLET, FILM COATED ORAL
Refills: 0 | DISCHARGE

## 2024-03-04 RX ORDER — SODIUM CHLORIDE 9 MG/ML
250 INJECTION INTRAMUSCULAR; INTRAVENOUS; SUBCUTANEOUS ONCE
Refills: 0 | Status: COMPLETED | OUTPATIENT
Start: 2024-03-04 | End: 2024-03-04

## 2024-03-04 RX ORDER — CLOPIDOGREL BISULFATE 75 MG/1
1 TABLET, FILM COATED ORAL
Refills: 0 | DISCHARGE

## 2024-03-04 RX ORDER — ATORVASTATIN CALCIUM 80 MG/1
1 TABLET, FILM COATED ORAL
Qty: 0 | Refills: 0 | DISCHARGE

## 2024-03-04 RX ADMIN — SODIUM CHLORIDE 75 MILLILITER(S): 9 INJECTION INTRAMUSCULAR; INTRAVENOUS; SUBCUTANEOUS at 09:07

## 2024-03-04 RX ADMIN — SODIUM CHLORIDE 750 MILLILITER(S): 9 INJECTION INTRAMUSCULAR; INTRAVENOUS; SUBCUTANEOUS at 09:07

## 2024-03-04 NOTE — ASU PATIENT PROFILE, ADULT - FALL HARM RISK - UNIVERSAL INTERVENTIONS
Bed in lowest position, wheels locked, appropriate side rails in place/Call bell, personal items and telephone in reach/Instruct patient to call for assistance before getting out of bed or chair/Non-slip footwear when patient is out of bed/[place] to call system/Physically safe environment - no spills, clutter or unnecessary equipment/Purposeful Proactive Rounding/Room/bathroom lighting operational, light cord in reach

## 2024-03-04 NOTE — H&P CARDIOLOGY - HISTORY OF PRESENT ILLNESS
62 y/o female with PMHx of Asthma (never requiring intubation), Breast CA s/p right lumpectomy chemo/radiation, Colon CA s/p resection, T2DM, HLD and family hx of CAD in 1st degree relatives (mother had MI in her 60's, father  2/2 AMI @ 80 y/o) who had episode of presyncope while walking in 2024.  She was referred to Cardiology for evaluation and had EKG with non-specific changes and stress testing consistent with decreased exercise tolerance and positive for ischemic changes in the apical septum, apex and mid-lateral walls.  Patient is now for further ischemic eval with [facility].      Cardiology- Dr. Gregorio                                                                                                                                                             64 y/o female with PMHx of Asthma (never requiring intubation), Breast CA s/p right lumpectomy chemo/radiation, Colon CA s/p resection, T2DM, HLD and family hx of CAD in 1st degree relatives (mother had MI in her 60's, father  2/2 AMI @ 80 y/o) who had episode of presyncope while walking in 2024.  She was referred to Cardiology for evaluation and had EKG with non-specific changes and stress testing consistent with decreased exercise tolerance and positive for ischemic changes in the apical septum, apex and mid-lateral walls.  Patient is now for further ischemic eval with [facility].    Of note - patient has ASA allergy, currently tolerating plavix.  Plan is for diagnostic [facility] today and if needed admission to CICU for ASA desensitization .  Cardiology- Dr. Gregorio

## 2024-03-04 NOTE — ASU DISCHARGE PLAN (ADULT/PEDIATRIC) - PROVIDER TOKENS
FREE:[LAST:[Jg],FIRST:[],PHONE:[(   )    -],FAX:[(   )    -],FOLLOWUP:[2 weeks]] PROVIDER:[TOKEN:[[id number]],FOLLOWUP:[2 weeks],ESTABLISHEDPATIENT:[T]]

## 2024-03-04 NOTE — ASU DISCHARGE PLAN (ADULT/PEDIATRIC) - ASU DC SPECIAL INSTRUCTIONSFT

## 2024-03-04 NOTE — ASU DISCHARGE PLAN (ADULT/PEDIATRIC) - CARE PROVIDER_API CALL
Dr Jg  Phone: (   )    -  Fax: (   )    -  Follow Up Time: 2 weeks   Madalyn Rosenthal  Cardiology  [address] [place] [address], [address]  [place] [address]  Phone: [phone]  Fax: [phone]  Established Patient  Follow Up Time: 2 weeks

## 2024-03-04 NOTE — ASU DISCHARGE PLAN (ADULT/PEDIATRIC) - NS MD DC FALL RISK RISK
For information on Fall & Injury Prevention, visit: [email or web address] OR  [email or web address] OR  https://www.cdc.gov/steadi/patient.html

## 2024-03-04 NOTE — H&P CARDIOLOGY - NSICDXPASTMEDICALHX_GEN_ALL_CORE_FT
PAST MEDICAL HISTORY:  Asthma (no hx/o intubation)    Breast cancer R breast 10/ 2019    Colon cancer     DM2 (diabetes mellitus, type 2)     History of chemotherapy     History of radiation therapy     Hypertension

## 2024-03-04 NOTE — H&P CARDIOLOGY - NSICDXPASTSURGICALHX_GEN_ALL_CORE_FT
PAST SURGICAL HISTORY:   delivery NOS x1    H/O foot surgery right    S/P lumpectomy, right breast 10/2019

## 2024-04-15 NOTE — ED ADULT NURSE NOTE - OBJECTIVE STATEMENT
Pt with hx of asthma. AOX3, skin warm, dry and intact.  Pt endorses SOB, no acute respiratory distress noted complains of pain/discomfort

## 2024-06-24 NOTE — ED ADULT TRIAGE NOTE - WEIGHT IN KG
No Changes or discrepancies to medication, sig, qty, or pharmacy.    Pharmacy set up and verified.     [id number] - [place], [place]  
Received refill request for Amlodipine 10 mg  Request is approved  because refill protocol Met approval criteria  LOV and or labs were verified to be correct  Refill sent to pharmacy    [facility] pharmacy  
68

## 2024-08-21 NOTE — H&P CARDIOLOGY - [facility] (M2)
[facility] AMBULATORY encounter  HEMATOLOGY/ONCOLOGY OFFICE VISIT    Date of Service:  8/21/2024    Diagnosis: Lung Cancer    Chief Complaint:   Chief Complaint   Patient presents with    Cancer   Evaluation of cycle 10 Pembrolizumab/Pemetrexed treatment for lung cancer    Portions of this note are brought forward from Dr. Sheffield's note; reviewed and edited by me as appropriate.     Oncology History:    Adenocarcinoma of left upper lobe of lung diagnosed on 12/28/2023     CT scan 10/31/2023-new 32 mm mass in left upper lobe.     PET scan 11/21/2023-12 mm left hilar lymph node SUV 19   Inferior left hilar lymph node SUV 14   Lingular mass SUV 23   Central right upper lobe 7 mm nodule SUV 4.8     Bronchoscopy 12/13/2023-negative for malignant cells in the lung nodule and hilar lymph node     IR lung biopsy 12/20/2023 left upper lobe FNA malignant cells consistent with adenocarcinoma of the lung.  PD-L1 100%.  NGS-insufficient count quantity.     CT scan of the chest 01/18/2024-increased size of left lingular mass up to 3.9 x 2.8 cm      PFTs FEV1 61% DLCO 57% FVC 85%     MRI brain 1/29/2024- brain metastasis.     MRI cervical thoracic and lumbar spine 2/7/2024- osseous metastatic disease     Final stage IV.     First-line palliative Carboplatin + Pemetrexed + Pembrolizumab beginning- 2/14/2024     SBRT to brain completed 3/25/2024.     Adenocarcinoma diagnosed 12/28/2023  Stage IV  PDL1 100%  NGS: quantity not sufficient  Current Disease status: Responding to treatment as of PET scan 7/2/2024    Interval History:  Patient presents today accompanied by her daughter for evaluation of cycle 10 maintenance Pembrolizumab/Pemetrexed. Patient feels she is tolerating treatment well. She denies any new concerns since last treatment. Appetite has been a chronic concern but trying to increase protein intake as much as possible. She notes easy bruising but denies any signs of bleeding including epistaxis, hematuria, or  hematochezia. Chronic constipation has not worsened. Feels her energy levels are stable. Neuropathy in bilateral feet have not worsened. She denies nausea, vomiting, shortness of breath, fever, chills, diarrhea, or rash. Trying to stay active as much as possible to help with fatigue. She notes that she has a lot of family support if needed for ADL's. No new concerns today and agreeable to proceed with treatment as planned.     Review of Systems:    GENERAL:  Patient denies headache, fevers, chills, night sweats, excessive fatigue, change in appetite, weight loss, dizziness, but complains of: weight loss  ALLERGIC/IMMUNOLOGIC: Verified allergies: Yes  EYES:  Patient denies significant visual difficulties, double vision, blurred vision, but complains of: significant visual difficulties  ENT/MOUTH: Patient denies problems with hearing, sore throat, sinus drainage, mouth sores, but complains of: sinus drainage  ENDOCRINE:  Patient denies diabetes, thyroid disease, hormone replacement, hot flashes  HEMATOLOGIC/LYMPHATIC: Patient denies easy bruising, bleeding, tender lymph nodes, swollen lymph nodes, but complains of: easy bruising  BREASTS: Patient denies abnormal masses of breast, nipple discharge, pain  RESPIRATORY:  Patient denies lung pain with breathing, cough, coughing up blood, shortness of breath  CARDIOVASCULAR:  Patient denies anginal chest pain, palpitations, shortness of breath when lying flat, peripheral edema  GASTROINTESTINAL: Patient denies abdominal pain , nausea, vomiting, diarrhea, GI bleeding, constipation, change in bowel habits, heartburn, sensation of feeling full, difficulty swallowing, but complains of: constipation  : Patient denies abnormal genital masses, blood in the urine, frequency, urgency, burning with urination, hesitancy, incontinence, vaginal bleeding, discharge  MUSCULOSKELETAL:  Patient denies joint pain, bone pain, joint swelling, redness, decreased range of motion  SKIN:   Patient denies chronic rashes, inflammation, ulcerations, skin changes, itching  NEUROLOGIC:  Patient denies loss of balance, areas of focal weakness, abnormal gait, sensory problems, numbness, tingling, but complains of: loss of balance, numbness bottom of feet and toes, and tingling bottom of feet and toes  PSYCHIATRIC: Patient denies insomnia, depression, anxiety    This patient reported abnormal symptoms that needed immediate verbal communication: No      Medical History:  Past Medical History:   Diagnosis Date    ARTHRITIS LUMBOSACRAL SPINE     Pain : Dr Hernández    Bronchitis     Centrilobular emphysema  (CMD)     Followed by Dr. Gualberto Velez [phone]    Depression with anxiety     DJD (degenerative joint disease), lumbar     Pain    Epilepsy  (CMD) 1980    controlled    Essential (primary) hypertension     Essential tremor     Neurology    GERD (gastroesophageal reflux disease)     High cholesterol     Malignant neoplasm of breast (female), unspecified site 2000    Stage I left breast invasive ductal carcinoma, s/p lumpectomy in 2000.    Myoclonus     non-epileptiform    Onychomycosis     Podiatry: Dr. Mcclure    Osteoarthritis, knee     Orthopedics: Dr. Calles    Osteoporosis 06/2018    Other convulsions 01/01/1980    Neurology: Dr. Sanchez    Primary malignant neoplasm of lung with metastasis to brain  (CMD) 02/16/2024    PROLAPSE MITRAL VALVE 1980    Cardiology Dr. Tinsley    Secondary malignant neoplasm of brain  (CMD) 03/2024    Sleep apnea     was told she needs to have this evaluated by MD    Tobacco use     Vitamin D deficiency         Physical Exam:  Vitals:  Visit Vitals  BP (!) 155/69   Pulse (!) 55   Temp 97.3 °F (36.3 °C) (Temporal)   Resp 16   Wt 65 kg (143 lb 3.2 oz)   SpO2 97%   BMI 23.83 kg/m²     GENERAL: The patient is alert, well-developed, well-nourished, no distress.  PSYCHIATRIC: Cooperative. Appropriate mood and affect. Normal judgment.  NECK:  Supple without masses or  thyromegaly.  No jugular venous distension.   HEMATOLOGIC/LYMPHATIC:  No petechiae or purpura.  No tender or palpable lymph nodes in the cervical, supraclavicular, axillary or inguinal area.   RESPIRATORY:  Lungs are clear to auscultation without rhonchi or wheezing.   CARDIOVASCULAR:  Regular rate and rhythm of heart without murmurs, gallops or rubs.   CHEST:  Chest is symmetric, without chest wall deformities.   BREASTS:  Symmetric bilaterally without nipple discharge and no masses or tenderness.  No skin changes.   ABDOMEN:  Nontender, nondistended, no masses, ascites or hepatosplenomegaly.  Good bowel sounds.  No guarding or rebound tenderness.  No pulsatile masses.   BACK/SPINE:  No kyphosis, scoliosis, compression fractures.  Nontender to palpation.   MUSCULOSKELETAL:  No tenderness or swelling, normal range of motion without obvious weakness.   EXTREMITIES:  No visible deformities, no cyanosis, clubbing or edema.    INTEGUMENTARY:  No rashes, scars, or lesions suggestive of malignancy.    Weight:  Wt Readings from Last 10 Encounters:   08/21/24 65 kg (143 lb 3.2 oz)   07/31/24 63.3 kg (139 lb 8 oz)   07/10/24 64.3 kg (141 lb 11.2 oz)   06/26/24 65.8 kg (145 lb)   06/20/24 63.5 kg (140 lb)   06/19/24 63.9 kg (140 lb 12.8 oz)   06/05/24 69.6 kg (153 lb 7 oz)   05/29/24 67.7 kg (149 lb 3.2 oz)   05/08/24 70.1 kg (154 lb 8 oz)   04/17/24 70.9 kg (156 lb 4.8 oz)       Imaging:  Pertinent imaging results were reviewed.     Labs:  Recent Labs   Lab 08/21/24  1140 07/31/24  1220 07/17/24  1323 07/10/24  1329 06/19/24  1325   WBC 4.6 9.0 3.5* 7.3 4.1*   RBC 2.53* 2.85* 2.85* 2.70* 2.27*   HGB 8.7* 9.4* 9.0* 8.5* 7.4*   HCT 26.1* 28.2* 28.3* 27.2* 22.6*   .2* 98.9 99.3 100.7* 99.6    293 129* 348 120*   Absolute Neutrophils 3.2 7.5 2.0 6.1 3.3   Absolute Lymphocytes 0.9* 0.9* 1.2 0.7* 0.5*   Absolute Monocytes 0.4 0.6 0.2* 0.5 0.2*   Absolute Eosinophils  0.1 0.0 0.0 0.0 0.0   Absolute Basophils 0.0 0.0  0.0 0.0 0.0     Recent Labs   Lab 08/21/24  1140 07/31/24  1220 07/10/24  1329 06/19/24  1325 06/06/24  1303 06/06/24  0402 06/01/24  1756 06/01/24  1123 04/17/24  0754 03/27/24  0816   Glucose 109* 119* 125* 148*  --  91   < > 155*   < > 119*   Sodium 143 137 141 143  --  140   < > 141   < > 138   Potassium 3.1* 2.8* 3.8 3.6 4.2 3.2*   < > 2.9*   < > 3.9   Chloride 107 100 107 107  --  106   < > 103   < > 105   BUN 8 10 5* 5*  --  26*   < > 14   < > 9   Creatinine 0.57 0.61 0.63 0.59  --  0.58   < > 0.60   < > 0.48*   Calcium 8.6 9.1 9.1 9.0  --  8.2*   < > 8.5   < > 8.4   Magnesium  --   --   --   --   --  1.8  --  2.1  --  2.4   Albumin 3.1* 3.5* 3.4* 3.2*  --   --   --  3.2*   < > 3.5*   GOT/AST 18 18 17 11  --   --   --  14   < > 10   Alkaline Phosphatase 84 85 87 79  --   --   --  94   < > 99   GPT 21 14 11 20  --   --   --  22   < > 18    < > = values in this interval not displayed.     Recent Labs   Lab 08/21/24  1140 07/31/24  1220 07/10/24  1329 06/19/24  1325 06/06/24  0402 06/02/24  0525 06/01/24  1123   Anion Gap 12 9 13 14 7   < > 9   Globulin 3.7 4.0 4.1* 3.7  --   --  4.0    < > = values in this interval not displayed.       Advance Directive:  Yes      Clinical Impression & Plan:    Primary malignant neoplasm of left upper lobe of lung   - Adenocarcinoma of left upper lobe of lung diagnosed on 12/28/2023  - Started first-line palliative Carboplatin + Pemetrexed + Pembrolizumab beginning- 2/14/2024  - Responding to treatment as of PET scan 7/2/2024   - Tolerating treatment well  - Currently Cycle 10 of maintenance Pemetrexed + Pembrolizumab    Encounter for chemotherapy management   - Labs reviewed and adequate for treatment. No grade 3/4 toxicity. Tolerating treatment well. Plan to continue with cycle 10 as planned today    Hypokalemia   - Currently taking Potassium 10mEq daily  - K 3.1 today  - Plan to increase supplementation to BID. Patient agreeable. New rx sent.     Follow up:  - Labs reviewed  with patient  - Cycle 10 Pemetrexed/Pembrolizumab today  - Increase home potassium to 10mEq BID, new rx sent  - RTC in 3 weeks for MD follow up with CBC, CMP and next cycle of treatment     The patient, daughter(s) indicated understanding of the diagnosis and agreed with the plan of care.  Patient is encouraged to call for any interval symptoms or concerns such as fever greater than 100.5, vomiting, constipation or diarrhea, bleeding, or weakness.  Patient verbalizes and is in agreement with recommended plan of care.  Patient was seen independently.     I spent a total of 25 minutes on the day of the visit.  This includes pre-charting, chart review, documenting, and referring/communicating with other health care professionals.      TUAN Montana     1.63

## 2024-09-12 ENCOUNTER — INPATIENT (INPATIENT)
Facility: HOSPITAL | Age: 64
LOS: 7 days | Discharge: HOME | DRG: 203 | End: 2024-09-20
Attending: INTERNAL MEDICINE | Admitting: STUDENT IN AN ORGANIZED HEALTH CARE EDUCATION/TRAINING PROGRAM
Payer: COMMERCIAL

## 2024-09-12 VITALS
WEIGHT: 145.06 LBS | DIASTOLIC BLOOD PRESSURE: 88 MMHG | HEART RATE: 100 BPM | TEMPERATURE: 98 F | OXYGEN SATURATION: 98 % | SYSTOLIC BLOOD PRESSURE: 142 MMHG | RESPIRATION RATE: 21 BRPM | HEIGHT: 59 IN

## 2024-09-12 DIAGNOSIS — Z98.89 OTHER SPECIFIED POSTPROCEDURAL STATES: Chronic | ICD-10-CM

## 2024-09-12 DIAGNOSIS — J45.901 UNSPECIFIED ASTHMA WITH (ACUTE) EXACERBATION: ICD-10-CM

## 2024-09-12 DIAGNOSIS — Z98.890 OTHER SPECIFIED POSTPROCEDURAL STATES: Chronic | ICD-10-CM

## 2024-09-12 LAB
ALBUMIN SERPL ELPH-MCNC: 4.4 G/DL — SIGNIFICANT CHANGE UP (ref 3.3–5)
ALP SERPL-CCNC: 110 U/L — SIGNIFICANT CHANGE UP (ref 40–120)
ALT FLD-CCNC: 22 U/L — SIGNIFICANT CHANGE UP (ref 10–45)
ANION GAP SERPL CALC-SCNC: 18 MMOL/L — HIGH (ref 5–17)
AST SERPL-CCNC: 17 U/L — SIGNIFICANT CHANGE UP (ref 10–40)
BASOPHILS # BLD AUTO: 0.02 K/UL — SIGNIFICANT CHANGE UP (ref 0–0.2)
BASOPHILS NFR BLD AUTO: 0.2 % — SIGNIFICANT CHANGE UP (ref 0–2)
BILIRUB SERPL-MCNC: 0.1 MG/DL — LOW (ref 0.2–1.2)
BUN SERPL-MCNC: 26 MG/DL — HIGH (ref 7–23)
CALCIUM SERPL-MCNC: 10.2 MG/DL — SIGNIFICANT CHANGE UP (ref 8.4–10.5)
CHLORIDE SERPL-SCNC: 100 MMOL/L — SIGNIFICANT CHANGE UP (ref 96–108)
CO2 SERPL-SCNC: 18 MMOL/L — LOW (ref 22–31)
CREAT SERPL-MCNC: 0.79 MG/DL — SIGNIFICANT CHANGE UP (ref 0.5–1.3)
EGFR: 85 ML/MIN/1.73M2 — SIGNIFICANT CHANGE UP
EOSINOPHIL # BLD AUTO: 0 K/UL — SIGNIFICANT CHANGE UP (ref 0–0.5)
EOSINOPHIL NFR BLD AUTO: 0 % — SIGNIFICANT CHANGE UP (ref 0–6)
FLUAV AG NPH QL: SIGNIFICANT CHANGE UP
FLUBV AG NPH QL: SIGNIFICANT CHANGE UP
GAS PNL BLDV: SIGNIFICANT CHANGE UP
GAS PNL BLDV: SIGNIFICANT CHANGE UP
GLUCOSE SERPL-MCNC: 346 MG/DL — HIGH (ref 70–99)
HCT VFR BLD CALC: 36.6 % — SIGNIFICANT CHANGE UP (ref 34.5–45)
HGB BLD-MCNC: 11.8 G/DL — SIGNIFICANT CHANGE UP (ref 11.5–15.5)
IMM GRANULOCYTES NFR BLD AUTO: 1.2 % — HIGH (ref 0–0.9)
LYMPHOCYTES # BLD AUTO: 1.59 K/UL — SIGNIFICANT CHANGE UP (ref 1–3.3)
LYMPHOCYTES # BLD AUTO: 12.6 % — LOW (ref 13–44)
MCHC RBC-ENTMCNC: 27.3 PG — SIGNIFICANT CHANGE UP (ref 27–34)
MCHC RBC-ENTMCNC: 32.2 GM/DL — SIGNIFICANT CHANGE UP (ref 32–36)
MCV RBC AUTO: 84.5 FL — SIGNIFICANT CHANGE UP (ref 80–100)
MONOCYTES # BLD AUTO: 0.27 K/UL — SIGNIFICANT CHANGE UP (ref 0–0.9)
MONOCYTES NFR BLD AUTO: 2.1 % — SIGNIFICANT CHANGE UP (ref 2–14)
NEUTROPHILS # BLD AUTO: 10.57 K/UL — HIGH (ref 1.8–7.4)
NEUTROPHILS NFR BLD AUTO: 83.9 % — HIGH (ref 43–77)
NRBC # BLD: 0 /100 WBCS — SIGNIFICANT CHANGE UP (ref 0–0)
PLATELET # BLD AUTO: 289 K/UL — SIGNIFICANT CHANGE UP (ref 150–400)
POTASSIUM SERPL-MCNC: 4.8 MMOL/L — SIGNIFICANT CHANGE UP (ref 3.5–5.3)
POTASSIUM SERPL-SCNC: 4.8 MMOL/L — SIGNIFICANT CHANGE UP (ref 3.5–5.3)
PROT SERPL-MCNC: 8 G/DL — SIGNIFICANT CHANGE UP (ref 6–8.3)
RBC # BLD: 4.33 M/UL — SIGNIFICANT CHANGE UP (ref 3.8–5.2)
RBC # FLD: 13.9 % — SIGNIFICANT CHANGE UP (ref 10.3–14.5)
RSV RNA NPH QL NAA+NON-PROBE: SIGNIFICANT CHANGE UP
SARS-COV-2 RNA SPEC QL NAA+PROBE: SIGNIFICANT CHANGE UP
SODIUM SERPL-SCNC: 136 MMOL/L — SIGNIFICANT CHANGE UP (ref 135–145)
WBC # BLD: 12.6 K/UL — HIGH (ref 3.8–10.5)
WBC # FLD AUTO: 12.6 K/UL — HIGH (ref 3.8–10.5)

## 2024-09-12 PROCEDURE — 71046 X-RAY EXAM CHEST 2 VIEWS: CPT | Mod: 26

## 2024-09-12 PROCEDURE — 99285 EMERGENCY DEPT VISIT HI MDM: CPT

## 2024-09-12 RX ORDER — IPRATROPIUM BROMIDE AND ALBUTEROL SULFATE .5; 3 MG/3ML; MG/3ML
3 SOLUTION RESPIRATORY (INHALATION)
Refills: 0 | Status: COMPLETED | OUTPATIENT
Start: 2024-09-12 | End: 2024-09-12

## 2024-09-12 RX ORDER — METHYLPREDNISOLONE 4 MG
125 TABLET ORAL ONCE
Refills: 0 | Status: COMPLETED | OUTPATIENT
Start: 2024-09-12 | End: 2024-09-12

## 2024-09-12 RX ORDER — IPRATROPIUM BROMIDE AND ALBUTEROL SULFATE .5; 3 MG/3ML; MG/3ML
3 SOLUTION RESPIRATORY (INHALATION) EVERY 6 HOURS
Refills: 0 | Status: DISCONTINUED | OUTPATIENT
Start: 2024-09-12 | End: 2024-09-12

## 2024-09-12 RX ORDER — SODIUM CHLORIDE 9 MG/ML
1000 INJECTION INTRAMUSCULAR; INTRAVENOUS; SUBCUTANEOUS ONCE
Refills: 0 | Status: COMPLETED | OUTPATIENT
Start: 2024-09-12 | End: 2024-09-12

## 2024-09-12 RX ADMIN — SODIUM CHLORIDE 1000 MILLILITER(S): 9 INJECTION INTRAMUSCULAR; INTRAVENOUS; SUBCUTANEOUS at 22:36

## 2024-09-12 RX ADMIN — Medication 150 GRAM(S): at 22:54

## 2024-09-12 RX ADMIN — Medication 125 MILLIGRAM(S): at 21:26

## 2024-09-12 RX ADMIN — IPRATROPIUM BROMIDE AND ALBUTEROL SULFATE 3 MILLILITER(S): .5; 3 SOLUTION RESPIRATORY (INHALATION) at 22:34

## 2024-09-12 RX ADMIN — IPRATROPIUM BROMIDE AND ALBUTEROL SULFATE 3 MILLILITER(S): .5; 3 SOLUTION RESPIRATORY (INHALATION) at 22:54

## 2024-09-12 RX ADMIN — IPRATROPIUM BROMIDE AND ALBUTEROL SULFATE 3 MILLILITER(S): .5; 3 SOLUTION RESPIRATORY (INHALATION) at 21:19

## 2024-09-12 NOTE — ED PROVIDER NOTE - PHYSICAL EXAMINATION
General: NAD. Nontoxic, well appearing. Speaking in full sentences with appropriate phonation.  Eyes: EOMI. Conjunctiva/sclera clear, MMM  Neck: Supple, no meningitic signs  Cardiac: RRR  Pulmonary: Audible expiratory wheezing, diminished BS throughout, no crackles/rales  Abdominal: Soft, nontender, nondistended, no peritoneal signs.  Neurologic: No gross focal sensory or motor deficits. Moves all 4 extremities during encounter.  Musculoskeletal: Strength appropriate in all 4 extremities for age with no limited ROM. No visible deformities or extremity swelling.  Skin: Color appropriate for race. Intact, warm, and well-perfused. No visible lesions or bruising.

## 2024-09-12 NOTE — ED ADULT TRIAGE NOTE - CHIEF COMPLAINT QUOTE
Pt c/o "difficulty breathing x 4 days, + cough. Placed on Steroids by Pulmonologist but not feeling better" + audible wheezing

## 2024-09-12 NOTE — ED ADULT NURSE NOTE - OBJECTIVE STATEMENT
63 y/o F with PMHx of DM, HTN, asthma presents to the ED with complaints of SOB x 4 days. Patient reports associated cough, states feels like prior asthma exacerbations. Has been using nebulizer and rescue inhaler without improvement of sxs. Patient states was seen by pulmonology 3 days ago and started on course of steroids, however sxs without improvement. Denies fever. chills. Previously admitted for asthma exacerbations without intubations. AOx4 and speaking coherently. Audible wheezing noted. Abdomen is soft, nondistended, and nontender. No bladder distention. No peripheral edema noted. <2s capillary refill. Ambulatory with full ROM of all extremities.

## 2024-09-12 NOTE — ED ADULT NURSE NOTE - CHIEF COMPLAINT
[facility] A[facility] c/o bradycardia.  Per EMS, [facility] is a [facility] and upon regular check up, patient was found to have increased altered mental status and bradycardia.  [facility] p[facility] but discontinued prior to transport.  Patient is not in mid 50's at resting heart rate.  Per EMS, patient is also at baseline mentation.  Patient has no complaints at this time.   The patient is a 64y Female complaining of difficulty breathing.

## 2024-09-12 NOTE — ED PROVIDER NOTE - CLINICAL SUMMARY MEDICAL DECISION MAKING FREE TEXT BOX
See Attending Note See Attending Note      64-year-old female PMHx HTN, DM 2, asthma presents to ED for 3 to 4-day onset expiratory wheezing, productive cough.  Patient saw pulmonologist 2 to 3 days ago who put her on prednisone, has noticed her sugars have been elevated since and so received Rx insulin for her on her provider today for that.  States that her breathing has not improved.  Denies any fevers, abdominal pain, urinary symptoms, NVD, abdominal pain, headache, lightheadedness, dizziness.  Patient states that her symptoms have not improved with at home nebulizer treatments and since the prednisone.  Denies any chest pain.  No other complaints at this time.  On exam patient with audible expiratory wheezing even without stethoscope, diminished breath sounds throughout, no crackles/Rales.  CV tachycardic with regular rhythm.  Abdomen soft, nontender.  Extremities grossly equal in size bilaterally.  No visible rashes or lesions. Will evaluate with basic labs, CXR, breathing treatments. Anticipate pt will require admission. Reeval to dispo.

## 2024-09-12 NOTE — ED PROVIDER NOTE - RAPID ASSESSMENT
64-year-old female past medical history of asthma, hypertension, hyperlipidemia, diabetes presenting with shortness of breath.  Patient reports for the last 4 days she has had worsening cough, wheezing and shortness of breath.  Patient saw her pulm neurologist 3 days ago and was started on steroids without improvement.  Patient using albuterol nebs and inhaler at home without improvement.  Denies fever/chills, nasal congestion    **Patient was rapidly assessed by me, Rasheed Costa PA-C. A limited history was obtained. The patient will be seen and further examined/worked up in the main ED and their care will be completed by the main ED team. Receiving team will follow up on labs, analgesia, any clinical imaging, and perform reassessment and disposition of the patient as clinically indicated. All decisions regarding the progression of care will be made at their discretion.

## 2024-09-12 NOTE — ED PROVIDER NOTE - ATTENDING CONTRIBUTION TO CARE
64-year-old female past medical history of asthma, hypertension, hyperlipidemia, diabetes presenting with shortness of breath.   States she has been short of breath for the last 4 days becoming worse on outpatient prednisone 40 mg right pulmonology she saw several days ago but not improving has been coughing denies fevers or chills satting 100% on room air.  Patient states she was last admitted for asthma about a year ago and never been intubated nebs steroids were ordered her chest x-ray is clear likely admission for asthma exacerbation.

## 2024-09-12 NOTE — ED ADULT NURSE REASSESSMENT NOTE - NS ED NURSE REASSESS COMMENT FT1
Patient with continued audible wheezing. MD Roach made aware, to administer additional duoneb treatment. Patient to be admitted per MD.

## 2024-09-13 DIAGNOSIS — E11.9 TYPE 2 DIABETES MELLITUS WITHOUT COMPLICATIONS: ICD-10-CM

## 2024-09-13 DIAGNOSIS — R65.10 SYSTEMIC INFLAMMATORY RESPONSE SYNDROME (SIRS) OF NON-INFECTIOUS ORIGIN WITHOUT ACUTE ORGAN DYSFUNCTION: ICD-10-CM

## 2024-09-13 DIAGNOSIS — I10 ESSENTIAL (PRIMARY) HYPERTENSION: ICD-10-CM

## 2024-09-13 DIAGNOSIS — J45.901 UNSPECIFIED ASTHMA WITH (ACUTE) EXACERBATION: ICD-10-CM

## 2024-09-13 DIAGNOSIS — Z29.9 ENCOUNTER FOR PROPHYLACTIC MEASURES, UNSPECIFIED: ICD-10-CM

## 2024-09-13 DIAGNOSIS — E78.5 HYPERLIPIDEMIA, UNSPECIFIED: ICD-10-CM

## 2024-09-13 DIAGNOSIS — R09.81 NASAL CONGESTION: ICD-10-CM

## 2024-09-13 PROBLEM — Z92.21 PERSONAL HISTORY OF ANTINEOPLASTIC CHEMOTHERAPY: Chronic | Status: ACTIVE | Noted: 2024-03-04

## 2024-09-13 PROBLEM — C18.9 MALIGNANT NEOPLASM OF COLON, UNSPECIFIED: Chronic | Status: ACTIVE | Noted: 2024-03-04

## 2024-09-13 PROBLEM — Z92.3 PERSONAL HISTORY OF IRRADIATION: Chronic | Status: ACTIVE | Noted: 2024-03-04

## 2024-09-13 LAB
ANION GAP SERPL CALC-SCNC: 16 MMOL/L — SIGNIFICANT CHANGE UP (ref 5–17)
ANION GAP SERPL CALC-SCNC: 20 MMOL/L — HIGH (ref 5–17)
B-OH-BUTYR SERPL-SCNC: 0.1 MMOL/L — SIGNIFICANT CHANGE UP
BASE EXCESS BLDA CALC-SCNC: -5.8 MMOL/L — LOW (ref -2–3)
BUN SERPL-MCNC: 21 MG/DL — SIGNIFICANT CHANGE UP (ref 7–23)
BUN SERPL-MCNC: 24 MG/DL — HIGH (ref 7–23)
CALCIUM SERPL-MCNC: 10 MG/DL — SIGNIFICANT CHANGE UP (ref 8.4–10.5)
CALCIUM SERPL-MCNC: 9.9 MG/DL — SIGNIFICANT CHANGE UP (ref 8.4–10.5)
CHLORIDE SERPL-SCNC: 100 MMOL/L — SIGNIFICANT CHANGE UP (ref 96–108)
CHLORIDE SERPL-SCNC: 101 MMOL/L — SIGNIFICANT CHANGE UP (ref 96–108)
CO2 BLDA-SCNC: 20 MMOL/L — SIGNIFICANT CHANGE UP (ref 19–24)
CO2 SERPL-SCNC: 18 MMOL/L — LOW (ref 22–31)
CO2 SERPL-SCNC: 21 MMOL/L — LOW (ref 22–31)
CREAT SERPL-MCNC: 0.77 MG/DL — SIGNIFICANT CHANGE UP (ref 0.5–1.3)
CREAT SERPL-MCNC: 0.8 MG/DL — SIGNIFICANT CHANGE UP (ref 0.5–1.3)
EGFR: 83 ML/MIN/1.73M2 — SIGNIFICANT CHANGE UP
EGFR: 87 ML/MIN/1.73M2 — SIGNIFICANT CHANGE UP
GAS PNL BLDA: SIGNIFICANT CHANGE UP
GLUCOSE BLDC GLUCOMTR-MCNC: 229 MG/DL — HIGH (ref 70–99)
GLUCOSE BLDC GLUCOMTR-MCNC: 265 MG/DL — HIGH (ref 70–99)
GLUCOSE BLDC GLUCOMTR-MCNC: 319 MG/DL — HIGH (ref 70–99)
GLUCOSE BLDC GLUCOMTR-MCNC: 319 MG/DL — HIGH (ref 70–99)
GLUCOSE BLDC GLUCOMTR-MCNC: 383 MG/DL — HIGH (ref 70–99)
GLUCOSE SERPL-MCNC: 243 MG/DL — HIGH (ref 70–99)
GLUCOSE SERPL-MCNC: 351 MG/DL — HIGH (ref 70–99)
HCO3 BLDA-SCNC: 18 MMOL/L — LOW (ref 21–28)
LACTATE SERPL-SCNC: 4.4 MMOL/L — CRITICAL HIGH (ref 0.5–2)
LACTATE SERPL-SCNC: 6.2 MMOL/L — CRITICAL HIGH (ref 0.5–2)
MAGNESIUM SERPL-MCNC: 2.5 MG/DL — SIGNIFICANT CHANGE UP (ref 1.6–2.6)
PCO2 BLDA: 32 MMHG — SIGNIFICANT CHANGE UP (ref 32–45)
PH BLDA: 7.37 — SIGNIFICANT CHANGE UP (ref 7.35–7.45)
PHOSPHATE SERPL-MCNC: 2.9 MG/DL — SIGNIFICANT CHANGE UP (ref 2.5–4.5)
PO2 BLDA: 195 MMHG — HIGH (ref 83–108)
POTASSIUM SERPL-MCNC: 4.1 MMOL/L — SIGNIFICANT CHANGE UP (ref 3.5–5.3)
POTASSIUM SERPL-MCNC: 5.9 MMOL/L — HIGH (ref 3.5–5.3)
POTASSIUM SERPL-SCNC: 4.1 MMOL/L — SIGNIFICANT CHANGE UP (ref 3.5–5.3)
POTASSIUM SERPL-SCNC: 5.9 MMOL/L — HIGH (ref 3.5–5.3)
PROCALCITONIN SERPL-MCNC: 0.04 NG/ML — SIGNIFICANT CHANGE UP (ref 0.02–0.1)
SAO2 % BLDA: 99.4 % — HIGH (ref 94–98)
SODIUM SERPL-SCNC: 138 MMOL/L — SIGNIFICANT CHANGE UP (ref 135–145)
SODIUM SERPL-SCNC: 138 MMOL/L — SIGNIFICANT CHANGE UP (ref 135–145)

## 2024-09-13 PROCEDURE — 31231 NASAL ENDOSCOPY DX: CPT

## 2024-09-13 PROCEDURE — 99223 1ST HOSP IP/OBS HIGH 75: CPT | Mod: GC

## 2024-09-13 PROCEDURE — 99223 1ST HOSP IP/OBS HIGH 75: CPT

## 2024-09-13 PROCEDURE — 99221 1ST HOSP IP/OBS SF/LOW 40: CPT | Mod: 25

## 2024-09-13 RX ORDER — TIOTROPIUM BROMIDE INHALATION SPRAY 3.12 UG/1
2 SPRAY, METERED RESPIRATORY (INHALATION) DAILY
Refills: 0 | Status: DISCONTINUED | OUTPATIENT
Start: 2024-09-13 | End: 2024-09-17

## 2024-09-13 RX ORDER — DEXTROSE 15 G/33 G
12.5 GEL IN PACKET (GRAM) ORAL ONCE
Refills: 0 | Status: DISCONTINUED | OUTPATIENT
Start: 2024-09-13 | End: 2024-09-20

## 2024-09-13 RX ORDER — SODIUM CHLORIDE 9 MG/ML
1000 INJECTION INTRAMUSCULAR; INTRAVENOUS; SUBCUTANEOUS
Refills: 0 | Status: DISCONTINUED | OUTPATIENT
Start: 2024-09-13 | End: 2024-09-20

## 2024-09-13 RX ORDER — IPRATROPIUM BROMIDE AND ALBUTEROL SULFATE .5; 3 MG/3ML; MG/3ML
3 SOLUTION RESPIRATORY (INHALATION) EVERY 6 HOURS
Refills: 0 | Status: DISCONTINUED | OUTPATIENT
Start: 2024-09-13 | End: 2024-09-14

## 2024-09-13 RX ORDER — DEXTROSE 15 G/33 G
15 GEL IN PACKET (GRAM) ORAL ONCE
Refills: 0 | Status: DISCONTINUED | OUTPATIENT
Start: 2024-09-13 | End: 2024-09-20

## 2024-09-13 RX ORDER — INSULIN GLARGINE 100 [IU]/ML
10 INJECTION, SOLUTION SUBCUTANEOUS EVERY MORNING
Refills: 0 | Status: DISCONTINUED | OUTPATIENT
Start: 2024-09-13 | End: 2024-09-13

## 2024-09-13 RX ORDER — SODIUM ZIRCONIUM CYCLOSILICATE 5 G/5G
10 POWDER, FOR SUSPENSION ORAL ONCE
Refills: 0 | Status: DISCONTINUED | OUTPATIENT
Start: 2024-09-13 | End: 2024-09-13

## 2024-09-13 RX ORDER — IPRATROPIUM BROMIDE AND ALBUTEROL SULFATE .5; 3 MG/3ML; MG/3ML
3 SOLUTION RESPIRATORY (INHALATION) ONCE
Refills: 0 | Status: COMPLETED | OUTPATIENT
Start: 2024-09-13 | End: 2024-09-13

## 2024-09-13 RX ORDER — GLUCAGON INJECTION, SOLUTION 1 MG/.2ML
1 INJECTION, SOLUTION SUBCUTANEOUS ONCE
Refills: 0 | Status: DISCONTINUED | OUTPATIENT
Start: 2024-09-13 | End: 2024-09-20

## 2024-09-13 RX ORDER — DEXTROSE 15 G/33 G
25 GEL IN PACKET (GRAM) ORAL ONCE
Refills: 0 | Status: DISCONTINUED | OUTPATIENT
Start: 2024-09-13 | End: 2024-09-20

## 2024-09-13 RX ORDER — FLUTICASONE PROPIONATE 50 UG/1
1 SPRAY, METERED NASAL
Refills: 0 | Status: DISCONTINUED | OUTPATIENT
Start: 2024-09-13 | End: 2024-09-20

## 2024-09-13 RX ORDER — ACETAMINOPHEN 325 MG/1
650 TABLET ORAL EVERY 6 HOURS
Refills: 0 | Status: DISCONTINUED | OUTPATIENT
Start: 2024-09-13 | End: 2024-09-20

## 2024-09-13 RX ORDER — MONTELUKAST SODIUM 5 MG/1
10 TABLET, CHEWABLE ORAL DAILY
Refills: 0 | Status: DISCONTINUED | OUTPATIENT
Start: 2024-09-13 | End: 2024-09-20

## 2024-09-13 RX ORDER — AMLODIPINE BESYLATE 10 MG/1
5 TABLET ORAL DAILY
Refills: 0 | Status: DISCONTINUED | OUTPATIENT
Start: 2024-09-13 | End: 2024-09-20

## 2024-09-13 RX ORDER — FLUTICASONE PROPIONATE AND SALMETEROL 250; 50 UG/1; UG/1
1 POWDER RESPIRATORY (INHALATION)
Refills: 0 | Status: DISCONTINUED | OUTPATIENT
Start: 2024-09-13 | End: 2024-09-14

## 2024-09-13 RX ORDER — FLUTICASONE FUROATE, UMECLIDINIUM BROMIDE AND VILANTEROL TRIFENATATE 200; 62.5; 25 UG/1; UG/1; UG/1
1 POWDER RESPIRATORY (INHALATION)
Refills: 0 | DISCHARGE

## 2024-09-13 RX ORDER — MAGNESIUM, ALUMINUM HYDROXIDE 200-225/5
30 SUSPENSION, ORAL (FINAL DOSE FORM) ORAL EVERY 4 HOURS
Refills: 0 | Status: DISCONTINUED | OUTPATIENT
Start: 2024-09-13 | End: 2024-09-20

## 2024-09-13 RX ORDER — INSULIN GLARGINE 100 [IU]/ML
15 INJECTION, SOLUTION SUBCUTANEOUS AT BEDTIME
Refills: 0 | Status: DISCONTINUED | OUTPATIENT
Start: 2024-09-13 | End: 2024-09-15

## 2024-09-13 RX ORDER — METHYLPREDNISOLONE 4 MG
40 TABLET ORAL EVERY 8 HOURS
Refills: 0 | Status: DISCONTINUED | OUTPATIENT
Start: 2024-09-13 | End: 2024-09-15

## 2024-09-13 RX ORDER — ONDANSETRON 2 MG/ML
4 INJECTION, SOLUTION INTRAMUSCULAR; INTRAVENOUS EVERY 8 HOURS
Refills: 0 | Status: DISCONTINUED | OUTPATIENT
Start: 2024-09-13 | End: 2024-09-20

## 2024-09-13 RX ORDER — LOSARTAN POTASSIUM 50 MG/1
100 TABLET ORAL DAILY
Refills: 0 | Status: DISCONTINUED | OUTPATIENT
Start: 2024-09-13 | End: 2024-09-20

## 2024-09-13 RX ORDER — OXYMETAZOLINE HYDROCHLORIDE 0.05 G/100ML
1 SPRAY, METERED NASAL
Refills: 0 | Status: COMPLETED | OUTPATIENT
Start: 2024-09-13 | End: 2024-09-16

## 2024-09-13 RX ADMIN — INSULIN GLARGINE 10 UNIT(S): 100 INJECTION, SOLUTION SUBCUTANEOUS at 10:05

## 2024-09-13 RX ADMIN — SODIUM CHLORIDE 100 MILLILITER(S): 9 INJECTION INTRAMUSCULAR; INTRAVENOUS; SUBCUTANEOUS at 06:32

## 2024-09-13 RX ADMIN — Medication 4: at 22:41

## 2024-09-13 RX ADMIN — TIOTROPIUM BROMIDE INHALATION SPRAY 2 PUFF(S): 3.12 SPRAY, METERED RESPIRATORY (INHALATION) at 13:07

## 2024-09-13 RX ADMIN — Medication 3 UNIT(S): at 08:50

## 2024-09-13 RX ADMIN — Medication 3 UNIT(S): at 13:08

## 2024-09-13 RX ADMIN — Medication 40 MILLIGRAM(S): at 13:07

## 2024-09-13 RX ADMIN — Medication 40 MILLIGRAM(S): at 22:52

## 2024-09-13 RX ADMIN — IPRATROPIUM BROMIDE AND ALBUTEROL SULFATE 3 MILLILITER(S): .5; 3 SOLUTION RESPIRATORY (INHALATION) at 17:34

## 2024-09-13 RX ADMIN — FLUTICASONE PROPIONATE AND SALMETEROL 1 DOSE(S): 250; 50 POWDER RESPIRATORY (INHALATION) at 17:35

## 2024-09-13 RX ADMIN — Medication 2: at 13:08

## 2024-09-13 RX ADMIN — Medication 4: at 08:51

## 2024-09-13 RX ADMIN — IPRATROPIUM BROMIDE AND ALBUTEROL SULFATE 3 MILLILITER(S): .5; 3 SOLUTION RESPIRATORY (INHALATION) at 05:28

## 2024-09-13 RX ADMIN — Medication 20 MILLIGRAM(S): at 22:41

## 2024-09-13 RX ADMIN — Medication 6: at 17:36

## 2024-09-13 RX ADMIN — Medication 2 GRAM(S): at 00:27

## 2024-09-13 RX ADMIN — Medication 40 MILLIGRAM(S): at 05:29

## 2024-09-13 RX ADMIN — IPRATROPIUM BROMIDE AND ALBUTEROL SULFATE 3 MILLILITER(S): .5; 3 SOLUTION RESPIRATORY (INHALATION) at 13:07

## 2024-09-13 RX ADMIN — AMLODIPINE BESYLATE 5 MILLIGRAM(S): 10 TABLET ORAL at 05:29

## 2024-09-13 RX ADMIN — LOSARTAN POTASSIUM 100 MILLIGRAM(S): 50 TABLET ORAL at 05:28

## 2024-09-13 RX ADMIN — Medication 6 UNIT(S): at 17:37

## 2024-09-13 RX ADMIN — FLUTICASONE PROPIONATE 1 SPRAY(S): 50 SPRAY, METERED NASAL at 22:42

## 2024-09-13 RX ADMIN — IPRATROPIUM BROMIDE AND ALBUTEROL SULFATE 3 MILLILITER(S): .5; 3 SOLUTION RESPIRATORY (INHALATION) at 23:07

## 2024-09-13 RX ADMIN — INSULIN GLARGINE 15 UNIT(S): 100 INJECTION, SOLUTION SUBCUTANEOUS at 22:41

## 2024-09-13 RX ADMIN — SODIUM CHLORIDE 1000 MILLILITER(S): 9 INJECTION INTRAMUSCULAR; INTRAVENOUS; SUBCUTANEOUS at 02:38

## 2024-09-13 RX ADMIN — IPRATROPIUM BROMIDE AND ALBUTEROL SULFATE 3 MILLILITER(S): .5; 3 SOLUTION RESPIRATORY (INHALATION) at 01:50

## 2024-09-13 NOTE — ED ADULT NURSE REASSESSMENT NOTE - NS ED NURSE REASSESS COMMENT FT1
Patient continues to be audibly wheezing. Previously received 3 duonebs and Magnesium sulfate. With complaints of chest tightness at present. GIOVANNI Ogden aware. Patient also noted to be tachycardic to 105. PA aware at this time.

## 2024-09-13 NOTE — PROVIDER CONTACT NOTE (CRITICAL VALUE NOTIFICATION) - ACTION/TREATMENT ORDERED:
Provider notified. Pt already started on NS bolus. Results relayed to day RN; will continue to follow.

## 2024-09-13 NOTE — PATIENT PROFILE ADULT - LEGAL HELP
Pt was withdrawn to room this evening. Pt endorsed AH of voices antagonizing/picking on her as well as high anxiety. Pt denies all other psych symptoms. Will CTM. Continuous pt safety checks ongoing. no

## 2024-09-13 NOTE — H&P ADULT - PROBLEM SELECTOR PLAN 3
-hyperglycemia likely steroid induced.   - -hyperglycemia likely steroid induced.   -start glargine 10u qAM + 3u+SS tid/m.

## 2024-09-13 NOTE — CONSULT NOTE ADULT - SUBJECTIVE AND OBJECTIVE BOX
CHIEF COMPLAINT: Lactic Acidosis    HPI: 64F PMHx asthma, HTN/HLD, DM. Presenting w/ 3d of SOB and cough, rx'd prednisone w/o improvement outpatient. Pt also been noticing increase in FS glucose while on steroid. Patient reportedly has been on metformin and in past had lactic acidosis associated with thiamine deficiency and metformin. Patient has been receiving atc duonebs and albuterol prn. MICU consulted for lactic acidosis.     Patient states has has multiple days of sob, failed outpatient treatment. not feeling better. Audible wheezing. States similar to last time and she has known about the elevated lactate in the past. Nephrology, Endocrine following.     PAST MEDICAL & SURGICAL HISTORY:  Asthma  (no hx/o intubation)      Hypertension      DM2 (diabetes mellitus, type 2)      Breast cancer  R breast 10/ 2019      Colon cancer      History of chemotherapy      History of radiation therapy       delivery NOS  x1      H/O foot surgery  right      S/P lumpectomy, right breast  10/2019          FAMILY HISTORY:  Family history of MI (myocardial infarction)  Father    Family history of lung cancer (Father)  (primary lung cancer)        SOCIAL HISTORY:      Allergies    NSAIDs (Unknown)  aspirin (Hives)  AValox (Unknown)    Intolerances        HOME MEDICATIONS:    REVIEW OF SYSTEMS:    CONSTITUTIONAL:  No weakness, fevers or chills  EYES/ENT:  No visual changes;  No vertigo or throat pain   NECK:  No pain or stiffness  RESPIRATORY:  +cough, wheezing, no hemoptysis; No shortness of breath  CARDIOVASCULAR:  No chest pain or palpitations  GASTROINTESTINAL:  No abdominal or epigastric pain. No nausea, vomiting, or hematemesis; No diarrhea or constipation. No melena or hematochezia.  GENITOURINARY:  No dysuria, frequency or hematuria  NEUROLOGICAL:  No numbness or weakness  SKIN:  No itching, rashes    [ ] Unable to assess ROS because ________    OBJECTIVE:  ICU Vital Signs Last 24 Hrs  T(C): 36.3 (13 Sep 2024 03:49), Max: 36.7 (13 Sep 2024 01:57)  T(F): 97.3 (13 Sep 2024 03:49), Max: 98 (13 Sep 2024 01:57)  HR: 94 (13 Sep 2024 05:00) (84 - 106)  BP: 128/77 (13 Sep 2024 05:00) (117/67 - 159/91)  BP(mean): 98 (12 Sep 2024 19:50) (98 - 98)  ABP: --  ABP(mean): --  RR: 20 (13 Sep 2024 05:00) (20 - 22)  SpO2: 97% (13 Sep 2024 05:00) (97% - 99%)    O2 Parameters below as of 13 Sep 2024 05:00  Patient On (Oxygen Delivery Method): room air              CAPILLARY BLOOD GLUCOSE      POCT Blood Glucose.: 383 mg/dL (13 Sep 2024 10:00)      Vital Signs Last 24 Hrs  T(C): 36.3 (13 Sep 2024 03:49), Max: 36.7 (13 Sep 2024 01:57)  T(F): 97.3 (13 Sep 2024 03:49), Max: 98 (13 Sep 2024 01:57)  HR: 94 (13 Sep 2024 05:00) (84 - 106)  BP: 128/77 (13 Sep 2024 05:00) (117/67 - 159/91)  BP(mean): 98 (12 Sep 2024 19:50) (98 - 98)  RR: 20 (13 Sep 2024 05:00) (20 - 22)  SpO2: 97% (13 Sep 2024 05:00) (97% - 99%)    Parameters below as of 13 Sep 2024 05:00  Patient On (Oxygen Delivery Method): room air        General: NAD  Neurology: A&Ox3, nonfocal, CAMERON x 4  Eyes: PERRLA/ EOMI, Gross vision intact  ENT/Neck: Neck supple, trachea midline, No JVD, Gross hearing intact  Respiratory: b/l wheezing, poor airmovement bilaterally  CV: RRR, +S1/S2, -S3/S4, no murmurs, rubs or gallops  Abdominal: Soft, NT, ND +BS, No HSM  MSK: 5/5 strength UE/LE bilaterally  Extremities: No edema, 2+ peripheral pulses  Skin: No Rashes, Hematoma, Ecchymosis  Incisions:   Tubes:    HOSPITAL MEDICATIONS:  MEDICATIONS  (STANDING):  albuterol/ipratropium for Nebulization 3 milliLiter(s) Nebulizer every 6 hours  amLODIPine   Tablet 5 milliGRAM(s) Oral daily  atorvastatin 20 milliGRAM(s) Oral at bedtime  dextrose 5%. 1000 milliLiter(s) (100 mL/Hr) IV Continuous <Continuous>  dextrose 5%. 1000 milliLiter(s) (50 mL/Hr) IV Continuous <Continuous>  dextrose 50% Injectable 12.5 Gram(s) IV Push once  dextrose 50% Injectable 25 Gram(s) IV Push once  dextrose 50% Injectable 25 Gram(s) IV Push once  fluticasone propionate/ salmeterol 100-50 MICROgram(s) Diskus 1 Dose(s) Inhalation two times a day  glucagon  Injectable 1 milliGRAM(s) IntraMuscular once  hydrochlorothiazide 12.5 milliGRAM(s) Oral daily  insulin glargine Injectable (LANTUS) 10 Unit(s) SubCutaneous every morning  insulin lispro (ADMELOG) corrective regimen sliding scale   SubCutaneous three times a day before meals  insulin lispro (ADMELOG) corrective regimen sliding scale   SubCutaneous at bedtime  insulin lispro Injectable (ADMELOG) 3 Unit(s) SubCutaneous three times a day before meals  losartan 100 milliGRAM(s) Oral daily  methylPREDNISolone sodium succinate Injectable 40 milliGRAM(s) IV Push every 8 hours  sodium chloride 0.9%. 1000 milliLiter(s) (100 mL/Hr) IV Continuous <Continuous>  tiotropium 2.5 MICROgram(s) Inhaler 2 Puff(s) Inhalation daily    MEDICATIONS  (PRN):  acetaminophen     Tablet .. 650 milliGRAM(s) Oral every 6 hours PRN Temp greater or equal to 38C (100.4F), Mild Pain (1 - 3)  albuterol    90 MICROgram(s) HFA Inhaler 2 Puff(s) Inhalation every 2 hours PRN Shortness of Breath and/or Wheezing  aluminum hydroxide/magnesium hydroxide/simethicone Suspension 30 milliLiter(s) Oral every 4 hours PRN Dyspepsia  dextrose Oral Gel 15 Gram(s) Oral once PRN Blood Glucose LESS THAN 70 milliGRAM(s)/deciliter  melatonin 3 milliGRAM(s) Oral at bedtime PRN Insomnia  ondansetron Injectable 4 milliGRAM(s) IV Push every 8 hours PRN Nausea and/or Vomiting      LABS:                        11.8   12.60 )-----------( 289      ( 12 Sep 2024 18:49 )             36.6     09-13    138  |  100  |  24<H>  ----------------------------<  351<H>  5.9<H>   |  18<L>  |  0.80    Ca    10.0      13 Sep 2024 06:17  Phos  2.9       Mg     2.5         TPro  8.0  /  Alb  4.4  /  TBili  0.1<L>  /  DBili  x   /  AST  17  /  ALT  22  /  AlkPhos  110        Urinalysis Basic - ( 13 Sep 2024 06:17 )    Color: x / Appearance: x / SG: x / pH: x  Gluc: 351 mg/dL / Ketone: x  / Bili: x / Urobili: x   Blood: x / Protein: x / Nitrite: x   Leuk Esterase: x / RBC: x / WBC x   Sq Epi: x / Non Sq Epi: x / Bacteria: x      Arterial Blood Gas:   @ 06:00  7.37/32/195/18/99.4/-5.8  ABG lactate: --    Venous Blood Gas:   @ 21:36  7.35/45/61/25/91.3  VBG Lactate: 4.6  Venous Blood Gas:   @ 18:45  7.34/40/78/22/97.4  VBG Lactate: 6.5      MICROBIOLOGY:     RADIOLOGY:  [ ] Reviewed and interpreted by me    EKG:

## 2024-09-13 NOTE — CONSULT NOTE ADULT - ASSESSMENT
64F PMHx asthma, HTN/HLD, DM. Presenting w/ 3d of SOB and cough, rx'd prednisone w/o improvement outpatient. Pt also been noticing increase in FS glucose while on steroid. In the ED, pt afebrile, bp stable, on RA saturating 99%, tachypneic to 22. Pt was audibly wheezing.   CBC w/ wbc 12.6, hgb 11.8, plt 289. CMP w/ SCr 0.79, bicarb 18, AG 18, glucose 346. VBG w/ initial lactate 6.5 -> 4.6 after 1L IVF. covid/flu/rsv neg. CXR showed clear lungs. Pt received solumedrol 125mg, Mg 2g, and duoneb x3. On interview/exam, pt still wheezing. Appears audible wheezing w/o stethoscope is more upper airway but on lung auscultation, with exp wheezing as well. No crackles. Pt reports subjective SOB but saturating 96% on RA.  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:  she has been on dupixant for asthma:  in addition: she has taked 3 dyas of 40 mg of predniosne prior to coming to hospital and feels better today        Asthma exacerbation:   -she has a known diagnosis of asthma and has been  on dupixant biweekly as an outpt:  -came in here with asthma exacerbation:   -her upper airway wheezing is more then lower airway wheezing   -3would get ENT to examine vocal cords and to rule out vocal cord dysfunction :  -her oxygen is reasonable and is not retaining co2:   -cont steroids and BD:  -add singulair    Uncontrolled DM:   -secondary to stress and steroids   monitor and control     HTN:   -controlled:     dvt prophylaxis    dw acp     
64F PMHx asthma, HTN/HLD, DM. hx breast ca and colon ca, Presenting w/ 3d of SOB and cough, rx'd prednisone w/o improvement outpatient. Pt also been noticing increase in FS glucose while on steroid.   on admission \VBG w/ initial lactate 6.5 -> 4.6 after 1L IVF.  pt repeat lactic acid on 9/13 6.2.. of note, outpt has been on metformin as well   covid/flu/rsv neg. CXR showed clear lungs. Pt received solumedrol 125mg, Mg 2g, and duoneb x3.   pt receiving beta agonists as well         1- lactic acidosis /high AGAP acidosis   2- HTN   3- hyperglycemia/DM  4- wheezing /sob   5- asthma exacerbation     lactic acidosis can be due to:     metformin use. metformin has been dc  it can be due to beta agonist. have pulm change her inahlers if able   it can also be due to thiamine def as well check vit B1 level and start thiamine 100 mg daily   it can also be due tp her hypoxemia   can also be in setting of uncontrolled Dm as well.       IVF hydration   hyperkalemia hemolyzed k repeat bmp stat  check beta hydroxy butyrate level as well   hold hctz temporarily   steroids as per pulm     d/w primary team and ICU team 
64F PMHx asthma, HTN/HLD, DM. Presenting w/ 3d of SOB and cough, rx'd prednisone w/o improvement outpatient. Pt also been noticing increase in FS glucose while on steroid. In the ED, pt afebrile, bp stable, on RA saturating 99%, tachypneic to 22. Pt was audibly wheezing. CXR showed clear lungs. Pt received solumedrol 125mg, Mg 2g, and duoneb x3. On interview/exam, pt still wheezing. Appears audible wheezing w/o stethoscope is more upper airway but on lung auscultation, with exp wheezing as well. No crackles. Pt reports subjective SOB. Pt denies dysphagia, odynophagia, dysphonia, changes in voice or inability to tolerated secretions. ENT called to r/o upper airway involvement. Of note pt does complain of new PND and nasal congestion, no dec in sensation, epistaxis or facial pressure. On exam +b/l mucosal edema and clear PND, no exudate noted.    
64 y.o. female with history of T2DM , Asthma, HTN and HLD presents for asthma exacerbation.    1. T2DM with hyperglycemia  - Increase Lantus to 15 units and take at night  - Increase Admelog to 6 units with meals  - Change to moderate Admelog correctional scale before meals and bedtime  - Monitor FS before meals and bedtime  - Follow hospital hypoglycemic protocol    2. Asthma exacerbation  - currently on solumedrol 40 mg IV Q8H  - pulm following    Will continue to follow.     Zoe Hayward MD  [facility]- Division of Endocrinology    [address] [place] [address]    T [phone]  F [phone]  
64F PMHx asthma, HTN/HLD, DM. Presenting w/ 3d of SOB and cough, rx'd prednisone w/o improvement outpatient. Pt also been noticing increase in FS glucose while on steroid. Patient reportedly has been on metformin and in past had lactic acidosis associated with thiamine deficiency and metformin. Patient has been receiving atc duonebs and albuterol prn. MICU consulted for lactic acidosis.     Recommendations:  AG likely 2.2 lactate, no acidemia, likely 2.2 thiamine deficiency, metformin, beta agonist activity  f/u nephrology recs  f/u endocrine recs  RPT VBG w/ daily labs for CO2   No need to trend lactate  c/w nebs for asthma exacerbation  c/w solu-medrol IV  Labs today appear hemolyzed, recommend repeating BMP w/ mg and phos  Can give additional magnesium prn for bronchospasm  
Patient is a 64 year old female with PMH of asthma, HTN, HLD, DM who presents with dyspnea and cough for 3 days. Patient reports she first started to have dyspnea and then cough, she saw her Pulmonologist earlier this week and was given oral prednisone and nebulizer treatment but did not improved. States possible sick contact is her sister in law who lives with them, states she had some URI symptoms and recently started going to a new day program. States she continued to have wheezing, chest tightness and dyspnea and came to the ER.    Acute asthma exacerbation  Suspect possible viral URI  SIRS likely d/t above   Leukocytosis likely reactive iso steroids  - tachypnea and leukocytosis on admission, remains afebrile  - COVID/RSV/Flu negative  - CXR with no consolidation/infiltrate  - PCT negative   - wheezing on exam, saturating well on RA, nontoxic appearing    Recommendations:   Continue off antibiotics   Steroids and nebs per Pulmonary   Supportive care   Continue rest of care per primary team     Over the weekend Dr. Arlyn Munoz will be covering for our group. If you have any questions, concerns or new micro data, please reach out to them at [phone]     Meek Duque M.D.  [facility], Division of Infectious Diseases  [phone]  After 5pm on weekdays and all day on weekends - please call [phone]  Available on [employer] TEAMS

## 2024-09-13 NOTE — PROGRESS NOTE ADULT - ASSESSMENT
64F admitted for asthma exacerbation.     Plan:    # Acute asthma exacerbation:  - CXR w/ clear lungs  - C/w nebs/ inhalers  - C/w steroids  - Serial ABG's  - Lactate 6.2, continue to trend  - Maintain Fio2>90%  - Pulm eval pending (Called)     # SIRS:  - Monitor off abx  - ID following    # Dm2:  - HgbA1c  - Continue to monitor blood glucose levels  - Sliding Scale    # HTN/ HLD:  - C/w CV meds    # GI:  - Bowel regimen prn    # DVT ppx:  - IPC's    [facility]  [phone]

## 2024-09-13 NOTE — H&P ADULT - NSHPLABSRESULTS_GEN_ALL_CORE
11.8   12.60 )-----------( 289      ( 12 Sep 2024 18:49 )             36.6       09-12    136  |  100  |  26<H>  ----------------------------<  346<H>  4.8   |  18<L>  |  0.79    Ca    10.2      12 Sep 2024 18:49    TPro  8.0  /  Alb  4.4  /  TBili  0.1<L>  /  DBili  x   /  AST  17  /  ALT  22  /  AlkPhos  110  09-12              Urinalysis Basic - ( 12 Sep 2024 18:49 )    Color: x / Appearance: x / SG: x / pH: x  Gluc: 346 mg/dL / Ketone: x  / Bili: x / Urobili: x   Blood: x / Protein: x / Nitrite: x   Leuk Esterase: x / RBC: x / WBC x   Sq Epi: x / Non Sq Epi: x / Bacteria: x                  CAPILLARY BLOOD GLUCOSE

## 2024-09-13 NOTE — CONSULT NOTE ADULT - SUBJECTIVE AND OBJECTIVE BOX
CC: vc eval     HPI: 64F PMHx asthma, HTN/HLD, DM. Presenting w/ 3d of SOB and cough, rx'd prednisone w/o improvement outpatient. Pt also been noticing increase in FS glucose while on steroid. In the ED, pt afebrile, bp stable, on RA saturating 99%, tachypneic to 22. Pt was audibly wheezing. CXR showed clear lungs. Pt received solumedrol 125mg, Mg 2g, and duoneb x3. On interview/exam, pt still wheezing. Appears audible wheezing w/o stethoscope is more upper airway but on lung auscultation, with exp wheezing as well. No crackles. Pt reports subjective SOB. Pt denies dysphagia, odynophagia, dysphonia, changes in voice or inability to tolerated secretions. ENT called to r/o upper airway involvement. Of note pt does complain of new PND and nasal congestion, no dec in sensation, epistaxis or facial pressure.             PAST MEDICAL & SURGICAL HISTORY:  Asthma  (no hx/o intubation)      Hypertension      DM2 (diabetes mellitus, type 2)      Breast cancer  R breast 10/ 2019      Colon cancer      History of chemotherapy      History of radiation therapy       delivery NOS  x1      H/O foot surgery  right      S/P lumpectomy, right breast  10/2019        Allergies    NSAIDs (Unknown)  aspirin (Hives)  AValox (Unknown)    Intolerances      MEDICATIONS  (STANDING):  albuterol/ipratropium for Nebulization 3 milliLiter(s) Nebulizer every 6 hours  amLODIPine   Tablet 5 milliGRAM(s) Oral daily  atorvastatin 20 milliGRAM(s) Oral at bedtime  dextrose 5%. 1000 milliLiter(s) (50 mL/Hr) IV Continuous <Continuous>  dextrose 5%. 1000 milliLiter(s) (100 mL/Hr) IV Continuous <Continuous>  dextrose 50% Injectable 12.5 Gram(s) IV Push once  dextrose 50% Injectable 25 Gram(s) IV Push once  dextrose 50% Injectable 25 Gram(s) IV Push once  fluticasone propionate/ salmeterol 100-50 MICROgram(s) Diskus 1 Dose(s) Inhalation two times a day  glucagon  Injectable 1 milliGRAM(s) IntraMuscular once  insulin glargine Injectable (LANTUS) 10 Unit(s) SubCutaneous every morning  insulin lispro (ADMELOG) corrective regimen sliding scale   SubCutaneous three times a day before meals  insulin lispro (ADMELOG) corrective regimen sliding scale   SubCutaneous at bedtime  insulin lispro Injectable (ADMELOG) 3 Unit(s) SubCutaneous three times a day before meals  losartan 100 milliGRAM(s) Oral daily  methylPREDNISolone sodium succinate Injectable 40 milliGRAM(s) IV Push every 8 hours  montelukast 10 milliGRAM(s) Oral daily  sodium chloride 0.9%. 1000 milliLiter(s) (100 mL/Hr) IV Continuous <Continuous>  tiotropium 2.5 MICROgram(s) Inhaler 2 Puff(s) Inhalation daily    MEDICATIONS  (PRN):  acetaminophen     Tablet .. 650 milliGRAM(s) Oral every 6 hours PRN Temp greater or equal to 38C (100.4F), Mild Pain (1 - 3)  albuterol    90 MICROgram(s) HFA Inhaler 2 Puff(s) Inhalation every 2 hours PRN Shortness of Breath and/or Wheezing  aluminum hydroxide/magnesium hydroxide/simethicone Suspension 30 milliLiter(s) Oral every 4 hours PRN Dyspepsia  dextrose Oral Gel 15 Gram(s) Oral once PRN Blood Glucose LESS THAN 70 milliGRAM(s)/deciliter  melatonin 3 milliGRAM(s) Oral at bedtime PRN Insomnia  ondansetron Injectable 4 milliGRAM(s) IV Push every 8 hours PRN Nausea and/or Vomiting      Social History: no tobacco, no etoh     Family history: Pt denies any sign FHx    ROS:   ENT: all negative except as noted in HPI   CV: denies palpitations  Pulm: denies SOB, cough, hemoptysis  GI: denies change in apetite, indigestion, n/v  : denies pertinent urinary symptoms, urgency  Neuro: denies numbness/tingling, loss of sensation  Psych: denies anxiety  MS: denies muscle weakness, instability  Heme: denies easy bruising or bleeding  Endo: denies heat/cold intolerance, excessive sweating  Vascular: denies LE edema    Vital Signs Last 24 Hrs  T(C): 36.4 (13 Sep 2024 12:30), Max: 36.7 (13 Sep 2024 01:57)  T(F): 97.5 (13 Sep 2024 12:30), Max: 98 (13 Sep 2024 01:57)  HR: 88 (13 Sep 2024 12:30) (84 - 106)  BP: 114/70 (13 Sep 2024 12:30) (114/70 - 159/91)  BP(mean): 98 (12 Sep 2024 19:50) (98 - 98)  RR: 20 (13 Sep 2024 12:30) (20 - 22)  SpO2: 94% (13 Sep 2024 12:30) (94% - 99%)    Parameters below as of 13 Sep 2024 12:30  Patient On (Oxygen Delivery Method): room air                              11.8   12.60 )-----------( 289      ( 12 Sep 2024 18:49 )             36.6    09-13    138  |  101  |  21  ----------------------------<  243<H>  4.1   |  21<L>  |  0.77    Ca    9.9      13 Sep 2024 14:44  Phos  2.9     09-13  Mg     2.5     09-13    TPro  8.0  /  Alb  4.4  /  TBili  0.1<L>  /  DBili  x   /  AST  17  /  ALT  22  /  AlkPhos  110  09-12       PHYSICAL EXAM:  Gen: NAD  Skin: No rashes, bruises, or lesions  Head: Normocephalic, Atraumatic  Face: no edema, erythema, or fluctuance. Parotid glands soft without mass  Eyes: no scleral injection  Nose: Nares bilaterally patent, no discharge  Mouth: No Stridor / Drooling / Trismus.  Mucosa moist, tongue/uvula midline, oropharynx clear  Neck: Flat, supple, no lymphadenopathy, trachea midline, no masses  Lymphatic: No lymphadenopathy  Resp: breathing easily, no stridor  CV: no peripheral edema/cyanosis  GI: nondistended   Peripheral vascular: no JVD or edema  Neuro: facial nerve intact, no facial droop        Diagnostic Nasal Endoscopy: (Scope #2 used)Diagnostic Nasal Endoscopy  Indication for procedure: nasal congestion    "Anterior rhinoscopy insufficient to account for symptoms"    Verbal and/or written consent obtained from patient    Scope #3: flexible fiber optic telescope used with surgilube     + right sigmoidal septal deviation noted. Mucosa moist and edematous with scant thin clear PND no purulent mucus, normal inferior/middle/superior turbinates, normal inferior/middle/superior meatus, normal fontanelles, maxillary ostia clear, sphenoethmoidal recess clear bilaterally. No polyps noted.       Fiberoptic Indirect laryngoscopy:  (Scope #2 used)Reason for Laryngoscopy:    Patient was unable to cooperate with mirror.  Nasopharynx, oropharynx, and hypopharynx clear, no bleeding. Tongue base, posterior pharyngeal wall, vallecula, epiglottis, and subglottis appear normal. No erythema, edema, pooling of secretions, masses or lesions. Airway patent, no foreign body visualized. No glottic/supraglottic edema. True vocal cords, arytenoids, vestibular folds, ventricles, pyriform sinuses, and aryepiglottic folds appear normal bilaterally. Vocal cords mobile with good contact b/l.

## 2024-09-13 NOTE — H&P ADULT - HISTORY OF PRESENT ILLNESS
64F PMHx asthma, HTN/HLD, DM. Presenting w/ 3d of SOB and cough, rx'd prednisone w/o improvement outpatient. Pt also been noticing increase in FS glucose while on steroid.   In the ED, pt afebrile, bp stable, on RA saturating 99%, tachypneic to 22. Pt was audibly wheezing.   CBC w/ wbc 12.6, hgb 11.8, plt 289.   CMP w/ SCr 0.79, bicarb 18, AG 18, glucose 346.   VBG w/ initial lactate 6.5 -> 4.6 after 1L IVF.   covid/flu/rsv neg.   CXR showed clear lungs.     Pt received solumedrol 125mg, Mg 2g, and duoneb x2.  64F PMHx asthma, HTN/HLD, DM. Presenting w/ 3d of SOB and cough, rx'd prednisone w/o improvement outpatient. Pt also been noticing increase in FS glucose while on steroid.   In the ED, pt afebrile, bp stable, on RA saturating 99%, tachypneic to 22. Pt was audibly wheezing.   CBC w/ wbc 12.6, hgb 11.8, plt 289.   CMP w/ SCr 0.79, bicarb 18, AG 18, glucose 346.   VBG w/ initial lactate 6.5 -> 4.6 after 1L IVF.   covid/flu/rsv neg.   CXR showed clear lungs.     Pt received solumedrol 125mg, Mg 2g, and duoneb x3.   On interview/exam, pt still wheezing. Appears audible wheezing w/o stethoscope is more upper airway but on lung auscultation, with exp wheezing as well. No crackles. Pt reports subjective SOB but saturating 96% on RA.

## 2024-09-13 NOTE — CONSULT NOTE ADULT - PROBLEM SELECTOR RECOMMENDATION 9
- afrin to b/l nare bid x3days then d/c   - flonase to b/l nare bid [id number]   - continue pulm recs for wheeze   - no further ent intervention recommended   Pt is to follow up at ENT in 2 weeks with Nikita Badillo Bruni, [place]. Call [phone] to make appointment Wheezing / PND  - Based on nasal endoscopy /Laryngoscopy no gross obstruction in the upper airway for cause of wheeze likely lower airway wheeze  - afrin to b/l nare bid x3days then d/c   - flonase to b/l nare bid [id number]   - continue pulm recs for wheeze   - no further ent intervention recommended   - Pt is to follow up at ENT in 2 weeks with Nikita Badillo Bruni, [place]. Call [phone] to make appointment

## 2024-09-13 NOTE — CONSULT NOTE ADULT - SUBJECTIVE AND OBJECTIVE BOX
24 @ 14:31    Patient is a 64y old  Female who presents with a chief complaint of     HPI:  64F PMHx asthma, HTN/HLD, DM. Presenting w/ 3d of SOB and cough, rx'd prednisone w/o improvement outpatient. Pt also been noticing increase in FS glucose while on steroid. In the ED, pt afebrile, bp stable, on RA saturating 99%, tachypneic to 22. Pt was audibly wheezing.   CBC w/ wbc 12.6, hgb 11.8, plt 289. CMP w/ SCr 0.79, bicarb 18, AG 18, glucose 346. VBG w/ initial lactate 6.5 -> 4.6 after 1L IVF. covid/flu/rsv neg. CXR showed clear lungs. Pt received solumedrol 125mg, Mg 2g, and duoneb x3. On interview/exam, pt still wheezing. Appears audible wheezing w/o stethoscope is more upper airway but on lung auscultation, with exp wheezing as well. No crackles. Pt reports subjective SOB but saturating 96% on RA.  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:  she has been on dupixant for asthma:  in addition: she has taked 3 dyas of 40 mg of predniosne prior to coming to hospital and feels better today        ?FOLLOWING PRESENT  [x ] Hx of PE/DVT, [x ] Hx COPD, [ y] Hx of Asthma, [x ] Hx of Hospitalization, [ x Hx of BiPAP/CPAP use, [ x] Hx of LUCIE    Allergies    NSAIDs (Unknown)  aspirin (Hives)  AValox (Unknown)    Intolerances        PAST MEDICAL & SURGICAL HISTORY:  Asthma  (no hx/o intubation)      Hypertension      DM2 (diabetes mellitus, type 2)      Breast cancer  R breast 10/ 2019      Colon cancer      History of chemotherapy      History of radiation therapy       delivery NOS  x1      H/O foot surgery  right      S/P lumpectomy, right breast  10/2019          FAMILY HISTORY:  Family history of MI (myocardial infarction)  Father    Family history of lung cancer (Father)  (primary lung cancer)        Social History: [ remote ] TOBACCO                  [  x] ETOH                                 [  ] IVDA/DRUGS    REVIEW OF SYSTEMS      General:	x  x  Skin/Breast:x  	  Ophthalmologic:x  	  ENMT:	x    Respiratory and Thorax:  so b, pedro  , wheezing  	  Cardiovascular:	x    Gastrointestinal:	x    Genitourinary:	x    Musculoskeletal:	x    Neurological:	x    Psychiatric:	x    Hematology/Lymphatics:	x    Endocrine:	x    Allergic/Immunologic:	x    MEDICATIONS  (STANDING):  albuterol/ipratropium for Nebulization 3 milliLiter(s) Nebulizer every 6 hours  amLODIPine   Tablet 5 milliGRAM(s) Oral daily  atorvastatin 20 milliGRAM(s) Oral at bedtime  dextrose 5%. 1000 milliLiter(s) (50 mL/Hr) IV Continuous <Continuous>  dextrose 5%. 1000 milliLiter(s) (100 mL/Hr) IV Continuous <Continuous>  dextrose 50% Injectable 25 Gram(s) IV Push once  dextrose 50% Injectable 25 Gram(s) IV Push once  dextrose 50% Injectable 12.5 Gram(s) IV Push once  fluticasone propionate/ salmeterol 100-50 MICROgram(s) Diskus 1 Dose(s) Inhalation two times a day  glucagon  Injectable 1 milliGRAM(s) IntraMuscular once  insulin glargine Injectable (LANTUS) 10 Unit(s) SubCutaneous every morning  insulin lispro (ADMELOG) corrective regimen sliding scale   SubCutaneous three times a day before meals  insulin lispro (ADMELOG) corrective regimen sliding scale   SubCutaneous at bedtime  insulin lispro Injectable (ADMELOG) 3 Unit(s) SubCutaneous three times a day before meals  losartan 100 milliGRAM(s) Oral daily  methylPREDNISolone sodium succinate Injectable 40 milliGRAM(s) IV Push every 8 hours  sodium chloride 0.9%. 1000 milliLiter(s) (100 mL/Hr) IV Continuous <Continuous>  tiotropium 2.5 MICROgram(s) Inhaler 2 Puff(s) Inhalation daily    MEDICATIONS  (PRN):  acetaminophen     Tablet .. 650 milliGRAM(s) Oral every 6 hours PRN Temp greater or equal to 38C (100.4F), Mild Pain (1 - 3)  albuterol    90 MICROgram(s) HFA Inhaler 2 Puff(s) Inhalation every 2 hours PRN Shortness of Breath and/or Wheezing  aluminum hydroxide/magnesium hydroxide/simethicone Suspension 30 milliLiter(s) Oral every 4 hours PRN Dyspepsia  dextrose Oral Gel 15 Gram(s) Oral once PRN Blood Glucose LESS THAN 70 milliGRAM(s)/deciliter  melatonin 3 milliGRAM(s) Oral at bedtime PRN Insomnia  ondansetron Injectable 4 milliGRAM(s) IV Push every 8 hours PRN Nausea and/or Vomiting       Vital Signs Last 24 Hrs  T(C): 36.4 (13 Sep 2024 12:30), Max: 36.7 (13 Sep 2024 01:57)  T(F): 97.5 (13 Sep 2024 12:30), Max: 98 (13 Sep 2024 01:57)  HR: 88 (13 Sep 2024 12:30) (84 - 106)  BP: 114/70 (13 Sep 2024 12:30) (114/70 - 159/91)  BP(mean): 98 (12 Sep 2024 19:50) (98 - 98)  RR: 20 (13 Sep 2024 12:30) (20 - 22)  SpO2: 94% (13 Sep 2024 12:30) (94% - 99%)    Parameters below as of 13 Sep 2024 12:30  Patient On (Oxygen Delivery Method): room air    Orthostatic VS          I&O's Summary      Physical Exam:   GENERAL: NAD, well-groomed, well-developed  HEENT: SAQIB/   Atraumatic, Normocephalic  ENMT: No tonsillar erythema, exudates, or enlargement; Moist mucous membranes, Good dentition, No lesions  NECK: Supple, No JVD, Normal thyroid  CHEST/LUNG: wheezing   upper air ways > lower airways   CVS: Regular rate and rhythm; No murmurs, rubs, or gallops  GI: : Soft, Nontender, Nondistended; Bowel sounds present  NERVOUS SYSTEM:  Alert & Oriented X3  EXTREMITIES: - edema  LYMPH: No lymphadenopathy noted  SKIN: No rashes or lesions  ENDOCRINOLOGY: No Thyromegaly  PSYCH: Appropriate    Labs:  ABG - ( 13 Sep 2024 06:00 )  pH, Arterial: 7.37  pH, Blood: x     /  pCO2: 32    /  pO2: 195   / HCO3: 18    / Base Excess: -5.8  /  SaO2: 99.4            -1.1<45<4>>61<<7.355>>-1.1<<3><<4><<5<<619>>, -3.9<40<4>>78<<7.345>>-3.9<<3><<4><<5<<789>>                            11.8   12.60 )-----------( 289      ( 12 Sep 2024 18:49 )             36.6         138  |  100  |  24<H>  ----------------------------<  351<H>  5.9<H>   |  18<L>  |  0.80      136  |  100  |  26<H>  ----------------------------<  346<H>  4.8   |  18<L>  |  0.79    Ca    10.0      13 Sep 2024 06:17  Ca    10.2      12 Sep 2024 18:49  Phos  2.9       Mg     2.5         TPro  8.0  /  Alb  4.4  /  TBili  0.1<L>  /  DBili  x   /  AST  17  /  ALT  22  /  AlkPhos  110      CAPILLARY BLOOD GLUCOSE      POCT Blood Glucose.: 229 mg/dL (13 Sep 2024 12:37)  POCT Blood Glucose.: 383 mg/dL (13 Sep 2024 10:00)  POCT Blood Glucose.: 319 mg/dL (13 Sep 2024 08:07)    LIVER FUNCTIONS - ( 12 Sep 2024 18:49 )  Alb: 4.4 g/dL / Pro: 8.0 g/dL / ALK PHOS: 110 U/L / ALT: 22 U/L / AST: 17 U/L / GGT: x             Urinalysis Basic - ( 13 Sep 2024 06:17 )    Color: x / Appearance: x / SG: x / pH: x  Gluc: 351 mg/dL / Ketone: x  / Bili: x / Urobili: x   Blood: x / Protein: x / Nitrite: x   Leuk Esterase: x / RBC: x / WBC x   Sq Epi: x / Non Sq Epi: x / Bacteria: x      D DImerLactate, Blood: 6.2 mmol/L ( @ 06:16)    Procalcitonin: 0.04 ng/mL ( @ 06:17)      Studies  Chest X-RAY  CT SCAN Chest   CT Abdomen  Venous Dopplers: LE:   Others  rad< from: Xray Chest 2 Views PA/Lat (09.12.24 @ 19:07) >  PROCEDURE DATE:  2024          INTERPRETATION:  EXAMINATION: XR CHEST PA AND LATERAL    CLINICAL INDICATION: Shortness of breath and cough for past 4 days.    TECHNIQUE: 2 views; Frontal and lateral views of the chest were obtained.    COMPARISON: X-ray chest 10/30/2023.    FINDINGS:    The heart is normal in size.  The lungs are clear.  There is no pneumothorax or pleural effusion.    IMPRESSION:  Clear lungs.    --- End of Report ---          KORINA ROLDAN MD; Resident Radiologist  This document has been electronically signed.  ADRIANNA RICARDO MD; Attending Radiologist  This document has been electronically signed. Sep 13 2024  8:22AM    < end of copied text >  < from: CT Abdomen and Pelvis w/ IV Cont (10.30.23 @ 18:38) >  LOWER CHEST: Within normal limits.    LIVER: Diffuse hepatic steatosis.  BILE DUCTS: Normal caliber.  GALLBLADDER: Within normal limits.  SPLEEN: Within normal limits.  PANCREAS: Within normal limits.  ADRENALS: Within normal limits.  KIDNEYS/URETERS: Bilateral renal cortical scarring. No hydronephrosis.   Right renal cyst.    BLADDER: Within normal limits.  REPRODUCTIVE ORGANS: Uterus and adnexa within normal limits.    BOWEL: Small hiatal hernia. Right hemicolectomy with ileocolic   anastomosis. No bowel obstruction. Colonic diverticulosis without acute   diverticulitis.  PERITONEUM: No ascites.  VESSELS: Within normal limits.  RETROPERITONEUM/LYMPH NODES: No lymphadenopathy.  ABDOMINAL WALL: Within normal limits.  BONES: Degenerative changes.    IMPRESSION:  No evidence of acute pathology in the abdomen or pelvis.        --- End of Report ---          YUKO LUCAS MD; Resident Radiologist  This document has been electronically signed.  ROSIE COSTA DO; Attending Radiologist  This document has been electronically signed. Oct 30 2023  7:07PM    < end of copied text >

## 2024-09-13 NOTE — CONSULT NOTE ADULT - ATTENDING COMMENTS
64F w/ asthma (on symbicort and dupixent), HTN, DM. Admitted w/ asthma exacerbation - attempted outpt treatment w/ PO steroids but wheezing and SOB continued and BG rising as well. Noted to have elevated lactate today on labs and MICU consulted.     Pt is HD stable at this time. Able to speak to us in full sentences but can hear audible wheeze. Relays that she has had elevated lactate in the past when she had her last asthma exacerbation a couple of years ago.     - etiology of lactate is likely multifactorial - could be lab error, ?related to metformin use, ?beta agonist use w/ inhalers, related to hyperglycemia  - no evidence for lack of perfusion causing lactic acidosis  - can try to decrease amount of albuterol, treat hyperglycemia, and can give thiamine  - appreciate renal input  - at this time, pt does not require ICU level of care; please call back with any further questions or if clinical status changes    Plan of care discussed w/ pt at bedside, all questions answered

## 2024-09-13 NOTE — CONSULT NOTE ADULT - SUBJECTIVE AND OBJECTIVE BOX
[facility] Initial Consult Note    HPI  64 y.o. female with history of T2DM not on insulin, Asthma, HTN and HLD presents for asthma exacerbation.    History was obtained from patient and outpatient/inpatient chart review.    Diabetes history: She's had T2DM for about 3-4 years and is well controlled on Metformin monotherapy. However, she will get courses of prednisone for a week at a time either for asthma or issues with her right leg. During this time, her numbers go up to the 400's and she has no way to bring them down. I started seeing her in December 2023 and prescribed insulin which helped control her numbers. She was on it for a few weeks and then stopped it. She was taking Levemir 15 units with meals plus aspart 6 units with meals and correction 2:50>150 which was keeping her numbers controlled. When not on steroids, she's well controlled with metformin alone.    Home regimen: Metformin 1000 mg BID  Home FS: recently in the 400s  A1C: 7% in 6/2024  Outpatient endocrinologist: myself, last visit 6/2024    She had an asthma exacerbation and was placed on prednisone 40 mg earlier this week which led to hyperglycemia. I spoke with her on the phone yesterday and prescribed insulin again to bring her numbers down. She is currently on Solumedrol 40 mg IV Q8H. MICU [facility] called for lactic acidosis which is improving along with her fingersticks.    Vital Signs Last 24 Hrs  T(C): 36.4 (09-13-24 @ 12:30), Max: 36.7 (09-13-24 @ 01:57)  HR: 88 (09-13-24 @ 12:30) (84 - 106)  BP: 114/70 (09-13-24 @ 12:30) (114/70 - 159/91)  RR: 20 (09-13-24 @ 12:30) (20 - 22)  SpO2: 94% (09-13-24 @ 12:30) (94% - 99%)    Physical Exam  Gen: NAD, alert, awake  HEENT: NC/AT, moist mucous membranes  Chest: normal respiratory effort  Heart: +S1 S2  Neuro: normal mood and affect    Medications  acetaminophen     Tablet .. 650 milliGRAM(s) Oral every 6 hours PRN  albuterol    90 MICROgram(s) HFA Inhaler 2 Puff(s) Inhalation every 2 hours PRN  albuterol/ipratropium for Nebulization 3 milliLiter(s) Nebulizer every 6 hours  aluminum hydroxide/magnesium hydroxide/simethicone Suspension 30 milliLiter(s) Oral every 4 hours PRN  amLODIPine   Tablet 5 milliGRAM(s) Oral daily  atorvastatin 20 milliGRAM(s) Oral at bedtime  dextrose 5%. 1000 milliLiter(s) IV Continuous <Continuous>  dextrose 5%. 1000 milliLiter(s) IV Continuous <Continuous>  dextrose 50% Injectable 12.5 Gram(s) IV Push once  dextrose 50% Injectable 25 Gram(s) IV Push once  dextrose 50% Injectable 25 Gram(s) IV Push once  dextrose Oral Gel 15 Gram(s) Oral once PRN  fluticasone propionate/ salmeterol 100-50 MICROgram(s) Diskus 1 Dose(s) Inhalation two times a day  glucagon  Injectable 1 milliGRAM(s) IntraMuscular once  insulin glargine Injectable (LANTUS) 10 Unit(s) SubCutaneous every morning  insulin lispro (ADMELOG) corrective regimen sliding scale   SubCutaneous three times a day before meals  insulin lispro (ADMELOG) corrective regimen sliding scale   SubCutaneous at bedtime  insulin lispro Injectable (ADMELOG) 3 Unit(s) SubCutaneous three times a day before meals  losartan 100 milliGRAM(s) Oral daily  melatonin 3 milliGRAM(s) Oral at bedtime PRN  methylPREDNISolone sodium succinate Injectable 40 milliGRAM(s) IV Push every 8 hours  montelukast 10 milliGRAM(s) Oral daily  ondansetron Injectable 4 milliGRAM(s) IV Push every 8 hours PRN  sodium chloride 0.9%. 1000 milliLiter(s) IV Continuous <Continuous>  tiotropium 2.5 MICROgram(s) Inhaler 2 Puff(s) Inhalation daily      Pertinent labs/imaging  POCT Blood Glucose.: 229 mg/dL (09-13-24 @ 12:37)  POCT Blood Glucose.: 383 mg/dL (09-13-24 @ 10:00)  POCT Blood Glucose.: 319 mg/dL (09-13-24 @ 08:07)    eGFR: 87 mL/min/1.73m2 (09-13-24 @ 14:44)  eGFR: 83 mL/min/1.73m2 (09-13-24 @ 06:17)  eGFR: 85 mL/min/1.73m2 (09-12-24 @ 18:49)   [facility] Initial Consult Note    HPI  64 y.o. female with history of T2DM not on insulin, Asthma, HTN and HLD presents for asthma exacerbation.    History was obtained from patient and outpatient/inpatient chart review.    Diabetes history: She's had T2DM for about 3-4 years and is well controlled on Metformin monotherapy. However, she will get courses of prednisone for a week at a time either for asthma or issues with her right leg. During this time, her numbers go up to the 400's and she has no way to bring them down. I started seeing her in December 2023 and prescribed insulin which helped control her numbers. She was on it for a few weeks and then stopped it. She was taking Levemir 15 units with meals plus aspart 6 units with meals and correction 2:50>150 which was keeping her numbers controlled. When not on steroids, she's well controlled with metformin alone.    Home regimen: Metformin 1000 mg BID  Home FS: recently in the 400s  A1C: 7% in 6/2024  Outpatient endocrinologist: myself, last visit 6/2024    She had an asthma exacerbation and was placed on prednisone 40 mg earlier this week which led to hyperglycemia. I spoke with her on the phone yesterday and prescribed insulin again to bring her numbers down. She is currently on Solumedrol 40 mg IV Q8H. MICU [place] called for lactic acidosis which is improving along with her fingersticks.    Vital Signs Last 24 Hrs  T(C): 36.4 (09-13-24 @ 12:30), Max: 36.7 (09-13-24 @ 01:57)  HR: 88 (09-13-24 @ 12:30) (84 - 106)  BP: 114/70 (09-13-24 @ 12:30) (114/70 - 159/91)  RR: 20 (09-13-24 @ 12:30) (20 - 22)  SpO2: 94% (09-13-24 @ 12:30) (94% - 99%)    Physical Exam  Gen: NAD, alert, awake  HEENT: NC/AT, moist mucous membranes  Chest: mildly increased respiratory effort  Heart: +S1 S2  Neuro: normal mood and affect    Medications  acetaminophen     Tablet .. 650 milliGRAM(s) Oral every 6 hours PRN  albuterol    90 MICROgram(s) HFA Inhaler 2 Puff(s) Inhalation every 2 hours PRN  albuterol/ipratropium for Nebulization 3 milliLiter(s) Nebulizer every 6 hours  aluminum hydroxide/magnesium hydroxide/simethicone Suspension 30 milliLiter(s) Oral every 4 hours PRN  amLODIPine   Tablet 5 milliGRAM(s) Oral daily  atorvastatin 20 milliGRAM(s) Oral at bedtime  dextrose 5%. 1000 milliLiter(s) IV Continuous <Continuous>  dextrose 5%. 1000 milliLiter(s) IV Continuous <Continuous>  dextrose 50% Injectable 12.5 Gram(s) IV Push once  dextrose 50% Injectable 25 Gram(s) IV Push once  dextrose 50% Injectable 25 Gram(s) IV Push once  dextrose Oral Gel 15 Gram(s) Oral once PRN  fluticasone propionate/ salmeterol 100-50 MICROgram(s) Diskus 1 Dose(s) Inhalation two times a day  glucagon  Injectable 1 milliGRAM(s) IntraMuscular once  insulin glargine Injectable (LANTUS) 10 Unit(s) SubCutaneous every morning  insulin lispro (ADMELOG) corrective regimen sliding scale   SubCutaneous three times a day before meals  insulin lispro (ADMELOG) corrective regimen sliding scale   SubCutaneous at bedtime  insulin lispro Injectable (ADMELOG) 3 Unit(s) SubCutaneous three times a day before meals  losartan 100 milliGRAM(s) Oral daily  melatonin 3 milliGRAM(s) Oral at bedtime PRN  methylPREDNISolone sodium succinate Injectable 40 milliGRAM(s) IV Push every 8 hours  montelukast 10 milliGRAM(s) Oral daily  ondansetron Injectable 4 milliGRAM(s) IV Push every 8 hours PRN  sodium chloride 0.9%. 1000 milliLiter(s) IV Continuous <Continuous>  tiotropium 2.5 MICROgram(s) Inhaler 2 Puff(s) Inhalation daily    Pertinent labs/imaging  POCT Blood Glucose.: 229 mg/dL (09-13-24 @ 12:37)  POCT Blood Glucose.: 383 mg/dL (09-13-24 @ 10:00)  POCT Blood Glucose.: 319 mg/dL (09-13-24 @ 08:07)    eGFR: 87 mL/min/1.73m2 (09-13-24 @ 14:44)  eGFR: 83 mL/min/1.73m2 (09-13-24 @ 06:17)  eGFR: 85 mL/min/1.73m2 (09-12-24 @ 18:49)

## 2024-09-13 NOTE — PROGRESS NOTE ADULT - SUBJECTIVE AND OBJECTIVE BOX
SUBJECTIVE / OVERNIGHT EVENTS:      Patient seen and examined at bedside. Elevated lactate this am      --------------------------------------------------------------------------------------------  LABS:                        11.8   12.60 )-----------( 289      ( 12 Sep 2024 18:49 )             36.6     09-13    138  |  100  |  24<H>  ----------------------------<  351<H>  5.9<H>   |  18<L>  |  0.80    Ca    10.0      13 Sep 2024 06:17  Phos  2.9     09-13  Mg     2.5     09-13    TPro  8.0  /  Alb  4.4  /  TBili  0.1<L>  /  DBili  x   /  AST  17  /  ALT  22  /  AlkPhos  110  09-12      CAPILLARY BLOOD GLUCOSE      POCT Blood Glucose.: 319 mg/dL (13 Sep 2024 08:07)        Urinalysis Basic - ( 13 Sep 2024 06:17 )    Color: x / Appearance: x / SG: x / pH: x  Gluc: 351 mg/dL / Ketone: x  / Bili: x / Urobili: x   Blood: x / Protein: x / Nitrite: x   Leuk Esterase: x / RBC: x / WBC x   Sq Epi: x / Non Sq Epi: x / Bacteria: x        RADIOLOGY & ADDITIONAL TESTS: < from: Xray Chest 2 Views PA/Lat (09.12.24 @ 19:07) >  IMPRESSION:  Clear lungs.    < end of copied text >      Imaging Personally Reviewed:  [x] YES  [ ] NO    Consultant(s) Notes Reviewed:  [x] YES  [ ] NO    MEDICATIONS  (STANDING):  albuterol/ipratropium for Nebulization 3 milliLiter(s) Nebulizer every 6 hours  amLODIPine   Tablet 5 milliGRAM(s) Oral daily  atorvastatin 20 milliGRAM(s) Oral at bedtime  dextrose 5%. 1000 milliLiter(s) (100 mL/Hr) IV Continuous <Continuous>  dextrose 5%. 1000 milliLiter(s) (50 mL/Hr) IV Continuous <Continuous>  dextrose 50% Injectable 12.5 Gram(s) IV Push once  dextrose 50% Injectable 25 Gram(s) IV Push once  dextrose 50% Injectable 25 Gram(s) IV Push once  fluticasone propionate/ salmeterol 100-50 MICROgram(s) Diskus 1 Dose(s) Inhalation two times a day  glucagon  Injectable 1 milliGRAM(s) IntraMuscular once  hydrochlorothiazide 12.5 milliGRAM(s) Oral daily  insulin glargine Injectable (LANTUS) 10 Unit(s) SubCutaneous every morning  insulin lispro (ADMELOG) corrective regimen sliding scale   SubCutaneous three times a day before meals  insulin lispro (ADMELOG) corrective regimen sliding scale   SubCutaneous at bedtime  insulin lispro Injectable (ADMELOG) 3 Unit(s) SubCutaneous three times a day before meals  losartan 100 milliGRAM(s) Oral daily  methylPREDNISolone sodium succinate Injectable 40 milliGRAM(s) IV Push every 8 hours  sodium chloride 0.9%. 1000 milliLiter(s) (100 mL/Hr) IV Continuous <Continuous>  sodium zirconium cyclosilicate 10 Gram(s) Oral once  tiotropium 2.5 MICROgram(s) Inhaler 2 Puff(s) Inhalation daily    MEDICATIONS  (PRN):  acetaminophen     Tablet .. 650 milliGRAM(s) Oral every 6 hours PRN Temp greater or equal to 38C (100.4F), Mild Pain (1 - 3)  albuterol    90 MICROgram(s) HFA Inhaler 2 Puff(s) Inhalation every 2 hours PRN Shortness of Breath and/or Wheezing  aluminum hydroxide/magnesium hydroxide/simethicone Suspension 30 milliLiter(s) Oral every 4 hours PRN Dyspepsia  dextrose Oral Gel 15 Gram(s) Oral once PRN Blood Glucose LESS THAN 70 milliGRAM(s)/deciliter  melatonin 3 milliGRAM(s) Oral at bedtime PRN Insomnia  ondansetron Injectable 4 milliGRAM(s) IV Push every 8 hours PRN Nausea and/or Vomiting      Care Discussed with Consultants/Other Providers [x] YES  [ ] NO    Vital Signs Last 24 Hrs  T(C): 36.3 (13 Sep 2024 03:49), Max: 36.7 (13 Sep 2024 01:57)  T(F): 97.3 (13 Sep 2024 03:49), Max: 98 (13 Sep 2024 01:57)  HR: 94 (13 Sep 2024 05:00) (84 - 106)  BP: 128/77 (13 Sep 2024 05:00) (117/67 - 159/91)  BP(mean): 98 (12 Sep 2024 19:50) (98 - 98)  RR: 20 (13 Sep 2024 05:00) (20 - 22)  SpO2: 97% (13 Sep 2024 05:00) (97% - 99%)    Parameters below as of 13 Sep 2024 05:00  Patient On (Oxygen Delivery Method): room air      I&O's Summary      PHYSICAL EXAM:  GENERAL: NAD, well-developed, comfortable  HEAD:  Atraumatic, Normocephalic  EYES: EOMI, PERRLA, conjunctiva and sclera clear  NECK: Supple, No JVD  CHEST/LUNG: Diminished bilaterally;+wheeze  HEART: Regular rate and rhythm; No murmurs, rubs, or gallops  ABDOMEN: Soft, Nontender, Nondistended; Bowel sounds present  NEURO: AAOx3, no focal weakness, 5/5 b/l extremity strength, b/l knee no arthritis, no effusion   EXTREMITIES:  2+ Peripheral Pulses, No clubbing, cyanosis, or edema  SKIN: No rashes or lesions

## 2024-09-13 NOTE — H&P ADULT - PROBLEM SELECTOR PLAN 1
-start solumedrol 40 q12h -denies h/o intubation d/t asthma flare   -start solumedrol 40 q8h.   -duoneb q6h standing.   -home inhalers converted to hospital formulary   -continuous pulse ox. pt is currently saturating well on o2.   -ABG in the morning. -denies h/o intubation d/t asthma flare   -start solumedrol 40 q8h.   -duoneb q6h standing.   -home inhalers converted to hospital formulary   -continuous pulse ox. pt is currently saturating well on o2.   -ABG in the morning.  -pulm emailed. -denies h/o intubation d/t asthma flare   -start solumedrol 40 q8h.   -duoneb q6h standing.   -home inhalers converted to hospital formulary   -continuous pulse ox. pt is currently saturating well on o2.   -ABG in the morning.  -pulm c/s in the morning.

## 2024-09-13 NOTE — PATIENT PROFILE ADULT - TRANSPORTATION
Bed: 03  Expected date: 4/3/17  Expected time: 9:08 AM  Means of arrival: [facility]  Comments:  64 y/o F intoxicated, fall yesterday with bruise to hip  VSS  Signed JOCELINE murphy   no

## 2024-09-13 NOTE — PATIENT PROFILE ADULT - IS PATIENT POST-MENOPAUSAL?
yes
I have personally seen and examined this patient. I have fully participated in the care of this patient. I have reviewed all pertinent clinical information, including history physical exam, plan and the Resident's note and agree except as noted

## 2024-09-13 NOTE — H&P ADULT - PROBLEM SELECTOR PLAN 2
-hyperglycemia likely steroid induced.   - -met criteria w/ leukocytosis and tachypnea but leukocytosis likely steroid induced.   -will monitor off abx  -f/u procal   -cxr was clear.

## 2024-09-13 NOTE — CONSULT NOTE ADULT - SUBJECTIVE AND OBJECTIVE BOX
[facility]  [phone]  NEPHROLOGY      INITIAL CONSULT NOTE  --------------------------------------------------------------------------------  HPI:      64F PMHx asthma, HTN/HLD, DM. hx breast ca and colon ca, Presenting w/ 3d of SOB and cough, rx'd prednisone w/o improvement outpatient. Pt also been noticing increase in FS glucose while on steroid.   on admission \VBG w/ initial lactate 6.5 -> 4.6 after 1L IVF.  pt repeat lactic acid on  6.2. renal consult called. of note, outpt has been on metformin as well   covid/flu/rsv neg. CXR showed clear lungs. Pt received solumedrol 125mg, Mg 2g, and duoneb x3.   pt receiving beta agonists as well     PAST HISTORY  --------------------------------------------------------------------------------  PAST MEDICAL & SURGICAL HISTORY:  Asthma  (no hx/o intubation)      Hypertension      DM2 (diabetes mellitus, type 2)      Breast cancer  R breast 10/ 2019      Colon cancer      History of chemotherapy      History of radiation therapy       delivery NOS  x1      H/O foot surgery  right      S/P lumpectomy, right breast  10/2019        FAMILY HISTORY:  Family history of MI (myocardial infarction)  Father    Family history of lung cancer (Father)  (primary lung cancer)      PAST SOCIAL HISTORY:    ALLERGIES & MEDICATIONS  --------------------------------------------------------------------------------  Allergies    NSAIDs (Unknown)  aspirin (Hives)  AValox (Unknown)    Intolerances      Standing Inpatient Medications  albuterol/ipratropium for Nebulization 3 milliLiter(s) Nebulizer every 6 hours  amLODIPine   Tablet 5 milliGRAM(s) Oral daily  atorvastatin 20 milliGRAM(s) Oral at bedtime  dextrose 5%. 1000 milliLiter(s) IV Continuous <Continuous>  dextrose 5%. 1000 milliLiter(s) IV Continuous <Continuous>  dextrose 50% Injectable 12.5 Gram(s) IV Push once  dextrose 50% Injectable 25 Gram(s) IV Push once  dextrose 50% Injectable 25 Gram(s) IV Push once  fluticasone propionate/ salmeterol 100-50 MICROgram(s) Diskus 1 Dose(s) Inhalation two times a day  glucagon  Injectable 1 milliGRAM(s) IntraMuscular once  hydrochlorothiazide 12.5 milliGRAM(s) Oral daily  insulin glargine Injectable (LANTUS) 10 Unit(s) SubCutaneous every morning  insulin lispro (ADMELOG) corrective regimen sliding scale   SubCutaneous three times a day before meals  insulin lispro (ADMELOG) corrective regimen sliding scale   SubCutaneous at bedtime  insulin lispro Injectable (ADMELOG) 3 Unit(s) SubCutaneous three times a day before meals  losartan 100 milliGRAM(s) Oral daily  methylPREDNISolone sodium succinate Injectable 40 milliGRAM(s) IV Push every 8 hours  sodium chloride 0.9%. 1000 milliLiter(s) IV Continuous <Continuous>  tiotropium 2.5 MICROgram(s) Inhaler 2 Puff(s) Inhalation daily    PRN Inpatient Medications  acetaminophen     Tablet .. 650 milliGRAM(s) Oral every 6 hours PRN  albuterol    90 MICROgram(s) HFA Inhaler 2 Puff(s) Inhalation every 2 hours PRN  aluminum hydroxide/magnesium hydroxide/simethicone Suspension 30 milliLiter(s) Oral every 4 hours PRN  dextrose Oral Gel 15 Gram(s) Oral once PRN  melatonin 3 milliGRAM(s) Oral at bedtime PRN  ondansetron Injectable 4 milliGRAM(s) IV Push every 8 hours PRN      REVIEW OF SYSTEMS  --------------------------------------------------------------------------------  Gen: No  fevers/chills   Skin: No rashes  Head/Eyes/Ears/Mouth: No headache; Normal hearing;  No sinus pain/discomfort, sore throat  Respiratory: + sob and wheezing as well as  cough,  CV: No chest pain, orthopnea  GI: No abdominal pain, intermittent diarrhea, has had nausea and vomiting   : No dysuria, decrease urination or hesitancy urinating  hematuria  MSK: No joint pain/swelling; no back pain  Neuro: No dizziness/lightheadedness  also with no edema       VITALS/PHYSICAL EXAM  --------------------------------------------------------------------------------  T(C): 36.3 (24 @ 03:49), Max: 36.7 (24 @ 01:57)  HR: 94 (24 @ 05:00) (84 - 106)  BP: 128/77 (24 @ 05:00) (117/67 - 159/91)  RR: 20 (24 @ 05:00) (20 - 22)  SpO2: 97% (24 @ 05:00) (97% - 99%)  Wt(kg): --  Height (cm): 149.9 (24 @ 18:20)  Weight (kg): 65.8 (24 @ 18:20)  BMI (kg/m2): 29.3 (24 @ 18:20)  BSA (m2): 1.61 (24 @ 18:20)      Physical Exam:  	Gen: tachypnea on O2  	no jvd ,   	Pulm: decrease bs  no rales or ronchi +  wheezing  	CV: RRR, S1S2; no rub  	Back: No CVA tenderness  	Abd: +BS, soft, nontender/nondistended  	: No suprapubic tenderness  	UE: Warm, no cyanosis  no clubbing,  LUE ? edema   	LE: Warm, no cyanosis  no clubbing, no edema  	Neuro: alert and oriented. speech coherent   	Psych: Normal affect and mood  	Skin: Warm, no decrease skin turgor   	Vascular access:    LABS/STUDIES  --------------------------------------------------------------------------------              11.8   12.60 >-----------<  289      [24 @ 18:49]              36.6     138  |  100  |  24  ----------------------------<  351      [24 @ 06:17]  5.9   |  18  |  0.80  hemolyzed       Ca     10.0     [24 06:17]      Mg     2.5     [24 06:17]      Phos  2.9     [24 06:17]    TPro  8.0  /  Alb  4.4  /  TBili  0.1  /  DBili  x   /  AST  17  /  ALT  22  /  AlkPhos  110  [24 18:49]          Creatinine Trend:  SCr 0.80 [:17]  SCr 0.79 [ 18:49]

## 2024-09-13 NOTE — H&P ADULT - NSHPPHYSICALEXAM_GEN_ALL_CORE
Vital Signs Last 24 Hrs  T(C): 36.4 (13 Sep 2024 00:45), Max: 36.5 (12 Sep 2024 18:20)  T(F): 97.6 (13 Sep 2024 00:45), Max: 97.7 (12 Sep 2024 18:20)  HR: 105 (13 Sep 2024 00:45) (84 - 105)  BP: 117/67 (13 Sep 2024 00:45) (117/67 - 142/88)  BP(mean): 98 (12 Sep 2024 19:50) (98 - 98)  RR: 22 (13 Sep 2024 00:45) (21 - 22)  SpO2: 98% (13 Sep 2024 00:45) (98% - 99%)    Parameters below as of 13 Sep 2024 00:45  Patient On (Oxygen Delivery Method): room air        CONSTITUTIONAL: Well-groomed, in no apparent distress  EYES: No conjunctival or scleral injection, non-icteric  ENMT: No external nasal lesions; MMM  RESPIRATORY: tachypneic, exp wheezing on auscultated, also w/ upper airway sounds.   CARDIOVASCULAR: +S1S2, RRR; no lower extremity edema  GASTROINTESTINAL: No tenderness, +BS throughout, no rebound/guarding  NEUROLOGIC: No gross focal neurological deficits, AAOX3  PSYCHIATRIC: mood and affect appropriate; appropriate insight and judgment

## 2024-09-13 NOTE — CONSULT NOTE ADULT - SUBJECTIVE AND OBJECTIVE BOX
[facility]  JAMSHID Bridges S. Shah, Y. Patel, G. Husam  [phone]  ([phone] - weekdays after 5pm and weekends)    ROGER PALUMBO  64y, Female  [id number]    HPI:  Patient is a 64 year old female with PMH of asthma, HTN/HLD, DM. Presenting w/ 3d of SOB and cough, rx'd prednisone w/o improvement outpatient. Pt also been noticing increase in FS glucose while on steroid. In the ED, pt afebrile, bp stable, on RA saturating 99%, tachypneic to 22. Pt was audibly wheezing. CBC w/ wbc 12.6, hgb 11.8, plt 289. CMP w/ SCr 0.79, bicarb 18, AG 18, glucose 346. VBG w/ initial lactate 6.5 -> 4.6 after 1L IVF. covid/flu/rsv neg. CXR showed clear lungs. Pt received solumedrol 125mg, Mg 2g, and duoneb x3. On interview/exam, pt still wheezing. Appears audible wheezing w/o stethoscope is more upper airway but on lung auscultation, with exp wheezing as well. No crackles. Pt reports subjective SOB but saturating 96% on RA.  (13 Sep 2024 00:43)  ROS: 14 point review of systems completed, pertinent positives and negatives as per HPI.    Allergies: NSAIDs (Unknown)  aspirin (Hives)  AValox (Unknown)    PMH -- Asthma  HTN (hypertension  Hypertension  DM2 (diabetes mellitus, type 2)  Former smoker, stopped smoking many years ago  Breast cancer  Colon cancer  History of chemotherapy  History of radiation therapy    PSH -- Uterus Problem  Bunion   delivery NOS  H/O foot surgery  S/P lumpectomy, right breast    FH -- Family history of MI (myocardial infarction)  Family history of lung cancer (Father)  Family history of renal cancer    Social History -- former smoker, denies alcohol or illicit drug use    Physical Exam--  Vital Signs Last 24 Hrs  T(F): 97.3 (13 Sep 2024 03:49), Max: 98 (13 Sep 2024 01:57)  HR: 96 (13 Sep 2024 03:49) (84 - 106)  BP: 153/67 (13 Sep 2024 03:49) (117/67 - 159/91)  RR: 22 (13 Sep 2024 03:49) (20 - 22)  SpO2: 97% (13 Sep 2024 03:49) (97% - 99%)  General: no acute distress  HEENT: NC/AT, EOMI, anicteric  Lungs: clear to auscultation bilaterally  Heart: S1, S2 present, normal rate/rhythm  Abdomen: Soft. ND. NT. BS present.   Neuro: AAOx3, no obvious focal deficits   Extremities: No cyanosis. No edema.   Skin: Warm. Dry. No visible rash.   Lines: PIV     Laboratory & Imaging Data--  CBC:                       11.8   12.60 )-----------( 289      ( 12 Sep 2024 18:49 )             36.6     WBC Count: 12.60 K/uL (24 @ 18:49)    CMP:     136  |  100  |  26<H>  ----------------------------<  346<H>  4.8   |  18<L>  |  0.79    Ca    10.2      12 Sep 2024 18:49    TPro  8.0  /  Alb  4.4  /  TBili  0.1<L>  /  DBili  x   /  AST  17  /  ALT  22  /  AlkPhos  110      LIVER FUNCTIONS - ( 12 Sep 2024 18:49 )  Alb: 4.4 g/dL / Pro: 8.0 g/dL / ALK PHOS: 110 U/L / ALT: 22 U/L / AST: 17 U/L / GGT: x           Microbiology: reviewed  FluA/FluB/RSV/COVID PCR (24 @ 18:49)    SARS-CoV-2 Result: [facility]: This Respiratory Panel uses polymerase chain reaction (PCR) to detect for  influenza A; influenza B; respiratory syncytial virus; and SARS-CoV-2.  This test was validated by [facility] and is in use under the FDA  Emergency Use Authorization (EUA) for clinical labs CLIA-certified to  perform high complexity testing. Test results should be correlated with  clinical presentation, patient history, and epidemiology.   Influenza A Result: [facility]   Influenza B Result: [facility]   Resp Syn Virus Result: [facility]    Radiology--reviewed  < from: Xray Chest 2 Views PA/Lat (24 @ 19:07) >      INTERPRETATION:  Prelim  impression:  Clear lungs.        ******PRELIMINARY REPORT******      < end of copied text >    Active Medications--  acetaminophen     Tablet .. 650 milliGRAM(s) Oral every 6 hours PRN  albuterol    90 MICROgram(s) HFA Inhaler 2 Puff(s) Inhalation every 2 hours PRN  albuterol/ipratropium for Nebulization 3 milliLiter(s) Nebulizer every 6 hours  aluminum hydroxide/magnesium hydroxide/simethicone Suspension 30 milliLiter(s) Oral every 4 hours PRN  amLODIPine   Tablet 5 milliGRAM(s) Oral daily  atorvastatin 20 milliGRAM(s) Oral at bedtime  dextrose 5%. 1000 milliLiter(s) IV Continuous <Continuous>  dextrose 5%. 1000 milliLiter(s) IV Continuous <Continuous>  dextrose 50% Injectable 25 Gram(s) IV Push once  dextrose 50% Injectable 25 Gram(s) IV Push once  dextrose 50% Injectable 12.5 Gram(s) IV Push once  dextrose Oral Gel 15 Gram(s) Oral once PRN  fluticasone propionate/ salmeterol 100-50 MICROgram(s) Diskus 1 Dose(s) Inhalation two times a day  glucagon  Injectable 1 milliGRAM(s) IntraMuscular once  hydrochlorothiazide 12.5 milliGRAM(s) Oral daily  insulin glargine Injectable (LANTUS) 10 Unit(s) SubCutaneous every morning  insulin lispro (ADMELOG) corrective regimen sliding scale   SubCutaneous three times a day before meals  insulin lispro (ADMELOG) corrective regimen sliding scale   SubCutaneous at bedtime  insulin lispro Injectable (ADMELOG) 3 Unit(s) SubCutaneous three times a day before meals  losartan 100 milliGRAM(s) Oral daily  melatonin 3 milliGRAM(s) Oral at bedtime PRN  methylPREDNISolone sodium succinate Injectable 40 milliGRAM(s) IV Push every 8 hours  ondansetron Injectable 4 milliGRAM(s) IV Push every 8 hours PRN  sodium chloride 0.9%. 1000 milliLiter(s) IV Continuous <Continuous>  tiotropium 2.5 MICROgram(s) Inhaler 2 Puff(s) Inhalation daily [facility]  JAMSHID Bridges, KRAIG Dorman [facility]  [phone]  ([phone] - weekdays after 5pm and weekends)    ROGER PALUMBO  64y, Female  [id number]    HPI:  Patient is a 64 year old female with PMH of asthma, HTN, HLD, DM who presents with dyspnea and cough for 3 days. Patient reports she first started to have dyspnea and then cough, she saw her Pulmonologist earlier this week and was given oral prednisone and nebulizer treatment but did not improved. States possible sick contact is her sister in law who lives with them, states she had some URI symptoms and recently started going to a new day program. States she continued to have wheezing, chest tightness and dyspnea and came to the ER. Patient continues to have audible wheezing this morning, states she feels slightly better. Denies fever, chills, pain, nausea, vomiting, diarrhea, dysuria or any other new complaints.   ROS: 14 point review of systems completed, pertinent positives and negatives as per HPI.    Allergies: NSAIDs (Unknown)  aspirin (Hives)  AValox (Unknown)    PMH -- Asthma  HTN (hypertension  Hypertension  DM2 (diabetes mellitus, type 2)  Former smoker, stopped smoking many years ago  Breast cancer  Colon cancer  History of chemotherapy  History of radiation therapy    PSH -- Uterus Problem  Bunion   delivery NOS  H/O foot surgery  S/P lumpectomy, right breast    FH -- Family history of MI (myocardial infarction)  Family history of lung cancer (Father)  Family history of renal cancer    Social History -- former smoker-quit 37 years ago, denies alcohol or illicit drug use    Physical Exam--  Vital Signs Last 24 Hrs  T(F): 97.3 (13 Sep 2024 03:49), Max: 98 (13 Sep 2024 01:57)  HR: 96 (13 Sep 2024 03:49) (84 - 106)  BP: 153/67 (13 Sep 2024 03:49) (117/67 - 159/91)  RR: 22 (13 Sep 2024 03:49) (20 - 22)  SpO2: 97% (13 Sep 2024 03:49) (97% - 99%)  General: no acute distress  HEENT: NC/AT, EOMI, anicteric  Lungs: breath sounds heard, +wheezes  Heart: S1, S2 present, normal rate/rhythm  Abdomen: Soft. ND. NT. BS present.   Neuro: AAOx3, no obvious focal deficits   Extremities: No cyanosis. No edema.   Skin: Warm. Dry. No visible rash.   Lines: PIV     Laboratory & Imaging Data--  CBC:                       11.8   12.60 )-----------( 289      ( 12 Sep 2024 18:49 )             36.6     WBC Count: 12.60 K/uL (24 @ 18:49)    CMP:     136  |  100  |  26<H>  ----------------------------<  346<H>  4.8   |  18<L>  |  0.79    Ca    10.2      12 Sep 2024 18:49    TPro  8.0  /  Alb  4.4  /  TBili  0.1<L>  /  DBili  x   /  AST  17  /  ALT  22  /  AlkPhos  110      LIVER FUNCTIONS - ( 12 Sep 2024 18:49 )  Alb: 4.4 g/dL / Pro: 8.0 g/dL / ALK PHOS: 110 U/L / ALT: 22 U/L / AST: 17 U/L / GGT: x           Microbiology: reviewed  FluA/FluB/RSV/COVID PCR (24 @ 18:49)    SARS-CoV-2 Result: [facility]: This Respiratory Panel uses polymerase chain reaction (PCR) to detect for  influenza A; influenza B; respiratory syncytial virus; and SARS-CoV-2.  This test was validated by [facility] and is in use under the FDA  Emergency Use Authorization (EUA) for clinical labs CLIA-certified to  perform high complexity testing. Test results should be correlated with  clinical presentation, patient history, and epidemiology.   Influenza A Result: [facility]   Influenza B Result: [facility]   Resp Syn Virus Result: [facility]    Radiology--reviewed  < from: Xray Chest 2 Views PA/Lat (24 @ 19:07) >  IMPRESSION:  Clear lungs.    --- End of Report ---    < end of copied text >      Active Medications--  acetaminophen     Tablet .. 650 milliGRAM(s) Oral every 6 hours PRN  albuterol    90 MICROgram(s) HFA Inhaler 2 Puff(s) Inhalation every 2 hours PRN  albuterol/ipratropium for Nebulization 3 milliLiter(s) Nebulizer every 6 hours  aluminum hydroxide/magnesium hydroxide/simethicone Suspension 30 milliLiter(s) Oral every 4 hours PRN  amLODIPine   Tablet 5 milliGRAM(s) Oral daily  atorvastatin 20 milliGRAM(s) Oral at bedtime  dextrose 5%. 1000 milliLiter(s) IV Continuous <Continuous>  dextrose 5%. 1000 milliLiter(s) IV Continuous <Continuous>  dextrose 50% Injectable 25 Gram(s) IV Push once  dextrose 50% Injectable 25 Gram(s) IV Push once  dextrose 50% Injectable 12.5 Gram(s) IV Push once  dextrose Oral Gel 15 Gram(s) Oral once PRN  fluticasone propionate/ salmeterol 100-50 MICROgram(s) Diskus 1 Dose(s) Inhalation two times a day  glucagon  Injectable 1 milliGRAM(s) IntraMuscular once  hydrochlorothiazide 12.5 milliGRAM(s) Oral daily  insulin glargine Injectable (LANTUS) 10 Unit(s) SubCutaneous every morning  insulin lispro (ADMELOG) corrective regimen sliding scale   SubCutaneous three times a day before meals  insulin lispro (ADMELOG) corrective regimen sliding scale   SubCutaneous at bedtime  insulin lispro Injectable (ADMELOG) 3 Unit(s) SubCutaneous three times a day before meals  losartan 100 milliGRAM(s) Oral daily  melatonin 3 milliGRAM(s) Oral at bedtime PRN  methylPREDNISolone sodium succinate Injectable 40 milliGRAM(s) IV Push every 8 hours  ondansetron Injectable 4 milliGRAM(s) IV Push every 8 hours PRN  sodium chloride 0.9%. 1000 milliLiter(s) IV Continuous <Continuous>  tiotropium 2.5 MICROgram(s) Inhaler 2 Puff(s) Inhalation daily

## 2024-09-14 LAB
ALBUMIN SERPL ELPH-MCNC: 4.2 G/DL — SIGNIFICANT CHANGE UP (ref 3.3–5)
ALP SERPL-CCNC: 105 U/L — SIGNIFICANT CHANGE UP (ref 40–120)
ALT FLD-CCNC: 18 U/L — SIGNIFICANT CHANGE UP (ref 10–45)
ANION GAP SERPL CALC-SCNC: 15 MMOL/L — SIGNIFICANT CHANGE UP (ref 5–17)
AST SERPL-CCNC: 10 U/L — SIGNIFICANT CHANGE UP (ref 10–40)
BILIRUB SERPL-MCNC: 0.1 MG/DL — LOW (ref 0.2–1.2)
BUN SERPL-MCNC: 22 MG/DL — SIGNIFICANT CHANGE UP (ref 7–23)
CALCIUM SERPL-MCNC: 9.7 MG/DL — SIGNIFICANT CHANGE UP (ref 8.4–10.5)
CHLORIDE SERPL-SCNC: 104 MMOL/L — SIGNIFICANT CHANGE UP (ref 96–108)
CO2 SERPL-SCNC: 22 MMOL/L — SIGNIFICANT CHANGE UP (ref 22–31)
CREAT SERPL-MCNC: 0.81 MG/DL — SIGNIFICANT CHANGE UP (ref 0.5–1.3)
EGFR: 82 ML/MIN/1.73M2 — SIGNIFICANT CHANGE UP
GLUCOSE BLDC GLUCOMTR-MCNC: 219 MG/DL — HIGH (ref 70–99)
GLUCOSE BLDC GLUCOMTR-MCNC: 222 MG/DL — HIGH (ref 70–99)
GLUCOSE BLDC GLUCOMTR-MCNC: 250 MG/DL — HIGH (ref 70–99)
GLUCOSE BLDC GLUCOMTR-MCNC: 274 MG/DL — HIGH (ref 70–99)
GLUCOSE BLDC GLUCOMTR-MCNC: 384 MG/DL — HIGH (ref 70–99)
GLUCOSE SERPL-MCNC: 232 MG/DL — HIGH (ref 70–99)
HCT VFR BLD CALC: 28 % — LOW (ref 34.5–45)
HGB BLD-MCNC: 9.1 G/DL — LOW (ref 11.5–15.5)
LACTATE SERPL-SCNC: 4.5 MMOL/L — CRITICAL HIGH (ref 0.5–2)
MAGNESIUM SERPL-MCNC: 2.2 MG/DL — SIGNIFICANT CHANGE UP (ref 1.6–2.6)
MCHC RBC-ENTMCNC: 26.9 PG — LOW (ref 27–34)
MCHC RBC-ENTMCNC: 32.5 GM/DL — SIGNIFICANT CHANGE UP (ref 32–36)
MCV RBC AUTO: 82.8 FL — SIGNIFICANT CHANGE UP (ref 80–100)
NRBC # BLD: 0 /100 WBCS — SIGNIFICANT CHANGE UP (ref 0–0)
PHOSPHATE SERPL-MCNC: 2.8 MG/DL — SIGNIFICANT CHANGE UP (ref 2.5–4.5)
PLATELET # BLD AUTO: 229 K/UL — SIGNIFICANT CHANGE UP (ref 150–400)
POTASSIUM SERPL-MCNC: 3.7 MMOL/L — SIGNIFICANT CHANGE UP (ref 3.5–5.3)
POTASSIUM SERPL-SCNC: 3.7 MMOL/L — SIGNIFICANT CHANGE UP (ref 3.5–5.3)
PROT SERPL-MCNC: 7.4 G/DL — SIGNIFICANT CHANGE UP (ref 6–8.3)
RBC # BLD: 3.38 M/UL — LOW (ref 3.8–5.2)
RBC # FLD: 14.1 % — SIGNIFICANT CHANGE UP (ref 10.3–14.5)
SODIUM SERPL-SCNC: 141 MMOL/L — SIGNIFICANT CHANGE UP (ref 135–145)
TROPONIN T, HIGH SENSITIVITY RESULT: 10 NG/L — SIGNIFICANT CHANGE UP (ref 0–51)
WBC # BLD: 11.31 K/UL — HIGH (ref 3.8–10.5)
WBC # FLD AUTO: 11.31 K/UL — HIGH (ref 3.8–10.5)

## 2024-09-14 RX ORDER — ACETAMINOPHEN 325 MG/1
1000 TABLET ORAL ONCE
Refills: 0 | Status: COMPLETED | OUTPATIENT
Start: 2024-09-14 | End: 2024-09-14

## 2024-09-14 RX ORDER — IPRATROPIUM BROMIDE AND ALBUTEROL SULFATE .5; 3 MG/3ML; MG/3ML
3 SOLUTION RESPIRATORY (INHALATION) EVERY 4 HOURS
Refills: 0 | Status: DISCONTINUED | OUTPATIENT
Start: 2024-09-14 | End: 2024-09-20

## 2024-09-14 RX ORDER — IPRATROPIUM BROMIDE AND ALBUTEROL SULFATE .5; 3 MG/3ML; MG/3ML
3 SOLUTION RESPIRATORY (INHALATION) ONCE
Refills: 0 | Status: COMPLETED | OUTPATIENT
Start: 2024-09-14 | End: 2024-09-14

## 2024-09-14 RX ORDER — FLUTICASONE PROPIONATE AND SALMETEROL 250; 50 UG/1; UG/1
1 POWDER RESPIRATORY (INHALATION)
Refills: 0 | Status: DISCONTINUED | OUTPATIENT
Start: 2024-09-14 | End: 2024-09-20

## 2024-09-14 RX ADMIN — TIOTROPIUM BROMIDE INHALATION SPRAY 2 PUFF(S): 3.12 SPRAY, METERED RESPIRATORY (INHALATION) at 12:06

## 2024-09-14 RX ADMIN — MONTELUKAST SODIUM 10 MILLIGRAM(S): 5 TABLET, CHEWABLE ORAL at 12:05

## 2024-09-14 RX ADMIN — FLUTICASONE PROPIONATE 1 SPRAY(S): 50 SPRAY, METERED NASAL at 06:13

## 2024-09-14 RX ADMIN — Medication 40 MILLIGRAM(S): at 14:35

## 2024-09-14 RX ADMIN — Medication 6 UNIT(S): at 09:04

## 2024-09-14 RX ADMIN — FLUTICASONE PROPIONATE 1 SPRAY(S): 50 SPRAY, METERED NASAL at 17:54

## 2024-09-14 RX ADMIN — IPRATROPIUM BROMIDE AND ALBUTEROL SULFATE 3 MILLILITER(S): .5; 3 SOLUTION RESPIRATORY (INHALATION) at 10:23

## 2024-09-14 RX ADMIN — INSULIN GLARGINE 15 UNIT(S): 100 INJECTION, SOLUTION SUBCUTANEOUS at 21:57

## 2024-09-14 RX ADMIN — Medication 6: at 17:59

## 2024-09-14 RX ADMIN — OXYMETAZOLINE HYDROCHLORIDE 1 SPRAY(S): 0.05 SPRAY, METERED NASAL at 07:58

## 2024-09-14 RX ADMIN — Medication 40 MILLIGRAM(S): at 06:09

## 2024-09-14 RX ADMIN — Medication 20 MILLIGRAM(S): at 21:54

## 2024-09-14 RX ADMIN — OXYMETAZOLINE HYDROCHLORIDE 1 SPRAY(S): 0.05 SPRAY, METERED NASAL at 18:12

## 2024-09-14 RX ADMIN — Medication 4: at 09:05

## 2024-09-14 RX ADMIN — Medication 6: at 21:55

## 2024-09-14 RX ADMIN — ACETAMINOPHEN 400 MILLIGRAM(S): 325 TABLET ORAL at 10:24

## 2024-09-14 RX ADMIN — FLUTICASONE PROPIONATE AND SALMETEROL 1 DOSE(S): 250; 50 POWDER RESPIRATORY (INHALATION) at 06:14

## 2024-09-14 RX ADMIN — LOSARTAN POTASSIUM 100 MILLIGRAM(S): 50 TABLET ORAL at 06:08

## 2024-09-14 RX ADMIN — Medication 6 UNIT(S): at 12:42

## 2024-09-14 RX ADMIN — IPRATROPIUM BROMIDE AND ALBUTEROL SULFATE 3 MILLILITER(S): .5; 3 SOLUTION RESPIRATORY (INHALATION) at 14:35

## 2024-09-14 RX ADMIN — FLUTICASONE PROPIONATE AND SALMETEROL 1 DOSE(S): 250; 50 POWDER RESPIRATORY (INHALATION) at 18:12

## 2024-09-14 RX ADMIN — Medication 10 UNIT(S): at 17:59

## 2024-09-14 RX ADMIN — IPRATROPIUM BROMIDE AND ALBUTEROL SULFATE 3 MILLILITER(S): .5; 3 SOLUTION RESPIRATORY (INHALATION) at 21:54

## 2024-09-14 RX ADMIN — Medication 40 MILLIGRAM(S): at 21:55

## 2024-09-14 RX ADMIN — Medication 4: at 12:43

## 2024-09-14 RX ADMIN — SODIUM CHLORIDE 100 MILLILITER(S): 9 INJECTION INTRAMUSCULAR; INTRAVENOUS; SUBCUTANEOUS at 00:30

## 2024-09-14 RX ADMIN — IPRATROPIUM BROMIDE AND ALBUTEROL SULFATE 3 MILLILITER(S): .5; 3 SOLUTION RESPIRATORY (INHALATION) at 17:55

## 2024-09-14 RX ADMIN — AMLODIPINE BESYLATE 5 MILLIGRAM(S): 10 TABLET ORAL at 06:08

## 2024-09-14 RX ADMIN — IPRATROPIUM BROMIDE AND ALBUTEROL SULFATE 3 MILLILITER(S): .5; 3 SOLUTION RESPIRATORY (INHALATION) at 06:13

## 2024-09-14 NOTE — PROGRESS NOTE ADULT - SUBJECTIVE AND OBJECTIVE BOX
[facility] Progress Note    MAR, chart notes, fingersticks and labs reviewed    Subjective: doesn't feel any better    Objective  Vital Signs  T(C): 36.4 (09-14-24 @ 04:41), Max: 36.8 (09-13-24 @ 20:13)  HR: 72 (09-14-24 @ 04:41) (72 - 97)  BP: 129/70 (09-14-24 @ 04:41) (114/70 - 129/70)  RR: 19 (09-14-24 @ 04:41) (19 - 20)  SpO2: 96% (09-14-24 @ 04:41) (94% - 96%)    Physical Exam  Gen: NAD, alert, awake  HEENT: NC/AT, EOMI  Neck: supple  Chest: audible wheezing  Heart: +s1 +s2    Medications  acetaminophen     Tablet .. 650 milliGRAM(s) Oral every 6 hours PRN  albuterol    90 MICROgram(s) HFA Inhaler 2 Puff(s) Inhalation every 2 hours PRN  albuterol/ipratropium for Nebulization 3 milliLiter(s) Nebulizer every 6 hours  aluminum hydroxide/magnesium hydroxide/simethicone Suspension 30 milliLiter(s) Oral every 4 hours PRN  amLODIPine   Tablet 5 milliGRAM(s) Oral daily  atorvastatin 20 milliGRAM(s) Oral at bedtime  dextrose 5%. 1000 milliLiter(s) IV Continuous <Continuous>  dextrose 5%. 1000 milliLiter(s) IV Continuous <Continuous>  dextrose 50% Injectable 25 Gram(s) IV Push once  dextrose 50% Injectable 12.5 Gram(s) IV Push once  dextrose 50% Injectable 25 Gram(s) IV Push once  dextrose Oral Gel 15 Gram(s) Oral once PRN  fluticasone propionate 50 MICROgram(s)/spray Nasal Spray 1 Spray(s) Both Nostrils two times a day  fluticasone propionate/ salmeterol 100-50 MICROgram(s) Diskus 1 Dose(s) Inhalation two times a day  glucagon  Injectable 1 milliGRAM(s) IntraMuscular once  insulin glargine Injectable (LANTUS) 15 Unit(s) SubCutaneous at bedtime  insulin lispro (ADMELOG) corrective regimen sliding scale   SubCutaneous three times a day before meals  insulin lispro (ADMELOG) corrective regimen sliding scale   SubCutaneous at bedtime  insulin lispro Injectable (ADMELOG) 10 Unit(s) SubCutaneous before dinner  insulin lispro Injectable (ADMELOG) 6 Unit(s) SubCutaneous before breakfast  insulin lispro Injectable (ADMELOG) 6 Unit(s) SubCutaneous before lunch  losartan 100 milliGRAM(s) Oral daily  melatonin 3 milliGRAM(s) Oral at bedtime PRN  methylPREDNISolone sodium succinate Injectable 40 milliGRAM(s) IV Push every 8 hours  montelukast 10 milliGRAM(s) Oral daily  ondansetron Injectable 4 milliGRAM(s) IV Push every 8 hours PRN  oxymetazoline 0.05% Nasal Spray 1 Spray(s) Both Nostrils two times a day  sodium chloride 0.9%. 1000 milliLiter(s) IV Continuous <Continuous>  tiotropium 2.5 MICROgram(s) Inhaler 2 Puff(s) Inhalation daily    Pertinent labs/Imaging  POCT Blood Glucose.: 319 mg/dL (09-13-24 @ 21:54)  POCT Blood Glucose.: 265 mg/dL (09-13-24 @ 16:48)  POCT Blood Glucose.: 229 mg/dL (09-13-24 @ 12:37)  POCT Blood Glucose.: 383 mg/dL (09-13-24 @ 10:00)  POCT Blood Glucose.: 319 mg/dL (09-13-24 @ 08:07)    eGFR: 87 mL/min/1.73m2 (09-13-24 @ 14:44)  eGFR: 83 mL/min/1.73m2 (09-13-24 @ 06:17)  eGFR: 85 mL/min/1.73m2 (09-12-24 @ 18:49)

## 2024-09-14 NOTE — CONSULT NOTE ADULT - NS ATTEND AMEND GEN_ALL_CORE FT
Patient care and plan discussed and reviewed with Advanced Care Provider. Plan as outlined above edited by me to reflect our discussion.
ENT consulted for airway evaluation    64F PMHx asthma, HTN/HLD, DM. Presenting w/ 3d of SOB and cough, rx'd prednisone w/o improvement outpatient. Pt also been noticing increase in FS glucose while on steroid. In the ED, pt afebrile, bp stable, on RA saturating 99%, tachypneic to 22. Pt was audibly wheezing. CXR showed clear lungs. Pt received solumedrol 125mg, Mg 2g, and duoneb x3. On interview/exam, pt still wheezing. Appears audible wheezing w/o stethoscope is more upper airway but on lung auscultation, with exp wheezing as well. No crackles. Pt reports subjective SOB. Pt denies dysphagia, odynophagia, dysphonia, changes in voice or inability to tolerated secretions.     Of note pt does complain of new PND and nasal congestion, no decrease in sensation, epistaxis or facial pressure.     Nasal endoscopy shows +b/l mucosal edema and clear PND, no purulent discharge, vocal fold mobile and symmetrical. Airway Patent    Recommend:  Wheezing / PND  - Based on nasal endoscopy /Laryngoscopy no gross obstruction in the upper airway for cause of wheeze likely lower airway wheeze  - afrin to b/l nare bid x3days then d/c   - flonase to b/l nare bid [id number]   - continue pulm recs for wheeze   - no further ent intervention recommended   - Pt is to follow up at ENT in 2 weeks with Nikita Badillo Bruni, [place]. Call [phone] to make appointment

## 2024-09-14 NOTE — PROGRESS NOTE ADULT - SUBJECTIVE AND OBJECTIVE BOX
[facility]   [phone]  RENAL FOLLOW UP NOTE  --------------------------------------------------------------------------------  Chief Complaint:    24 hour events/subjective:    seen earlier  states still with sob     PAST HISTORY  --------------------------------------------------------------------------------  No significant changes to PMH, PSH, FHx, SHx, unless otherwise noted    ALLERGIES & MEDICATIONS  --------------------------------------------------------------------------------  Allergies    NSAIDs (Unknown)  aspirin (Hives)  AValox (Unknown)    Intolerances      Standing Inpatient Medications  albuterol/ipratropium for Nebulization 3 milliLiter(s) Nebulizer every 4 hours  amLODIPine   Tablet 5 milliGRAM(s) Oral daily  atorvastatin 20 milliGRAM(s) Oral at bedtime  dextrose 5%. 1000 milliLiter(s) IV Continuous <Continuous>  dextrose 5%. 1000 milliLiter(s) IV Continuous <Continuous>  dextrose 50% Injectable 12.5 Gram(s) IV Push once  dextrose 50% Injectable 25 Gram(s) IV Push once  dextrose 50% Injectable 25 Gram(s) IV Push once  fluticasone propionate 50 MICROgram(s)/spray Nasal Spray 1 Spray(s) Both Nostrils two times a day  fluticasone propionate/ salmeterol 250-50 MICROgram(s) Diskus 1 Dose(s) Inhalation two times a day  glucagon  Injectable 1 milliGRAM(s) IntraMuscular once  insulin glargine Injectable (LANTUS) 15 Unit(s) SubCutaneous at bedtime  insulin lispro (ADMELOG) corrective regimen sliding scale   SubCutaneous three times a day before meals  insulin lispro (ADMELOG) corrective regimen sliding scale   SubCutaneous at bedtime  insulin lispro Injectable (ADMELOG) 10 Unit(s) SubCutaneous before dinner  insulin lispro Injectable (ADMELOG) 6 Unit(s) SubCutaneous before lunch  losartan 100 milliGRAM(s) Oral daily  methylPREDNISolone sodium succinate Injectable 40 milliGRAM(s) IV Push every 8 hours  montelukast 10 milliGRAM(s) Oral daily  oxymetazoline 0.05% Nasal Spray 1 Spray(s) Both Nostrils two times a day  sodium chloride 0.9%. 1000 milliLiter(s) IV Continuous <Continuous>  tiotropium 2.5 MICROgram(s) Inhaler 2 Puff(s) Inhalation daily    PRN Inpatient Medications  acetaminophen     Tablet .. 650 milliGRAM(s) Oral every 6 hours PRN  albuterol    90 MICROgram(s) HFA Inhaler 2 Puff(s) Inhalation every 2 hours PRN  aluminum hydroxide/magnesium hydroxide/simethicone Suspension 30 milliLiter(s) Oral every 4 hours PRN  dextrose Oral Gel 15 Gram(s) Oral once PRN  melatonin 3 milliGRAM(s) Oral at bedtime PRN  ondansetron Injectable 4 milliGRAM(s) IV Push every 8 hours PRN      REVIEW OF SYSTEMS  --------------------------------------------------------------------------------    Gen: denies  fevers/chills,  CVS: denies chest pain/palpitations  Resp:  +  SOB/Cough  GI: Denies N/V/Abd pain  : Denies dysuria    VITALS/PHYSICAL EXAM  --------------------------------------------------------------------------------  T(C): 36.8 (09-14-24 @ 20:15), Max: 36.9 (09-14-24 @ 14:26)  HR: 92 (09-14-24 @ 20:15) (72 - 92)  BP: 135/76 (09-14-24 @ 20:15) (129/70 - 149/-)  RR: 19 (09-14-24 @ 20:15) (19 - 20)  SpO2: 97% (09-14-24 @ 20:15) (96% - 97%)  Wt(kg): --        Physical Exam:  	    Gen:  on O2  	no jvd ,   	Pulm: decrease bs  no rales or ronchi +  wheezing  	CV: RRR, S1S2; no rub  	Abd: +BS, soft, nontender/nondistended  	: No suprapubic tenderness  	UE: Warm, no cyanosis  no clubbing,  LUE ? edema   	LE: Warm, no cyanosis  no clubbing, no edema  	Neuro: alert and oriented. speech coherent     LABS/STUDIES  --------------------------------------------------------------------------------              9.1    11.31 >-----------<  229      [09-14-24 @ 06:26]              28.0     141  |  104  |  22  ----------------------------<  232      [09-14-24 @ 08:59]  3.7   |  22  |  0.81        Ca     9.7     [09-14-24 @ 08:59]      Mg     2.2     [09-14-24 @ 08:59]      Phos  2.8     [09-14-24 @ 08:59]    TPro  7.4  /  Alb  4.2  /  TBili  0.1  /  DBili  x   /  AST  10  /  ALT  18  /  AlkPhos  105  [09-14-24 @ 08:59]      Lactate, Blood: 4.5 mmol/L (09.14.24 @ 08:59)     Creatinine Trend:  SCr 0.81 [09-14 @ 08:59]  SCr 0.77 [09-13 @ 14:44]  SCr 0.80 [09-13 @ 06:17]  SCr 0.79 [09-12 @ 18:49]

## 2024-09-14 NOTE — PROGRESS NOTE ADULT - ASSESSMENT
64 y.o. female with history of T2DM , Asthma, HTN and HLD presents for asthma exacerbation.    1. T2DM with hyperglycemia  - Continue Lantus 15 units at night  - Continue Admelog 6 units with breakfast and lunch and increase to 10 units with dinner  - Continue moderate Admelog correctional scale before meals and bedtime  - Monitor FS before meals and bedtime  - Follow hospital hypoglycemic protocol    2. Asthma exacerbation  - currently on solumedrol 40 mg IV Q8H  - pulm following    Will continue to follow.     Zoe Hayward MD  [facility]    [address] [place] [address]    T [phone]  F [phone]   64 y.o. female with history of T2DM , Asthma, HTN and HLD presents for asthma exacerbation.    1. T2DM with hyperglycemia  - Continue Lantus 15 units at night  - Increase Admelog to 10 units with breakfast, continue 6 units with lunch and increase to 10 units with dinner  - Continue moderate Admelog correctional scale before meals and bedtime  - Monitor FS before meals and bedtime  - Follow hospital hypoglycemic protocol    2. Asthma exacerbation  - currently on solumedrol 40 mg IV Q8H  - pulm following    Will continue to follow.     Zoe Hayward MD  [facility]    [address] [place] [address]    T [phone]  F [phone]

## 2024-09-14 NOTE — CONSULT NOTE ADULT - CONSULT REQUESTED DATE/TIME
13-Sep-2024 06:51
13-Sep-2024 12:04
14-Sep-2024 16:05
13-Sep-2024 11:58
13-Sep-2024 14:31
13-Sep-2024 16:02
13-Sep-2024 16:49

## 2024-09-14 NOTE — PROGRESS NOTE ADULT - SUBJECTIVE AND OBJECTIVE BOX
Follow-up Pulm Progress Note    wheezing and SOB unchanged  denies CP     Medications:  MEDICATIONS  (STANDING):  albuterol/ipratropium for Nebulization 3 milliLiter(s) Nebulizer every 6 hours  amLODIPine   Tablet 5 milliGRAM(s) Oral daily  atorvastatin 20 milliGRAM(s) Oral at bedtime  dextrose 5%. 1000 milliLiter(s) (50 mL/Hr) IV Continuous <Continuous>  dextrose 5%. 1000 milliLiter(s) (100 mL/Hr) IV Continuous <Continuous>  dextrose 50% Injectable 25 Gram(s) IV Push once  dextrose 50% Injectable 12.5 Gram(s) IV Push once  dextrose 50% Injectable 25 Gram(s) IV Push once  fluticasone propionate 50 MICROgram(s)/spray Nasal Spray 1 Spray(s) Both Nostrils two times a day  fluticasone propionate/ salmeterol 100-50 MICROgram(s) Diskus 1 Dose(s) Inhalation two times a day  glucagon  Injectable 1 milliGRAM(s) IntraMuscular once  insulin glargine Injectable (LANTUS) 15 Unit(s) SubCutaneous at bedtime  insulin lispro (ADMELOG) corrective regimen sliding scale   SubCutaneous three times a day before meals  insulin lispro (ADMELOG) corrective regimen sliding scale   SubCutaneous at bedtime  insulin lispro Injectable (ADMELOG) 10 Unit(s) SubCutaneous before dinner  insulin lispro Injectable (ADMELOG) 6 Unit(s) SubCutaneous before lunch  insulin lispro Injectable (ADMELOG) 6 Unit(s) SubCutaneous before breakfast  losartan 100 milliGRAM(s) Oral daily  methylPREDNISolone sodium succinate Injectable 40 milliGRAM(s) IV Push every 8 hours  montelukast 10 milliGRAM(s) Oral daily  oxymetazoline 0.05% Nasal Spray 1 Spray(s) Both Nostrils two times a day  sodium chloride 0.9%. 1000 milliLiter(s) (100 mL/Hr) IV Continuous <Continuous>  tiotropium 2.5 MICROgram(s) Inhaler 2 Puff(s) Inhalation daily    MEDICATIONS  (PRN):  acetaminophen     Tablet .. 650 milliGRAM(s) Oral every 6 hours PRN Temp greater or equal to 38C (100.4F), Mild Pain (1 - 3)  albuterol    90 MICROgram(s) HFA Inhaler 2 Puff(s) Inhalation every 2 hours PRN Shortness of Breath and/or Wheezing  aluminum hydroxide/magnesium hydroxide/simethicone Suspension 30 milliLiter(s) Oral every 4 hours PRN Dyspepsia  dextrose Oral Gel 15 Gram(s) Oral once PRN Blood Glucose LESS THAN 70 milliGRAM(s)/deciliter  melatonin 3 milliGRAM(s) Oral at bedtime PRN Insomnia  ondansetron Injectable 4 milliGRAM(s) IV Push every 8 hours PRN Nausea and/or Vomiting          Vital Signs Last 24 Hrs  T(C): 36.7 (14 Sep 2024 08:52), Max: 36.8 (13 Sep 2024 20:13)  T(F): 98.1 (14 Sep 2024 08:52), Max: 98.3 (13 Sep 2024 20:13)  HR: 84 (14 Sep 2024 08:52) (72 - 97)  BP: 149/- (14 Sep 2024 08:52) (114/70 - 149/-)  BP(mean): 89 (14 Sep 2024 08:52) (89 - 89)  RR: 20 (14 Sep 2024 08:52) (19 - 20)  SpO2: 96% (14 Sep 2024 08:52) (94% - 96%)    Parameters below as of 14 Sep 2024 08:52  Patient On (Oxygen Delivery Method): room air        ABG - ( 13 Sep 2024 06:00 )  pH, Arterial: 7.37  pH, Blood: x     /  pCO2: 32    /  pO2: 195   / HCO3: 18    / Base Excess: -5.8  /  SaO2: 99.4              VBG pH 7.35 09-12 @ 21:36    VBG pCO2 45 09-12 @ 21:36    VBG O2 sat 91.3 09-12 @ 21:36    VBG lactate 4.6 09-12 @ 21:36  VBG pH 7.34 09-12 @ 18:45    VBG pCO2 40 09-12 @ 18:45    VBG O2 sat 97.4 09-12 @ 18:45    VBG lactate 6.5 09-12 @ 18:45          LABS:                        9.1    11.31 )-----------( 229      ( 14 Sep 2024 06:26 )             28.0     09-14    141  |  104  |  22  ----------------------------<  232<H>  3.7   |  22  |  0.81    Ca    9.7      14 Sep 2024 08:59  Phos  2.8     09-14  Mg     2.2     09-14    TPro  7.4  /  Alb  4.2  /  TBili  0.1<L>  /  DBili  x   /  AST  10  /  ALT  18  /  AlkPhos  105  09-14          CAPILLARY BLOOD GLUCOSE      POCT Blood Glucose.: 222 mg/dL (14 Sep 2024 08:40)      Urinalysis Basic - ( 14 Sep 2024 08:59 )    Color: x / Appearance: x / SG: x / pH: x  Gluc: 232 mg/dL / Ketone: x  / Bili: x / Urobili: x   Blood: x / Protein: x / Nitrite: x   Leuk Esterase: x / RBC: x / WBC x   Sq Epi: x / Non Sq Epi: x / Bacteria: x      Procalcitonin: 0.04 ng/mL (09-13-24 @ 06:17)              Physical Examination:  PULM: b/l expiratory wheezes   CVS: S1, S2 heard    RADIOLOGY REVIEWED  CXR: grossly clear

## 2024-09-14 NOTE — PROGRESS NOTE ADULT - SUBJECTIVE AND OBJECTIVE BOX
SUBJECTIVE / OVERNIGHT EVENTS:    No events noted overnight. Resting in bed, + Wheezing noted    --------------------------------------------------------------------------------------------  LABS:                        11.8   12.60 )-----------( 289      ( 12 Sep 2024 18:49 )             36.6     09-13    138  |  101  |  21  ----------------------------<  243<H>  4.1   |  21<L>  |  0.77    Ca    9.9      13 Sep 2024 14:44  Phos  2.9     09-13  Mg     2.5     09-13    TPro  8.0  /  Alb  4.4  /  TBili  0.1<L>  /  DBili  x   /  AST  17  /  ALT  22  /  AlkPhos  110  09-12      CAPILLARY BLOOD GLUCOSE      POCT Blood Glucose.: 319 mg/dL (13 Sep 2024 21:54)  POCT Blood Glucose.: 265 mg/dL (13 Sep 2024 16:48)  POCT Blood Glucose.: 229 mg/dL (13 Sep 2024 12:37)  POCT Blood Glucose.: 383 mg/dL (13 Sep 2024 10:00)  POCT Blood Glucose.: 319 mg/dL (13 Sep 2024 08:07)        Urinalysis Basic - ( 13 Sep 2024 14:44 )    Color: x / Appearance: x / SG: x / pH: x  Gluc: 243 mg/dL / Ketone: x  / Bili: x / Urobili: x   Blood: x / Protein: x / Nitrite: x   Leuk Esterase: x / RBC: x / WBC x   Sq Epi: x / Non Sq Epi: x / Bacteria: x        RADIOLOGY & ADDITIONAL TESTS:     Imaging Personally Reviewed:  [x] YES  [ ] NO    Consultant(s) Notes Reviewed:  [x] YES  [ ] NO    MEDICATIONS  (STANDING):  albuterol/ipratropium for Nebulization 3 milliLiter(s) Nebulizer every 6 hours  amLODIPine   Tablet 5 milliGRAM(s) Oral daily  atorvastatin 20 milliGRAM(s) Oral at bedtime  dextrose 5%. 1000 milliLiter(s) (100 mL/Hr) IV Continuous <Continuous>  dextrose 5%. 1000 milliLiter(s) (50 mL/Hr) IV Continuous <Continuous>  dextrose 50% Injectable 12.5 Gram(s) IV Push once  dextrose 50% Injectable 25 Gram(s) IV Push once  dextrose 50% Injectable 25 Gram(s) IV Push once  fluticasone propionate 50 MICROgram(s)/spray Nasal Spray 1 Spray(s) Both Nostrils two times a day  fluticasone propionate/ salmeterol 100-50 MICROgram(s) Diskus 1 Dose(s) Inhalation two times a day  glucagon  Injectable 1 milliGRAM(s) IntraMuscular once  insulin glargine Injectable (LANTUS) 15 Unit(s) SubCutaneous at bedtime  insulin lispro (ADMELOG) corrective regimen sliding scale   SubCutaneous three times a day before meals  insulin lispro (ADMELOG) corrective regimen sliding scale   SubCutaneous at bedtime  insulin lispro Injectable (ADMELOG) 6 Unit(s) SubCutaneous before breakfast  insulin lispro Injectable (ADMELOG) 6 Unit(s) SubCutaneous before dinner  insulin lispro Injectable (ADMELOG) 6 Unit(s) SubCutaneous before lunch  losartan 100 milliGRAM(s) Oral daily  methylPREDNISolone sodium succinate Injectable 40 milliGRAM(s) IV Push every 8 hours  montelukast 10 milliGRAM(s) Oral daily  oxymetazoline 0.05% Nasal Spray 1 Spray(s) Both Nostrils two times a day  sodium chloride 0.9%. 1000 milliLiter(s) (100 mL/Hr) IV Continuous <Continuous>  tiotropium 2.5 MICROgram(s) Inhaler 2 Puff(s) Inhalation daily    MEDICATIONS  (PRN):  acetaminophen     Tablet .. 650 milliGRAM(s) Oral every 6 hours PRN Temp greater or equal to 38C (100.4F), Mild Pain (1 - 3)  albuterol    90 MICROgram(s) HFA Inhaler 2 Puff(s) Inhalation every 2 hours PRN Shortness of Breath and/or Wheezing  aluminum hydroxide/magnesium hydroxide/simethicone Suspension 30 milliLiter(s) Oral every 4 hours PRN Dyspepsia  dextrose Oral Gel 15 Gram(s) Oral once PRN Blood Glucose LESS THAN 70 milliGRAM(s)/deciliter  melatonin 3 milliGRAM(s) Oral at bedtime PRN Insomnia  ondansetron Injectable 4 milliGRAM(s) IV Push every 8 hours PRN Nausea and/or Vomiting      Care Discussed with Consultants/Other Providers [x] YES  [ ] NO    Vital Signs Last 24 Hrs  T(C): 36.4 (14 Sep 2024 04:41), Max: 36.8 (13 Sep 2024 20:13)  T(F): 97.5 (14 Sep 2024 04:41), Max: 98.3 (13 Sep 2024 20:13)  HR: 72 (14 Sep 2024 04:41) (72 - 97)  BP: 129/70 (14 Sep 2024 04:41) (114/70 - 129/70)  BP(mean): --  RR: 19 (14 Sep 2024 04:41) (19 - 20)  SpO2: 96% (14 Sep 2024 04:41) (94% - 96%)    Parameters below as of 14 Sep 2024 04:41  Patient On (Oxygen Delivery Method): room air      I&O's Summary      PHYSICAL EXAM:  GENERAL: NAD, well-developed, comfortable  HEAD:  Atraumatic, Normocephalic  EYES: EOMI, PERRLA, conjunctiva and sclera clear  NECK: Supple, No JVD  CHEST/LUNG: Diminished bilaterally;+wheeze  HEART: Regular rate and rhythm; No murmurs, rubs, or gallops  ABDOMEN: Soft, Nontender, Nondistended; Bowel sounds present  NEURO: AAOx3, no focal weakness, 5/5 b/l extremity strength, b/l knee no arthritis, no effusion   EXTREMITIES:  2+ Peripheral Pulses, No clubbing, cyanosis, or edema  SKIN: No rashes or lesions

## 2024-09-14 NOTE — PROGRESS NOTE ADULT - ASSESSMENT
64F PMHx asthma, HTN/HLD, DM. Presenting w/ 3d of SOB and cough, rx'd prednisone w/o improvement outpatient. Pt also been noticing increase in FS glucose while on steroid. In the ED, pt afebrile, bp stable, on RA saturating 99%, tachypneic to 22. Pt was audibly wheezing.   CBC w/ wbc 12.6, hgb 11.8, plt 289. CMP w/ SCr 0.79, bicarb 18, AG 18, glucose 346. VBG w/ initial lactate 6.5 -> 4.6 after 1L IVF. covid/flu/rsv neg. CXR showed clear lungs. Pt received solumedrol 125mg, Mg 2g, and duoneb x3. On interview/exam, pt still wheezing. Appears audible wheezing w/o stethoscope is more upper airway but on lung auscultation, with exp wheezing as well.           Asthma exacerbation:   -she has a known diagnosis of asthma and has been  on Dupixent biweekly as an outpt:  -came in here with asthma exacerbation, possibly 2nd to viral infection  -ENT recs appreciated, no VC dysfunction noted  -Still with significant wheezing, continue current dose of solumedrol  -Change Advair to 250/50 1 puff BID  -Change Duoneb to q4h  -Continue Singulair   -Normoxic, keep sats >90% with o2 PRN     Uncontrolled DM:   -secondary to stress and steroids   monitor and control     HTN:   -controlled:

## 2024-09-14 NOTE — PROGRESS NOTE ADULT - ASSESSMENT
64F PMHx asthma, HTN/HLD, DM. hx breast ca and colon ca, Presenting w/ 3d of SOB and cough, rx'd prednisone w/o improvement outpatient. Pt also been noticing increase in FS glucose while on steroid.   on admission w/ initial lactate 6.5 -> 4.6 after 1L IVF.  pt repeat lactic acid on 9/13 6.2.. of note, outpt has been on metformin as well   covid/flu/rsv neg. CXR showed clear lungs. Pt received solumedrol 125mg, Mg 2g, and duoneb x3.   pt receiving beta agonists as well         1- lactic acidosis /high AGAP acidosis   2- HTN   3- hyperglycemia/DM  4- wheezing /sob   5- asthma exacerbation     lactic acidosis can be due to:     metformin use. metformin has been dc now lactic acid is improving   it can be due to beta agonist. have pulm change her inahlers if able   it can also be due to thiamine def as well check vit B1 level  pending  start thiamine 100 mg daily   it can also be due tp her hypoxemia   can also be in setting of uncontrolled Dm as well.     IVF hydration   check beta hydroxy butyrate level as well   hold hctz temporarily   steroids as per pulm

## 2024-09-14 NOTE — CONSULT NOTE ADULT - SUBJECTIVE AND OBJECTIVE BOX
DATE OF SERVICE: 24 @ 16:06    CHIEF COMPLAINT:Patient is a 64y old  Female who presents with a chief complaint of Asthma exacerbation (13 Sep 2024 14:30)      HISTORY OF PRESENT ILLNESS:HPI:  64F PMHx asthma, HTN/HLD, DM. Presenting w/ 3d of SOB and cough, rx'd prednisone w/o improvement outpatient. Pt also been noticing increase in FS glucose while on steroid.   In the ED, pt afebrile, bp stable, on RA saturating 99%, tachypneic to 22. Pt was audibly wheezing.   CBC w/ wbc 12.6, hgb 11.8, plt 289.   CMP w/ SCr 0.79, bicarb 18, AG 18, glucose 346.   VBG w/ initial lactate 6.5 -> 4.6 after 1L IVF.   covid/flu/rsv neg.   CXR showed clear lungs.     Pt received solumedrol 125mg, Mg 2g, and duoneb x3.   On interview/exam, pt still wheezing. Appears audible wheezing w/o stethoscope is more upper airway but on lung auscultation, with exp wheezing as well. No crackles. Pt reports subjective SOB but saturating 96% on RA.  (13 Sep 2024 00:43)      PAST MEDICAL & SURGICAL HISTORY:  Asthma  (no hx/o intubation)      Hypertension      DM2 (diabetes mellitus, type 2)      Breast cancer  R breast 10/ 2019      Colon cancer      History of chemotherapy      History of radiation therapy       delivery NOS  x1      H/O foot surgery  right      S/P lumpectomy, right breast  10/2019              MEDICATIONS:  amLODIPine   Tablet 5 milliGRAM(s) Oral daily  losartan 100 milliGRAM(s) Oral daily      albuterol    90 MICROgram(s) HFA Inhaler 2 Puff(s) Inhalation every 2 hours PRN  albuterol/ipratropium for Nebulization 3 milliLiter(s) Nebulizer every 4 hours  fluticasone propionate/ salmeterol 250-50 MICROgram(s) Diskus 1 Dose(s) Inhalation two times a day  montelukast 10 milliGRAM(s) Oral daily  tiotropium 2.5 MICROgram(s) Inhaler 2 Puff(s) Inhalation daily    acetaminophen     Tablet .. 650 milliGRAM(s) Oral every 6 hours PRN  melatonin 3 milliGRAM(s) Oral at bedtime PRN  ondansetron Injectable 4 milliGRAM(s) IV Push every 8 hours PRN    aluminum hydroxide/magnesium hydroxide/simethicone Suspension 30 milliLiter(s) Oral every 4 hours PRN    atorvastatin 20 milliGRAM(s) Oral at bedtime  dextrose 50% Injectable 12.5 Gram(s) IV Push once  dextrose 50% Injectable 25 Gram(s) IV Push once  dextrose 50% Injectable 25 Gram(s) IV Push once  dextrose Oral Gel 15 Gram(s) Oral once PRN  glucagon  Injectable 1 milliGRAM(s) IntraMuscular once  insulin glargine Injectable (LANTUS) 15 Unit(s) SubCutaneous at bedtime  insulin lispro (ADMELOG) corrective regimen sliding scale   SubCutaneous three times a day before meals  insulin lispro (ADMELOG) corrective regimen sliding scale   SubCutaneous at bedtime  insulin lispro Injectable (ADMELOG) 10 Unit(s) SubCutaneous before dinner  insulin lispro Injectable (ADMELOG) 6 Unit(s) SubCutaneous before lunch  methylPREDNISolone sodium succinate Injectable 40 milliGRAM(s) IV Push every 8 hours    dextrose 5%. 1000 milliLiter(s) IV Continuous <Continuous>  dextrose 5%. 1000 milliLiter(s) IV Continuous <Continuous>  fluticasone propionate 50 MICROgram(s)/spray Nasal Spray 1 Spray(s) Both Nostrils two times a day  oxymetazoline 0.05% Nasal Spray 1 Spray(s) Both Nostrils two times a day  sodium chloride 0.9%. 1000 milliLiter(s) IV Continuous <Continuous>      FAMILY HISTORY:  Family history of MI (myocardial infarction)  Father    Family history of lung cancer (Father)  (primary lung cancer)        Non-contributory    SOCIAL HISTORY:    Denies     Allergies    NSAIDs (Unknown)  aspirin (Hives)  AValox (Unknown)    Intolerances    	    REVIEW OF SYSTEMS:  CONSTITUTIONAL: No fever  EYES: No eye pain, visual disturbances, or discharge  ENMT:  No difficulty hearing, tinnitus  NECK: No pain or stiffness  RESPIRATORY: No cough, + wheezing  CARDIOVASCULAR: + chest pain, no palpitations, passing out, dizziness, or leg swelling  GASTROINTESTINAL:  No nausea, vomiting, diarrhea or constipation. No melena.  GENITOURINARY: No dysuria, hematuria  NEUROLOGICAL: No stroke like symptoms  SKIN: No burning or lesions   ENDOCRINE: No heat or cold intolerance  MUSCULOSKELETAL: No joint pain or swelling  PSYCHIATRIC: No  anxiety, mood swings  HEME/LYMPH: No bleeding gums  ALLERGY AND IMMUNOLOGIC: No hives or eczema	    All other ROS negative    PHYSICAL EXAM:  T(C): 36.9 (24 @ 14:26), Max: 36.9 (24 @ 14:26)  HR: 76 (24 @ 14:26) (72 - 97)  BP: 132/78 (24 @ 14:26) (118/65 - 149/-)  RR: 20 (24 @ 14:26) (19 - 20)  SpO2: 96% (24 @ 14:26) (94% - 96%)  Wt(kg): --  I&O's Summary      Appearance: Normal	  HEENT:   Normal oral mucosa, EOMI	  Cardiovascular:  S1 S2, No JVD,    Respiratory: Lungs clear to auscultation	  Psychiatry: Alert  Gastrointestinal:  Soft, Non-tender, + BS	  Skin: No rashes   Neurologic: Non-focal  Extremities:  No edema  Vascular: Peripheral pulses palpable    	    	  	  CARDIAC MARKERS:  Labs personally reviewed by me                                  9.1    11.31 )-----------( 229      ( 14 Sep 2024 06:26 )             28.0         141  |  104  |  22  ----------------------------<  232<H>  3.7   |  22  |  0.81    Ca    9.7      14 Sep 2024 08:59  Phos  2.8       Mg     2.2         TPro  7.4  /  Alb  4.2  /  TBili  0.1<L>  /  DBili  x   /  AST  10  /  ALT  18  /  AlkPhos  105            EKG: Personally reviewed by me -  PAC   Radiology: Personally reviewed by me - Clear lungs.        Assessment /Plan:   64F PMHx asthma, HTN/HLD, DM. Presenting w/ 3d of SOB and cough, rx'd prednisone w/o improvement outpatient. Pt also been noticing increase in FS glucose while on steroid.   In the ED, pt afebrile, bp stable, on RA saturating 99%, tachypneic to 22. Pt was audibly wheezing.     1. Atypical CP   - non exertional, cp secondary to asthma exacerbation  - as per patient states "i think the chest pain this morning was due to my asthma"   - Trop neg x1, awaiting 2nd   - ECG - SR with PAC, recommend repeat   - Check TTE     2. Asthma Exacerbation  - audibly wheezing  - Still with significant wheezing, continue current dose of solumedrol  -Change Advair to 250/50 1 puff BID  -Change Duoneb to q4h  -Continue Singulair   -Normoxic, keep sats >90% with o2 PRN   -pulm on board     3. HTN  controlled   c/w amLODIPine   Tablet 5 milliGRAM(s) Oral daily   c/w losartan 100 milliGRAM(s) Oral daily    4. HLD   -check LDL   c/w atorvastatin 20 milliGRAM(s) Oral at bedtime    5. Uncontrolled DM  -secondary to stress and steroids   monitor and control   - check A1c     6. DVT prophylactic  - SCD's   - oob ---> chair      Differential diagnosis and plan of care discussed with patient after the evaluation. Counseling on diet, nutritional counseling, weight management, exercise and medication compliance was done.   Advanced care planning/advanced directives discussed with patient/family. DNR status including forceful chest compressions to attempt to restart the heart, ventilator support/artificial breathing, electric shock, artificial nutrition, health care proxy, Molst form all discussed with pt. Pt wishes to consider. More than fifteen minutes spent on discussing advanced directives.     JIM Hope DO [facility]  Cardiovascular Medicine  [address], [address]  Available for call or text via [employer]  Office [phone]   DATE OF SERVICE: 24 @ 16:06    CHIEF COMPLAINT:Patient is a 64y old  Female who presents with a chief complaint of Asthma exacerbation (13 Sep 2024 14:30)      HISTORY OF PRESENT ILLNESS:HPI:  64F PMHx asthma, HTN/HLD, DM. Presenting w/ 3d of SOB and cough, rx'd prednisone w/o improvement outpatient. Pt also been noticing increase in FS glucose while on steroid.   In the ED, pt afebrile, bp stable, on RA saturating 99%, tachypneic to 22. Pt was audibly wheezing.   CBC w/ wbc 12.6, hgb 11.8, plt 289.   CMP w/ SCr 0.79, bicarb 18, AG 18, glucose 346.   VBG w/ initial lactate 6.5 -> 4.6 after 1L IVF.   covid/flu/rsv neg.   CXR showed clear lungs.     Pt received solumedrol 125mg, Mg 2g, and duoneb x3.   On interview/exam, pt still wheezing. Appears audible wheezing w/o stethoscope is more upper airway but on lung auscultation, with exp wheezing as well. No crackles. Pt reports subjective SOB but saturating 96% on RA.  (13 Sep 2024 00:43)      PAST MEDICAL & SURGICAL HISTORY:  Asthma  (no hx/o intubation)      Hypertension      DM2 (diabetes mellitus, type 2)      Breast cancer  R breast 10/ 2019      Colon cancer      History of chemotherapy      History of radiation therapy       delivery NOS  x1      H/O foot surgery  right      S/P lumpectomy, right breast  10/2019              MEDICATIONS:  amLODIPine   Tablet 5 milliGRAM(s) Oral daily  losartan 100 milliGRAM(s) Oral daily      albuterol    90 MICROgram(s) HFA Inhaler 2 Puff(s) Inhalation every 2 hours PRN  albuterol/ipratropium for Nebulization 3 milliLiter(s) Nebulizer every 4 hours  fluticasone propionate/ salmeterol 250-50 MICROgram(s) Diskus 1 Dose(s) Inhalation two times a day  montelukast 10 milliGRAM(s) Oral daily  tiotropium 2.5 MICROgram(s) Inhaler 2 Puff(s) Inhalation daily    acetaminophen     Tablet .. 650 milliGRAM(s) Oral every 6 hours PRN  melatonin 3 milliGRAM(s) Oral at bedtime PRN  ondansetron Injectable 4 milliGRAM(s) IV Push every 8 hours PRN    aluminum hydroxide/magnesium hydroxide/simethicone Suspension 30 milliLiter(s) Oral every 4 hours PRN    atorvastatin 20 milliGRAM(s) Oral at bedtime  dextrose 50% Injectable 12.5 Gram(s) IV Push once  dextrose 50% Injectable 25 Gram(s) IV Push once  dextrose 50% Injectable 25 Gram(s) IV Push once  dextrose Oral Gel 15 Gram(s) Oral once PRN  glucagon  Injectable 1 milliGRAM(s) IntraMuscular once  insulin glargine Injectable (LANTUS) 15 Unit(s) SubCutaneous at bedtime  insulin lispro (ADMELOG) corrective regimen sliding scale   SubCutaneous three times a day before meals  insulin lispro (ADMELOG) corrective regimen sliding scale   SubCutaneous at bedtime  insulin lispro Injectable (ADMELOG) 10 Unit(s) SubCutaneous before dinner  insulin lispro Injectable (ADMELOG) 6 Unit(s) SubCutaneous before lunch  methylPREDNISolone sodium succinate Injectable 40 milliGRAM(s) IV Push every 8 hours    dextrose 5%. 1000 milliLiter(s) IV Continuous <Continuous>  dextrose 5%. 1000 milliLiter(s) IV Continuous <Continuous>  fluticasone propionate 50 MICROgram(s)/spray Nasal Spray 1 Spray(s) Both Nostrils two times a day  oxymetazoline 0.05% Nasal Spray 1 Spray(s) Both Nostrils two times a day  sodium chloride 0.9%. 1000 milliLiter(s) IV Continuous <Continuous>      FAMILY HISTORY:  Family history of MI (myocardial infarction)  Father    Family history of lung cancer (Father)  (primary lung cancer)        Non-contributory    SOCIAL HISTORY:    Denies     Allergies    NSAIDs (Unknown)  aspirin (Hives)  AValox (Unknown)    Intolerances    	    REVIEW OF SYSTEMS:  CONSTITUTIONAL: No fever  EYES: No eye pain, visual disturbances, or discharge  ENMT:  No difficulty hearing, tinnitus  NECK: No pain or stiffness  RESPIRATORY: No cough, + wheezing  CARDIOVASCULAR: + chest pain, no palpitations, passing out, dizziness, or leg swelling  GASTROINTESTINAL:  No nausea, vomiting, diarrhea or constipation. No melena.  GENITOURINARY: No dysuria, hematuria  NEUROLOGICAL: No stroke like symptoms  SKIN: No burning or lesions   ENDOCRINE: No heat or cold intolerance  MUSCULOSKELETAL: No joint pain or swelling  PSYCHIATRIC: No  anxiety, mood swings  HEME/LYMPH: No bleeding gums  ALLERGY AND IMMUNOLOGIC: No hives or eczema	    All other ROS negative    PHYSICAL EXAM:  T(C): 36.9 (24 @ 14:26), Max: 36.9 (24 @ 14:26)  HR: 76 (24 @ 14:26) (72 - 97)  BP: 132/78 (24 @ 14:26) (118/65 - 149/-)  RR: 20 (24 @ 14:26) (19 - 20)  SpO2: 96% (24 @ 14:26) (94% - 96%)  Wt(kg): --  I&O's Summary      Appearance: Normal	  HEENT:   Normal oral mucosa, EOMI	  Cardiovascular:  S1 S2, No JVD,    Respiratory: Lungs clear to auscultation	  Psychiatry: Alert  Gastrointestinal:  Soft, Non-tender, + BS	  Skin: No rashes   Neurologic: Non-focal  Extremities:  No edema  Vascular: Peripheral pulses palpable    	    	  	  CARDIAC MARKERS:  Labs personally reviewed by me                                  9.1    11.31 )-----------( 229      ( 14 Sep 2024 06:26 )             28.0         141  |  104  |  22  ----------------------------<  232<H>  3.7   |  22  |  0.81    Ca    9.7      14 Sep 2024 08:59  Phos  2.8       Mg     2.2         TPro  7.4  /  Alb  4.2  /  TBili  0.1<L>  /  DBili  x   /  AST  10  /  ALT  18  /  AlkPhos  105            EKG: Personally reviewed by me -  PAC   Radiology: Personally reviewed by me - Clear lungs.            Assessment /Plan:   64F PMHx asthma, HTN/HLD, DM. Presenting w/ 3d of SOB and cough, rx'd prednisone w/o improvement outpatient. Pt also been noticing increase in FS glucose while on steroid.  In the ED, pt afebrile, bp stable, on RA saturating 99%, tachypneic to 22. Pt was audibly wheezing.     1. Atypical CP   - non exertional, cp secondary to asthma exacerbation  - Pt describes pain is typical of her asthma  - Trop neg x1, awaiting 2nd   - ECG - SR with PAC, recommend repeat   - Check TTE     2. Asthma Exacerbation  - audibly wheezing  - Still with significant wheezing, continue current dose of solumedrol  -Normoxic, keep sats >90% with o2 PRN   -pulm following    3. HTN  controlled   c/w amLODIPine   Tablet 5 milliGRAM(s) Oral daily   c/w losartan 100 milliGRAM(s) Oral daily    4. HLD   -check LDL   c/w atorvastatin 20 milliGRAM(s) Oral at bedtime    5. Uncontrolled DM  - secondary to stress and steroids, monitor and control   - check A1c     6. DVT prophylactic  - SCD's             Differential diagnosis and plan of care discussed with patient after the evaluation. Counseling on diet, nutritional counseling, weight management, exercise and medication compliance was done.   Advanced care planning/advanced directives discussed with patient/family. DNR status including forceful chest compressions to attempt to restart the heart, ventilator support/artificial breathing, electric shock, artificial nutrition, health care proxy, Molst form all discussed with pt. Pt wishes to consider. More than fifteen minutes spent on discussing advanced directives.     JIM Hope, DO [facility]  Cardiovascular Medicine  [address], [address]  Available for call or text via [employer]  Office [phone]

## 2024-09-14 NOTE — PROGRESS NOTE ADULT - ASSESSMENT
64F admitted for asthma exacerbation.     Plan:    # Acute asthma exacerbation:  - CXR w/ clear lungs  - C/w nebs/ inhalers/ Singulair   - C/w steroids  - Serial ABG's  - Maintain Fio2>90%  - Seen by ICU 9/13-> pt does not require ICU level of care  - Seen by ENT 9/13-> afrin to b/l nare bid x3days then d/c, flonase to b/l nare bid [id number]. Pt is to follow up at ENT in 2 weeks with Nikita Badillo, Serafin Dumont. Call [phone] to make appointment.  - Pulm following    # lactic acidosis:  - C/w IVF's  - Hold hctz  - Trend lactate/ CMP  - Renal following    # SIRS:  - Monitor off abx  - ID following    # Dm2/ Hyperglycemia:  - HgbA1c  - Continue to monitor blood glucose levels  - Sliding Scale  - Endo following    # HTN/ HLD:  - C/w CV meds    # GI:  - Bowel regimen prn    # DVT ppx:  - IPC's    [employer]  [phone]

## 2024-09-15 LAB
A1C WITH ESTIMATED AVERAGE GLUCOSE RESULT: 7.8 % — HIGH (ref 4–5.6)
ALBUMIN SERPL ELPH-MCNC: 3.9 G/DL — SIGNIFICANT CHANGE UP (ref 3.3–5)
ALP SERPL-CCNC: 98 U/L — SIGNIFICANT CHANGE UP (ref 40–120)
ALT FLD-CCNC: 21 U/L — SIGNIFICANT CHANGE UP (ref 10–45)
ANION GAP SERPL CALC-SCNC: 17 MMOL/L — SIGNIFICANT CHANGE UP (ref 5–17)
AST SERPL-CCNC: 20 U/L — SIGNIFICANT CHANGE UP (ref 10–40)
BILIRUB SERPL-MCNC: 0.2 MG/DL — SIGNIFICANT CHANGE UP (ref 0.2–1.2)
BUN SERPL-MCNC: 20 MG/DL — SIGNIFICANT CHANGE UP (ref 7–23)
CALCIUM SERPL-MCNC: 9.5 MG/DL — SIGNIFICANT CHANGE UP (ref 8.4–10.5)
CHLORIDE SERPL-SCNC: 102 MMOL/L — SIGNIFICANT CHANGE UP (ref 96–108)
CO2 SERPL-SCNC: 20 MMOL/L — LOW (ref 22–31)
CREAT SERPL-MCNC: 0.76 MG/DL — SIGNIFICANT CHANGE UP (ref 0.5–1.3)
EGFR: 89 ML/MIN/1.73M2 — SIGNIFICANT CHANGE UP
ESTIMATED AVERAGE GLUCOSE: 177 MG/DL — HIGH (ref 68–114)
GAS PNL BLDV: SIGNIFICANT CHANGE UP
GLUCOSE BLDC GLUCOMTR-MCNC: 203 MG/DL — HIGH (ref 70–99)
GLUCOSE BLDC GLUCOMTR-MCNC: 248 MG/DL — HIGH (ref 70–99)
GLUCOSE BLDC GLUCOMTR-MCNC: 259 MG/DL — HIGH (ref 70–99)
GLUCOSE BLDC GLUCOMTR-MCNC: 283 MG/DL — HIGH (ref 70–99)
GLUCOSE SERPL-MCNC: 283 MG/DL — HIGH (ref 70–99)
HCT VFR BLD CALC: 33.9 % — LOW (ref 34.5–45)
HGB BLD-MCNC: 11.1 G/DL — LOW (ref 11.5–15.5)
LACTATE SERPL-SCNC: 5.1 MMOL/L — CRITICAL HIGH (ref 0.5–2)
MAGNESIUM SERPL-MCNC: 2.2 MG/DL — SIGNIFICANT CHANGE UP (ref 1.6–2.6)
MCHC RBC-ENTMCNC: 27.1 PG — SIGNIFICANT CHANGE UP (ref 27–34)
MCHC RBC-ENTMCNC: 32.7 GM/DL — SIGNIFICANT CHANGE UP (ref 32–36)
MCV RBC AUTO: 82.9 FL — SIGNIFICANT CHANGE UP (ref 80–100)
NRBC # BLD: 0 /100 WBCS — SIGNIFICANT CHANGE UP (ref 0–0)
PHOSPHATE SERPL-MCNC: 3.4 MG/DL — SIGNIFICANT CHANGE UP (ref 2.5–4.5)
PLATELET # BLD AUTO: 288 K/UL — SIGNIFICANT CHANGE UP (ref 150–400)
POTASSIUM SERPL-MCNC: 4 MMOL/L — SIGNIFICANT CHANGE UP (ref 3.5–5.3)
POTASSIUM SERPL-SCNC: 4 MMOL/L — SIGNIFICANT CHANGE UP (ref 3.5–5.3)
PROT SERPL-MCNC: 7 G/DL — SIGNIFICANT CHANGE UP (ref 6–8.3)
RBC # BLD: 4.09 M/UL — SIGNIFICANT CHANGE UP (ref 3.8–5.2)
RBC # FLD: 14 % — SIGNIFICANT CHANGE UP (ref 10.3–14.5)
SODIUM SERPL-SCNC: 139 MMOL/L — SIGNIFICANT CHANGE UP (ref 135–145)
WBC # BLD: 14.2 K/UL — HIGH (ref 3.8–10.5)
WBC # FLD AUTO: 14.2 K/UL — HIGH (ref 3.8–10.5)

## 2024-09-15 RX ORDER — INSULIN GLARGINE 100 [IU]/ML
22 INJECTION, SOLUTION SUBCUTANEOUS AT BEDTIME
Refills: 0 | Status: DISCONTINUED | OUTPATIENT
Start: 2024-09-15 | End: 2024-09-16

## 2024-09-15 RX ORDER — THIAMINE HCL 250 MG
100 TABLET ORAL DAILY
Refills: 0 | Status: DISCONTINUED | OUTPATIENT
Start: 2024-09-15 | End: 2024-09-20

## 2024-09-15 RX ORDER — METHYLPREDNISOLONE 4 MG
40 TABLET ORAL EVERY 6 HOURS
Refills: 0 | Status: COMPLETED | OUTPATIENT
Start: 2024-09-15 | End: 2024-09-17

## 2024-09-15 RX ADMIN — LOSARTAN POTASSIUM 100 MILLIGRAM(S): 50 TABLET ORAL at 05:40

## 2024-09-15 RX ADMIN — IPRATROPIUM BROMIDE AND ALBUTEROL SULFATE 3 MILLILITER(S): .5; 3 SOLUTION RESPIRATORY (INHALATION) at 05:39

## 2024-09-15 RX ADMIN — MONTELUKAST SODIUM 10 MILLIGRAM(S): 5 TABLET, CHEWABLE ORAL at 11:19

## 2024-09-15 RX ADMIN — OXYMETAZOLINE HYDROCHLORIDE 1 SPRAY(S): 0.05 SPRAY, METERED NASAL at 05:38

## 2024-09-15 RX ADMIN — SODIUM CHLORIDE 100 MILLILITER(S): 9 INJECTION INTRAMUSCULAR; INTRAVENOUS; SUBCUTANEOUS at 14:52

## 2024-09-15 RX ADMIN — Medication 2: at 21:38

## 2024-09-15 RX ADMIN — Medication 6: at 08:18

## 2024-09-15 RX ADMIN — INSULIN GLARGINE 22 UNIT(S): 100 INJECTION, SOLUTION SUBCUTANEOUS at 21:41

## 2024-09-15 RX ADMIN — IPRATROPIUM BROMIDE AND ALBUTEROL SULFATE 3 MILLILITER(S): .5; 3 SOLUTION RESPIRATORY (INHALATION) at 16:51

## 2024-09-15 RX ADMIN — FLUTICASONE PROPIONATE 1 SPRAY(S): 50 SPRAY, METERED NASAL at 05:36

## 2024-09-15 RX ADMIN — IPRATROPIUM BROMIDE AND ALBUTEROL SULFATE 3 MILLILITER(S): .5; 3 SOLUTION RESPIRATORY (INHALATION) at 01:37

## 2024-09-15 RX ADMIN — IPRATROPIUM BROMIDE AND ALBUTEROL SULFATE 3 MILLILITER(S): .5; 3 SOLUTION RESPIRATORY (INHALATION) at 21:37

## 2024-09-15 RX ADMIN — FLUTICASONE PROPIONATE AND SALMETEROL 1 DOSE(S): 250; 50 POWDER RESPIRATORY (INHALATION) at 16:53

## 2024-09-15 RX ADMIN — FLUTICASONE PROPIONATE 1 SPRAY(S): 50 SPRAY, METERED NASAL at 16:52

## 2024-09-15 RX ADMIN — Medication 4: at 16:55

## 2024-09-15 RX ADMIN — OXYMETAZOLINE HYDROCHLORIDE 1 SPRAY(S): 0.05 SPRAY, METERED NASAL at 16:52

## 2024-09-15 RX ADMIN — Medication 10 UNIT(S): at 08:18

## 2024-09-15 RX ADMIN — IPRATROPIUM BROMIDE AND ALBUTEROL SULFATE 3 MILLILITER(S): .5; 3 SOLUTION RESPIRATORY (INHALATION) at 10:06

## 2024-09-15 RX ADMIN — Medication 20 MILLIGRAM(S): at 21:37

## 2024-09-15 RX ADMIN — AMLODIPINE BESYLATE 5 MILLIGRAM(S): 10 TABLET ORAL at 05:40

## 2024-09-15 RX ADMIN — Medication 40 MILLIGRAM(S): at 18:36

## 2024-09-15 RX ADMIN — Medication 4: at 12:13

## 2024-09-15 RX ADMIN — TIOTROPIUM BROMIDE INHALATION SPRAY 2 PUFF(S): 3.12 SPRAY, METERED RESPIRATORY (INHALATION) at 11:20

## 2024-09-15 RX ADMIN — FLUTICASONE PROPIONATE AND SALMETEROL 1 DOSE(S): 250; 50 POWDER RESPIRATORY (INHALATION) at 05:37

## 2024-09-15 RX ADMIN — Medication 16 UNIT(S): at 16:56

## 2024-09-15 RX ADMIN — Medication 40 MILLIGRAM(S): at 05:39

## 2024-09-15 NOTE — PROGRESS NOTE ADULT - ASSESSMENT
64F PMHx asthma, HTN/HLD, DM. Presenting w/ 3d of SOB and cough, rx'd prednisone w/o improvement outpatient. Pt also been noticing increase in FS glucose while on steroid. In the ED, pt afebrile, bp stable, on RA saturating 99%, tachypneic to 22. Pt was audibly wheezing.   CBC w/ wbc 12.6, hgb 11.8, plt 289. CMP w/ SCr 0.79, bicarb 18, AG 18, glucose 346. VBG w/ initial lactate 6.5 -> 4.6 after 1L IVF. covid/flu/rsv neg. CXR showed clear lungs. Pt received solumedrol 125mg, Mg 2g, and duoneb x3. On interview/exam, pt still wheezing. Appears audible wheezing w/o stethoscope is more upper airway but on lung auscultation, with exp wheezing as well.           Asthma exacerbation:   -she has a known diagnosis of asthma and has been  on Dupixent biweekly as an outpt:  -came in here with asthma exacerbation, possibly 2nd to viral infection  -ENT recs appreciated, no VC dysfunction noted  -Still with significant wheezing, continue current dose of solumedrol  -Change Advair to 250/50 1 puff BID  -Change Duoneb to q4h  -Continue Singulair   -Normoxic, keep sats >90% with o2 PRN     9/15:   -she has been wheezing a lot:  cant decrease steroids:   -cont BD and cont iv steroids:   -cont singulair    Uncontrolled DM:   -secondary to stress and steroids   monitor and control     9/15; cont current meds:   -cont to control on high dose glucose    HTN:   -controlled:     dvt prophylaxis    dw acp

## 2024-09-15 NOTE — PROGRESS NOTE ADULT - SUBJECTIVE AND OBJECTIVE BOX
Date of Service: 09-15-24 @ 12:41    Patient is a 64y old  Female who presents with a chief complaint of Asthma exacerbation (13 Sep 2024 14:30)      Any change in ROS: seems to be doing  poorly today  : she has excessive coughing   and increased wheezing     MEDICATIONS  (STANDING):  albuterol/ipratropium for Nebulization 3 milliLiter(s) Nebulizer every 4 hours  amLODIPine   Tablet 5 milliGRAM(s) Oral daily  atorvastatin 20 milliGRAM(s) Oral at bedtime  dextrose 5%. 1000 milliLiter(s) (100 mL/Hr) IV Continuous <Continuous>  dextrose 5%. 1000 milliLiter(s) (50 mL/Hr) IV Continuous <Continuous>  dextrose 50% Injectable 12.5 Gram(s) IV Push once  dextrose 50% Injectable 25 Gram(s) IV Push once  dextrose 50% Injectable 25 Gram(s) IV Push once  fluticasone propionate 50 MICROgram(s)/spray Nasal Spray 1 Spray(s) Both Nostrils two times a day  fluticasone propionate/ salmeterol 250-50 MICROgram(s) Diskus 1 Dose(s) Inhalation two times a day  glucagon  Injectable 1 milliGRAM(s) IntraMuscular once  insulin glargine Injectable (LANTUS) 22 Unit(s) SubCutaneous at bedtime  insulin lispro (ADMELOG) corrective regimen sliding scale   SubCutaneous three times a day before meals  insulin lispro (ADMELOG) corrective regimen sliding scale   SubCutaneous at bedtime  insulin lispro Injectable (ADMELOG) 10 Unit(s) SubCutaneous before lunch  insulin lispro Injectable (ADMELOG) 10 Unit(s) SubCutaneous before breakfast  insulin lispro Injectable (ADMELOG) 16 Unit(s) SubCutaneous before dinner  losartan 100 milliGRAM(s) Oral daily  methylPREDNISolone sodium succinate Injectable 40 milliGRAM(s) IV Push every 8 hours  montelukast 10 milliGRAM(s) Oral daily  oxymetazoline 0.05% Nasal Spray 1 Spray(s) Both Nostrils two times a day  sodium chloride 0.9%. 1000 milliLiter(s) (100 mL/Hr) IV Continuous <Continuous>  tiotropium 2.5 MICROgram(s) Inhaler 2 Puff(s) Inhalation daily    MEDICATIONS  (PRN):  acetaminophen     Tablet .. 650 milliGRAM(s) Oral every 6 hours PRN Temp greater or equal to 38C (100.4F), Mild Pain (1 - 3)  albuterol    90 MICROgram(s) HFA Inhaler 2 Puff(s) Inhalation every 2 hours PRN Shortness of Breath and/or Wheezing  aluminum hydroxide/magnesium hydroxide/simethicone Suspension 30 milliLiter(s) Oral every 4 hours PRN Dyspepsia  dextrose Oral Gel 15 Gram(s) Oral once PRN Blood Glucose LESS THAN 70 milliGRAM(s)/deciliter  melatonin 3 milliGRAM(s) Oral at bedtime PRN Insomnia  ondansetron Injectable 4 milliGRAM(s) IV Push every 8 hours PRN Nausea and/or Vomiting    Vital Signs Last 24 Hrs  T(C): 36.4 (15 Sep 2024 09:08), Max: 36.9 (14 Sep 2024 14:26)  T(F): 97.6 (15 Sep 2024 09:08), Max: 98.4 (14 Sep 2024 14:26)  HR: 101 (15 Sep 2024 09:08) (76 - 101)  BP: 144/81 (15 Sep 2024 09:08) (132/77 - 144/81)  BP(mean): --  RR: 18 (15 Sep 2024 09:08) (18 - 20)  SpO2: 97% (15 Sep 2024 09:08) (96% - 98%)    Parameters below as of 15 Sep 2024 09:08  Patient On (Oxygen Delivery Method): room air        I&O's Summary        Physical Exam:   GENERAL: NAD, well-groomed, well-developed  HEENT: SAQIB/   Atraumatic, Normocephalic  ENMT: No tonsillar erythema, exudates, or enlargement; Moist mucous membranes, Good dentition, No lesions  NECK: Supple, No JVD, Normal thyroid  CHEST/LUNG: wheezing++  CVS: Regular rate and rhythm; No murmurs, rubs, or gallops  GI: : Soft, Nontender, Nondistended; Bowel sounds present  NERVOUS SYSTEM:  Alert & Oriented X3  EXTREMITIES: - edema  LYMPH: No lymphadenopathy noted  SKIN: No rashes or lesions  ENDOCRINOLOGY: No Thyromegaly  PSYCH: Appropriate    Labs:  23, 25, 22                            11.1   14.20 )-----------( 288      ( 15 Sep 2024 06:58 )             33.9                         9.1    11.31 )-----------( 229      ( 14 Sep 2024 06:26 )             28.0                         11.8   12.60 )-----------( 289      ( 12 Sep 2024 18:49 )             36.6     09-15    139  |  102  |  20  ----------------------------<  283<H>  4.0   |  20<L>  |  0.76  09-14    141  |  104  |  22  ----------------------------<  232<H>  3.7   |  22  |  0.81  09-13    138  |  101  |  21  ----------------------------<  243<H>  4.1   |  21<L>  |  0.77  09-13    138  |  100  |  24<H>  ----------------------------<  351<H>  5.9<H>   |  18<L>  |  0.80  09-12    136  |  100  |  26<H>  ----------------------------<  346<H>  4.8   |  18<L>  |  0.79    Ca    9.5      15 Sep 2024 06:56  Ca    9.7      14 Sep 2024 08:59  Ca    9.9      13 Sep 2024 14:44  Phos  3.4     09-15  Phos  2.8     09-14  Mg     2.2     09-15  Mg     2.2     09-14    TPro  7.0  /  Alb  3.9  /  TBili  0.2  /  DBili  x   /  AST  20  /  ALT  21  /  AlkPhos  98  09-15  TPro  7.4  /  Alb  4.2  /  TBili  0.1<L>  /  DBili  x   /  AST  10  /  ALT  18  /  AlkPhos  105  09-14  TPro  8.0  /  Alb  4.4  /  TBili  0.1<L>  /  DBili  x   /  AST  17  /  ALT  22  /  AlkPhos  110  09-12    CAPILLARY BLOOD GLUCOSE      POCT Blood Glucose.: 203 mg/dL (15 Sep 2024 11:30)  POCT Blood Glucose.: 259 mg/dL (15 Sep 2024 07:43)  POCT Blood Glucose.: 384 mg/dL (14 Sep 2024 21:30)  POCT Blood Glucose.: 274 mg/dL (14 Sep 2024 17:58)  POCT Blood Glucose.: 250 mg/dL (14 Sep 2024 16:55)      LIVER FUNCTIONS - ( 15 Sep 2024 06:56 )  Alb: 3.9 g/dL / Pro: 7.0 g/dL / ALK PHOS: 98 U/L / ALT: 21 U/L / AST: 20 U/L / GGT: x             Urinalysis Basic - ( 15 Sep 2024 06:56 )    Color: x / Appearance: x / SG: x / pH: x  Gluc: 283 mg/dL / Ketone: x  / Bili: x / Urobili: x   Blood: x / Protein: x / Nitrite: x   Leuk Esterase: x / RBC: x / WBC x   Sq Epi: x / Non Sq Epi: x / Bacteria: x      Procalcitonin: 0.04 ng/mL (09-13 @ 06:17)  Lactate, Blood: 5.1 mmol/L (09-15 @ 06:56)  Lactate, Blood: 4.5 mmol/L (09-14 @ 08:59)  Lactate, Blood: 4.4 mmol/L (09-13 @ 14:44)  Lactate, Blood: 6.2 mmol/L (09-13 @ 06:16)        RECENT CULTURES:    ra< from: Xray Chest 2 Views PA/Lat (09.12.24 @ 19:07) >  PROCEDURE DATE:  09/12/2024          INTERPRETATION:  EXAMINATION: XR CHEST PA AND LATERAL    CLINICAL INDICATION: Shortness of breath and cough for past 4 days.    TECHNIQUE: 2 views; Frontal and lateral views of the chest were obtained.    COMPARISON: X-ray chest 10/30/2023.    FINDINGS:    The heart is normal in size.  The lungs are clear.  There is no pneumothorax or pleural effusion.    IMPRESSION:  Clear lungs.    --- End of Report ---          KORINA ROLDAN MD; Resident Radiologist  This document has been electronically signed.  ADRIANNA RICARDO MD; Attending Radiologist  This document has been electronically signed. Sep 13 2024  8:22AM    < end of copied text >  < from: Xray Chest 1 View AP/PA (10.30.23 @ 19:10) >    PROCEDURE DATE:  10/30/2023          INTERPRETATION:  CLINICAL INDICATION: shortness of breath.    EXAM: Frontal radiograph of the chest.    COMPARISON: Chest x-ray 10/22/2022.    FINDINGS:  The lungs are clear.  There is no pleural effusion or pneumothorax.  The heart is normal in size  The visualized osseous structures demonstrate no acute pathology.    IMPRESSION:  No focal consolidations.    --- End of Report ---          COOPER REEDER MD; Resident Radiologist  This document has been electronically signed.  MALISSA PUGA MD; Attending Radiologist  This document has been electronically signed. Oct 31 2023  9:31AM    < end of copied text >      RESPIRATORY CULTURES:          Studies  Chest X-RAY  CT SCAN Chest   Venous Dopplers: LE:   CT Abdomen  Others

## 2024-09-15 NOTE — PROGRESS NOTE ADULT - ASSESSMENT
64F admitted for asthma exacerbation.     Plan:    # Acute asthma exacerbation:  - CXR w/ clear lungs  - C/w nebs/ inhalers/ Singulair   - C/w steroids  - Serial ABG's  - Maintain Fio2>90%  - Seen by ICU 9/13-> pt does not require ICU level of care  - Seen by ENT 9/13-> afrin to b/l nare bid x3days then d/c, flonase to b/l nare bid [id number]. Pt is to follow up at ENT in 2 weeks with Nikita Badillo, Serafin Dumont. Call [phone] to make appointment.  - Pulm following    # lactic acidosis:  - C/w IVF's  - Hold hctz  - Trend lactate/ CMP  - Renal following    # Atypical chest pain:  - Likely  secondary to asthma exacerbation  - trops negative.  - Echo pending  - Cards following    # SIRS:  - Monitor off abx  - ID following    # Dm2/ Hyperglycemia:  - HgbA1c  - Continue to monitor blood glucose levels  - Sliding Scale  - Endo following    # HTN/ HLD:  - C/w CV meds    # GI:  - Bowel regimen prn    # DVT ppx:  - IPC's    [facility]  [phone]

## 2024-09-15 NOTE — PROGRESS NOTE ADULT - ASSESSMENT
64F PMHx asthma, HTN/HLD, DM. hx breast ca and colon ca, Presenting w/ 3d of SOB and cough, rx'd prednisone w/o improvement outpatient. Pt also been noticing increase in FS glucose while on steroid.   on admission w/ initial lactate 6.5 -> 4.6 after 1L IVF.  pt repeat lactic acid on 9/13 6.2.. of note, outpt has been on metformin as well   covid/flu/rsv neg. CXR showed clear lungs. Pt received solumedrol 125mg, Mg 2g, and duoneb x3.   pt receiving beta agonists as well         1- lactic acidosis /high AGAP acidosis   2- HTN   3- hyperglycemia/DM  4- wheezing /sob   5- asthma exacerbation     lactic acidosis can be due to:     metformin use. metformin has been dc now lactic acid is improving   it can be due to beta agonist. have pulm change her inahlers if able   it can also be due to thiamine def as well check vit B1 level  pending  start thiamine 100 mg daily   it can also be due tp her hypoxemia   can also be in setting of uncontrolled Dm as well.   to have ua c and s as well as bcx to rule out infection   IVF hydration    beta hydroxy butyrate level in range   hold hctz temporarily   steroids as per pulm

## 2024-09-15 NOTE — PROGRESS NOTE ADULT - SUBJECTIVE AND OBJECTIVE BOX
SUBJECTIVE / OVERNIGHT EVENTS:     No events noted overnight. Resting comfortably in bed      --------------------------------------------------------------------------------------------  LABS:                        9.1    11.31 )-----------( 229      ( 14 Sep 2024 06:26 )             28.0     09-14    141  |  104  |  22  ----------------------------<  232<H>  3.7   |  22  |  0.81    Ca    9.7      14 Sep 2024 08:59  Phos  2.8     09-14  Mg     2.2     09-14    TPro  7.4  /  Alb  4.2  /  TBili  0.1<L>  /  DBili  x   /  AST  10  /  ALT  18  /  AlkPhos  105  09-14      CAPILLARY BLOOD GLUCOSE      POCT Blood Glucose.: 384 mg/dL (14 Sep 2024 21:30)  POCT Blood Glucose.: 274 mg/dL (14 Sep 2024 17:58)  POCT Blood Glucose.: 250 mg/dL (14 Sep 2024 16:55)  POCT Blood Glucose.: 219 mg/dL (14 Sep 2024 12:23)  POCT Blood Glucose.: 222 mg/dL (14 Sep 2024 08:40)        Urinalysis Basic - ( 14 Sep 2024 08:59 )    Color: x / Appearance: x / SG: x / pH: x  Gluc: 232 mg/dL / Ketone: x  / Bili: x / Urobili: x   Blood: x / Protein: x / Nitrite: x   Leuk Esterase: x / RBC: x / WBC x   Sq Epi: x / Non Sq Epi: x / Bacteria: x        RADIOLOGY & ADDITIONAL TESTS:     Imaging Personally Reviewed:  [x] YES  [ ] NO    Consultant(s) Notes Reviewed:  [x] YES  [ ] NO    MEDICATIONS  (STANDING):  albuterol/ipratropium for Nebulization 3 milliLiter(s) Nebulizer every 4 hours  amLODIPine   Tablet 5 milliGRAM(s) Oral daily  atorvastatin 20 milliGRAM(s) Oral at bedtime  dextrose 5%. 1000 milliLiter(s) (100 mL/Hr) IV Continuous <Continuous>  dextrose 5%. 1000 milliLiter(s) (50 mL/Hr) IV Continuous <Continuous>  dextrose 50% Injectable 12.5 Gram(s) IV Push once  dextrose 50% Injectable 25 Gram(s) IV Push once  dextrose 50% Injectable 25 Gram(s) IV Push once  fluticasone propionate 50 MICROgram(s)/spray Nasal Spray 1 Spray(s) Both Nostrils two times a day  fluticasone propionate/ salmeterol 250-50 MICROgram(s) Diskus 1 Dose(s) Inhalation two times a day  glucagon  Injectable 1 milliGRAM(s) IntraMuscular once  insulin glargine Injectable (LANTUS) 22 Unit(s) SubCutaneous at bedtime  insulin lispro (ADMELOG) corrective regimen sliding scale   SubCutaneous three times a day before meals  insulin lispro (ADMELOG) corrective regimen sliding scale   SubCutaneous at bedtime  insulin lispro Injectable (ADMELOG) 10 Unit(s) SubCutaneous before breakfast  insulin lispro Injectable (ADMELOG) 10 Unit(s) SubCutaneous before lunch  insulin lispro Injectable (ADMELOG) 16 Unit(s) SubCutaneous before dinner  losartan 100 milliGRAM(s) Oral daily  methylPREDNISolone sodium succinate Injectable 40 milliGRAM(s) IV Push every 8 hours  montelukast 10 milliGRAM(s) Oral daily  oxymetazoline 0.05% Nasal Spray 1 Spray(s) Both Nostrils two times a day  sodium chloride 0.9%. 1000 milliLiter(s) (100 mL/Hr) IV Continuous <Continuous>  tiotropium 2.5 MICROgram(s) Inhaler 2 Puff(s) Inhalation daily    MEDICATIONS  (PRN):  acetaminophen     Tablet .. 650 milliGRAM(s) Oral every 6 hours PRN Temp greater or equal to 38C (100.4F), Mild Pain (1 - 3)  albuterol    90 MICROgram(s) HFA Inhaler 2 Puff(s) Inhalation every 2 hours PRN Shortness of Breath and/or Wheezing  aluminum hydroxide/magnesium hydroxide/simethicone Suspension 30 milliLiter(s) Oral every 4 hours PRN Dyspepsia  dextrose Oral Gel 15 Gram(s) Oral once PRN Blood Glucose LESS THAN 70 milliGRAM(s)/deciliter  melatonin 3 milliGRAM(s) Oral at bedtime PRN Insomnia  ondansetron Injectable 4 milliGRAM(s) IV Push every 8 hours PRN Nausea and/or Vomiting      Care Discussed with Consultants/Other Providers [x] YES  [ ] NO    Vital Signs Last 24 Hrs  T(C): 36.4 (15 Sep 2024 04:29), Max: 36.9 (14 Sep 2024 14:26)  T(F): 97.5 (15 Sep 2024 04:29), Max: 98.4 (14 Sep 2024 14:26)  HR: 88 (15 Sep 2024 04:29) (76 - 92)  BP: 132/77 (15 Sep 2024 04:29) (132/77 - 149/-)  BP(mean): 89 (14 Sep 2024 08:52) (89 - 89)  RR: 19 (15 Sep 2024 04:29) (19 - 20)  SpO2: 98% (15 Sep 2024 04:29) (96% - 98%)    Parameters below as of 15 Sep 2024 04:29  Patient On (Oxygen Delivery Method): nasal cannula      I&O's Summary      PHYSICAL EXAM:  GENERAL: NAD, well-developed, comfortable  HEAD:  Atraumatic, Normocephalic  EYES: EOMI, PERRLA, conjunctiva and sclera clear  NECK: Supple, No JVD  CHEST/LUNG: Diminished bilaterally;+wheeze  HEART: Regular rate and rhythm; No murmurs, rubs, or gallops  ABDOMEN: Soft, Nontender, Nondistended; Bowel sounds present  NEURO: AAOx3, no focal weakness, 5/5 b/l extremity strength, b/l knee no arthritis, no effusion   EXTREMITIES:  2+ Peripheral Pulses, No clubbing, cyanosis, or edema  SKIN: No rashes or lesions

## 2024-09-15 NOTE — PROGRESS NOTE ADULT - SUBJECTIVE AND OBJECTIVE BOX
[facility] Progress Note    MAR, chart notes, fingersticks and labs reviewed    Subjective: SOB improved but still wheezing    Objective  Vital Signs  T(C): 36.4 (09-15-24 @ 04:29), Max: 36.9 (09-14-24 @ 14:26)  HR: 88 (09-15-24 @ 04:29) (76 - 92)  BP: 132/77 (09-15-24 @ 04:29) (132/77 - 149/-)  RR: 19 (09-15-24 @ 04:29) (19 - 20)  SpO2: 98% (09-15-24 @ 04:29) (96% - 98%)    Physical Exam  Gen: NAD, alert, awake  HEENT: NC/AT, EOMI  Neck: supple  Chest: breathing comfortably, wheezing  Heart: +s1 +s2    Medications  acetaminophen     Tablet .. 650 milliGRAM(s) Oral every 6 hours PRN  albuterol    90 MICROgram(s) HFA Inhaler 2 Puff(s) Inhalation every 2 hours PRN  albuterol/ipratropium for Nebulization 3 milliLiter(s) Nebulizer every 4 hours  aluminum hydroxide/magnesium hydroxide/simethicone Suspension 30 milliLiter(s) Oral every 4 hours PRN  amLODIPine   Tablet 5 milliGRAM(s) Oral daily  atorvastatin 20 milliGRAM(s) Oral at bedtime  dextrose 5%. 1000 milliLiter(s) IV Continuous <Continuous>  dextrose 5%. 1000 milliLiter(s) IV Continuous <Continuous>  dextrose 50% Injectable 25 Gram(s) IV Push once  dextrose 50% Injectable 12.5 Gram(s) IV Push once  dextrose 50% Injectable 25 Gram(s) IV Push once  dextrose Oral Gel 15 Gram(s) Oral once PRN  fluticasone propionate 50 MICROgram(s)/spray Nasal Spray 1 Spray(s) Both Nostrils two times a day  fluticasone propionate/ salmeterol 250-50 MICROgram(s) Diskus 1 Dose(s) Inhalation two times a day  glucagon  Injectable 1 milliGRAM(s) IntraMuscular once  insulin glargine Injectable (LANTUS) 22 Unit(s) SubCutaneous at bedtime  insulin lispro (ADMELOG) corrective regimen sliding scale   SubCutaneous three times a day before meals  insulin lispro (ADMELOG) corrective regimen sliding scale   SubCutaneous at bedtime  insulin lispro Injectable (ADMELOG) 10 Unit(s) SubCutaneous before breakfast  insulin lispro Injectable (ADMELOG) 10 Unit(s) SubCutaneous before lunch  insulin lispro Injectable (ADMELOG) 16 Unit(s) SubCutaneous before dinner  losartan 100 milliGRAM(s) Oral daily  melatonin 3 milliGRAM(s) Oral at bedtime PRN  methylPREDNISolone sodium succinate Injectable 40 milliGRAM(s) IV Push every 8 hours  montelukast 10 milliGRAM(s) Oral daily  ondansetron Injectable 4 milliGRAM(s) IV Push every 8 hours PRN  oxymetazoline 0.05% Nasal Spray 1 Spray(s) Both Nostrils two times a day  sodium chloride 0.9%. 1000 milliLiter(s) IV Continuous <Continuous>  tiotropium 2.5 MICROgram(s) Inhaler 2 Puff(s) Inhalation daily    Pertinent labs/Imaging  POCT Blood Glucose.: 384 mg/dL (09-14-24 @ 21:30)  POCT Blood Glucose.: 274 mg/dL (09-14-24 @ 17:58)  POCT Blood Glucose.: 250 mg/dL (09-14-24 @ 16:55)  POCT Blood Glucose.: 219 mg/dL (09-14-24 @ 12:23)  POCT Blood Glucose.: 222 mg/dL (09-14-24 @ 08:40)    eGFR: 82 mL/min/1.73m2 (09-14-24 @ 08:59)  eGFR: 87 mL/min/1.73m2 (09-13-24 @ 14:44)  eGFR: 83 mL/min/1.73m2 (09-13-24 @ 06:17)

## 2024-09-15 NOTE — PROGRESS NOTE ADULT - SUBJECTIVE AND OBJECTIVE BOX
DATE OF SERVICE: 09-15-24 @ 23:11    Patient is a 64y old  Female who presents with a chief complaint of Asthma exacerbation (14 Sep 2024 16:05)      INTERVAL HISTORY: feels ok     	  MEDICATIONS:  amLODIPine   Tablet 5 milliGRAM(s) Oral daily  losartan 100 milliGRAM(s) Oral daily        PHYSICAL EXAM:  T(C): 36.8 (09-15-24 @ 19:41), Max: 36.8 (09-15-24 @ 19:41)  HR: 84 (09-15-24 @ 20:59) (84 - 101)  BP: 143/84 (09-15-24 @ 20:59) (103/71 - 144/81)  RR: 18 (09-15-24 @ 19:41) (18 - 19)  SpO2: 97% (09-15-24 @ 19:41) (97% - 98%)  Wt(kg): --  I&O's Summary        Appearance: In no distress	  HEENT:    PERRL, EOMI	  Cardiovascular:  S1 S2, No JVD  Respiratory: Lungs clear to auscultation	  Gastrointestinal:  Soft, Non-tender, + BS	  Vascularature:  No edema of LE  Psychiatric: Appropriate affect   Neuro: no acute focal deficits                               11.1   14.20 )-----------( 288      ( 15 Sep 2024 06:58 )             33.9     09-15    139  |  102  |  20  ----------------------------<  283<H>  4.0   |  20<L>  |  0.76    Ca    9.5      15 Sep 2024 06:56  Phos  3.4     09-15  Mg     2.2     09-15    TPro  7.0  /  Alb  3.9  /  TBili  0.2  /  DBili  x   /  AST  20  /  ALT  21  /  AlkPhos  98  09-15        Labs personally reviewed      ASSESSMENT/PLAN: 	  EKG: Personally reviewed by klelee Hooper SR PAC   Radiology: Personally reviewed by me - Clear lungs.            Assessment /Plan:   64F PMHx asthma, HTN/HLD, DM. Presenting w/ 3d of SOB and cough, rx'd prednisone w/o improvement outpatient. Pt also been noticing increase in FS glucose while on steroid.  In the ED, pt afebrile, bp stable, on RA saturating 99%, tachypneic to 22. Pt was audibly wheezing.     1. Atypical CP   - non exertional, cp secondary to asthma exacerbation  - Pt describes pain is typical of her asthma  - Trop neg x1, awaiting 2nd   - ECG - SR with PAC, recommend repeat   - Check TTE     2. Asthma Exacerbation  - audibly wheezing  - Still with significant wheezing, continue current dose of solumedrol  -Normoxic, keep sats >90% with o2 PRN   -pulm following    3. HTN  controlled   c/w amLODIPine   Tablet 5 milliGRAM(s) Oral daily   c/w losartan 100 milliGRAM(s) Oral daily    4. HLD   -check LDL   c/w atorvastatin 20 milliGRAM(s) Oral at bedtime    5. Uncontrolled DM  - secondary to stress and steroids, monitor and control   - check A1c     6. DVT prophylactic  - SCD's           Evan Dietz DO formerly [facility]  Cardiovascular Medicine  [address] [place] [address], [address]  Office: [phone]  Available via Text/call on Microsoft Teams

## 2024-09-15 NOTE — PROGRESS NOTE ADULT - ASSESSMENT
64 y.o. female with history of T2DM , Asthma, HTN and HLD presents for asthma exacerbation.    1. T2DM with hyperglycemia  - Increase Lantus to 22 units at night  - Continue Admelog 10 units with breakfast, increase to 10 units with lunch and 16 units with dinner  - Continue moderate Admelog correctional scale before meals and bedtime  - Monitor FS before meals and bedtime  - Follow hospital hypoglycemic protocol    2. Asthma exacerbation  - currently on solumedrol 40 mg IV Q8H  - pulm following    Will continue to follow.     Zoe Hayward MD  [facility]- Division of Endocrinology    [address] [place] [address]    T [phone]  F [phone]

## 2024-09-15 NOTE — PROGRESS NOTE ADULT - SUBJECTIVE AND OBJECTIVE BOX
[facility]   [phone]  RENAL FOLLOW UP NOTE  --------------------------------------------------------------------------------  Chief Complaint:    24 hour events/subjective:    seen earlier   states still sob     PAST HISTORY  --------------------------------------------------------------------------------  No significant changes to PMH, PSH, FHx, SHx, unless otherwise noted    ALLERGIES & MEDICATIONS  --------------------------------------------------------------------------------  Allergies    NSAIDs (Unknown)  aspirin (Hives)  AValox (Unknown)    Intolerances      Standing Inpatient Medications  albuterol/ipratropium for Nebulization 3 milliLiter(s) Nebulizer every 4 hours  amLODIPine   Tablet 5 milliGRAM(s) Oral daily  atorvastatin 20 milliGRAM(s) Oral at bedtime  dextrose 5%. 1000 milliLiter(s) IV Continuous <Continuous>  dextrose 5%. 1000 milliLiter(s) IV Continuous <Continuous>  dextrose 50% Injectable 25 Gram(s) IV Push once  dextrose 50% Injectable 12.5 Gram(s) IV Push once  dextrose 50% Injectable 25 Gram(s) IV Push once  fluticasone propionate 50 MICROgram(s)/spray Nasal Spray 1 Spray(s) Both Nostrils two times a day  fluticasone propionate/ salmeterol 250-50 MICROgram(s) Diskus 1 Dose(s) Inhalation two times a day  glucagon  Injectable 1 milliGRAM(s) IntraMuscular once  insulin glargine Injectable (LANTUS) 22 Unit(s) SubCutaneous at bedtime  insulin lispro (ADMELOG) corrective regimen sliding scale   SubCutaneous three times a day before meals  insulin lispro (ADMELOG) corrective regimen sliding scale   SubCutaneous at bedtime  insulin lispro Injectable (ADMELOG) 10 Unit(s) SubCutaneous before breakfast  insulin lispro Injectable (ADMELOG) 16 Unit(s) SubCutaneous before dinner  insulin lispro Injectable (ADMELOG) 10 Unit(s) SubCutaneous before lunch  losartan 100 milliGRAM(s) Oral daily  methylPREDNISolone sodium succinate Injectable 40 milliGRAM(s) IV Push every 6 hours  montelukast 10 milliGRAM(s) Oral daily  oxymetazoline 0.05% Nasal Spray 1 Spray(s) Both Nostrils two times a day  sodium chloride 0.9%. 1000 milliLiter(s) IV Continuous <Continuous>  thiamine 100 milliGRAM(s) Oral daily  tiotropium 2.5 MICROgram(s) Inhaler 2 Puff(s) Inhalation daily    PRN Inpatient Medications  acetaminophen     Tablet .. 650 milliGRAM(s) Oral every 6 hours PRN  albuterol    90 MICROgram(s) HFA Inhaler 2 Puff(s) Inhalation every 2 hours PRN  aluminum hydroxide/magnesium hydroxide/simethicone Suspension 30 milliLiter(s) Oral every 4 hours PRN  dextrose Oral Gel 15 Gram(s) Oral once PRN  melatonin 3 milliGRAM(s) Oral at bedtime PRN  ondansetron Injectable 4 milliGRAM(s) IV Push every 8 hours PRN      REVIEW OF SYSTEMS  --------------------------------------------------------------------------------    Gen: denies  fevers/chills,  CVS: denies chest pain/palpitations  Resp: + SOB/Cough  GI: Denies N/V/Abd pain  : Denies dysuria/oliguria/hematuria    VITALS/PHYSICAL EXAM  --------------------------------------------------------------------------------  T(C): 36.8 (09-15-24 @ 19:41), Max: 36.8 (09-15-24 @ 19:41)  HR: 84 (09-15-24 @ 20:59) (84 - 101)  BP: 143/84 (09-15-24 @ 20:59) (103/71 - 144/81)  RR: 18 (09-15-24 @ 19:41) (18 - 19)  SpO2: 97% (09-15-24 @ 19:41) (97% - 98%)  Wt(kg): --        Physical Exam:  	      Gen:  on O2  	no jvd ,   	Pulm: decrease bs  no rales or ronchi +  wheezing  	CV: RRR, S1S2; no rub  	Abd: +BS, soft, nontender/nondistended  	: No suprapubic tenderness  	UE: Warm, no cyanosis  no clubbing,  LUE ? edema   	LE: Warm, no cyanosis  no clubbing, no edema  	Neuro: alert and oriented. speech coherent       LABS/STUDIES  --------------------------------------------------------------------------------              11.1   14.20 >-----------<  288      [09-15-24 @ 06:58]              33.9     139  |  102  |  20  ----------------------------<  283      [09-15-24 @ 06:56]  4.0   |  20  |  0.76        Ca     9.5     [09-15-24 @ 06:56]      Mg     2.2     [09-15-24 @ 06:56]      Phos  3.4     [09-15-24 @ 06:56]    TPro  7.0  /  Alb  3.9  /  TBili  0.2  /  DBili  x   /  AST  20  /  ALT  21  /  AlkPhos  98  [09-15-24 @ 06:56]          Creatinine Trend:  SCr 0.76 [09-15 @ 06:56]  SCr 0.81 [09-14 @ 08:59]  SCr 0.77 [09-13 @ 14:44]  SCr 0.80 [09-13 @ 06:17]  SCr 0.79 [09-12 @ 18:49]

## 2024-09-16 ENCOUNTER — RESULT REVIEW (OUTPATIENT)
Age: 64
End: 2024-09-16

## 2024-09-16 LAB
ANION GAP SERPL CALC-SCNC: 15 MMOL/L — SIGNIFICANT CHANGE UP (ref 5–17)
APPEARANCE UR: CLEAR — SIGNIFICANT CHANGE UP
BILIRUB UR-MCNC: NEGATIVE — SIGNIFICANT CHANGE UP
BUN SERPL-MCNC: 16 MG/DL — SIGNIFICANT CHANGE UP (ref 7–23)
CALCIUM SERPL-MCNC: 9.3 MG/DL — SIGNIFICANT CHANGE UP (ref 8.4–10.5)
CHLORIDE SERPL-SCNC: 103 MMOL/L — SIGNIFICANT CHANGE UP (ref 96–108)
CO2 SERPL-SCNC: 21 MMOL/L — LOW (ref 22–31)
COLOR SPEC: YELLOW — SIGNIFICANT CHANGE UP
CREAT SERPL-MCNC: 0.67 MG/DL — SIGNIFICANT CHANGE UP (ref 0.5–1.3)
DIFF PNL FLD: NEGATIVE — SIGNIFICANT CHANGE UP
EGFR: 99 ML/MIN/1.73M2 — SIGNIFICANT CHANGE UP
GLUCOSE BLDC GLUCOMTR-MCNC: 155 MG/DL — HIGH (ref 70–99)
GLUCOSE BLDC GLUCOMTR-MCNC: 156 MG/DL — HIGH (ref 70–99)
GLUCOSE BLDC GLUCOMTR-MCNC: 211 MG/DL — HIGH (ref 70–99)
GLUCOSE BLDC GLUCOMTR-MCNC: 237 MG/DL — HIGH (ref 70–99)
GLUCOSE BLDC GLUCOMTR-MCNC: 275 MG/DL — HIGH (ref 70–99)
GLUCOSE SERPL-MCNC: 223 MG/DL — HIGH (ref 70–99)
GLUCOSE UR QL: 500 MG/DL
KETONES UR-MCNC: NEGATIVE MG/DL — SIGNIFICANT CHANGE UP
LACTATE SERPL-SCNC: 4.9 MMOL/L — CRITICAL HIGH (ref 0.5–2)
LACTATE SERPL-SCNC: 5.1 MMOL/L — CRITICAL HIGH (ref 0.5–2)
LEUKOCYTE ESTERASE UR-ACNC: NEGATIVE — SIGNIFICANT CHANGE UP
NITRITE UR-MCNC: NEGATIVE — SIGNIFICANT CHANGE UP
PH UR: 6.5 — SIGNIFICANT CHANGE UP (ref 5–8)
POTASSIUM SERPL-MCNC: 3.7 MMOL/L — SIGNIFICANT CHANGE UP (ref 3.5–5.3)
POTASSIUM SERPL-SCNC: 3.7 MMOL/L — SIGNIFICANT CHANGE UP (ref 3.5–5.3)
PROT UR-MCNC: NEGATIVE MG/DL — SIGNIFICANT CHANGE UP
SODIUM SERPL-SCNC: 139 MMOL/L — SIGNIFICANT CHANGE UP (ref 135–145)
SP GR SPEC: 1.01 — SIGNIFICANT CHANGE UP (ref 1–1.03)
UROBILINOGEN FLD QL: 0.2 MG/DL — SIGNIFICANT CHANGE UP (ref 0.2–1)

## 2024-09-16 PROCEDURE — 93306 TTE W/DOPPLER COMPLETE: CPT | Mod: 26

## 2024-09-16 PROCEDURE — 93356 MYOCRD STRAIN IMG SPCKL TRCK: CPT

## 2024-09-16 RX ORDER — INSULIN GLARGINE 100 [IU]/ML
30 INJECTION, SOLUTION SUBCUTANEOUS AT BEDTIME
Refills: 0 | Status: DISCONTINUED | OUTPATIENT
Start: 2024-09-16 | End: 2024-09-20

## 2024-09-16 RX ADMIN — Medication 40 MILLIGRAM(S): at 06:26

## 2024-09-16 RX ADMIN — TIOTROPIUM BROMIDE INHALATION SPRAY 2 PUFF(S): 3.12 SPRAY, METERED RESPIRATORY (INHALATION) at 13:37

## 2024-09-16 RX ADMIN — IPRATROPIUM BROMIDE AND ALBUTEROL SULFATE 3 MILLILITER(S): .5; 3 SOLUTION RESPIRATORY (INHALATION) at 17:39

## 2024-09-16 RX ADMIN — IPRATROPIUM BROMIDE AND ALBUTEROL SULFATE 3 MILLILITER(S): .5; 3 SOLUTION RESPIRATORY (INHALATION) at 22:00

## 2024-09-16 RX ADMIN — Medication 10 UNIT(S): at 08:16

## 2024-09-16 RX ADMIN — FLUTICASONE PROPIONATE 1 SPRAY(S): 50 SPRAY, METERED NASAL at 17:40

## 2024-09-16 RX ADMIN — Medication 100 MILLIGRAM(S): at 13:37

## 2024-09-16 RX ADMIN — OXYMETAZOLINE HYDROCHLORIDE 1 SPRAY(S): 0.05 SPRAY, METERED NASAL at 05:24

## 2024-09-16 RX ADMIN — OXYMETAZOLINE HYDROCHLORIDE 1 SPRAY(S): 0.05 SPRAY, METERED NASAL at 17:40

## 2024-09-16 RX ADMIN — Medication 2: at 16:54

## 2024-09-16 RX ADMIN — Medication 4: at 08:15

## 2024-09-16 RX ADMIN — IPRATROPIUM BROMIDE AND ALBUTEROL SULFATE 3 MILLILITER(S): .5; 3 SOLUTION RESPIRATORY (INHALATION) at 05:22

## 2024-09-16 RX ADMIN — AMLODIPINE BESYLATE 5 MILLIGRAM(S): 10 TABLET ORAL at 05:21

## 2024-09-16 RX ADMIN — Medication 20 MILLIGRAM(S): at 23:22

## 2024-09-16 RX ADMIN — Medication 40 MILLIGRAM(S): at 17:40

## 2024-09-16 RX ADMIN — SODIUM CHLORIDE 100 MILLILITER(S): 9 INJECTION INTRAMUSCULAR; INTRAVENOUS; SUBCUTANEOUS at 04:22

## 2024-09-16 RX ADMIN — FLUTICASONE PROPIONATE 1 SPRAY(S): 50 SPRAY, METERED NASAL at 05:23

## 2024-09-16 RX ADMIN — Medication 40 MILLIGRAM(S): at 01:13

## 2024-09-16 RX ADMIN — Medication 10 UNIT(S): at 13:36

## 2024-09-16 RX ADMIN — Medication 2: at 13:36

## 2024-09-16 RX ADMIN — Medication 40 MILLIGRAM(S): at 13:52

## 2024-09-16 RX ADMIN — INSULIN GLARGINE 30 UNIT(S): 100 INJECTION, SOLUTION SUBCUTANEOUS at 22:00

## 2024-09-16 RX ADMIN — IPRATROPIUM BROMIDE AND ALBUTEROL SULFATE 3 MILLILITER(S): .5; 3 SOLUTION RESPIRATORY (INHALATION) at 09:53

## 2024-09-16 RX ADMIN — LOSARTAN POTASSIUM 100 MILLIGRAM(S): 50 TABLET ORAL at 05:22

## 2024-09-16 RX ADMIN — SODIUM CHLORIDE 100 MILLILITER(S): 9 INJECTION INTRAMUSCULAR; INTRAVENOUS; SUBCUTANEOUS at 17:46

## 2024-09-16 RX ADMIN — IPRATROPIUM BROMIDE AND ALBUTEROL SULFATE 3 MILLILITER(S): .5; 3 SOLUTION RESPIRATORY (INHALATION) at 01:13

## 2024-09-16 RX ADMIN — IPRATROPIUM BROMIDE AND ALBUTEROL SULFATE 3 MILLILITER(S): .5; 3 SOLUTION RESPIRATORY (INHALATION) at 13:38

## 2024-09-16 RX ADMIN — MONTELUKAST SODIUM 10 MILLIGRAM(S): 5 TABLET, CHEWABLE ORAL at 13:37

## 2024-09-16 RX ADMIN — Medication 22 UNIT(S): at 16:54

## 2024-09-16 RX ADMIN — FLUTICASONE PROPIONATE AND SALMETEROL 1 DOSE(S): 250; 50 POWDER RESPIRATORY (INHALATION) at 17:41

## 2024-09-16 RX ADMIN — FLUTICASONE PROPIONATE AND SALMETEROL 1 DOSE(S): 250; 50 POWDER RESPIRATORY (INHALATION) at 05:24

## 2024-09-16 NOTE — PROGRESS NOTE ADULT - SUBJECTIVE AND OBJECTIVE BOX
[facility] Progress Note    MAR, chart notes, fingersticks and labs reviewed    Subjective:    Objective  Vital Signs  T(C): 37 (09-16-24 @ 13:53), Max: 37 (09-16-24 @ 13:53)  HR: 80 (09-16-24 @ 13:53) (80 - 91)  BP: 150/75 (09-16-24 @ 13:53) (103/71 - 156/89)  RR: 18 (09-16-24 @ 13:53) (18 - 18)  SpO2: 98% (09-16-24 @ 13:53) (97% - 98%)    Physical Exam  Gen: NAD, alert, awake  HEENT: NC/AT, EOMI  Neck: supple  Chest: breathing comfortably  Heart: +s1 +s2    Medications  acetaminophen     Tablet .. 650 milliGRAM(s) Oral every 6 hours PRN  albuterol    90 MICROgram(s) HFA Inhaler 2 Puff(s) Inhalation every 2 hours PRN  albuterol/ipratropium for Nebulization 3 milliLiter(s) Nebulizer every 4 hours  aluminum hydroxide/magnesium hydroxide/simethicone Suspension 30 milliLiter(s) Oral every 4 hours PRN  amLODIPine   Tablet 5 milliGRAM(s) Oral daily  atorvastatin 20 milliGRAM(s) Oral at bedtime  dextrose 5%. 1000 milliLiter(s) IV Continuous <Continuous>  dextrose 5%. 1000 milliLiter(s) IV Continuous <Continuous>  dextrose 50% Injectable 12.5 Gram(s) IV Push once  dextrose 50% Injectable 25 Gram(s) IV Push once  dextrose 50% Injectable 25 Gram(s) IV Push once  dextrose Oral Gel 15 Gram(s) Oral once PRN  fluticasone propionate 50 MICROgram(s)/spray Nasal Spray 1 Spray(s) Both Nostrils two times a day  fluticasone propionate/ salmeterol 250-50 MICROgram(s) Diskus 1 Dose(s) Inhalation two times a day  glucagon  Injectable 1 milliGRAM(s) IntraMuscular once  insulin glargine Injectable (LANTUS) 30 Unit(s) SubCutaneous at bedtime  insulin lispro (ADMELOG) corrective regimen sliding scale   SubCutaneous three times a day before meals  insulin lispro (ADMELOG) corrective regimen sliding scale   SubCutaneous at bedtime  insulin lispro Injectable (ADMELOG) 10 Unit(s) SubCutaneous before lunch  insulin lispro Injectable (ADMELOG) 10 Unit(s) SubCutaneous before breakfast  insulin lispro Injectable (ADMELOG) 22 Unit(s) SubCutaneous before dinner  losartan 100 milliGRAM(s) Oral daily  melatonin 3 milliGRAM(s) Oral at bedtime PRN  methylPREDNISolone sodium succinate Injectable 40 milliGRAM(s) IV Push every 6 hours  montelukast 10 milliGRAM(s) Oral daily  ondansetron Injectable 4 milliGRAM(s) IV Push every 8 hours PRN  oxymetazoline 0.05% Nasal Spray 1 Spray(s) Both Nostrils two times a day  sodium chloride 0.9%. 1000 milliLiter(s) IV Continuous <Continuous>  thiamine 100 milliGRAM(s) Oral daily  tiotropium 2.5 MICROgram(s) Inhaler 2 Puff(s) Inhalation daily    Pertinent labs/Imaging  POCT Blood Glucose.: 155 mg/dL (09-16-24 @ 13:33)  POCT Blood Glucose.: 211 mg/dL (09-16-24 @ 12:10)  POCT Blood Glucose.: 237 mg/dL (09-16-24 @ 08:02)  POCT Blood Glucose.: 283 mg/dL (09-15-24 @ 21:22)  POCT Blood Glucose.: 248 mg/dL (09-15-24 @ 16:40)    eGFR: 99 mL/min/1.73m2 (09-16-24 @ 06:55)  eGFR: 89 mL/min/1.73m2 (09-15-24 @ 06:56)       [facility] Progress Note    MAR, chart notes, fingersticks and labs reviewed    Subjective: still wheezing, doesn't feel much better, low appetite    Objective  Vital Signs  T(C): 37 (09-16-24 @ 13:53), Max: 37 (09-16-24 @ 13:53)  HR: 80 (09-16-24 @ 13:53) (80 - 91)  BP: 150/75 (09-16-24 @ 13:53) (103/71 - 156/89)  RR: 18 (09-16-24 @ 13:53) (18 - 18)  SpO2: 98% (09-16-24 @ 13:53) (97% - 98%)    Physical Exam  Gen: NAD, alert, awake  HEENT: NC/AT, EOMI  Neck: supple  Chest: breathing comfortably  Heart: +s1 +s2    Medications  acetaminophen     Tablet .. 650 milliGRAM(s) Oral every 6 hours PRN  albuterol    90 MICROgram(s) HFA Inhaler 2 Puff(s) Inhalation every 2 hours PRN  albuterol/ipratropium for Nebulization 3 milliLiter(s) Nebulizer every 4 hours  aluminum hydroxide/magnesium hydroxide/simethicone Suspension 30 milliLiter(s) Oral every 4 hours PRN  amLODIPine   Tablet 5 milliGRAM(s) Oral daily  atorvastatin 20 milliGRAM(s) Oral at bedtime  dextrose 5%. 1000 milliLiter(s) IV Continuous <Continuous>  dextrose 5%. 1000 milliLiter(s) IV Continuous <Continuous>  dextrose 50% Injectable 12.5 Gram(s) IV Push once  dextrose 50% Injectable 25 Gram(s) IV Push once  dextrose 50% Injectable 25 Gram(s) IV Push once  dextrose Oral Gel 15 Gram(s) Oral once PRN  fluticasone propionate 50 MICROgram(s)/spray Nasal Spray 1 Spray(s) Both Nostrils two times a day  fluticasone propionate/ salmeterol 250-50 MICROgram(s) Diskus 1 Dose(s) Inhalation two times a day  glucagon  Injectable 1 milliGRAM(s) IntraMuscular once  insulin glargine Injectable (LANTUS) 30 Unit(s) SubCutaneous at bedtime  insulin lispro (ADMELOG) corrective regimen sliding scale   SubCutaneous three times a day before meals  insulin lispro (ADMELOG) corrective regimen sliding scale   SubCutaneous at bedtime  insulin lispro Injectable (ADMELOG) 10 Unit(s) SubCutaneous before lunch  insulin lispro Injectable (ADMELOG) 10 Unit(s) SubCutaneous before breakfast  insulin lispro Injectable (ADMELOG) 22 Unit(s) SubCutaneous before dinner  losartan 100 milliGRAM(s) Oral daily  melatonin 3 milliGRAM(s) Oral at bedtime PRN  methylPREDNISolone sodium succinate Injectable 40 milliGRAM(s) IV Push every 6 hours  montelukast 10 milliGRAM(s) Oral daily  ondansetron Injectable 4 milliGRAM(s) IV Push every 8 hours PRN  oxymetazoline 0.05% Nasal Spray 1 Spray(s) Both Nostrils two times a day  sodium chloride 0.9%. 1000 milliLiter(s) IV Continuous <Continuous>  thiamine 100 milliGRAM(s) Oral daily  tiotropium 2.5 MICROgram(s) Inhaler 2 Puff(s) Inhalation daily    Pertinent labs/Imaging  POCT Blood Glucose.: 155 mg/dL (09-16-24 @ 13:33)  POCT Blood Glucose.: 211 mg/dL (09-16-24 @ 12:10)  POCT Blood Glucose.: 237 mg/dL (09-16-24 @ 08:02)  POCT Blood Glucose.: 283 mg/dL (09-15-24 @ 21:22)  POCT Blood Glucose.: 248 mg/dL (09-15-24 @ 16:40)    eGFR: 99 mL/min/1.73m2 (09-16-24 @ 06:55)  eGFR: 89 mL/min/1.73m2 (09-15-24 @ 06:56)

## 2024-09-16 NOTE — PROGRESS NOTE ADULT - SUBJECTIVE AND OBJECTIVE BOX
[facility]   [phone]  RENAL FOLLOW UP NOTE  --------------------------------------------------------------------------------  Chief Complaint:    24 hour events/subjective:    patient seen and examined.   +wheezing    PAST HISTORY  --------------------------------------------------------------------------------  No significant changes to PMH, PSH, FHx, SHx, unless otherwise noted    ALLERGIES & MEDICATIONS  --------------------------------------------------------------------------------  Allergies    NSAIDs (Unknown)  aspirin (Hives)  AValox (Unknown)    Intolerances      Standing Inpatient Medications  albuterol/ipratropium for Nebulization 3 milliLiter(s) Nebulizer every 4 hours  amLODIPine   Tablet 5 milliGRAM(s) Oral daily  atorvastatin 20 milliGRAM(s) Oral at bedtime  dextrose 5%. 1000 milliLiter(s) IV Continuous <Continuous>  dextrose 5%. 1000 milliLiter(s) IV Continuous <Continuous>  dextrose 50% Injectable 25 Gram(s) IV Push once  dextrose 50% Injectable 12.5 Gram(s) IV Push once  dextrose 50% Injectable 25 Gram(s) IV Push once  fluticasone propionate 50 MICROgram(s)/spray Nasal Spray 1 Spray(s) Both Nostrils two times a day  fluticasone propionate/ salmeterol 250-50 MICROgram(s) Diskus 1 Dose(s) Inhalation two times a day  glucagon  Injectable 1 milliGRAM(s) IntraMuscular once  insulin glargine Injectable (LANTUS) 22 Unit(s) SubCutaneous at bedtime  insulin lispro (ADMELOG) corrective regimen sliding scale   SubCutaneous three times a day before meals  insulin lispro (ADMELOG) corrective regimen sliding scale   SubCutaneous at bedtime  insulin lispro Injectable (ADMELOG) 10 Unit(s) SubCutaneous before breakfast  insulin lispro Injectable (ADMELOG) 10 Unit(s) SubCutaneous before lunch  insulin lispro Injectable (ADMELOG) 16 Unit(s) SubCutaneous before dinner  losartan 100 milliGRAM(s) Oral daily  methylPREDNISolone sodium succinate Injectable 40 milliGRAM(s) IV Push every 6 hours  montelukast 10 milliGRAM(s) Oral daily  oxymetazoline 0.05% Nasal Spray 1 Spray(s) Both Nostrils two times a day  sodium chloride 0.9%. 1000 milliLiter(s) IV Continuous <Continuous>  thiamine 100 milliGRAM(s) Oral daily  tiotropium 2.5 MICROgram(s) Inhaler 2 Puff(s) Inhalation daily    PRN Inpatient Medications  acetaminophen     Tablet .. 650 milliGRAM(s) Oral every 6 hours PRN  albuterol    90 MICROgram(s) HFA Inhaler 2 Puff(s) Inhalation every 2 hours PRN  aluminum hydroxide/magnesium hydroxide/simethicone Suspension 30 milliLiter(s) Oral every 4 hours PRN  dextrose Oral Gel 15 Gram(s) Oral once PRN  melatonin 3 milliGRAM(s) Oral at bedtime PRN  ondansetron Injectable 4 milliGRAM(s) IV Push every 8 hours PRN      REVIEW OF SYSTEMS  --------------------------------------------------------------------------------    Gen: denies fevers/chills,  CVS: denies chest pain/palpitations  Resp: +SOB/Cough+  GI: Denies N/V/Abd pain  : Denies dysuria/oliguria/hematuria    VITALS/PHYSICAL EXAM  --------------------------------------------------------------------------------  T(C): 37 (09-16-24 @ 13:53), Max: 37 (09-16-24 @ 13:53)  HR: 80 (09-16-24 @ 13:53) (80 - 91)  BP: 150/75 (09-16-24 @ 13:53) (103/71 - 156/89)  RR: 18 (09-16-24 @ 13:53) (18 - 18)  SpO2: 98% (09-16-24 @ 13:53) (97% - 98%)  Wt(kg): --        Physical Exam:                Gen: on O2  	Pulm: decrease bs  no rales or ronchi +  wheezing  	CV: no JVD. RRR, S1S2; no rub  	Abd: +BS, soft, nontender/nondistended  	: No suprapubic tenderness  	UE: Warm, no cyanosis  no clubbing,  LUE ? edema   	LE: Warm, no cyanosis  no clubbing, no edema  	Neuro: alert and oriented. speech coherent     LABS/STUDIES  --------------------------------------------------------------------------------              11.1   14.20 >-----------<  288      [09-15-24 @ 06:58]              33.9     139  |  103  |  16  ----------------------------<  223      [09-16-24 @ 06:55]  3.7   |  21  |  0.67        Ca     9.3     [09-16-24 @ 06:55]      Mg     2.2     [09-15-24 @ 06:56]      Phos  3.4     [09-15-24 @ 06:56]    TPro  7.0  /  Alb  3.9  /  TBili  0.2  /  DBili  x   /  AST  20  /  ALT  21  /  AlkPhos  98  [09-15-24 @ 06:56]          Creatinine Trend:  SCr 0.67 [09-16 @ 06:55]  SCr 0.76 [09-15 @ 06:56]  SCr 0.81 [09-14 @ 08:59]  SCr 0.77 [09-13 @ 14:44]  SCr 0.80 [09-13 @ 06:17]

## 2024-09-16 NOTE — PROGRESS NOTE ADULT - ASSESSMENT
64F PMHx asthma, HTN/HLD, DM. hx breast ca and colon ca, Presenting w/ 3d of SOB and cough, rx'd prednisone w/o improvement outpatient. Pt also been noticing increase in FS glucose while on steroid.   on admission w/ initial lactate 6.5 -> 4.6 after 1L IVF.  pt repeat lactic acid on 9/13 6.2.. of note, outpt has been on metformin as well   covid/flu/rsv neg. CXR showed clear lungs. Pt received solumedrol 125mg, Mg 2g, and duoneb x3.   pt receiving beta agonists as well         1- lactic acidosis /high AGAP acidosis   2- HTN   3- hyperglycemia/DM  4- wheezing /sob   5- asthma exacerbation     lactate still elevated  continue NS @100 ml/hr  U/A unremarkable  blood cx pending  thiamine level pending  thiamine 100 mg daily  amlodipine 5 mg daily  losartan 100 mg daily  trend bp  steroids as per pulm

## 2024-09-16 NOTE — PROGRESS NOTE ADULT - ASSESSMENT
64F PMHx asthma, HTN/HLD, DM. Presenting w/ 3d of SOB and cough, rx'd prednisone w/o improvement outpatient. Pt also been noticing increase in FS glucose while on steroid. In the ED, pt afebrile, bp stable, on RA saturating 99%, tachypneic to 22. Pt was audibly wheezing.   CBC w/ wbc 12.6, hgb 11.8, plt 289. CMP w/ SCr 0.79, bicarb 18, AG 18, glucose 346. VBG w/ initial lactate 6.5 -> 4.6 after 1L IVF. covid/flu/rsv neg. CXR showed clear lungs. Pt received solumedrol 125mg, Mg 2g, and duoneb x3. On interview/exam, pt still wheezing. Appears audible wheezing w/o stethoscope is more upper airway but on lung auscultation, with exp wheezing as well.           Asthma exacerbation:   -she has a known diagnosis of asthma and has been  on Dupixent biweekly as an outpt:  -came in here with asthma exacerbation, possibly 2nd to viral infection  -ENT recs appreciated, no VC dysfunction noted  -Still with significant wheezing, continue current dose of solumedrol  -Change Advair to 250/50 1 puff BID  -Change Duoneb to q4h  -Continue Singulair   -Normoxic, keep sats >90% with o2 PRN     9/15:   -she has been wheezing a lot:  cant decrease steroids:   -cont BD and cont iv steroids:   -cont singulair    9/16:  -she is still wheezing considerably:  -needs high dose steroids still:   -cont bd and singulair and nebs;   -check p eak flow q shift    Uncontrolled DM:   -secondary to stress and steroids   -monitor and control     9/15; cont current meds:   -cont to control on high dose glucose    9/16:  -pt still needs high dosages of steroids :  -cont to control blood glucose on high steroids     HTN:   -controlled:     dvt prophylaxis    dw acp

## 2024-09-16 NOTE — PROGRESS NOTE ADULT - SUBJECTIVE AND OBJECTIVE BOX
DATE OF SERVICE: 09-16-24 @ 12:48    Patient is a 64y old  Female who presents with a chief complaint of Asthma exacerbation (14 Sep 2024 16:05)      INTERVAL HISTORY: No acute events overnight    REVIEW OF SYSTEMS:  CONSTITUTIONAL: No weakness  EYES/ENT: No visual changes;  No throat pain   NECK: No pain or stiffness  RESPIRATORY: No cough, wheezing; No shortness of breath  CARDIOVASCULAR: No chest pain or palpitations  GASTROINTESTINAL: No abdominal  pain. No nausea, vomiting, or hematemesis  GENITOURINARY: No dysuria, frequency or hematuria  NEUROLOGICAL: No stroke like symptoms  SKIN: No rashes      	  MEDICATIONS:  amLODIPine   Tablet 5 milliGRAM(s) Oral daily  losartan 100 milliGRAM(s) Oral daily        PHYSICAL EXAM:  T(C): 36.4 (09-16-24 @ 04:43), Max: 36.8 (09-15-24 @ 19:41)  HR: 84 (09-16-24 @ 04:43) (84 - 91)  BP: 156/89 (09-16-24 @ 04:43) (103/71 - 156/89)  RR: 18 (09-16-24 @ 04:43) (18 - 18)  SpO2: 98% (09-16-24 @ 04:43) (97% - 98%)  Wt(kg): --  I&O's Summary        Appearance: In no distress	  HEENT:    PERRL, EOMI	  Cardiovascular:  S1 S2, No JVD  Respiratory: Lungs clear to auscultation	  Gastrointestinal:  Soft, Non-tender, + BS	  Vascularature:  No edema of LE  Psychiatric: Appropriate affect   Neuro: no acute focal deficits                               11.1   14.20 )-----------( 288      ( 15 Sep 2024 06:58 )             33.9     09-16    139  |  103  |  16  ----------------------------<  223<H>  3.7   |  21<L>  |  0.67    Ca    9.3      16 Sep 2024 06:55  Phos  3.4     09-15  Mg     2.2     09-15    TPro  7.0  /  Alb  3.9  /  TBili  0.2  /  DBili  x   /  AST  20  /  ALT  21  /  AlkPhos  98  09-15        Labs personally reviewed      ASSESSMENT/PLAN: 	  64F PMHx asthma, HTN/HLD, DM. Presenting w/ 3d of SOB and cough, rx'd prednisone w/o improvement outpatient. Pt also been noticing increase in FS glucose while on steroid.  In the ED, pt afebrile, bp stable, on RA saturating 99%, tachypneic to 22. Pt was audibly wheezing.     1. Atypical CP   - non exertional, cp secondary to asthma exacerbation  - Pt describes pain is typical of her asthma  - Trop neg x1, awaiting 2nd   - ECG - SR with PAC, recommend repeat   - Check TTE pending    2. Asthma Exacerbation  - audibly wheezing  - Still with significant wheezing, continue current dose of solumedrol  -Normoxic, keep sats >90% with o2 PRN   -pulm following    3. HTN  controlled   c/w amLODIPine   Tablet 5 milliGRAM(s) Oral daily   c/w losartan 100 milliGRAM(s) Oral daily    4. HLD   -check LDL   c/w atorvastatin 20 milliGRAM(s) Oral at bedtime    5. Uncontrolled DM  - secondary to stress and steroids, monitor and control   - check A1c     6. DVT prophylactic  - SCD's             DANIELLE Acevedo DO [facility]  Cardiovascular Medicine  [address], [address]  Available via call or text on [employer]  Office: [phone]

## 2024-09-16 NOTE — PROGRESS NOTE ADULT - SUBJECTIVE AND OBJECTIVE BOX
Date of Service: 09-16-24 @ 12:37    Patient is a 64y old  Female who presents with a chief complaint of Asthma exacerbation (14 Sep 2024 16:05)      Any change in ROS: she is wheezing+  -but less cough :"     MEDICATIONS  (STANDING):  albuterol/ipratropium for Nebulization 3 milliLiter(s) Nebulizer every 4 hours  amLODIPine   Tablet 5 milliGRAM(s) Oral daily  atorvastatin 20 milliGRAM(s) Oral at bedtime  dextrose 5%. 1000 milliLiter(s) (100 mL/Hr) IV Continuous <Continuous>  dextrose 5%. 1000 milliLiter(s) (50 mL/Hr) IV Continuous <Continuous>  dextrose 50% Injectable 12.5 Gram(s) IV Push once  dextrose 50% Injectable 25 Gram(s) IV Push once  dextrose 50% Injectable 25 Gram(s) IV Push once  fluticasone propionate 50 MICROgram(s)/spray Nasal Spray 1 Spray(s) Both Nostrils two times a day  fluticasone propionate/ salmeterol 250-50 MICROgram(s) Diskus 1 Dose(s) Inhalation two times a day  glucagon  Injectable 1 milliGRAM(s) IntraMuscular once  insulin glargine Injectable (LANTUS) 22 Unit(s) SubCutaneous at bedtime  insulin lispro (ADMELOG) corrective regimen sliding scale   SubCutaneous three times a day before meals  insulin lispro (ADMELOG) corrective regimen sliding scale   SubCutaneous at bedtime  insulin lispro Injectable (ADMELOG) 10 Unit(s) SubCutaneous before lunch  insulin lispro Injectable (ADMELOG) 16 Unit(s) SubCutaneous before dinner  insulin lispro Injectable (ADMELOG) 10 Unit(s) SubCutaneous before breakfast  losartan 100 milliGRAM(s) Oral daily  methylPREDNISolone sodium succinate Injectable 40 milliGRAM(s) IV Push every 6 hours  montelukast 10 milliGRAM(s) Oral daily  oxymetazoline 0.05% Nasal Spray 1 Spray(s) Both Nostrils two times a day  sodium chloride 0.9%. 1000 milliLiter(s) (100 mL/Hr) IV Continuous <Continuous>  thiamine 100 milliGRAM(s) Oral daily  tiotropium 2.5 MICROgram(s) Inhaler 2 Puff(s) Inhalation daily    MEDICATIONS  (PRN):  acetaminophen     Tablet .. 650 milliGRAM(s) Oral every 6 hours PRN Temp greater or equal to 38C (100.4F), Mild Pain (1 - 3)  albuterol    90 MICROgram(s) HFA Inhaler 2 Puff(s) Inhalation every 2 hours PRN Shortness of Breath and/or Wheezing  aluminum hydroxide/magnesium hydroxide/simethicone Suspension 30 milliLiter(s) Oral every 4 hours PRN Dyspepsia  dextrose Oral Gel 15 Gram(s) Oral once PRN Blood Glucose LESS THAN 70 milliGRAM(s)/deciliter  melatonin 3 milliGRAM(s) Oral at bedtime PRN Insomnia  ondansetron Injectable 4 milliGRAM(s) IV Push every 8 hours PRN Nausea and/or Vomiting    Vital Signs Last 24 Hrs  T(C): 36.4 (16 Sep 2024 04:43), Max: 36.8 (15 Sep 2024 19:41)  T(F): 97.5 (16 Sep 2024 04:43), Max: 98.2 (15 Sep 2024 19:41)  HR: 84 (16 Sep 2024 04:43) (84 - 91)  BP: 156/89 (16 Sep 2024 04:43) (103/71 - 156/89)  BP(mean): --  RR: 18 (16 Sep 2024 04:43) (18 - 18)  SpO2: 98% (16 Sep 2024 04:43) (97% - 98%)    Parameters below as of 16 Sep 2024 04:43  Patient On (Oxygen Delivery Method): room air        I&O's Summary        Physical Exam:   GENERAL: NAD, well-groomed, well-developed  HEENT: SAQIB/   Atraumatic, Normocephalic  ENMT: No tonsillar erythema, exudates, or enlargement; Moist mucous membranes, Good dentition, No lesions  NECK: Supple, No JVD, Normal thyroid  CHEST/LUNG: wheezing++  CVS: Regular rate and rhythm; No murmurs, rubs, or gallops  GI: : Soft, Nontender, Nondistended; Bowel sounds present  NERVOUS SYSTEM:  Alert & Oriented X3  EXTREMITIES: - edema  LYMPH: No lymphadenopathy noted  SKIN: No rashes or lesions  ENDOCRINOLOGY: No Thyromegaly  PSYCH: Appropriate    Labs:  23, 25, 22                            11.1   14.20 )-----------( 288      ( 15 Sep 2024 06:58 )             33.9                         9.1    11.31 )-----------( 229      ( 14 Sep 2024 06:26 )             28.0                         11.8   12.60 )-----------( 289      ( 12 Sep 2024 18:49 )             36.6     09-16    139  |  103  |  16  ----------------------------<  223<H>  3.7   |  21<L>  |  0.67  09-15    139  |  102  |  20  ----------------------------<  283<H>  4.0   |  20<L>  |  0.76  09-14    141  |  104  |  22  ----------------------------<  232<H>  3.7   |  22  |  0.81  09-13    138  |  101  |  21  ----------------------------<  243<H>  4.1   |  21<L>  |  0.77  09-13    138  |  100  |  24<H>  ----------------------------<  351<H>  5.9<H>   |  18<L>  |  0.80  09-12    136  |  100  |  26<H>  ----------------------------<  346<H>  4.8   |  18<L>  |  0.79    Ca    9.3      16 Sep 2024 06:55  Ca    9.5      15 Sep 2024 06:56  Phos  3.4     09-15  Mg     2.2     09-15    TPro  7.0  /  Alb  3.9  /  TBili  0.2  /  DBili  x   /  AST  20  /  ALT  21  /  AlkPhos  98  09-15  TPro  7.4  /  Alb  4.2  /  TBili  0.1<L>  /  DBili  x   /  AST  10  /  ALT  18  /  AlkPhos  105  09-14  TPro  8.0  /  Alb  4.4  /  TBili  0.1<L>  /  DBili  x   /  AST  17  /  ALT  22  /  AlkPhos  110  09-12    CAPILLARY BLOOD GLUCOSE      POCT Blood Glucose.: 211 mg/dL (16 Sep 2024 12:10)  POCT Blood Glucose.: 237 mg/dL (16 Sep 2024 08:02)  POCT Blood Glucose.: 283 mg/dL (15 Sep 2024 21:22)  POCT Blood Glucose.: 248 mg/dL (15 Sep 2024 16:40)      LIVER FUNCTIONS - ( 15 Sep 2024 06:56 )  Alb: 3.9 g/dL / Pro: 7.0 g/dL / ALK PHOS: 98 U/L / ALT: 21 U/L / AST: 20 U/L / GGT: x             Urinalysis Basic - ( 16 Sep 2024 06:55 )    Color: x / Appearance: x / SG: x / pH: x  Gluc: 223 mg/dL / Ketone: x  / Bili: x / Urobili: x   Blood: x / Protein: x / Nitrite: x   Leuk Esterase: x / RBC: x / WBC x   Sq Epi: x / Non Sq Epi: x / Bacteria: x      Procalcitonin: 0.04 ng/mL (09-13 @ 06:17)  Lactate, Blood: 4.9 mmol/L (09-16 @ 06:55)  Lactate, Blood: 5.1 mmol/L (09-15 @ 23:43)  Lactate, Blood: 5.1 mmol/L (09-15 @ 06:56)  Lactate, Blood: 4.5 mmol/L (09-14 @ 08:59)  Lactate, Blood: 4.4 mmol/L (09-13 @ 14:44)  Lactate, Blood: 6.2 mmol/L (09-13 @ 06:16)        RECENT CULTURES:        RESPIRATORY CULTURES:        rad< from: Xray Chest 2 Views PA/Lat (09.12.24 @ 19:07) >    ACC: [id number] EXAM:  XR CHEST PA LAT 2V   ORDERED BY: MAREN GORDON     PROCEDURE DATE:  09/12/2024          INTERPRETATION:  EXAMINATION: XR CHEST PA AND LATERAL    CLINICAL INDICATION: Shortness of breath and cough for past 4 days.    TECHNIQUE: 2 views; Frontal and lateral views of the chest were obtained.    COMPARISON: X-ray chest 10/30/2023.    FINDINGS:    The heart is normal in size.  The lungs are clear.  There is no pneumothorax or pleural effusion.    IMPRESSION:  Clear lungs.    --- End of Report ---          KORINA ROLDAN MD; Resident Radiologist  This document has been electronically signed.  ADRIANNA RICARDO MD; Attending Radiologist  This document has been electronically signed. Sep 13 2024  8:22AM    < end of copied text >    Studies  Chest X-RAY  CT SCAN Chest   Venous Dopplers: LE:   CT Abdomen  Others      < from: Xray Chest 2 Views PA/Lat (09.12.24 @ 19:07) >    ACC: [id number] EXAM:  XR CHEST PA LAT 2V   ORDERED BY: MAREN GORDON     PROCEDURE DATE:  09/12/2024          INTERPRETATION:  EXAMINATION: XR CHEST PA AND LATERAL    CLINICAL INDICATION: Shortness of breath and cough for past 4 days.    TECHNIQUE: 2 views; Frontal and lateral views of the chest were obtained.    COMPARISON: X-ray chest 10/30/2023.    FINDINGS:    The heart is normal in size.  The lungs are clear.  There is no pneumothorax or pleural effusion.    IMPRESSION:  Clear lungs.    --- End of Report ---          KORINA ROLDAN MD; Resident Radiologist  This document has been electronically signed.  ADRIANNA RICARDO MD; Attending Radiologist  This document has been electronically signed. Sep 13 2024  8:22AM    < end of copied text >

## 2024-09-16 NOTE — PROGRESS NOTE ADULT - ASSESSMENT
64F admitted for asthma exacerbation.     Plan:    # Acute asthma exacerbation:  - CXR w/ clear lungs  - C/w nebs/ inhalers/ Singulair   - C/w steroids  - Serial ABG's  - Maintain Fio2>90%  - Seen by ICU 9/13-> pt does not require ICU level of care  - Seen by ENT 9/13-> afrin to b/l nare bid x3days then d/c, flonase to b/l nare bid i1irfck. Pt is to follow up at ENT in 2 weeks with Nikita Badillo, Serafin Dumont. Call [phone] to make appointment.  - Pulm following    # lactic acidosis:  - C/w IVF's  - Hold hctz  - Trend lactate/ CMP  - Renal following    # Atypical chest pain:  - Likely  secondary to asthma exacerbation  - trops negative.  - Echo pending  - Cards following    # SIRS:  - Monitor off abx  - ID following    # Dm2/ Hyperglycemia:  - HgbA1c  - Continue to monitor blood glucose levels  - Sliding Scale  - Endo following    # HTN/ HLD:  - C/w CV meds    # GI:  - Bowel regimen prn    # DVT ppx:  - IPC's    [facility]  [phone]

## 2024-09-16 NOTE — PROGRESS NOTE ADULT - SUBJECTIVE AND OBJECTIVE BOX
SUBJECTIVE / OVERNIGHT EVENTS:    Patient seen and examined at bedside. No events noted overnight. Resting in bed, Still wheezing        --------------------------------------------------------------------------------------------  LABS:                        11.1   14.20 )-----------( 288      ( 15 Sep 2024 06:58 )             33.9     09-16    139  |  103  |  16  ----------------------------<  223<H>  3.7   |  21<L>  |  0.67    Ca    9.3      16 Sep 2024 06:55  Phos  3.4     09-15  Mg     2.2     09-15    TPro  7.0  /  Alb  3.9  /  TBili  0.2  /  DBili  x   /  AST  20  /  ALT  21  /  AlkPhos  98  09-15      CAPILLARY BLOOD GLUCOSE      POCT Blood Glucose.: 237 mg/dL (16 Sep 2024 08:02)  POCT Blood Glucose.: 283 mg/dL (15 Sep 2024 21:22)  POCT Blood Glucose.: 248 mg/dL (15 Sep 2024 16:40)  POCT Blood Glucose.: 203 mg/dL (15 Sep 2024 11:30)        Urinalysis Basic - ( 16 Sep 2024 06:55 )    Color: x / Appearance: x / SG: x / pH: x  Gluc: 223 mg/dL / Ketone: x  / Bili: x / Urobili: x   Blood: x / Protein: x / Nitrite: x   Leuk Esterase: x / RBC: x / WBC x   Sq Epi: x / Non Sq Epi: x / Bacteria: x        RADIOLOGY & ADDITIONAL TESTS:    Imaging Personally Reviewed:  [x] YES  [ ] NO    Consultant(s) Notes Reviewed:  [x] YES  [ ] NO    MEDICATIONS  (STANDING):  albuterol/ipratropium for Nebulization 3 milliLiter(s) Nebulizer every 4 hours  amLODIPine   Tablet 5 milliGRAM(s) Oral daily  atorvastatin 20 milliGRAM(s) Oral at bedtime  dextrose 5%. 1000 milliLiter(s) (100 mL/Hr) IV Continuous <Continuous>  dextrose 5%. 1000 milliLiter(s) (50 mL/Hr) IV Continuous <Continuous>  dextrose 50% Injectable 12.5 Gram(s) IV Push once  dextrose 50% Injectable 25 Gram(s) IV Push once  dextrose 50% Injectable 25 Gram(s) IV Push once  fluticasone propionate 50 MICROgram(s)/spray Nasal Spray 1 Spray(s) Both Nostrils two times a day  fluticasone propionate/ salmeterol 250-50 MICROgram(s) Diskus 1 Dose(s) Inhalation two times a day  glucagon  Injectable 1 milliGRAM(s) IntraMuscular once  insulin glargine Injectable (LANTUS) 22 Unit(s) SubCutaneous at bedtime  insulin lispro (ADMELOG) corrective regimen sliding scale   SubCutaneous three times a day before meals  insulin lispro (ADMELOG) corrective regimen sliding scale   SubCutaneous at bedtime  insulin lispro Injectable (ADMELOG) 16 Unit(s) SubCutaneous before dinner  insulin lispro Injectable (ADMELOG) 10 Unit(s) SubCutaneous before breakfast  insulin lispro Injectable (ADMELOG) 10 Unit(s) SubCutaneous before lunch  losartan 100 milliGRAM(s) Oral daily  methylPREDNISolone sodium succinate Injectable 40 milliGRAM(s) IV Push every 6 hours  montelukast 10 milliGRAM(s) Oral daily  oxymetazoline 0.05% Nasal Spray 1 Spray(s) Both Nostrils two times a day  sodium chloride 0.9%. 1000 milliLiter(s) (100 mL/Hr) IV Continuous <Continuous>  thiamine 100 milliGRAM(s) Oral daily  tiotropium 2.5 MICROgram(s) Inhaler 2 Puff(s) Inhalation daily    MEDICATIONS  (PRN):  acetaminophen     Tablet .. 650 milliGRAM(s) Oral every 6 hours PRN Temp greater or equal to 38C (100.4F), Mild Pain (1 - 3)  albuterol    90 MICROgram(s) HFA Inhaler 2 Puff(s) Inhalation every 2 hours PRN Shortness of Breath and/or Wheezing  aluminum hydroxide/magnesium hydroxide/simethicone Suspension 30 milliLiter(s) Oral every 4 hours PRN Dyspepsia  dextrose Oral Gel 15 Gram(s) Oral once PRN Blood Glucose LESS THAN 70 milliGRAM(s)/deciliter  melatonin 3 milliGRAM(s) Oral at bedtime PRN Insomnia  ondansetron Injectable 4 milliGRAM(s) IV Push every 8 hours PRN Nausea and/or Vomiting      Care Discussed with Consultants/Other Providers [x] YES  [ ] NO    Vital Signs Last 24 Hrs  T(C): 36.4 (16 Sep 2024 04:43), Max: 36.8 (15 Sep 2024 19:41)  T(F): 97.5 (16 Sep 2024 04:43), Max: 98.2 (15 Sep 2024 19:41)  HR: 84 (16 Sep 2024 04:43) (84 - 91)  BP: 156/89 (16 Sep 2024 04:43) (103/71 - 156/89)  BP(mean): --  RR: 18 (16 Sep 2024 04:43) (18 - 18)  SpO2: 98% (16 Sep 2024 04:43) (97% - 98%)    Parameters below as of 16 Sep 2024 04:43  Patient On (Oxygen Delivery Method): room air      I&O's Summary      PHYSICAL EXAM:  GENERAL: NAD, well-developed, comfortable  HEAD:  Atraumatic, Normocephalic  EYES: EOMI, PERRLA, conjunctiva and sclera clear  NECK: Supple, No JVD  CHEST/LUNG: Diminished bilaterally;+wheeze  HEART: Regular rate and rhythm; No murmurs, rubs, or gallops  ABDOMEN: Soft, Nontender, Nondistended; Bowel sounds present  NEURO: AAOx3, no focal weakness, 5/5 b/l extremity strength, b/l knee no arthritis, no effusion   EXTREMITIES:  2+ Peripheral Pulses, No clubbing, cyanosis, or edema  SKIN: No rashes or lesions

## 2024-09-16 NOTE — PROGRESS NOTE ADULT - ASSESSMENT
64 y.o. female with history of T2DM , Asthma, HTN and HLD presents for asthma exacerbation.    1. T2DM with hyperglycemia  - Increase Lantus to 30 units at night  - Continue Admelog 10 units with breakfast, 10 units with lunch and increase to 22 units with dinner  - Continue moderate Admelog correctional scale before meals and bedtime  - Monitor FS before meals and bedtime  - Follow hospital hypoglycemic protocol    2. Asthma exacerbation  - currently on solumedrol 40 mg IV Q6H  - pulm following    Will continue to follow.     Zoe Hayward MD  [facility]- Division of Endocrinology    [address] [place] [address]    T [phone]  F [phone]

## 2024-09-16 NOTE — PROGRESS NOTE ADULT - SUBJECTIVE AND OBJECTIVE BOX
[facility] DIVISION OF INFECTIOUS DISEASES  JAMSHID Bridges Y. Patel, S. Shah, G. Husam  [phone]  ([phone] - weekdays after 5pm and weekends)    Name: ROGER PALUMBO  Age/Gender: 64y Female  MRN: [id number]    Interval History:  Patient seen and examined this morning.  Feels slightly better, still wheezing.   No new complaints noted.  Notes reviewed  No concerning overnight events  Afebrile     Allergies: NSAIDs (Unknown)  aspirin (Hives)  AValox (Unknown)      Objective:  Vitals:   T(F): 97.5 (09-16-24 @ 04:43), Max: 98.2 (09-15-24 @ 19:41)  HR: 84 (09-16-24 @ 04:43) (84 - 101)  BP: 156/89 (09-16-24 @ 04:43) (103/71 - 156/89)  RR: 18 (09-16-24 @ 04:43) (18 - 18)  SpO2: 98% (09-16-24 @ 04:43) (97% - 98%)  Physical Examination:  General: no acute distress, RA, nontoxic  HEENT: NC/AT, anicteric, EOMI  Respiratory: decreased breath sounds, wheezes  Cardiovascular: S1 S2 present, normal rate   Gastrointestinal: normal appearing, nondistended  Extremities: no edema, no cyanosis  Skin: no visible rash    Laboratory Studies:  CBC:                       11.1   14.20 )-----------( 288      ( 15 Sep 2024 06:58 )             33.9     WBC Trend:  14.20 09-15-24 @ 06:58  11.31 09-14-24 @ 06:26  12.60 09-12-24 @ 18:49    CMP: 09-16    139  |  103  |  16  ----------------------------<  223<H>  3.7   |  21<L>  |  0.67    Ca    9.3      16 Sep 2024 06:55  Phos  3.4     09-15  Mg     2.2     09-15    TPro  7.0  /  Alb  3.9  /  TBili  0.2  /  DBili  x   /  AST  20  /  ALT  21  /  AlkPhos  98  09-15    Creatinine: 0.67 mg/dL (09-16-24 @ 06:55)  Creatinine: 0.76 mg/dL (09-15-24 @ 06:56)  Creatinine: 0.81 mg/dL (09-14-24 @ 08:59)  Creatinine: 0.77 mg/dL (09-13-24 @ 14:44)  Creatinine: 0.80 mg/dL (09-13-24 @ 06:17)  Creatinine: 0.79 mg/dL (09-12-24 @ 18:49)    LIVER FUNCTIONS - ( 15 Sep 2024 06:56 )  Alb: 3.9 g/dL / Pro: 7.0 g/dL / ALK PHOS: 98 U/L / ALT: 21 U/L / AST: 20 U/L / GGT: x           Microbiology: reviewed   SARS-CoV-2 Result: NotDetec (12 Sep 2024 18:49)    Radiology: reviewed     Medications:  acetaminophen     Tablet .. 650 milliGRAM(s) Oral every 6 hours PRN  albuterol    90 MICROgram(s) HFA Inhaler 2 Puff(s) Inhalation every 2 hours PRN  albuterol/ipratropium for Nebulization 3 milliLiter(s) Nebulizer every 4 hours  aluminum hydroxide/magnesium hydroxide/simethicone Suspension 30 milliLiter(s) Oral every 4 hours PRN  amLODIPine   Tablet 5 milliGRAM(s) Oral daily  atorvastatin 20 milliGRAM(s) Oral at bedtime  dextrose 5%. 1000 milliLiter(s) IV Continuous <Continuous>  dextrose 5%. 1000 milliLiter(s) IV Continuous <Continuous>  dextrose 50% Injectable 12.5 Gram(s) IV Push once  dextrose 50% Injectable 25 Gram(s) IV Push once  dextrose 50% Injectable 25 Gram(s) IV Push once  dextrose Oral Gel 15 Gram(s) Oral once PRN  fluticasone propionate 50 MICROgram(s)/spray Nasal Spray 1 Spray(s) Both Nostrils two times a day  fluticasone propionate/ salmeterol 250-50 MICROgram(s) Diskus 1 Dose(s) Inhalation two times a day  glucagon  Injectable 1 milliGRAM(s) IntraMuscular once  insulin glargine Injectable (LANTUS) 22 Unit(s) SubCutaneous at bedtime  insulin lispro (ADMELOG) corrective regimen sliding scale   SubCutaneous three times a day before meals  insulin lispro (ADMELOG) corrective regimen sliding scale   SubCutaneous at bedtime  insulin lispro Injectable (ADMELOG) 10 Unit(s) SubCutaneous before lunch  insulin lispro Injectable (ADMELOG) 16 Unit(s) SubCutaneous before dinner  insulin lispro Injectable (ADMELOG) 10 Unit(s) SubCutaneous before breakfast  losartan 100 milliGRAM(s) Oral daily  melatonin 3 milliGRAM(s) Oral at bedtime PRN  methylPREDNISolone sodium succinate Injectable 40 milliGRAM(s) IV Push every 6 hours  montelukast 10 milliGRAM(s) Oral daily  ondansetron Injectable 4 milliGRAM(s) IV Push every 8 hours PRN  oxymetazoline 0.05% Nasal Spray 1 Spray(s) Both Nostrils two times a day  sodium chloride 0.9%. 1000 milliLiter(s) IV Continuous <Continuous>  thiamine 100 milliGRAM(s) Oral daily  tiotropium 2.5 MICROgram(s) Inhaler 2 Puff(s) Inhalation daily

## 2024-09-16 NOTE — PROVIDER CONTACT NOTE (CRITICAL VALUE NOTIFICATION) - ACTION/TREATMENT ORDERED:
GIOVANNI ochoa-travon twiggs made aware, GIOVANNI soni made aware. Sodium chloride fluids at 100ml/hr to  be continued as ordered GIOVANNI soni made aware. Sodium chloride 0.9 fluids at 100ml/hr to  be continued as ordered

## 2024-09-16 NOTE — PROGRESS NOTE ADULT - ASSESSMENT
Patient is a 64 year old female with PMH of asthma, HTN, HLD, DM who presents with dyspnea and cough for 3 days. Patient reports she first started to have dyspnea and then cough, she saw her Pulmonologist earlier this week and was given oral prednisone and nebulizer treatment but did not improved. States possible sick contact is her sister in law who lives with them, states she had some URI symptoms and recently started going to a new day program. States she continued to have wheezing, chest tightness and dyspnea and came to the ER.    Acute asthma exacerbation  Suspect possible viral URI  SIRS likely d/t above   Leukocytosis likely reactive iso steroids  - tachypnea and leukocytosis on admission, remains afebrile  - COVID/RSV/Flu negative  - CXR with no consolidation/infiltrate  - PCT negative   - UA negative for infection, no gu sx noted   - lactate noted, low suspicion for infection, Nephrology following   - remains afebrile, WBC trend noted, wheezing, on RA, nontoxic    Recommendations:   Follow Bcx - in process   Continue off antibiotics   Steroids and nebs per Pulmonary   Supportive care   Continue rest of care per primary team       Meek Duque M.D.  [facility], Division of Infectious Diseases  [phone]  After 5pm on weekdays and all day on weekends - please call [phone]  Available on [employer] TEAMS

## 2024-09-17 LAB
ANION GAP SERPL CALC-SCNC: 18 MMOL/L — HIGH (ref 5–17)
BUN SERPL-MCNC: 16 MG/DL — SIGNIFICANT CHANGE UP (ref 7–23)
CALCIUM SERPL-MCNC: 9.4 MG/DL — SIGNIFICANT CHANGE UP (ref 8.4–10.5)
CHLORIDE SERPL-SCNC: 101 MMOL/L — SIGNIFICANT CHANGE UP (ref 96–108)
CO2 SERPL-SCNC: 20 MMOL/L — LOW (ref 22–31)
CREAT SERPL-MCNC: 0.74 MG/DL — SIGNIFICANT CHANGE UP (ref 0.5–1.3)
EGFR: 91 ML/MIN/1.73M2 — SIGNIFICANT CHANGE UP
GLUCOSE BLDC GLUCOMTR-MCNC: 174 MG/DL — HIGH (ref 70–99)
GLUCOSE BLDC GLUCOMTR-MCNC: 199 MG/DL — HIGH (ref 70–99)
GLUCOSE BLDC GLUCOMTR-MCNC: 297 MG/DL — HIGH (ref 70–99)
GLUCOSE BLDC GLUCOMTR-MCNC: 89 MG/DL — SIGNIFICANT CHANGE UP (ref 70–99)
GLUCOSE SERPL-MCNC: 200 MG/DL — HIGH (ref 70–99)
HCT VFR BLD CALC: 35.7 % — SIGNIFICANT CHANGE UP (ref 34.5–45)
HGB BLD-MCNC: 11.9 G/DL — SIGNIFICANT CHANGE UP (ref 11.5–15.5)
LACTATE SERPL-SCNC: 5.9 MMOL/L — CRITICAL HIGH (ref 0.5–2)
MCHC RBC-ENTMCNC: 27.2 PG — SIGNIFICANT CHANGE UP (ref 27–34)
MCHC RBC-ENTMCNC: 33.3 GM/DL — SIGNIFICANT CHANGE UP (ref 32–36)
MCV RBC AUTO: 81.7 FL — SIGNIFICANT CHANGE UP (ref 80–100)
NRBC # BLD: 0 /100 WBCS — SIGNIFICANT CHANGE UP (ref 0–0)
PLATELET # BLD AUTO: 277 K/UL — SIGNIFICANT CHANGE UP (ref 150–400)
POTASSIUM SERPL-MCNC: 3.4 MMOL/L — LOW (ref 3.5–5.3)
POTASSIUM SERPL-SCNC: 3.4 MMOL/L — LOW (ref 3.5–5.3)
RBC # BLD: 4.37 M/UL — SIGNIFICANT CHANGE UP (ref 3.8–5.2)
RBC # FLD: 14.2 % — SIGNIFICANT CHANGE UP (ref 10.3–14.5)
SODIUM SERPL-SCNC: 139 MMOL/L — SIGNIFICANT CHANGE UP (ref 135–145)
WBC # BLD: 13.98 K/UL — HIGH (ref 3.8–10.5)
WBC # FLD AUTO: 13.98 K/UL — HIGH (ref 3.8–10.5)

## 2024-09-17 RX ORDER — METHYLPREDNISOLONE 4 MG
40 TABLET ORAL EVERY 8 HOURS
Refills: 0 | Status: DISCONTINUED | OUTPATIENT
Start: 2024-09-17 | End: 2024-09-19

## 2024-09-17 RX ORDER — POTASSIUM CHLORIDE 10 MEQ
40 TABLET, EXT RELEASE, PARTICLES/CRYSTALS ORAL ONCE
Refills: 0 | Status: COMPLETED | OUTPATIENT
Start: 2024-09-17 | End: 2024-09-17

## 2024-09-17 RX ADMIN — FLUTICASONE PROPIONATE AND SALMETEROL 1 DOSE(S): 250; 50 POWDER RESPIRATORY (INHALATION) at 17:18

## 2024-09-17 RX ADMIN — INSULIN GLARGINE 30 UNIT(S): 100 INJECTION, SOLUTION SUBCUTANEOUS at 22:30

## 2024-09-17 RX ADMIN — Medication 40 MILLIGRAM(S): at 01:00

## 2024-09-17 RX ADMIN — Medication 6: at 16:56

## 2024-09-17 RX ADMIN — IPRATROPIUM BROMIDE AND ALBUTEROL SULFATE 3 MILLILITER(S): .5; 3 SOLUTION RESPIRATORY (INHALATION) at 16:54

## 2024-09-17 RX ADMIN — Medication 40 MILLIGRAM(S): at 05:33

## 2024-09-17 RX ADMIN — Medication 40 MILLIEQUIVALENT(S): at 12:14

## 2024-09-17 RX ADMIN — IPRATROPIUM BROMIDE AND ALBUTEROL SULFATE 3 MILLILITER(S): .5; 3 SOLUTION RESPIRATORY (INHALATION) at 02:00

## 2024-09-17 RX ADMIN — AMLODIPINE BESYLATE 5 MILLIGRAM(S): 10 TABLET ORAL at 05:32

## 2024-09-17 RX ADMIN — SODIUM CHLORIDE 100 MILLILITER(S): 9 INJECTION INTRAMUSCULAR; INTRAVENOUS; SUBCUTANEOUS at 18:24

## 2024-09-17 RX ADMIN — IPRATROPIUM BROMIDE AND ALBUTEROL SULFATE 3 MILLILITER(S): .5; 3 SOLUTION RESPIRATORY (INHALATION) at 05:32

## 2024-09-17 RX ADMIN — IPRATROPIUM BROMIDE AND ALBUTEROL SULFATE 3 MILLILITER(S): .5; 3 SOLUTION RESPIRATORY (INHALATION) at 11:20

## 2024-09-17 RX ADMIN — Medication 100 MILLIGRAM(S): at 11:19

## 2024-09-17 RX ADMIN — IPRATROPIUM BROMIDE AND ALBUTEROL SULFATE 3 MILLILITER(S): .5; 3 SOLUTION RESPIRATORY (INHALATION) at 21:48

## 2024-09-17 RX ADMIN — IPRATROPIUM BROMIDE AND ALBUTEROL SULFATE 3 MILLILITER(S): .5; 3 SOLUTION RESPIRATORY (INHALATION) at 20:00

## 2024-09-17 RX ADMIN — Medication 10 UNIT(S): at 08:13

## 2024-09-17 RX ADMIN — Medication 20 MILLIGRAM(S): at 21:48

## 2024-09-17 RX ADMIN — Medication 40 MILLIGRAM(S): at 11:17

## 2024-09-17 RX ADMIN — Medication 40 MILLIGRAM(S): at 16:54

## 2024-09-17 RX ADMIN — Medication 40 MILLIGRAM(S): at 21:48

## 2024-09-17 RX ADMIN — FLUTICASONE PROPIONATE 1 SPRAY(S): 50 SPRAY, METERED NASAL at 17:03

## 2024-09-17 RX ADMIN — Medication 10 UNIT(S): at 12:35

## 2024-09-17 RX ADMIN — TIOTROPIUM BROMIDE INHALATION SPRAY 2 PUFF(S): 3.12 SPRAY, METERED RESPIRATORY (INHALATION) at 11:16

## 2024-09-17 RX ADMIN — FLUTICASONE PROPIONATE 1 SPRAY(S): 50 SPRAY, METERED NASAL at 05:34

## 2024-09-17 RX ADMIN — Medication 2: at 08:13

## 2024-09-17 RX ADMIN — Medication 2: at 12:36

## 2024-09-17 RX ADMIN — Medication 30 UNIT(S): at 16:55

## 2024-09-17 RX ADMIN — FLUTICASONE PROPIONATE AND SALMETEROL 1 DOSE(S): 250; 50 POWDER RESPIRATORY (INHALATION) at 07:46

## 2024-09-17 RX ADMIN — MONTELUKAST SODIUM 10 MILLIGRAM(S): 5 TABLET, CHEWABLE ORAL at 11:19

## 2024-09-17 RX ADMIN — LOSARTAN POTASSIUM 100 MILLIGRAM(S): 50 TABLET ORAL at 05:32

## 2024-09-17 NOTE — PROGRESS NOTE ADULT - ASSESSMENT
64F admitted for asthma exacerbation.     Plan:    # Acute asthma exacerbation:  - CXR w/ clear lungs  - C/w nebs/ inhalers/ Singulair   - C/w steroids  - Serial ABG's  - Maintain Fio2>90%  - Seen by ICU 9/13-> pt does not require ICU level of care  - Seen by ENT 9/13-> afrin to b/l nare bid x3days then d/c, flonase to b/l nare bid i9chstg. Pt is to follow up at ENT in 2 weeks with Nikita Badillo, Serafin Dumont. Call [phone] to make appointment.  - Pulm following    # lactic acidosis:  - Hold hctz  - Trend lactate/ CMP  - Renal following    # Atypical chest pain:  - Likely  secondary to asthma exacerbation  - trops negative.  - Echo w/ Left ventricular cavity is normalin size. Left ventricular wall thickness is normal. Left ventricular systolic function is normal. There are no regional wall motion abnormalities seen.  - Cards following    # SIRS:  - Monitor off abx  - ID following    # Dm2/ Hyperglycemia:  - HgbA1c 7.8  - Continue to monitor blood glucose levels  - Sliding Scale  - Endo following    # HTN/ HLD:  - C/w CV meds    # GI:  - Bowel regimen prn    # DVT ppx:  - IPC's    [facility]  [phone]

## 2024-09-17 NOTE — PROGRESS NOTE ADULT - SUBJECTIVE AND OBJECTIVE BOX
Date of Service: 09-17-24 @ 13:49    Patient is a 64y old  Female who presents with a chief complaint of Asthma exacerbation (14 Sep 2024 16:05)      Any change in ROS: feels slightly better   still wheezing  alot;       MEDICATIONS  (STANDING):  albuterol/ipratropium for Nebulization 3 milliLiter(s) Nebulizer every 4 hours  amLODIPine   Tablet 5 milliGRAM(s) Oral daily  atorvastatin 20 milliGRAM(s) Oral at bedtime  dextrose 5%. 1000 milliLiter(s) (100 mL/Hr) IV Continuous <Continuous>  dextrose 5%. 1000 milliLiter(s) (50 mL/Hr) IV Continuous <Continuous>  dextrose 50% Injectable 12.5 Gram(s) IV Push once  dextrose 50% Injectable 25 Gram(s) IV Push once  dextrose 50% Injectable 25 Gram(s) IV Push once  fluticasone propionate 50 MICROgram(s)/spray Nasal Spray 1 Spray(s) Both Nostrils two times a day  fluticasone propionate/ salmeterol 250-50 MICROgram(s) Diskus 1 Dose(s) Inhalation two times a day  glucagon  Injectable 1 milliGRAM(s) IntraMuscular once  insulin glargine Injectable (LANTUS) 30 Unit(s) SubCutaneous at bedtime  insulin lispro (ADMELOG) corrective regimen sliding scale   SubCutaneous three times a day before meals  insulin lispro (ADMELOG) corrective regimen sliding scale   SubCutaneous at bedtime  insulin lispro Injectable (ADMELOG) 10 Unit(s) SubCutaneous before breakfast  insulin lispro Injectable (ADMELOG) 10 Unit(s) SubCutaneous before lunch  insulin lispro Injectable (ADMELOG) 30 Unit(s) SubCutaneous before dinner  losartan 100 milliGRAM(s) Oral daily  montelukast 10 milliGRAM(s) Oral daily  sodium chloride 0.9%. 1000 milliLiter(s) (100 mL/Hr) IV Continuous <Continuous>  thiamine 100 milliGRAM(s) Oral daily  tiotropium 2.5 MICROgram(s) Inhaler 2 Puff(s) Inhalation daily    MEDICATIONS  (PRN):  acetaminophen     Tablet .. 650 milliGRAM(s) Oral every 6 hours PRN Temp greater or equal to 38C (100.4F), Mild Pain (1 - 3)  albuterol    90 MICROgram(s) HFA Inhaler 2 Puff(s) Inhalation every 2 hours PRN Shortness of Breath and/or Wheezing  aluminum hydroxide/magnesium hydroxide/simethicone Suspension 30 milliLiter(s) Oral every 4 hours PRN Dyspepsia  dextrose Oral Gel 15 Gram(s) Oral once PRN Blood Glucose LESS THAN 70 milliGRAM(s)/deciliter  melatonin 3 milliGRAM(s) Oral at bedtime PRN Insomnia  ondansetron Injectable 4 milliGRAM(s) IV Push every 8 hours PRN Nausea and/or Vomiting    Vital Signs Last 24 Hrs  T(C): 36.6 (17 Sep 2024 04:25), Max: 37 (16 Sep 2024 13:53)  T(F): 97.8 (17 Sep 2024 04:25), Max: 98.6 (16 Sep 2024 13:53)  HR: 87 (17 Sep 2024 04:25) (80 - 94)  BP: 149/82 (17 Sep 2024 04:25) (133/72 - 150/75)  BP(mean): --  RR: 18 (17 Sep 2024 04:25) (18 - 18)  SpO2: 97% (17 Sep 2024 04:25) (97% - 98%)    Parameters below as of 17 Sep 2024 04:25  Patient On (Oxygen Delivery Method): room air        I&O's Summary        Physical Exam:   GENERAL: NAD, well-groomed, well-developed  HEENT: SAQIB/   Atraumatic, Normocephalic  ENMT: No tonsillar erythema, exudates, or enlargement; Moist mucous membranes, Good dentition, No lesions  NECK: Supple, No JVD, Normal thyroid  CHEST/LUNG: wheezing+  CVS: Regular rate and rhythm; No murmurs, rubs, or gallops  GI: : Soft, Nontender, Nondistended; Bowel sounds present  NERVOUS SYSTEM:  Alert & Oriented X3  EXTREMITIES: -edema  LYMPH: No lymphadenopathy noted  SKIN: No rashes or lesions  ENDOCRINOLOGY: No Thyromegaly  PSYCH: Appropriate    Labs:  23, 25, 22                            11.9   13.98 )-----------( 277      ( 17 Sep 2024 09:13 )             35.7                         11.1   14.20 )-----------( 288      ( 15 Sep 2024 06:58 )             33.9                         9.1    11.31 )-----------( 229      ( 14 Sep 2024 06:26 )             28.0     09-17    139  |  101  |  16  ----------------------------<  200[H]  3.4[L]   |  20[L]  |  0.74  09-16    139  |  103  |  16  ----------------------------<  223[H]  3.7   |  21[L]  |  0.67  09-15    139  |  102  |  20  ----------------------------<  283[H]  4.0   |  20[L]  |  0.76  09-14    141  |  104  |  22  ----------------------------<  232[H]  3.7   |  22  |  0.81  09-13    138  |  101  |  21  ----------------------------<  243[H]  4.1   |  21[L]  |  0.77    Ca    9.4      17 Sep 2024 09:13  Ca    9.3      16 Sep 2024 06:55    TPro  7.0  /  Alb  3.9  /  TBili  0.2  /  DBili  x   /  AST  20  /  ALT  21  /  AlkPhos  98  09-15  TPro  7.4  /  Alb  4.2  /  TBili  0.1[L]  /  DBili  x   /  AST  10  /  ALT  18  /  AlkPhos  105  09-14    CAPILLARY BLOOD GLUCOSE      POCT Blood Glucose.: 174 mg/dL (17 Sep 2024 12:21)  POCT Blood Glucose.: 199 mg/dL (17 Sep 2024 07:50)  POCT Blood Glucose.: 275 mg/dL (16 Sep 2024 21:43)  POCT Blood Glucose.: 156 mg/dL (16 Sep 2024 16:24)          Urinalysis Basic - ( 17 Sep 2024 09:13 )    Color: x / Appearance: x / SG: x / pH: x  Gluc: 200 mg/dL / Ketone: x  / Bili: x / Urobili: x   Blood: x / Protein: x / Nitrite: x   Leuk Esterase: x / RBC: x / WBC x   Sq Epi: x / Non Sq Epi: x / Bacteria: x      Lactate, Blood: 5.9 mmol/L (09-17 @ 09:13)  Lactate, Blood: 4.9 mmol/L (09-16 @ 06:55)  Lactate, Blood: 5.1 mmol/L (09-15 @ 23:43)  Lactate, Blood: 5.1 mmol/L (09-15 @ 06:56)  Lactate, Blood: 4.5 mmol/L (09-14 @ 08:59)  Lactate, Blood: 4.4 mmol/L (09-13 @ 14:44)        RECENT CULTURES:  09-15 @ 11:30 .Blood Blood            rasd< from: Xray Chest 2 Views PA/Lat (09.12.24 @ 19:07) >  COMPARISON: X-ray chest 10/30/2023.    FINDINGS:    The heart is normal in size.  The lungs are clear.  There is no pneumothorax or pleural effusion.    IMPRESSION:  Clear lungs.    --- End of Report ---          KORINA ROLDAN MD; Resident Radiologist  This document has been electronically signed.  ADRIANNA RICARDO MD; Attending Radiologist  This document has been electronically signed. Sep 13 2024  8:22AM    < end of copied text >  < from: Xray Chest 1 View AP/PA (10.30.23 @ 19:10) >  INTERPRETATION:  CLINICAL INDICATION: shortness of breath.    EXAM: Frontal radiograph of the chest.    COMPARISON: Chest x-ray 10/22/2022.    FINDINGS:  The lungs are clear.  There is no pleural effusion or pneumothorax.  The heart is normal in size  The visualized osseous structures demonstrate no acute pathology.    IMPRESSION:  No focal consolidations.    --- End of Report ---          COOPER REEDER MD; Resident Radiologist  This document has been electronically signed.  MALISSA PUGA MD; Attending Radiologist  This document has been electronically signed. Oct 31 2023  9:31AM    < end of copied text >      No growth at 24 hours    09-15 @ 11:22 .Blood Blood                No growth at 24 hours          RESPIRATORY CULTURES:          Studies  Chest X-RAY  CT SCAN Chest   Venous Dopplers: LE:   CT Abdomen  Others

## 2024-09-17 NOTE — PROGRESS NOTE ADULT - SUBJECTIVE AND OBJECTIVE BOX
[facility]  JAMSHID Bridges Y. Patel, S. Shah, G. Husam  [phone]  ([phone] - weekdays after 5pm and weekends)    Name: ROGER PALUMBO  Age/Gender: 64y Female  MRN: [id number]    Interval History:  Patient seen and examined this morning.   Feels cough and wheezing is the same.   No new complaints noted.  Notes reviewed  No concerning overnight events  Afebrile     Allergies: NSAIDs (Unknown)  aspirin (Hives)  AValox (Unknown)      Objective:  Vitals:   T(F): 97.8 (09-17-24 @ 04:25), Max: 98.6 (09-16-24 @ 13:53)  HR: 87 (09-17-24 @ 04:25) (80 - 94)  BP: 149/82 (09-17-24 @ 04:25) (133/72 - 150/75)  RR: 18 (09-17-24 @ 04:25) (18 - 18)  SpO2: 97% (09-17-24 @ 04:25) (97% - 98%)  Physical Examination:  General: no acute distress, RA, nontoxic appearing   HEENT: NC/AT, anicteric, EOMI  Respiratory: no acc muscle use, wheezes   Cardiovascular: S1 and S2 present  Gastrointestinal: normal appearing, nondistended  Extremities: no edema, no cyanosis  Skin: no visible rash    Laboratory Studies:  CBC:   WBC Trend:  14.20 09-15-24 @ 06:58  11.31 09-14-24 @ 06:26  12.60 09-12-24 @ 18:49    CMP: 09-16    139  |  103  |  16  ----------------------------<  223[H]  3.7   |  21[L]  |  0.67    Ca    9.3      16 Sep 2024 06:55      Creatinine: 0.67 mg/dL (09-16-24 @ 06:55)  Creatinine: 0.76 mg/dL (09-15-24 @ 06:56)  Creatinine: 0.81 mg/dL (09-14-24 @ 08:59)  Creatinine: 0.77 mg/dL (09-13-24 @ 14:44)  Creatinine: 0.80 mg/dL (09-13-24 @ 06:17)  Creatinine: 0.79 mg/dL (09-12-24 @ 18:49)    Microbiology: reviewed   Culture - Blood (collected 09-15-24 @ 11:30)  Source: .Blood Blood  Preliminary Report (09-16-24 @ 15:02):    No growth at 24 hours    Culture - Blood (collected 09-15-24 @ 11:22)  Source: .Blood Blood  Preliminary Report (09-16-24 @ 15:02):    No growth at 24 hours    SARS-CoV-2 Result: NotDetec (12 Sep 2024 18:49)    Radiology: reviewed     Medications:  acetaminophen     Tablet .. 650 milliGRAM(s) Oral every 6 hours PRN  albuterol    90 MICROgram(s) HFA Inhaler 2 Puff(s) Inhalation every 2 hours PRN  albuterol/ipratropium for Nebulization 3 milliLiter(s) Nebulizer every 4 hours  aluminum hydroxide/magnesium hydroxide/simethicone Suspension 30 milliLiter(s) Oral every 4 hours PRN  amLODIPine   Tablet 5 milliGRAM(s) Oral daily  atorvastatin 20 milliGRAM(s) Oral at bedtime  dextrose 5%. 1000 milliLiter(s) IV Continuous <Continuous>  dextrose 5%. 1000 milliLiter(s) IV Continuous <Continuous>  dextrose 50% Injectable 12.5 Gram(s) IV Push once  dextrose 50% Injectable 25 Gram(s) IV Push once  dextrose 50% Injectable 25 Gram(s) IV Push once  dextrose Oral Gel 15 Gram(s) Oral once PRN  fluticasone propionate 50 MICROgram(s)/spray Nasal Spray 1 Spray(s) Both Nostrils two times a day  fluticasone propionate/ salmeterol 250-50 MICROgram(s) Diskus 1 Dose(s) Inhalation two times a day  glucagon  Injectable 1 milliGRAM(s) IntraMuscular once  insulin glargine Injectable (LANTUS) 30 Unit(s) SubCutaneous at bedtime  insulin lispro (ADMELOG) corrective regimen sliding scale   SubCutaneous three times a day before meals  insulin lispro (ADMELOG) corrective regimen sliding scale   SubCutaneous at bedtime  insulin lispro Injectable (ADMELOG) 10 Unit(s) SubCutaneous before lunch  insulin lispro Injectable (ADMELOG) 22 Unit(s) SubCutaneous before dinner  insulin lispro Injectable (ADMELOG) 10 Unit(s) SubCutaneous before breakfast  losartan 100 milliGRAM(s) Oral daily  melatonin 3 milliGRAM(s) Oral at bedtime PRN  methylPREDNISolone sodium succinate Injectable 40 milliGRAM(s) IV Push every 6 hours  montelukast 10 milliGRAM(s) Oral daily  ondansetron Injectable 4 milliGRAM(s) IV Push every 8 hours PRN  sodium chloride 0.9%. 1000 milliLiter(s) IV Continuous <Continuous>  thiamine 100 milliGRAM(s) Oral daily  tiotropium 2.5 MICROgram(s) Inhaler 2 Puff(s) Inhalation daily

## 2024-09-17 NOTE — PROGRESS NOTE ADULT - SUBJECTIVE AND OBJECTIVE BOX
SUBJECTIVE / OVERNIGHT EVENTS:      Patient seen and examined at bedside. No events noted overnight. Resting comfortably in bed. Still wheezing      --------------------------------------------------------------------------------------------  LABS:                        11.9   13.98 )-----------( 277      ( 17 Sep 2024 09:13 )             35.7     09-16    139  |  103  |  16  ----------------------------<  223[H]  3.7   |  21[L]  |  0.67    Ca    9.3      16 Sep 2024 06:55        CAPILLARY BLOOD GLUCOSE      POCT Blood Glucose.: 199 mg/dL (17 Sep 2024 07:50)  POCT Blood Glucose.: 275 mg/dL (16 Sep 2024 21:43)  POCT Blood Glucose.: 156 mg/dL (16 Sep 2024 16:24)  POCT Blood Glucose.: 155 mg/dL (16 Sep 2024 13:33)  POCT Blood Glucose.: 211 mg/dL (16 Sep 2024 12:10)        Urinalysis Basic - ( 16 Sep 2024 06:55 )    Color: x / Appearance: x / SG: x / pH: x  Gluc: 223 mg/dL / Ketone: x  / Bili: x / Urobili: x   Blood: x / Protein: x / Nitrite: x   Leuk Esterase: x / RBC: x / WBC x   Sq Epi: x / Non Sq Epi: x / Bacteria: x        RADIOLOGY & ADDITIONAL TESTS: < from: TTE W or WO Ultrasound Enhancing Agent (09.16.24 @ 12:31) >  CONCLUSIONS:      1. Left ventricular cavity is normalin size. Left ventricular wall thickness is normal. Left ventricular systolic function is normal. There are no regional wall motion abnormalities seen.    < end of copied text >      Imaging Personally Reviewed:  [x] YES  [ ] NO    Consultant(s) Notes Reviewed:  [x] YES  [ ] NO    MEDICATIONS  (STANDING):  albuterol/ipratropium for Nebulization 3 milliLiter(s) Nebulizer every 4 hours  amLODIPine   Tablet 5 milliGRAM(s) Oral daily  atorvastatin 20 milliGRAM(s) Oral at bedtime  dextrose 5%. 1000 milliLiter(s) (100 mL/Hr) IV Continuous <Continuous>  dextrose 5%. 1000 milliLiter(s) (50 mL/Hr) IV Continuous <Continuous>  dextrose 50% Injectable 12.5 Gram(s) IV Push once  dextrose 50% Injectable 25 Gram(s) IV Push once  dextrose 50% Injectable 25 Gram(s) IV Push once  fluticasone propionate 50 MICROgram(s)/spray Nasal Spray 1 Spray(s) Both Nostrils two times a day  fluticasone propionate/ salmeterol 250-50 MICROgram(s) Diskus 1 Dose(s) Inhalation two times a day  glucagon  Injectable 1 milliGRAM(s) IntraMuscular once  insulin glargine Injectable (LANTUS) 30 Unit(s) SubCutaneous at bedtime  insulin lispro (ADMELOG) corrective regimen sliding scale   SubCutaneous three times a day before meals  insulin lispro (ADMELOG) corrective regimen sliding scale   SubCutaneous at bedtime  insulin lispro Injectable (ADMELOG) 10 Unit(s) SubCutaneous before lunch  insulin lispro Injectable (ADMELOG) 10 Unit(s) SubCutaneous before breakfast  insulin lispro Injectable (ADMELOG) 22 Unit(s) SubCutaneous before dinner  losartan 100 milliGRAM(s) Oral daily  methylPREDNISolone sodium succinate Injectable 40 milliGRAM(s) IV Push every 6 hours  montelukast 10 milliGRAM(s) Oral daily  sodium chloride 0.9%. 1000 milliLiter(s) (100 mL/Hr) IV Continuous <Continuous>  thiamine 100 milliGRAM(s) Oral daily  tiotropium 2.5 MICROgram(s) Inhaler 2 Puff(s) Inhalation daily    MEDICATIONS  (PRN):  acetaminophen     Tablet .. 650 milliGRAM(s) Oral every 6 hours PRN Temp greater or equal to 38C (100.4F), Mild Pain (1 - 3)  albuterol    90 MICROgram(s) HFA Inhaler 2 Puff(s) Inhalation every 2 hours PRN Shortness of Breath and/or Wheezing  aluminum hydroxide/magnesium hydroxide/simethicone Suspension 30 milliLiter(s) Oral every 4 hours PRN Dyspepsia  dextrose Oral Gel 15 Gram(s) Oral once PRN Blood Glucose LESS THAN 70 milliGRAM(s)/deciliter  melatonin 3 milliGRAM(s) Oral at bedtime PRN Insomnia  ondansetron Injectable 4 milliGRAM(s) IV Push every 8 hours PRN Nausea and/or Vomiting      Care Discussed with Consultants/Other Providers [x] YES  [ ] NO    Vital Signs Last 24 Hrs  T(C): 36.6 (17 Sep 2024 04:25), Max: 37 (16 Sep 2024 13:53)  T(F): 97.8 (17 Sep 2024 04:25), Max: 98.6 (16 Sep 2024 13:53)  HR: 87 (17 Sep 2024 04:25) (80 - 94)  BP: 149/82 (17 Sep 2024 04:25) (133/72 - 150/75)  BP(mean): --  RR: 18 (17 Sep 2024 04:25) (18 - 18)  SpO2: 97% (17 Sep 2024 04:25) (97% - 98%)    Parameters below as of 17 Sep 2024 04:25  Patient On (Oxygen Delivery Method): room air      I&O's Summary      PHYSICAL EXAM:  GENERAL: NAD, well-developed, comfortable  HEAD:  Atraumatic, Normocephalic  EYES: EOMI, PERRLA, conjunctiva and sclera clear  NECK: Supple, No JVD  CHEST/LUNG: Diminished bilaterally;+wheeze  HEART: Regular rate and rhythm; No murmurs, rubs, or gallops  ABDOMEN: Soft, Nontender, Nondistended; Bowel sounds present  NEURO: AAOx3, no focal weakness, 5/5 b/l extremity strength, b/l knee no arthritis, no effusion   EXTREMITIES:  2+ Peripheral Pulses, No clubbing, cyanosis, or edema  SKIN: No rashes or lesions

## 2024-09-17 NOTE — PROVIDER CONTACT NOTE (CRITICAL VALUE NOTIFICATION) - ASSESSMENT
Pt AxO -4, VSS.
Pt alert
Pt A&OX4, VS as charted. Pt denies chest pain, N/V, SOB, dizziness, muscle cramps or fatigue.
pt AXO4. no pain, sob or discomfort noted
Pt A&Ox4, resting comfortably, receiving NS at 100ml/hr IV

## 2024-09-17 NOTE — PROGRESS NOTE ADULT - SUBJECTIVE AND OBJECTIVE BOX
[facility] Progress Note    MAR, chart notes, fingersticks and labs reviewed    Subjective:    Objective  Vital Signs  T(C): 36.6 (09-17-24 @ 04:25), Max: 37 (09-16-24 @ 13:53)  HR: 87 (09-17-24 @ 04:25) (80 - 94)  BP: 149/82 (09-17-24 @ 04:25) (133/72 - 150/75)  RR: 18 (09-17-24 @ 04:25) (18 - 18)  SpO2: 97% (09-17-24 @ 04:25) (97% - 98%)    Physical Exam  Gen: NAD, alert, awake  HEENT: NC/AT, EOMI  Neck: supple  Chest: breathing comfortably  Heart: +s1 +s2    Medications  acetaminophen     Tablet .. 650 milliGRAM(s) Oral every 6 hours PRN  albuterol    90 MICROgram(s) HFA Inhaler 2 Puff(s) Inhalation every 2 hours PRN  albuterol/ipratropium for Nebulization 3 milliLiter(s) Nebulizer every 4 hours  aluminum hydroxide/magnesium hydroxide/simethicone Suspension 30 milliLiter(s) Oral every 4 hours PRN  amLODIPine   Tablet 5 milliGRAM(s) Oral daily  atorvastatin 20 milliGRAM(s) Oral at bedtime  dextrose 5%. 1000 milliLiter(s) IV Continuous <Continuous>  dextrose 5%. 1000 milliLiter(s) IV Continuous <Continuous>  dextrose 50% Injectable 25 Gram(s) IV Push once  dextrose 50% Injectable 12.5 Gram(s) IV Push once  dextrose 50% Injectable 25 Gram(s) IV Push once  dextrose Oral Gel 15 Gram(s) Oral once PRN  fluticasone propionate 50 MICROgram(s)/spray Nasal Spray 1 Spray(s) Both Nostrils two times a day  fluticasone propionate/ salmeterol 250-50 MICROgram(s) Diskus 1 Dose(s) Inhalation two times a day  glucagon  Injectable 1 milliGRAM(s) IntraMuscular once  insulin glargine Injectable (LANTUS) 30 Unit(s) SubCutaneous at bedtime  insulin lispro (ADMELOG) corrective regimen sliding scale   SubCutaneous three times a day before meals  insulin lispro (ADMELOG) corrective regimen sliding scale   SubCutaneous at bedtime  insulin lispro Injectable (ADMELOG) 10 Unit(s) SubCutaneous before breakfast  insulin lispro Injectable (ADMELOG) 10 Unit(s) SubCutaneous before lunch  insulin lispro Injectable (ADMELOG) 30 Unit(s) SubCutaneous before dinner  losartan 100 milliGRAM(s) Oral daily  melatonin 3 milliGRAM(s) Oral at bedtime PRN  methylPREDNISolone sodium succinate Injectable 40 milliGRAM(s) IV Push every 6 hours  montelukast 10 milliGRAM(s) Oral daily  ondansetron Injectable 4 milliGRAM(s) IV Push every 8 hours PRN  sodium chloride 0.9%. 1000 milliLiter(s) IV Continuous <Continuous>  thiamine 100 milliGRAM(s) Oral daily  tiotropium 2.5 MICROgram(s) Inhaler 2 Puff(s) Inhalation daily    Pertinent labs/Imaging  POCT Blood Glucose.: 199 mg/dL (09-17-24 @ 07:50)  POCT Blood Glucose.: 275 mg/dL (09-16-24 @ 21:43)  POCT Blood Glucose.: 156 mg/dL (09-16-24 @ 16:24)  POCT Blood Glucose.: 155 mg/dL (09-16-24 @ 13:33)  POCT Blood Glucose.: 211 mg/dL (09-16-24 @ 12:10)    eGFR: 91 mL/min/1.73m2 (09-17-24 @ 09:13)  eGFR: 99 mL/min/1.73m2 (09-16-24 @ 06:55)       [facility] Progress Note    MAR, chart notes, fingersticks and labs reviewed    Subjective: doesn't feel much better    Objective  Vital Signs  T(C): 36.6 (09-17-24 @ 04:25), Max: 37 (09-16-24 @ 13:53)  HR: 87 (09-17-24 @ 04:25) (80 - 94)  BP: 149/82 (09-17-24 @ 04:25) (133/72 - 150/75)  RR: 18 (09-17-24 @ 04:25) (18 - 18)  SpO2: 97% (09-17-24 @ 04:25) (97% - 98%)    Physical Exam  Gen: NAD, alert, awake  HEENT: NC/AT, EOMI  Neck: supple  Chest: breathing comfortably  Heart: +s1 +s2    Medications  acetaminophen     Tablet .. 650 milliGRAM(s) Oral every 6 hours PRN  albuterol    90 MICROgram(s) HFA Inhaler 2 Puff(s) Inhalation every 2 hours PRN  albuterol/ipratropium for Nebulization 3 milliLiter(s) Nebulizer every 4 hours  aluminum hydroxide/magnesium hydroxide/simethicone Suspension 30 milliLiter(s) Oral every 4 hours PRN  amLODIPine   Tablet 5 milliGRAM(s) Oral daily  atorvastatin 20 milliGRAM(s) Oral at bedtime  dextrose 5%. 1000 milliLiter(s) IV Continuous <Continuous>  dextrose 5%. 1000 milliLiter(s) IV Continuous <Continuous>  dextrose 50% Injectable 25 Gram(s) IV Push once  dextrose 50% Injectable 12.5 Gram(s) IV Push once  dextrose 50% Injectable 25 Gram(s) IV Push once  dextrose Oral Gel 15 Gram(s) Oral once PRN  fluticasone propionate 50 MICROgram(s)/spray Nasal Spray 1 Spray(s) Both Nostrils two times a day  fluticasone propionate/ salmeterol 250-50 MICROgram(s) Diskus 1 Dose(s) Inhalation two times a day  glucagon  Injectable 1 milliGRAM(s) IntraMuscular once  insulin glargine Injectable (LANTUS) 30 Unit(s) SubCutaneous at bedtime  insulin lispro (ADMELOG) corrective regimen sliding scale   SubCutaneous three times a day before meals  insulin lispro (ADMELOG) corrective regimen sliding scale   SubCutaneous at bedtime  insulin lispro Injectable (ADMELOG) 10 Unit(s) SubCutaneous before breakfast  insulin lispro Injectable (ADMELOG) 10 Unit(s) SubCutaneous before lunch  insulin lispro Injectable (ADMELOG) 30 Unit(s) SubCutaneous before dinner  losartan 100 milliGRAM(s) Oral daily  melatonin 3 milliGRAM(s) Oral at bedtime PRN  methylPREDNISolone sodium succinate Injectable 40 milliGRAM(s) IV Push every 6 hours  montelukast 10 milliGRAM(s) Oral daily  ondansetron Injectable 4 milliGRAM(s) IV Push every 8 hours PRN  sodium chloride 0.9%. 1000 milliLiter(s) IV Continuous <Continuous>  thiamine 100 milliGRAM(s) Oral daily  tiotropium 2.5 MICROgram(s) Inhaler 2 Puff(s) Inhalation daily    Pertinent labs/Imaging  POCT Blood Glucose.: 199 mg/dL (09-17-24 @ 07:50)  POCT Blood Glucose.: 275 mg/dL (09-16-24 @ 21:43)  POCT Blood Glucose.: 156 mg/dL (09-16-24 @ 16:24)  POCT Blood Glucose.: 155 mg/dL (09-16-24 @ 13:33)  POCT Blood Glucose.: 211 mg/dL (09-16-24 @ 12:10)    eGFR: 91 mL/min/1.73m2 (09-17-24 @ 09:13)  eGFR: 99 mL/min/1.73m2 (09-16-24 @ 06:55)

## 2024-09-17 NOTE — PROGRESS NOTE ADULT - ASSESSMENT
64F PMHx asthma, HTN/HLD, DM. Presenting w/ 3d of SOB and cough, rx'd prednisone w/o improvement outpatient. Pt also been noticing increase in FS glucose while on steroid. In the ED, pt afebrile, bp stable, on RA saturating 99%, tachypneic to 22. Pt was audibly wheezing.   CBC w/ wbc 12.6, hgb 11.8, plt 289. CMP w/ SCr 0.79, bicarb 18, AG 18, glucose 346. VBG w/ initial lactate 6.5 -> 4.6 after 1L IVF. covid/flu/rsv neg. CXR showed clear lungs. Pt received solumedrol 125mg, Mg 2g, and duoneb x3. On interview/exam, pt still wheezing. Appears audible wheezing w/o stethoscope is more upper airway but on lung auscultation, with exp wheezing as well.           Asthma exacerbation:   -she has a known diagnosis of asthma and has been  on Dupixent biweekly as an outpt:  -came in here with asthma exacerbation, possibly 2nd to viral infection  -ENT recs appreciated, no VC dysfunction noted  -Still with significant wheezing, continue current dose of solumedrol  -Change Advair to 250/50 1 puff BID  -Change Duoneb to q4h  -Continue Singulair   -Normoxic, keep sats >90% with o2 PRN     9/15:   -she has been wheezing a lot:  cant decrease steroids:   -cont BD and cont iv steroids:   -cont singulair    9/16:  -she is still wheezing considerably:  -needs high dose steroids still:   -cont bd and singulair and nebs;   -check p eak flow q shift    9/17:  -she is still wheezing a lot:   -cont SM 40 mg q 8 hours:   -cont bd ;  -cont singulair and advair:   -check d dimer:   -peak flow q 12 hours:   -she remans on room air     Uncontrolled DM:   -secondary to stress and steroids   -monitor and control     9/15; cont current meds:   -cont to control on high dose glucose    9/16:  -pt still needs high dosages of steroids :  -cont to control blood glucose on high steroids     9/17: now on q 8 hours of steroids:  manage per primary team      HTN:   -controlled:     dvt prophylaxis    dw acp

## 2024-09-17 NOTE — PROGRESS NOTE ADULT - SUBJECTIVE AND OBJECTIVE BOX
DATE OF SERVICE: 09-17-24 @ 09:58    Patient is a 64y old  Female who presents with a chief complaint of Asthma exacerbation (14 Sep 2024 16:05)      INTERVAL HISTORY: Feels ok.     REVIEW OF SYSTEMS:  CONSTITUTIONAL: No weakness  EYES/ENT: No visual changes;  No throat pain   NECK: No pain or stiffness  RESPIRATORY: +cough, + wheezing; + shortness of breath  CARDIOVASCULAR: No chest pain or palpitations  GASTROINTESTINAL: No abdominal  pain. No nausea, vomiting, or hematemesis  GENITOURINARY: No dysuria, frequency or hematuria  NEUROLOGICAL: No stroke like symptoms  SKIN: No rashes    TELEMETRY Personally reviewed: SR/.ST   	  MEDICATIONS:  amLODIPine   Tablet 5 milliGRAM(s) Oral daily  losartan 100 milliGRAM(s) Oral daily        PHYSICAL EXAM:  T(C): 36.6 (09-17-24 @ 04:25), Max: 37 (09-16-24 @ 13:53)  HR: 87 (09-17-24 @ 04:25) (80 - 94)  BP: 149/82 (09-17-24 @ 04:25) (133/72 - 150/75)  RR: 18 (09-17-24 @ 04:25) (18 - 18)  SpO2: 97% (09-17-24 @ 04:25) (97% - 98%)  Wt(kg): --  I&O's Summary        Appearance: In no distress	  HEENT:    PERRL, EOMI	  Cardiovascular:  S1 S2, No JVD  Respiratory: B/L diffuse wheezing  Gastrointestinal:  Soft, Non-tender, + BS	  Vascularature:  No edema of LE  Psychiatric: Appropriate affect   Neuro: no acute focal deficits                               11.9   13.98 )-----------( 277      ( 17 Sep 2024 09:13 )             35.7     09-17    139  |  101  |  16  ----------------------------<  200[H]  3.4[L]   |  20[L]  |  0.74    Ca    9.4      17 Sep 2024 09:13          Labs personally reviewed    < from: TTE W or WO Ultrasound Enhancing Agent (09.16.24 @ 12:31) >   1. Left ventricular cavity is normalin size. Left ventricular wall thickness is normal. Left ventricular systolic function is normal. There are no regional wall motion abnormalities seen.        ASSESSMENT/PLAN: 	    64F PMHx asthma, HTN/HLD, DM. Presenting w/ 3d of SOB and cough, rx'd prednisone w/o improvement outpatient. Pt also been noticing increase in FS glucose while on steroid.  In the ED, pt afebrile, bp stable, on RA saturating 99%, tachypneic to 22. Pt was audibly wheezing.     1. Atypical CP   - non exertional, cp secondary to asthma exacerbation  - Pt describes pain is typical of her asthma  - Trop neg x1, awaiting 2nd   - ECG - SR with PAC, recommend repeat   - TTE wnl    2. Asthma Exacerbation  - audibly wheezing  - Still with significant wheezing, continue current dose of solumedrol  -Normoxic, keep sats >90% with o2 PRN   -pulm following    3. HTN  controlled   c/w amLODIPine   Tablet 5 milliGRAM(s) Oral daily   c/w losartan 100 milliGRAM(s) Oral daily    4. HLD   -check LDL   c/w atorvastatin 20 milliGRAM(s) Oral at bedtime    5. Uncontrolled DM  - secondary to stress and steroids, monitor and control   - check A1c     6. DVT prophylactic  - SCD's         Cornelia Schneider, STEPHEN-NP   Evan Dietz DO [facility]  Cardiovascular Medicine  [address], [address]  Available through call or text on [employer] TEAMs  Office: [phone]

## 2024-09-17 NOTE — PROGRESS NOTE ADULT - ASSESSMENT
64 y.o. female with history of T2DM , Asthma, HTN and HLD presents for asthma exacerbation.    1. T2DM with hyperglycemia  - Continue Lantus 30 units at night  - Continue Admelog 10 units with breakfast, 10 units with lunch and increase to 30 units with dinner  - Continue moderate Admelog correctional scale before meals and bedtime  - Monitor FS before meals and bedtime  - Follow hospital hypoglycemic protocol    2. Asthma exacerbation  - currently on solumedrol 40 mg IV Q6H  - pulm following    Will continue to follow.     Zoe Hayward MD  [facility] Division of Endocrinology    [address] [place] [address]    T [phone]  F [phone]   64 y.o. female with history of T2DM , Asthma, HTN and HLD presents for asthma exacerbation.    1. T2DM with hyperglycemia  - Continue Lantus 30 units at night  - Continue Admelog 10 units with breakfast, increase to 15 units with lunch and 30 units with dinner  - Continue moderate Admelog correctional scale before meals and bedtime  - Monitor FS before meals and bedtime  - Follow hospital hypoglycemic protocol    2. Asthma exacerbation  - currently on solumedrol 40 mg IV Q6H  - pulm following    Will continue to follow.     Zoe Hayward MD  [facility] of Endocrinology    [address] [place] [address]    T [phone]  F [phone]

## 2024-09-17 NOTE — PROVIDER CONTACT NOTE (CRITICAL VALUE NOTIFICATION) - BACKGROUND
65y/o F w/ PMH DM2, HTN, HLD, presenting to ED w/ SOB and cough; admitted for Acute Asthma Exacerbation.
64 year female PMH asthma , HT, hld, dM. Admitted for asthma with acute exacerbation
65 y/o female admit for acute asthma exacerbation
Pt admitted for Asthma Exacerbation.

## 2024-09-17 NOTE — PROGRESS NOTE ADULT - SUBJECTIVE AND OBJECTIVE BOX
[facility]   [phone]  RENAL FOLLOW UP NOTE  --------------------------------------------------------------------------------  Chief Complaint:    24 hour events/subjective:    patient seen and examined.   wheezing    PAST HISTORY  --------------------------------------------------------------------------------  No significant changes to PMH, PSH, FHx, SHx, unless otherwise noted    ALLERGIES & MEDICATIONS  --------------------------------------------------------------------------------  Allergies    NSAIDs (Unknown)  aspirin (Hives)  AValox (Unknown)    Intolerances      Standing Inpatient Medications  albuterol/ipratropium for Nebulization 3 milliLiter(s) Nebulizer every 4 hours  amLODIPine   Tablet 5 milliGRAM(s) Oral daily  atorvastatin 20 milliGRAM(s) Oral at bedtime  dextrose 5%. 1000 milliLiter(s) IV Continuous <Continuous>  dextrose 5%. 1000 milliLiter(s) IV Continuous <Continuous>  dextrose 50% Injectable 25 Gram(s) IV Push once  dextrose 50% Injectable 12.5 Gram(s) IV Push once  dextrose 50% Injectable 25 Gram(s) IV Push once  fluticasone propionate 50 MICROgram(s)/spray Nasal Spray 1 Spray(s) Both Nostrils two times a day  fluticasone propionate/ salmeterol 250-50 MICROgram(s) Diskus 1 Dose(s) Inhalation two times a day  glucagon  Injectable 1 milliGRAM(s) IntraMuscular once  insulin glargine Injectable (LANTUS) 30 Unit(s) SubCutaneous at bedtime  insulin lispro (ADMELOG) corrective regimen sliding scale   SubCutaneous three times a day before meals  insulin lispro (ADMELOG) corrective regimen sliding scale   SubCutaneous at bedtime  insulin lispro Injectable (ADMELOG) 10 Unit(s) SubCutaneous before lunch  insulin lispro Injectable (ADMELOG) 30 Unit(s) SubCutaneous before dinner  insulin lispro Injectable (ADMELOG) 10 Unit(s) SubCutaneous before breakfast  losartan 100 milliGRAM(s) Oral daily  methylPREDNISolone sodium succinate Injectable 40 milliGRAM(s) IV Push every 8 hours  montelukast 10 milliGRAM(s) Oral daily  sodium chloride 0.9%. 1000 milliLiter(s) IV Continuous <Continuous>  thiamine 100 milliGRAM(s) Oral daily    PRN Inpatient Medications  acetaminophen     Tablet .. 650 milliGRAM(s) Oral every 6 hours PRN  albuterol    90 MICROgram(s) HFA Inhaler 2 Puff(s) Inhalation every 2 hours PRN  aluminum hydroxide/magnesium hydroxide/simethicone Suspension 30 milliLiter(s) Oral every 4 hours PRN  dextrose Oral Gel 15 Gram(s) Oral once PRN  melatonin 3 milliGRAM(s) Oral at bedtime PRN  ondansetron Injectable 4 milliGRAM(s) IV Push every 8 hours PRN      REVIEW OF SYSTEMS  --------------------------------------------------------------------------------    Gen: denies fevers/chills,  CVS: denies chest pain/palpitations  Resp: +SOB/Cough+  GI: Denies N/V/Abd pain  : Denies dysuria/oliguria/hematuria    VITALS/PHYSICAL EXAM  --------------------------------------------------------------------------------  T(C): 36.5 (09-17-24 @ 14:19), Max: 36.7 (09-16-24 @ 19:55)  HR: 86 (09-17-24 @ 14:19) (86 - 94)  BP: 154/89 (09-17-24 @ 14:19) (133/72 - 154/89)  RR: 18 (09-17-24 @ 14:19) (18 - 18)  SpO2: 97% (09-17-24 @ 14:19) (97% - 97%)  Wt(kg): --        Physical Exam:  	              Gen: on O2  	Pulm: decrease bs  no rales or ronchi +  wheezing  	CV: no JVD. RRR, S1S2; no rub  	Abd: +BS, soft, nontender/nondistended  	: No suprapubic tenderness  	UE: Warm, no cyanosis  no clubbing,  LUE ? edema   	LE: Warm, no cyanosis  no clubbing, no edema  	Neuro: alert and oriented. speech coherent     LABS/STUDIES  --------------------------------------------------------------------------------              11.9   13.98 >-----------<  277      [09-17-24 @ 09:13]              35.7     139  |  101  |  16  ----------------------------<  200      [09-17-24 @ 09:13]  3.4   |  20  |  0.74        Ca     9.4     [09-17-24 @ 09:13]    Lactate, Blood (09.17.24 @ 09:13)   Lactate, Blood: 5.9 mmol/L      Historical Values  Lactate, Blood (09.17.24 @ 09:13)   Lactate, Blood: 5.9 mmol/L  Lactate, Blood (09.16.24 @ 06:55)   Lactate, Blood: 4.9 mmol/L  Lactate, Blood (09.15.24 @ 23:43)   Lactate, Blood: 5.1 mmol/L  Lactate, Blood (09.15.24 @ 06:56)   Lactate, Blood: 5.1 mmol/L  Lactate, Blood (09.14.24 @ 08:59)   Lactate, Blood: 4.5 mmol/L  Lactate, Blood (09.13.24 @ 14:44)   Lactate, Blood: 4.4 mmol/L        Creatinine Trend:  SCr 0.74 [09-17 @ 09:13]  SCr 0.67 [09-16 @ 06:55]  SCr 0.76 [09-15 @ 06:56]  SCr 0.81 [09-14 @ 08:59]  SCr 0.77 [09-13 @ 14:44]

## 2024-09-17 NOTE — PROGRESS NOTE ADULT - ASSESSMENT
Patient is a 64 year old female with PMH of asthma, HTN, HLD, DM who presents with dyspnea and cough for 3 days. Patient reports she first started to have dyspnea and then cough, she saw her Pulmonologist earlier this week and was given oral prednisone and nebulizer treatment but did not improved. States possible sick contact is her sister in law who lives with them, states she had some URI symptoms and recently started going to a new day program. States she continued to have wheezing, chest tightness and dyspnea and came to the ER.    Acute asthma exacerbation  Suspect possible viral URI  SIRS likely d/t above   Leukocytosis likely reactive iso steroids  - tachypnea and leukocytosis on admission, remains afebrile  - COVID/RSV/Flu negative  - CXR with no consolidation/infiltrate  - PCT negative   - UA negative for infection, no gu sx noted   - lactate noted, low suspicion for infection, Nephrology following   - remains afebrile, WBC trend noted, wheezing, on RA, nontoxic    Recommendations:   Follow Bcx - NGTD x2   Continue off antibiotics   Steroids and nebs per Pulmonary   Supportive care   Continue rest of care per primary team       Meek Duque M.D.  ED, Division of Infectious Diseases  [phone]  After 5pm on weekdays and all day on weekends - please call [phone]  Available on [employer] TEAMS

## 2024-09-17 NOTE — PROGRESS NOTE ADULT - ASSESSMENT
64F PMHx asthma, HTN/HLD, DM. hx breast ca and colon ca, Presenting w/ 3d of SOB and cough, rx'd prednisone w/o improvement outpatient. Pt also been noticing increase in FS glucose while on steroid.   on admission w/ initial lactate 6.5 -> 4.6 after 1L IVF.  pt repeat lactic acid on 9/13 6.2.. of note, outpt has been on metformin as well   covid/flu/rsv neg. CXR showed clear lungs. Pt received solumedrol 125mg, Mg 2g, and duoneb x3.   pt receiving beta agonists as well         1- lactic acidosis /high AGAP acidosis   2- HTN   3- hyperglycemia/DM  4- wheezing /sob   5- asthma exacerbation     lactate still elevated  continue NS @100 ml/hr  U/A unremarkable  blood cx negative to date  thiamine level pending  thiamine 100 mg daily  amlodipine 5 mg daily  losartan 100 mg daily  trend bp  steroids as per pulm

## 2024-09-18 LAB
ANION GAP SERPL CALC-SCNC: 17 MMOL/L — SIGNIFICANT CHANGE UP (ref 5–17)
BUN SERPL-MCNC: 19 MG/DL — SIGNIFICANT CHANGE UP (ref 7–23)
CALCIUM SERPL-MCNC: 8.8 MG/DL — SIGNIFICANT CHANGE UP (ref 8.4–10.5)
CHLORIDE SERPL-SCNC: 101 MMOL/L — SIGNIFICANT CHANGE UP (ref 96–108)
CO2 SERPL-SCNC: 23 MMOL/L — SIGNIFICANT CHANGE UP (ref 22–31)
CREAT SERPL-MCNC: 0.73 MG/DL — SIGNIFICANT CHANGE UP (ref 0.5–1.3)
D DIMER BLD IA.RAPID-MCNC: 570 NG/ML DDU — HIGH
EGFR: 93 ML/MIN/1.73M2 — SIGNIFICANT CHANGE UP
GLUCOSE BLDC GLUCOMTR-MCNC: 116 MG/DL — HIGH (ref 70–99)
GLUCOSE BLDC GLUCOMTR-MCNC: 151 MG/DL — HIGH (ref 70–99)
GLUCOSE BLDC GLUCOMTR-MCNC: 222 MG/DL — HIGH (ref 70–99)
GLUCOSE BLDC GLUCOMTR-MCNC: 298 MG/DL — HIGH (ref 70–99)
GLUCOSE SERPL-MCNC: 230 MG/DL — HIGH (ref 70–99)
HCT VFR BLD CALC: 34.9 % — SIGNIFICANT CHANGE UP (ref 34.5–45)
HGB BLD-MCNC: 11.6 G/DL — SIGNIFICANT CHANGE UP (ref 11.5–15.5)
LACTATE SERPL-SCNC: 5.5 MMOL/L — CRITICAL HIGH (ref 0.5–2)
MAGNESIUM SERPL-MCNC: 2.2 MG/DL — SIGNIFICANT CHANGE UP (ref 1.6–2.6)
MCHC RBC-ENTMCNC: 26.7 PG — LOW (ref 27–34)
MCHC RBC-ENTMCNC: 33.2 GM/DL — SIGNIFICANT CHANGE UP (ref 32–36)
MCV RBC AUTO: 80.2 FL — SIGNIFICANT CHANGE UP (ref 80–100)
NRBC # BLD: 0 /100 WBCS — SIGNIFICANT CHANGE UP (ref 0–0)
PLATELET # BLD AUTO: 286 K/UL — SIGNIFICANT CHANGE UP (ref 150–400)
POTASSIUM SERPL-MCNC: 3.3 MMOL/L — LOW (ref 3.5–5.3)
POTASSIUM SERPL-SCNC: 3.3 MMOL/L — LOW (ref 3.5–5.3)
RBC # BLD: 4.35 M/UL — SIGNIFICANT CHANGE UP (ref 3.8–5.2)
RBC # FLD: 14.4 % — SIGNIFICANT CHANGE UP (ref 10.3–14.5)
SODIUM SERPL-SCNC: 141 MMOL/L — SIGNIFICANT CHANGE UP (ref 135–145)
VIT B1 SERPL-MCNC: 100.7 NMOL/L — SIGNIFICANT CHANGE UP (ref 66.5–200)
WBC # BLD: 13.76 K/UL — HIGH (ref 3.8–10.5)
WBC # FLD AUTO: 13.76 K/UL — HIGH (ref 3.8–10.5)

## 2024-09-18 PROCEDURE — 93970 EXTREMITY STUDY: CPT | Mod: 26

## 2024-09-18 RX ORDER — BENZONATATE 100 MG
100 CAPSULE ORAL EVERY 8 HOURS
Refills: 0 | Status: DISCONTINUED | OUTPATIENT
Start: 2024-09-18 | End: 2024-09-20

## 2024-09-18 RX ORDER — POTASSIUM CHLORIDE 10 MEQ
40 TABLET, EXT RELEASE, PARTICLES/CRYSTALS ORAL ONCE
Refills: 0 | Status: COMPLETED | OUTPATIENT
Start: 2024-09-18 | End: 2024-09-18

## 2024-09-18 RX ADMIN — Medication 6: at 17:38

## 2024-09-18 RX ADMIN — Medication 3 MILLIGRAM(S): at 22:16

## 2024-09-18 RX ADMIN — Medication 100 MILLIGRAM(S): at 22:20

## 2024-09-18 RX ADMIN — FLUTICASONE PROPIONATE 1 SPRAY(S): 50 SPRAY, METERED NASAL at 17:39

## 2024-09-18 RX ADMIN — Medication 20 MILLIGRAM(S): at 22:15

## 2024-09-18 RX ADMIN — MONTELUKAST SODIUM 10 MILLIGRAM(S): 5 TABLET, CHEWABLE ORAL at 08:12

## 2024-09-18 RX ADMIN — Medication 40 MILLIGRAM(S): at 22:15

## 2024-09-18 RX ADMIN — IPRATROPIUM BROMIDE AND ALBUTEROL SULFATE 3 MILLILITER(S): .5; 3 SOLUTION RESPIRATORY (INHALATION) at 17:39

## 2024-09-18 RX ADMIN — IPRATROPIUM BROMIDE AND ALBUTEROL SULFATE 3 MILLILITER(S): .5; 3 SOLUTION RESPIRATORY (INHALATION) at 21:02

## 2024-09-18 RX ADMIN — LOSARTAN POTASSIUM 100 MILLIGRAM(S): 50 TABLET ORAL at 05:27

## 2024-09-18 RX ADMIN — Medication 25 UNIT(S): at 17:37

## 2024-09-18 RX ADMIN — AMLODIPINE BESYLATE 5 MILLIGRAM(S): 10 TABLET ORAL at 05:27

## 2024-09-18 RX ADMIN — INSULIN GLARGINE 30 UNIT(S): 100 INJECTION, SOLUTION SUBCUTANEOUS at 22:20

## 2024-09-18 RX ADMIN — IPRATROPIUM BROMIDE AND ALBUTEROL SULFATE 3 MILLILITER(S): .5; 3 SOLUTION RESPIRATORY (INHALATION) at 12:08

## 2024-09-18 RX ADMIN — Medication 100 MILLIGRAM(S): at 08:12

## 2024-09-18 RX ADMIN — FLUTICASONE PROPIONATE AND SALMETEROL 1 DOSE(S): 250; 50 POWDER RESPIRATORY (INHALATION) at 17:39

## 2024-09-18 RX ADMIN — FLUTICASONE PROPIONATE 1 SPRAY(S): 50 SPRAY, METERED NASAL at 06:50

## 2024-09-18 RX ADMIN — IPRATROPIUM BROMIDE AND ALBUTEROL SULFATE 3 MILLILITER(S): .5; 3 SOLUTION RESPIRATORY (INHALATION) at 16:41

## 2024-09-18 RX ADMIN — IPRATROPIUM BROMIDE AND ALBUTEROL SULFATE 3 MILLILITER(S): .5; 3 SOLUTION RESPIRATORY (INHALATION) at 06:51

## 2024-09-18 RX ADMIN — FLUTICASONE PROPIONATE AND SALMETEROL 1 DOSE(S): 250; 50 POWDER RESPIRATORY (INHALATION) at 05:28

## 2024-09-18 RX ADMIN — Medication 2: at 08:11

## 2024-09-18 RX ADMIN — Medication 40 MILLIGRAM(S): at 12:08

## 2024-09-18 RX ADMIN — Medication 10 UNIT(S): at 08:11

## 2024-09-18 RX ADMIN — Medication 15 UNIT(S): at 12:09

## 2024-09-18 RX ADMIN — Medication 40 MILLIEQUIVALENT(S): at 12:08

## 2024-09-18 RX ADMIN — Medication 40 MILLIGRAM(S): at 05:28

## 2024-09-18 RX ADMIN — IPRATROPIUM BROMIDE AND ALBUTEROL SULFATE 3 MILLILITER(S): .5; 3 SOLUTION RESPIRATORY (INHALATION) at 02:27

## 2024-09-18 NOTE — PROGRESS NOTE ADULT - ASSESSMENT
64F PMHx asthma, HTN/HLD, DM. Presenting w/ 3d of SOB and cough, rx'd prednisone w/o improvement outpatient. Pt also been noticing increase in FS glucose while on steroid. In the ED, pt afebrile, bp stable, on RA saturating 99%, tachypneic to 22. Pt was audibly wheezing.   CBC w/ wbc 12.6, hgb 11.8, plt 289. CMP w/ SCr 0.79, bicarb 18, AG 18, glucose 346. VBG w/ initial lactate 6.5 -> 4.6 after 1L IVF. covid/flu/rsv neg. CXR showed clear lungs. Pt received solumedrol 125mg, Mg 2g, and duoneb x3. On interview/exam, pt still wheezing. Appears audible wheezing w/o stethoscope is more upper airway but on lung auscultation, with exp wheezing as well.           Asthma exacerbation:   -she has a known diagnosis of asthma and has been  on Dupixent biweekly as an outpt:  -came in here with asthma exacerbation, possibly 2nd to viral infection  -ENT recs appreciated, no VC dysfunction noted  -Still with significant wheezing, continue current dose of solumedrol  -Change Advair to 250/50 1 puff BID  -Change Duoneb to q4h  -Continue Singulair   -Normoxic, keep sats >90% with o2 PRN     9/15:   -she has been wheezing a lot:  cant decrease steroids:   -cont BD and cont iv steroids:   -cont singulair    9/16:  -she is still wheezing considerably:  -needs high dose steroids still:   -cont bd and singulair and nebs;   -check p eak flow q shift    9/17:  -she is still wheezing a lot:   -cont SM 40 mg q 8 hours:   -cont bd ;  -cont singulair and advair:   -check d dimer:   -peak flow q 12 hours:   -she remans on room air     9/18:  -seems OK;  wheezing persistent  cough excessively:   -d dimer is high : for cta:   -add tessdalone per les;   -cont bd:   -peak flow not done    Uncontrolled DM:   -secondary to stress and steroids   -monitor and control     9/15; cont current meds:   -cont to control on high dose glucose    9/16:  -pt still needs high dosages of steroids :  -cont to control blood glucose on high steroids     9/17: now on q 8 hours of steroids:  manage per primary team    9/18:  -blood gluse seems to be controlled    HTN:   -controlled:     dvt prophylaxis    dw acp

## 2024-09-18 NOTE — PROGRESS NOTE ADULT - ASSESSMENT
Patient is a 64 year old female with PMH of asthma, HTN, HLD, DM who presents with dyspnea and cough for 3 days. Patient reports she first started to have dyspnea and then cough, she saw her Pulmonologist earlier this week and was given oral prednisone and nebulizer treatment but did not improved. States possible sick contact is her sister in law who lives with them, states she had some URI symptoms and recently started going to a new day program. States she continued to have wheezing, chest tightness and dyspnea and came to the ER.    Acute asthma exacerbation  Suspect possible viral URI  SIRS likely d/t above   Leukocytosis likely reactive iso steroids  - tachypnea and leukocytosis on admission, remains afebrile  - COVID/RSV/Flu negative  - CXR with no consolidation/infiltrate  - PCT negative   - UA negative for infection, no gu sx noted   - Bcx NGTD x2 (48h)  - lactate noted, low suspicion for infection, Nephrology following   - remains afebrile, on RA, WBC trend noted, nontoxic    Recommendations:   Continue off antibiotics   Steroids and nebs per Pulmonary   Supportive care   Continue rest of care per primary team   Stable from ID standpoint at this time.       Meek Duque M.D.  [facility], Division of Infectious Diseases  [phone]  After 5pm on weekdays and all day on weekends - please call [phone]  Available on [employer] TEAMS

## 2024-09-18 NOTE — PROGRESS NOTE ADULT - SUBJECTIVE AND OBJECTIVE BOX
DATE OF SERVICE: 09-18-24 @ 13:41    Patient is a 64y old  Female who presents with a chief complaint of Asthma exacerbation (14 Sep 2024 16:05)      INTERVAL HISTORY: Feels ok.     REVIEW OF SYSTEMS:  CONSTITUTIONAL: No weakness  EYES/ENT: No visual changes;  No throat pain   NECK: No pain or stiffness  RESPIRATORY: No cough, wheezing; No shortness of breath  CARDIOVASCULAR: No chest pain or palpitations  GASTROINTESTINAL: No abdominal  pain. No nausea, vomiting, or hematemesis  GENITOURINARY: No dysuria, frequency or hematuria  NEUROLOGICAL: No stroke like symptoms  SKIN: No rashes    TELEMETRY Personally reviewed: SR 70-90  	  MEDICATIONS:  amLODIPine   Tablet 5 milliGRAM(s) Oral daily  losartan 100 milliGRAM(s) Oral daily        PHYSICAL EXAM:  T(C): 36.5 (09-18-24 @ 12:16), Max: 36.8 (09-18-24 @ 04:35)  HR: 85 (09-18-24 @ 12:16) (77 - 86)  BP: 157/85 (09-18-24 @ 12:16) (139/80 - 157/85)  RR: 18 (09-18-24 @ 12:16) (18 - 18)  SpO2: 97% (09-18-24 @ 12:16) (96% - 98%)  Wt(kg): --  I&O's Summary        Appearance: In no distress	  HEENT:    PERRL, EOMI	  Cardiovascular:  S1 S2, No JVD  Respiratory: B/L wheezing  Gastrointestinal:  Soft, Non-tender, + BS	  Vascularature:  No edema of LE  Psychiatric: Appropriate affect   Neuro: no acute focal deficits                               11.6   13.76 )-----------( 286      ( 18 Sep 2024 09:59 )             34.9     09-18    141  |  101  |  19  ----------------------------<  230[H]  3.3[L]   |  23  |  0.73    Ca    8.8      18 Sep 2024 09:59  Mg     2.2     09-18          Labs personally reviewed      ASSESSMENT/PLAN: 	    64F PMHx asthma, HTN/HLD, DM. Presenting w/ 3d of SOB and cough, rx'd prednisone w/o improvement outpatient. Pt also been noticing increase in FS glucose while on steroid.  In the ED, pt afebrile, bp stable, on RA saturating 99%, tachypneic to 22. Pt was audibly wheezing.     1. Atypical CP   - non exertional, cp secondary to asthma exacerbation  - Pt describes pain is typical of her asthma  - Trop neg x1, awaiting 2nd   - ECG - SR with PAC, recommend repeat   - TTE wnl    2. Asthma Exacerbation  - audibly wheezing  - Still with significant wheezing, continue current dose of solumedrol  -Normoxic, keep sats >90% with o2 PRN   -pulm following    3. HTN  controlled   c/w amLODIPine   Tablet 5 milliGRAM(s) Oral daily   c/w losartan 100 milliGRAM(s) Oral daily    4. HLD   -check LDL   c/w atorvastatin 20 milliGRAM(s) Oral at bedtime    5. Uncontrolled DM  - secondary to stress and steroids, monitor and control   - check A1c     6. DVT prophylactic  - SCD's           Cornelia Schneider, AG-NP   Evan Dietz DO [facility]  Cardiovascular Medicine  [address] [place] [address], [address]  Available through call or text on [employer] TEAM[employer]  Office: [phone]

## 2024-09-18 NOTE — PROGRESS NOTE ADULT - SUBJECTIVE AND OBJECTIVE BOX
Date of Service: 09-18-24 @ 15:44    Patient is a 64y old  Female who presents with a chief complaint of Asthma exacerbation (14 Sep 2024 16:05)      Any change in ROS: she is still wheezing a lot:   d dimer is high ; for cta today   cough +    MEDICATIONS  (STANDING):  albuterol/ipratropium for Nebulization 3 milliLiter(s) Nebulizer every 4 hours  amLODIPine   Tablet 5 milliGRAM(s) Oral daily  atorvastatin 20 milliGRAM(s) Oral at bedtime  dextrose 5%. 1000 milliLiter(s) (50 mL/Hr) IV Continuous <Continuous>  dextrose 5%. 1000 milliLiter(s) (100 mL/Hr) IV Continuous <Continuous>  dextrose 50% Injectable 12.5 Gram(s) IV Push once  dextrose 50% Injectable 25 Gram(s) IV Push once  dextrose 50% Injectable 25 Gram(s) IV Push once  fluticasone propionate 50 MICROgram(s)/spray Nasal Spray 1 Spray(s) Both Nostrils two times a day  fluticasone propionate/ salmeterol 250-50 MICROgram(s) Diskus 1 Dose(s) Inhalation two times a day  glucagon  Injectable 1 milliGRAM(s) IntraMuscular once  insulin glargine Injectable (LANTUS) 30 Unit(s) SubCutaneous at bedtime  insulin lispro (ADMELOG) corrective regimen sliding scale   SubCutaneous three times a day before meals  insulin lispro (ADMELOG) corrective regimen sliding scale   SubCutaneous at bedtime  insulin lispro Injectable (ADMELOG) 15 Unit(s) SubCutaneous before lunch  insulin lispro Injectable (ADMELOG) 25 Unit(s) SubCutaneous before dinner  insulin lispro Injectable (ADMELOG) 10 Unit(s) SubCutaneous before breakfast  losartan 100 milliGRAM(s) Oral daily  methylPREDNISolone sodium succinate Injectable 40 milliGRAM(s) IV Push every 8 hours  montelukast 10 milliGRAM(s) Oral daily  sodium chloride 0.9%. 1000 milliLiter(s) (100 mL/Hr) IV Continuous <Continuous>  thiamine 100 milliGRAM(s) Oral daily    MEDICATIONS  (PRN):  acetaminophen     Tablet .. 650 milliGRAM(s) Oral every 6 hours PRN Temp greater or equal to 38C (100.4F), Mild Pain (1 - 3)  albuterol    90 MICROgram(s) HFA Inhaler 2 Puff(s) Inhalation every 2 hours PRN Shortness of Breath and/or Wheezing  aluminum hydroxide/magnesium hydroxide/simethicone Suspension 30 milliLiter(s) Oral every 4 hours PRN Dyspepsia  dextrose Oral Gel 15 Gram(s) Oral once PRN Blood Glucose LESS THAN 70 milliGRAM(s)/deciliter  melatonin 3 milliGRAM(s) Oral at bedtime PRN Insomnia  ondansetron Injectable 4 milliGRAM(s) IV Push every 8 hours PRN Nausea and/or Vomiting    Vital Signs Last 24 Hrs  T(C): 36.5 (18 Sep 2024 12:16), Max: 36.8 (18 Sep 2024 04:35)  T(F): 97.7 (18 Sep 2024 12:16), Max: 98.2 (18 Sep 2024 04:35)  HR: 85 (18 Sep 2024 12:16) (77 - 85)  BP: 157/85 (18 Sep 2024 12:16) (139/80 - 157/85)  BP(mean): --  RR: 18 (18 Sep 2024 12:16) (18 - 18)  SpO2: 97% (18 Sep 2024 12:16) (96% - 98%)    Parameters below as of 18 Sep 2024 12:16  Patient On (Oxygen Delivery Method): room air        I&O's Summary        Physical Exam:   GENERAL: NAD, well-groomed, well-developed  HEENT: SAQIB/   Atraumatic, Normocephalic  ENMT: No tonsillar erythema, exudates, or enlargement; Moist mucous membranes, Good dentition, No lesions  NECK: Supple, No JVD, Normal thyroid  CHEST/LUNG: wheezing++  CVS: Regular rate and rhythm; No murmurs, rubs, or gallops  GI: : Soft, Nontender, Nondistended; Bowel sounds present  NERVOUS SYSTEM:  Alert & Oriented X3  EXTREMITIES:  2+ Peripheral Pulses, No clubbing, cyanosis, or edema  LYMPH: No lymphadenopathy noted  SKIN: No rashes or lesions  ENDOCRINOLOGY: No Thyromegaly  PSYCH: Appropriate    Labs:  23, 25, 22                            11.6   13.76 )-----------( 286      ( 18 Sep 2024 09:59 )             34.9                         11.9   13.98 )-----------( 277      ( 17 Sep 2024 09:13 )             35.7                         11.1   14.20 )-----------( 288      ( 15 Sep 2024 06:58 )             33.9     09-18    141  |  101  |  19  ----------------------------<  230[H]  3.3[L]   |  23  |  0.73  09-17    139  |  101  |  16  ----------------------------<  200[H]  3.4[L]   |  20[L]  |  0.74  09-16    139  |  103  |  16  ----------------------------<  223[H]  3.7   |  21[L]  |  0.67  09-15    139  |  102  |  20  ----------------------------<  283[H]  4.0   |  20[L]  |  0.76    Ca    8.8      18 Sep 2024 09:59  Ca    9.4      17 Sep 2024 09:13  Mg     2.2     09-18    TPro  7.0  /  Alb  3.9  /  TBili  0.2  /  DBili  x   /  AST  20  /  ALT  21  /  AlkPhos  98  09-15    CAPILLARY BLOOD GLUCOSE      POCT Blood Glucose.: 116 mg/dL (18 Sep 2024 11:56)  POCT Blood Glucose.: 151 mg/dL (18 Sep 2024 07:45)  POCT Blood Glucose.: 89 mg/dL (17 Sep 2024 21:50)  POCT Blood Glucose.: 297 mg/dL (17 Sep 2024 16:38)          Urinalysis Basic - ( 18 Sep 2024 09:59 )    Color: x / Appearance: x / SG: x / pH: x  Gluc: 230 mg/dL / Ketone: x  / Bili: x / Urobili: x   Blood: x / Protein: x / Nitrite: x   Leuk Esterase: x / RBC: x / WBC x   Sq Epi: x / Non Sq Epi: x / Bacteria: x      D-Dimer Assay, Quantitative: 570 ng/mL DDU (09-18 @ 09:59)  Lactate, Blood: 5.5 mmol/L (09-18 @ 09:59)  Lactate, Blood: 5.9 mmol/L (09-17 @ 09:13)  Lactate, Blood: 4.9 mmol/L (09-16 @ 06:55)  Lactate, Blood: 5.1 mmol/L (09-15 @ 23:43)  Lactate, Blood: 5.1 mmol/L (09-15 @ 06:56)        RECENT CULTURES:  09-15 @ 11:30 .Blood Blood                No growth at 72 Hours    09-15 @ 11:22 .Blood Blood                No growth at 72 Hours          RESPIRATORY CULTURES:          Studies  Chest X-RAY  CT SCAN Chest   Venous Dopplers: LE:   CT Abdomen  Others

## 2024-09-18 NOTE — PROGRESS NOTE ADULT - SUBJECTIVE AND OBJECTIVE BOX
SUBJECTIVE / OVERNIGHT EVENTS:    Patient seen and examined at bedside. Reports that she had to use supplemental oxygen overnight. Wheezing ongoing. However in good spirits       --------------------------------------------------------------------------------------------  LABS:                        11.9   13.98 )-----------( 277      ( 17 Sep 2024 09:13 )             35.7     09-17    139  |  101  |  16  ----------------------------<  200[H]  3.4[L]   |  20[L]  |  0.74    Ca    9.4      17 Sep 2024 09:13        CAPILLARY BLOOD GLUCOSE      POCT Blood Glucose.: 151 mg/dL (18 Sep 2024 07:45)  POCT Blood Glucose.: 89 mg/dL (17 Sep 2024 21:50)  POCT Blood Glucose.: 297 mg/dL (17 Sep 2024 16:38)  POCT Blood Glucose.: 174 mg/dL (17 Sep 2024 12:21)        Urinalysis Basic - ( 17 Sep 2024 09:13 )    Color: x / Appearance: x / SG: x / pH: x  Gluc: 200 mg/dL / Ketone: x  / Bili: x / Urobili: x   Blood: x / Protein: x / Nitrite: x   Leuk Esterase: x / RBC: x / WBC x   Sq Epi: x / Non Sq Epi: x / Bacteria: x        RADIOLOGY & ADDITIONAL TESTS:    Imaging Personally Reviewed:  [x] YES  [ ] NO    Consultant(s) Notes Reviewed:  [x] YES  [ ] NO    MEDICATIONS  (STANDING):  albuterol/ipratropium for Nebulization 3 milliLiter(s) Nebulizer every 4 hours  amLODIPine   Tablet 5 milliGRAM(s) Oral daily  atorvastatin 20 milliGRAM(s) Oral at bedtime  dextrose 5%. 1000 milliLiter(s) (100 mL/Hr) IV Continuous <Continuous>  dextrose 5%. 1000 milliLiter(s) (50 mL/Hr) IV Continuous <Continuous>  dextrose 50% Injectable 12.5 Gram(s) IV Push once  dextrose 50% Injectable 25 Gram(s) IV Push once  dextrose 50% Injectable 25 Gram(s) IV Push once  fluticasone propionate 50 MICROgram(s)/spray Nasal Spray 1 Spray(s) Both Nostrils two times a day  fluticasone propionate/ salmeterol 250-50 MICROgram(s) Diskus 1 Dose(s) Inhalation two times a day  glucagon  Injectable 1 milliGRAM(s) IntraMuscular once  insulin glargine Injectable (LANTUS) 30 Unit(s) SubCutaneous at bedtime  insulin lispro (ADMELOG) corrective regimen sliding scale   SubCutaneous three times a day before meals  insulin lispro (ADMELOG) corrective regimen sliding scale   SubCutaneous at bedtime  insulin lispro Injectable (ADMELOG) 30 Unit(s) SubCutaneous before dinner  insulin lispro Injectable (ADMELOG) 10 Unit(s) SubCutaneous before breakfast  insulin lispro Injectable (ADMELOG) 15 Unit(s) SubCutaneous before lunch  losartan 100 milliGRAM(s) Oral daily  methylPREDNISolone sodium succinate Injectable 40 milliGRAM(s) IV Push every 8 hours  montelukast 10 milliGRAM(s) Oral daily  sodium chloride 0.9%. 1000 milliLiter(s) (100 mL/Hr) IV Continuous <Continuous>  thiamine 100 milliGRAM(s) Oral daily    MEDICATIONS  (PRN):  acetaminophen     Tablet .. 650 milliGRAM(s) Oral every 6 hours PRN Temp greater or equal to 38C (100.4F), Mild Pain (1 - 3)  albuterol    90 MICROgram(s) HFA Inhaler 2 Puff(s) Inhalation every 2 hours PRN Shortness of Breath and/or Wheezing  aluminum hydroxide/magnesium hydroxide/simethicone Suspension 30 milliLiter(s) Oral every 4 hours PRN Dyspepsia  dextrose Oral Gel 15 Gram(s) Oral once PRN Blood Glucose LESS THAN 70 milliGRAM(s)/deciliter  melatonin 3 milliGRAM(s) Oral at bedtime PRN Insomnia  ondansetron Injectable 4 milliGRAM(s) IV Push every 8 hours PRN Nausea and/or Vomiting      Care Discussed with Consultants/Other Providers [x] YES  [ ] NO    Vital Signs Last 24 Hrs  T(C): 36.8 (18 Sep 2024 04:35), Max: 36.8 (18 Sep 2024 04:35)  T(F): 98.2 (18 Sep 2024 04:35), Max: 98.2 (18 Sep 2024 04:35)  HR: 79 (18 Sep 2024 04:35) (77 - 86)  BP: 139/80 (18 Sep 2024 04:35) (139/80 - 154/89)  BP(mean): --  RR: 18 (18 Sep 2024 04:35) (18 - 18)  SpO2: 96% (18 Sep 2024 04:35) (96% - 98%)    Parameters below as of 18 Sep 2024 04:35  Patient On (Oxygen Delivery Method): room air      I&O's Summary      PHYSICAL EXAM:  GENERAL: NAD, well-developed, comfortable  HEAD:  Atraumatic, Normocephalic  EYES: EOMI, PERRLA, conjunctiva and sclera clear  NECK: Supple, No JVD  CHEST/LUNG: Diminished bilaterally;+wheeze  HEART: Regular rate and rhythm; No murmurs, rubs, or gallops  ABDOMEN: Soft, Nontender, Nondistended; Bowel sounds present  NEURO: AAOx3, no focal weakness, 5/5 b/l extremity strength, b/l knee no arthritis, no effusion   EXTREMITIES:  2+ Peripheral Pulses, No clubbing, cyanosis, or edema  SKIN: No rashes or lesions

## 2024-09-18 NOTE — PROGRESS NOTE ADULT - SUBJECTIVE AND OBJECTIVE BOX
[facility]  JAMSHID Bridges Y. Patel, S. Shah, G. Husam  [phone]  ([phone] - weekdays after 5pm and weekends)    Name: ROGER PALUMBO  Age/Gender: 64y Female  MRN: [id number]    Interval History:  Patient seen and examined this morning.   Resting comfortably.   Notes reviewed  No concerning overnight events  Afebrile     Allergies: NSAIDs (Unknown)  aspirin (Hives)  AValox (Unknown)      Objective:  Vitals:   T(F): 98.2 (09-18-24 @ 04:35), Max: 98.2 (09-18-24 @ 04:35)  HR: 79 (09-18-24 @ 04:35) (77 - 86)  BP: 139/80 (09-18-24 @ 04:35) (139/80 - 154/89)  RR: 18 (09-18-24 @ 04:35) (18 - 18)  SpO2: 96% (09-18-24 @ 04:35) (96% - 98%)  Physical Examination:  General: no acute distress, RA, nontoxic  HEENT: NC/AT, anicteric, EOMI  Respiratory: no acc muscle use, breathing comfortably  Cardiovascular: S1 and S2 present  Gastrointestinal: normal appearing, nondistended  Extremities: no edema, no cyanosis  Skin: no visible rash    Laboratory Studies:  CBC:                       11.9   13.98 )-----------( 277      ( 17 Sep 2024 09:13 )             35.7     WBC Trend:  13.98 09-17-24 @ 09:13  14.20 09-15-24 @ 06:58  11.31 09-14-24 @ 06:26  12.60 09-12-24 @ 18:49    CMP: 09-17    139  |  101  |  16  ----------------------------<  200[H]  3.4[L]   |  20[L]  |  0.74    Ca    9.4      17 Sep 2024 09:13      Creatinine: 0.74 mg/dL (09-17-24 @ 09:13)  Creatinine: 0.67 mg/dL (09-16-24 @ 06:55)  Creatinine: 0.76 mg/dL (09-15-24 @ 06:56)  Creatinine: 0.81 mg/dL (09-14-24 @ 08:59)  Creatinine: 0.77 mg/dL (09-13-24 @ 14:44)  Creatinine: 0.80 mg/dL (09-13-24 @ 06:17)  Creatinine: 0.79 mg/dL (09-12-24 @ 18:49)    Microbiology: reviewed   Culture - Blood (collected 09-15-24 @ 11:30)  Source: .Blood Blood  Preliminary Report (09-17-24 @ 15:01):    No growth at 48 Hours    Culture - Blood (collected 09-15-24 @ 11:22)  Source: .Blood Blood  Preliminary Report (09-17-24 @ 15:01):    No growth at 48 Hours    SARS-CoV-2 Result: NotDetec (12 Sep 2024 18:49)    Radiology: reviewed     Medications:  acetaminophen     Tablet .. 650 milliGRAM(s) Oral every 6 hours PRN  albuterol    90 MICROgram(s) HFA Inhaler 2 Puff(s) Inhalation every 2 hours PRN  albuterol/ipratropium for Nebulization 3 milliLiter(s) Nebulizer every 4 hours  aluminum hydroxide/magnesium hydroxide/simethicone Suspension 30 milliLiter(s) Oral every 4 hours PRN  amLODIPine   Tablet 5 milliGRAM(s) Oral daily  atorvastatin 20 milliGRAM(s) Oral at bedtime  dextrose 5%. 1000 milliLiter(s) IV Continuous <Continuous>  dextrose 5%. 1000 milliLiter(s) IV Continuous <Continuous>  dextrose 50% Injectable 12.5 Gram(s) IV Push once  dextrose 50% Injectable 25 Gram(s) IV Push once  dextrose 50% Injectable 25 Gram(s) IV Push once  dextrose Oral Gel 15 Gram(s) Oral once PRN  fluticasone propionate 50 MICROgram(s)/spray Nasal Spray 1 Spray(s) Both Nostrils two times a day  fluticasone propionate/ salmeterol 250-50 MICROgram(s) Diskus 1 Dose(s) Inhalation two times a day  glucagon  Injectable 1 milliGRAM(s) IntraMuscular once  insulin glargine Injectable (LANTUS) 30 Unit(s) SubCutaneous at bedtime  insulin lispro (ADMELOG) corrective regimen sliding scale   SubCutaneous three times a day before meals  insulin lispro (ADMELOG) corrective regimen sliding scale   SubCutaneous at bedtime  insulin lispro Injectable (ADMELOG) 15 Unit(s) SubCutaneous before lunch  insulin lispro Injectable (ADMELOG) 30 Unit(s) SubCutaneous before dinner  insulin lispro Injectable (ADMELOG) 10 Unit(s) SubCutaneous before breakfast  losartan 100 milliGRAM(s) Oral daily  melatonin 3 milliGRAM(s) Oral at bedtime PRN  methylPREDNISolone sodium succinate Injectable 40 milliGRAM(s) IV Push every 8 hours  montelukast 10 milliGRAM(s) Oral daily  ondansetron Injectable 4 milliGRAM(s) IV Push every 8 hours PRN  sodium chloride 0.9%. 1000 milliLiter(s) IV Continuous <Continuous>  thiamine 100 milliGRAM(s) Oral daily

## 2024-09-18 NOTE — PROGRESS NOTE ADULT - ASSESSMENT
64 y.o. female with history of T2DM , Asthma, HTN and HLD presents for asthma exacerbation.    1. T2DM with hyperglycemia  - Continue Lantus 30 units at night  - Continue Admelog 10 units with breakfast, 15 units with lunch and decrease to 25 units with dinner  - Continue moderate Admelog correctional scale before meals and bedtime  - Monitor FS before meals and bedtime  - Follow hospital hypoglycemic protocol    2. Asthma exacerbation  - currently on solumedrol 40 mg IV Q6H  - pulm following    Will continue to follow.     Zoe Hayward MD  [facility]    [address] [place] [address]    T [phone]  F [phone]   64 y.o. female with history of T2DM , Asthma, HTN and HLD presents for asthma exacerbation.    1. T2DM with hyperglycemia  - Continue Lantus 30 units at night  - Continue Admelog 10 units with breakfast, 15 units with lunch and decrease to 25 units with dinner  - Continue moderate Admelog correctional scale before meals and bedtime  - Monitor FS before meals and bedtime  - Follow hospital hypoglycemic protocol    2. Asthma exacerbation  - currently on solumedrol 40 mg IV Q8H  - pulm following    Will continue to follow.     Zoe Hayward MD  [facility]    [address] [place] [address]    T [phone]  F [phone]

## 2024-09-18 NOTE — PROGRESS NOTE ADULT - ASSESSMENT
64F admitted for asthma exacerbation.     Plan:    # Acute asthma exacerbation:  - CXR w/ clear lungs  - C/w nebs/ inhalers/ Singulair   - C/w steroids per Pulm  - Check ddimer  - Serial ABG's  - Maintain Fio2>90%  - Seen by ICU 9/13-> pt does not require ICU level of care  - Seen by ENT 9/13-> afrin to b/l nare bid x3days then d/c, flonase to b/l nare bid r6ofmbl. Pt is to follow up at ENT in 2 weeks with Nikita Badillo, Serafin Dumont. Call [phone] to make appointment.  - Pulm following    # lactic acidosis:  - Hold hctz  - Trend lactate/ CMP  - Renal following    # Atypical chest pain:  - Likely  secondary to asthma exacerbation  - trops negative.  - Echo w/ Left ventricular cavity is normalin size. Left ventricular wall thickness is normal. Left ventricular systolic function is normal. There are no regional wall motion abnormalities seen.  - Cards following    # SIRS:  - Monitor off abx  - ID following    # Dm2/ Hyperglycemia:  - HgbA1c 7.8  - Continue to monitor blood glucose levels  - Sliding Scale  - Endo following    # HTN/ HLD:  - C/w CV meds    # GI:  - Bowel regimen prn    # DVT ppx:  - IPC's    [facility]  [phone]

## 2024-09-19 LAB
ANION GAP SERPL CALC-SCNC: 15 MMOL/L — SIGNIFICANT CHANGE UP (ref 5–17)
BUN SERPL-MCNC: 20 MG/DL — SIGNIFICANT CHANGE UP (ref 7–23)
CALCIUM SERPL-MCNC: 8.4 MG/DL — SIGNIFICANT CHANGE UP (ref 8.4–10.5)
CHLORIDE SERPL-SCNC: 96 MMOL/L — SIGNIFICANT CHANGE UP (ref 96–108)
CO2 SERPL-SCNC: 22 MMOL/L — SIGNIFICANT CHANGE UP (ref 22–31)
CREAT SERPL-MCNC: 0.75 MG/DL — SIGNIFICANT CHANGE UP (ref 0.5–1.3)
EGFR: 90 ML/MIN/1.73M2 — SIGNIFICANT CHANGE UP
GLUCOSE BLDC GLUCOMTR-MCNC: 108 MG/DL — HIGH (ref 70–99)
GLUCOSE BLDC GLUCOMTR-MCNC: 108 MG/DL — HIGH (ref 70–99)
GLUCOSE BLDC GLUCOMTR-MCNC: 141 MG/DL — HIGH (ref 70–99)
GLUCOSE BLDC GLUCOMTR-MCNC: 179 MG/DL — HIGH (ref 70–99)
GLUCOSE SERPL-MCNC: 171 MG/DL — HIGH (ref 70–99)
HCT VFR BLD CALC: 34.1 % — LOW (ref 34.5–45)
HGB BLD-MCNC: 11.3 G/DL — LOW (ref 11.5–15.5)
LACTATE SERPL-SCNC: 3.3 MMOL/L — HIGH (ref 0.5–2)
LACTATE SERPL-SCNC: 4.1 MMOL/L — CRITICAL HIGH (ref 0.5–2)
MAGNESIUM SERPL-MCNC: 2.2 MG/DL — SIGNIFICANT CHANGE UP (ref 1.6–2.6)
MCHC RBC-ENTMCNC: 27.6 PG — SIGNIFICANT CHANGE UP (ref 27–34)
MCHC RBC-ENTMCNC: 33.1 GM/DL — SIGNIFICANT CHANGE UP (ref 32–36)
MCV RBC AUTO: 83.4 FL — SIGNIFICANT CHANGE UP (ref 80–100)
NRBC # BLD: 0 /100 WBCS — SIGNIFICANT CHANGE UP (ref 0–0)
PLATELET # BLD AUTO: 160 K/UL — SIGNIFICANT CHANGE UP (ref 150–400)
POTASSIUM SERPL-MCNC: 4 MMOL/L — SIGNIFICANT CHANGE UP (ref 3.5–5.3)
POTASSIUM SERPL-SCNC: 4 MMOL/L — SIGNIFICANT CHANGE UP (ref 3.5–5.3)
RBC # BLD: 4.09 M/UL — SIGNIFICANT CHANGE UP (ref 3.8–5.2)
RBC # FLD: 14.4 % — SIGNIFICANT CHANGE UP (ref 10.3–14.5)
SODIUM SERPL-SCNC: 133 MMOL/L — LOW (ref 135–145)
WBC # BLD: 11.7 K/UL — HIGH (ref 3.8–10.5)
WBC # FLD AUTO: 11.7 K/UL — HIGH (ref 3.8–10.5)

## 2024-09-19 RX ORDER — METHYLPREDNISOLONE 4 MG
20 TABLET ORAL EVERY 8 HOURS
Refills: 0 | Status: DISCONTINUED | OUTPATIENT
Start: 2024-09-19 | End: 2024-09-20

## 2024-09-19 RX ORDER — METHYLPREDNISOLONE 4 MG
30 TABLET ORAL EVERY 8 HOURS
Refills: 0 | Status: DISCONTINUED | OUTPATIENT
Start: 2024-09-19 | End: 2024-09-19

## 2024-09-19 RX ADMIN — Medication 100 MILLIGRAM(S): at 22:05

## 2024-09-19 RX ADMIN — Medication 100 MILLIGRAM(S): at 12:45

## 2024-09-19 RX ADMIN — IPRATROPIUM BROMIDE AND ALBUTEROL SULFATE 3 MILLILITER(S): .5; 3 SOLUTION RESPIRATORY (INHALATION) at 06:02

## 2024-09-19 RX ADMIN — FLUTICASONE PROPIONATE 1 SPRAY(S): 50 SPRAY, METERED NASAL at 17:42

## 2024-09-19 RX ADMIN — LOSARTAN POTASSIUM 100 MILLIGRAM(S): 50 TABLET ORAL at 05:57

## 2024-09-19 RX ADMIN — Medication 100 MILLIGRAM(S): at 05:56

## 2024-09-19 RX ADMIN — Medication 15 UNIT(S): at 12:45

## 2024-09-19 RX ADMIN — IPRATROPIUM BROMIDE AND ALBUTEROL SULFATE 3 MILLILITER(S): .5; 3 SOLUTION RESPIRATORY (INHALATION) at 22:08

## 2024-09-19 RX ADMIN — MONTELUKAST SODIUM 10 MILLIGRAM(S): 5 TABLET, CHEWABLE ORAL at 12:45

## 2024-09-19 RX ADMIN — Medication 10 UNIT(S): at 08:34

## 2024-09-19 RX ADMIN — Medication 25 UNIT(S): at 17:41

## 2024-09-19 RX ADMIN — FLUTICASONE PROPIONATE AND SALMETEROL 1 DOSE(S): 250; 50 POWDER RESPIRATORY (INHALATION) at 18:07

## 2024-09-19 RX ADMIN — ACETAMINOPHEN 650 MILLIGRAM(S): 325 TABLET ORAL at 20:30

## 2024-09-19 RX ADMIN — IPRATROPIUM BROMIDE AND ALBUTEROL SULFATE 3 MILLILITER(S): .5; 3 SOLUTION RESPIRATORY (INHALATION) at 14:26

## 2024-09-19 RX ADMIN — INSULIN GLARGINE 30 UNIT(S): 100 INJECTION, SOLUTION SUBCUTANEOUS at 22:06

## 2024-09-19 RX ADMIN — ACETAMINOPHEN 650 MILLIGRAM(S): 325 TABLET ORAL at 19:51

## 2024-09-19 RX ADMIN — Medication 100 MILLIGRAM(S): at 14:26

## 2024-09-19 RX ADMIN — IPRATROPIUM BROMIDE AND ALBUTEROL SULFATE 3 MILLILITER(S): .5; 3 SOLUTION RESPIRATORY (INHALATION) at 17:42

## 2024-09-19 RX ADMIN — IPRATROPIUM BROMIDE AND ALBUTEROL SULFATE 3 MILLILITER(S): .5; 3 SOLUTION RESPIRATORY (INHALATION) at 10:07

## 2024-09-19 RX ADMIN — Medication 20 MILLIGRAM(S): at 22:07

## 2024-09-19 RX ADMIN — FLUTICASONE PROPIONATE AND SALMETEROL 1 DOSE(S): 250; 50 POWDER RESPIRATORY (INHALATION) at 06:02

## 2024-09-19 RX ADMIN — IPRATROPIUM BROMIDE AND ALBUTEROL SULFATE 3 MILLILITER(S): .5; 3 SOLUTION RESPIRATORY (INHALATION) at 03:07

## 2024-09-19 RX ADMIN — AMLODIPINE BESYLATE 5 MILLIGRAM(S): 10 TABLET ORAL at 05:57

## 2024-09-19 RX ADMIN — FLUTICASONE PROPIONATE 1 SPRAY(S): 50 SPRAY, METERED NASAL at 06:01

## 2024-09-19 RX ADMIN — Medication 20 MILLIGRAM(S): at 22:06

## 2024-09-19 RX ADMIN — SODIUM CHLORIDE 100 MILLILITER(S): 9 INJECTION INTRAMUSCULAR; INTRAVENOUS; SUBCUTANEOUS at 06:03

## 2024-09-19 RX ADMIN — Medication 40 MILLIGRAM(S): at 05:57

## 2024-09-19 NOTE — PROGRESS NOTE ADULT - ASSESSMENT
PS sent to provider asking for DME orders and notes on wheelchair and BSC. I have also requested [address] orders for pt so I can establish [address] with [facility] agency. 1413 - Referral and [address] orders faxed to [facility] YESENIA DEAVN. DME order faxed to Jarad Linares. [facility] cannot accept pt. Referral sent to [facility]. 1555 - This RN CM has not heard back from GAGANDEEP MYLES [facility], [facility]. Referral also sent to [facility] and [facility]. PS sent to provider regarding [address] and difficulty finding provider. 1626 - Attempted to call radhika Jara pickup after 15 minutes. Unable to leave VM. Order for DME orders faxed to [facility]. I have also placed the charge nurse's number in case I am not in when [facility] calls back. 1635 - [facility] unable to accept pt due to staffing. [facility]. PS sent to provider about difficulty getting [address] and DME. Pt and family updated on progress. · Assessment	  64F PMHx asthma, HTN/HLD, DM. hx breast ca and colon ca, Presenting w/ 3d of SOB and cough, rx'd prednisone w/o improvement outpatient. Pt also been noticing increase in FS glucose while on steroid.   on admission w/ initial lactate 6.5 -> 4.6 after 1L IVF.  pt repeat lactic acid on 9/13 6.2.. of note, outpt has been on metformin as well   covid/flu/rsv neg. CXR showed clear lungs. Pt received solumedrol 125mg, Mg 2g, and duoneb x3.   pt receiving beta agonists as well         1- lactic acidosis /high AGAP acidosis   2- HTN   3- hyperglycemia/DM  4- wheezing /sob   5- asthma exacerbation     lactate improving   continue NS @100 ml/hr and dc in am   U/A unremarkable  blood cx negative to date  thiamine level pending  thiamine 100 mg daily  amlodipine 5 mg daily  losartan 100 mg daily  trend bp  steroids as per pulm

## 2024-09-19 NOTE — PROGRESS NOTE ADULT - SUBJECTIVE AND OBJECTIVE BOX
[facility]   [phone]  RENAL FOLLOW UP NOTE  --------------------------------------------------------------------------------  Chief Complaint:    24 hour events/subjective:    seen earlier  states still with sob     PAST HISTORY  --------------------------------------------------------------------------------  No significant changes to PMH, PSH, FHx, SHx, unless otherwise noted    ALLERGIES & MEDICATIONS  --------------------------------------------------------------------------------  Allergies    NSAIDs (Unknown)  aspirin (Hives)  AValox (Unknown)    Intolerances      Standing Inpatient Medications  albuterol/ipratropium for Nebulization 3 milliLiter(s) Nebulizer every 4 hours  amLODIPine   Tablet 5 milliGRAM(s) Oral daily  atorvastatin 20 milliGRAM(s) Oral at bedtime  benzonatate 100 milliGRAM(s) Oral every 8 hours  dextrose 5%. 1000 milliLiter(s) IV Continuous <Continuous>  dextrose 5%. 1000 milliLiter(s) IV Continuous <Continuous>  dextrose 50% Injectable 12.5 Gram(s) IV Push once  dextrose 50% Injectable 25 Gram(s) IV Push once  dextrose 50% Injectable 25 Gram(s) IV Push once  fluticasone propionate 50 MICROgram(s)/spray Nasal Spray 1 Spray(s) Both Nostrils two times a day  fluticasone propionate/ salmeterol 250-50 MICROgram(s) Diskus 1 Dose(s) Inhalation two times a day  glucagon  Injectable 1 milliGRAM(s) IntraMuscular once  insulin glargine Injectable (LANTUS) 30 Unit(s) SubCutaneous at bedtime  insulin lispro (ADMELOG) corrective regimen sliding scale   SubCutaneous three times a day before meals  insulin lispro (ADMELOG) corrective regimen sliding scale   SubCutaneous at bedtime  insulin lispro Injectable (ADMELOG) 25 Unit(s) SubCutaneous before dinner  insulin lispro Injectable (ADMELOG) 10 Unit(s) SubCutaneous before breakfast  losartan 100 milliGRAM(s) Oral daily  methylPREDNISolone sodium succinate Injectable 20 milliGRAM(s) IV Push every 8 hours  montelukast 10 milliGRAM(s) Oral daily  sodium chloride 0.9%. 1000 milliLiter(s) IV Continuous <Continuous>  thiamine 100 milliGRAM(s) Oral daily    PRN Inpatient Medications  acetaminophen     Tablet .. 650 milliGRAM(s) Oral every 6 hours PRN  albuterol    90 MICROgram(s) HFA Inhaler 2 Puff(s) Inhalation every 2 hours PRN  aluminum hydroxide/magnesium hydroxide/simethicone Suspension 30 milliLiter(s) Oral every 4 hours PRN  dextrose Oral Gel 15 Gram(s) Oral once PRN  melatonin 3 milliGRAM(s) Oral at bedtime PRN  ondansetron Injectable 4 milliGRAM(s) IV Push every 8 hours PRN      REVIEW OF SYSTEMS  --------------------------------------------------------------------------------    Gen: denies  fevers/chills,  CVS: denies chest pain/palpitations  Resp:  +  SOB/Cough  GI: Denies N/V/Abd pain  : Denies dysuria/oliguria/hematuria        VITALS/PHYSICAL EXAM  --------------------------------------------------------------------------------  T(C): 36.8 (09-19-24 @ 19:51), Max: 36.8 (09-19-24 @ 13:40)  HR: 86 (09-19-24 @ 19:51) (85 - 99)  BP: 130/77 (09-19-24 @ 19:51) (130/77 - 160/89)  RR: 18 (09-19-24 @ 19:51) (18 - 18)  SpO2: 98% (09-19-24 @ 19:51) (98% - 99%)  Wt(kg): --        Physical Exam:  	    	              Gen:    	Pulm: decrease bs  no rales or ronchi +  wheezing  	CV: no JVD. RRR, S1S2; no rub  	Abd: +BS, soft, nontender/nondistended  	: No suprapubic tenderness  	UE: Warm, no cyanosis  no clubbing,  LUE ? edema   	LE: Warm, no cyanosis  no clubbing, no edema  	Neuro: alert and oriented. speech coherent     LABS/STUDIES  --------------------------------------------------------------------------------              11.3   11.70 >-----------<  160      [09-19-24 @ 09:13]              34.1     133  |  96  |  20  ----------------------------<  171      [09-19-24 @ 09:13]  4.0   |  22  |  0.75        Ca     8.4     [09-19-24 @ 09:13]      Mg     2.2     [09-19-24 @ 09:13]            Creatinine Trend:  SCr 0.75 [09-19 @ 09:13]  SCr 0.73 [09-18 @ 09:59]  SCr 0.74 [09-17 @ 09:13]  SCr 0.67 [09-16 @ 06:55]  SCr 0.76 [09-15 @ 06:56]

## 2024-09-19 NOTE — PROGRESS NOTE ADULT - SUBJECTIVE AND OBJECTIVE BOX
SUBJECTIVE / OVERNIGHT EVENTS:          --------------------------------------------------------------------------------------------  LABS:                        11.6   13.76 )-----------( 286      ( 18 Sep 2024 09:59 )             34.9     09-18    141  |  101  |  19  ----------------------------<  230[H]  3.3[L]   |  23  |  0.73    Ca    8.8      18 Sep 2024 09:59  Mg     2.2     09-18        CAPILLARY BLOOD GLUCOSE      POCT Blood Glucose.: 222 mg/dL (18 Sep 2024 21:23)  POCT Blood Glucose.: 298 mg/dL (18 Sep 2024 16:53)  POCT Blood Glucose.: 116 mg/dL (18 Sep 2024 11:56)  POCT Blood Glucose.: 151 mg/dL (18 Sep 2024 07:45)        Urinalysis Basic - ( 18 Sep 2024 09:59 )    Color: x / Appearance: x / SG: x / pH: x  Gluc: 230 mg/dL / Ketone: x  / Bili: x / Urobili: x   Blood: x / Protein: x / Nitrite: x   Leuk Esterase: x / RBC: x / WBC x   Sq Epi: x / Non Sq Epi: x / Bacteria: x        RADIOLOGY & ADDITIONAL TESTS:    Imaging Personally Reviewed:  [x] YES  [ ] NO    Consultant(s) Notes Reviewed:  [x] YES  [ ] NO    MEDICATIONS  (STANDING):  albuterol/ipratropium for Nebulization 3 milliLiter(s) Nebulizer every 4 hours  amLODIPine   Tablet 5 milliGRAM(s) Oral daily  atorvastatin 20 milliGRAM(s) Oral at bedtime  benzonatate 100 milliGRAM(s) Oral every 8 hours  dextrose 5%. 1000 milliLiter(s) (100 mL/Hr) IV Continuous <Continuous>  dextrose 5%. 1000 milliLiter(s) (50 mL/Hr) IV Continuous <Continuous>  dextrose 50% Injectable 12.5 Gram(s) IV Push once  dextrose 50% Injectable 25 Gram(s) IV Push once  dextrose 50% Injectable 25 Gram(s) IV Push once  fluticasone propionate 50 MICROgram(s)/spray Nasal Spray 1 Spray(s) Both Nostrils two times a day  fluticasone propionate/ salmeterol 250-50 MICROgram(s) Diskus 1 Dose(s) Inhalation two times a day  glucagon  Injectable 1 milliGRAM(s) IntraMuscular once  insulin glargine Injectable (LANTUS) 30 Unit(s) SubCutaneous at bedtime  insulin lispro (ADMELOG) corrective regimen sliding scale   SubCutaneous three times a day before meals  insulin lispro (ADMELOG) corrective regimen sliding scale   SubCutaneous at bedtime  insulin lispro Injectable (ADMELOG) 15 Unit(s) SubCutaneous before lunch  insulin lispro Injectable (ADMELOG) 25 Unit(s) SubCutaneous before dinner  insulin lispro Injectable (ADMELOG) 10 Unit(s) SubCutaneous before breakfast  losartan 100 milliGRAM(s) Oral daily  methylPREDNISolone sodium succinate Injectable 40 milliGRAM(s) IV Push every 8 hours  montelukast 10 milliGRAM(s) Oral daily  sodium chloride 0.9%. 1000 milliLiter(s) (100 mL/Hr) IV Continuous <Continuous>  thiamine 100 milliGRAM(s) Oral daily    MEDICATIONS  (PRN):  acetaminophen     Tablet .. 650 milliGRAM(s) Oral every 6 hours PRN Temp greater or equal to 38C (100.4F), Mild Pain (1 - 3)  albuterol    90 MICROgram(s) HFA Inhaler 2 Puff(s) Inhalation every 2 hours PRN Shortness of Breath and/or Wheezing  aluminum hydroxide/magnesium hydroxide/simethicone Suspension 30 milliLiter(s) Oral every 4 hours PRN Dyspepsia  dextrose Oral Gel 15 Gram(s) Oral once PRN Blood Glucose LESS THAN 70 milliGRAM(s)/deciliter  melatonin 3 milliGRAM(s) Oral at bedtime PRN Insomnia  ondansetron Injectable 4 milliGRAM(s) IV Push every 8 hours PRN Nausea and/or Vomiting      Care Discussed with Consultants/Other Providers [x] YES  [ ] NO    Vital Signs Last 24 Hrs  T(C): 36.7 (19 Sep 2024 04:49), Max: 37 (18 Sep 2024 20:40)  T(F): 98.1 (19 Sep 2024 04:49), Max: 98.6 (18 Sep 2024 20:40)  HR: 99 (19 Sep 2024 04:49) (85 - 99)  BP: 145/80 (19 Sep 2024 04:49) (145/80 - 157/85)  BP(mean): --  RR: 18 (19 Sep 2024 04:49) (18 - 18)  SpO2: 99% (19 Sep 2024 04:49) (97% - 99%)    Parameters below as of 19 Sep 2024 04:49  Patient On (Oxygen Delivery Method): room air      I&O's Summary         SUBJECTIVE / OVERNIGHT EVENTS:    patient seen and examined  resting in bed  frustrated she is still wheezing    --------------------------------------------------------------------------------------------  LABS:                        11.6   13.76 )-----------( 286      ( 18 Sep 2024 09:59 )             34.9     09-18    141  |  101  |  19  ----------------------------<  230[H]  3.3[L]   |  23  |  0.73    Ca    8.8      18 Sep 2024 09:59  Mg     2.2     09-18        CAPILLARY BLOOD GLUCOSE      POCT Blood Glucose.: 222 mg/dL (18 Sep 2024 21:23)  POCT Blood Glucose.: 298 mg/dL (18 Sep 2024 16:53)  POCT Blood Glucose.: 116 mg/dL (18 Sep 2024 11:56)  POCT Blood Glucose.: 151 mg/dL (18 Sep 2024 07:45)        Urinalysis Basic - ( 18 Sep 2024 09:59 )    Color: x / Appearance: x / SG: x / pH: x  Gluc: 230 mg/dL / Ketone: x  / Bili: x / Urobili: x   Blood: x / Protein: x / Nitrite: x   Leuk Esterase: x / RBC: x / WBC x   Sq Epi: x / Non Sq Epi: x / Bacteria: x        RADIOLOGY & ADDITIONAL TESTS:    Imaging Personally Reviewed:  [x] YES  [ ] NO    Consultant(s) Notes Reviewed:  [x] YES  [ ] NO    MEDICATIONS  (STANDING):  albuterol/ipratropium for Nebulization 3 milliLiter(s) Nebulizer every 4 hours  amLODIPine   Tablet 5 milliGRAM(s) Oral daily  atorvastatin 20 milliGRAM(s) Oral at bedtime  benzonatate 100 milliGRAM(s) Oral every 8 hours  dextrose 5%. 1000 milliLiter(s) (100 mL/Hr) IV Continuous <Continuous>  dextrose 5%. 1000 milliLiter(s) (50 mL/Hr) IV Continuous <Continuous>  dextrose 50% Injectable 12.5 Gram(s) IV Push once  dextrose 50% Injectable 25 Gram(s) IV Push once  dextrose 50% Injectable 25 Gram(s) IV Push once  fluticasone propionate 50 MICROgram(s)/spray Nasal Spray 1 Spray(s) Both Nostrils two times a day  fluticasone propionate/ salmeterol 250-50 MICROgram(s) Diskus 1 Dose(s) Inhalation two times a day  glucagon  Injectable 1 milliGRAM(s) IntraMuscular once  insulin glargine Injectable (LANTUS) 30 Unit(s) SubCutaneous at bedtime  insulin lispro (ADMELOG) corrective regimen sliding scale   SubCutaneous three times a day before meals  insulin lispro (ADMELOG) corrective regimen sliding scale   SubCutaneous at bedtime  insulin lispro Injectable (ADMELOG) 15 Unit(s) SubCutaneous before lunch  insulin lispro Injectable (ADMELOG) 25 Unit(s) SubCutaneous before dinner  insulin lispro Injectable (ADMELOG) 10 Unit(s) SubCutaneous before breakfast  losartan 100 milliGRAM(s) Oral daily  methylPREDNISolone sodium succinate Injectable 40 milliGRAM(s) IV Push every 8 hours  montelukast 10 milliGRAM(s) Oral daily  sodium chloride 0.9%. 1000 milliLiter(s) (100 mL/Hr) IV Continuous <Continuous>  thiamine 100 milliGRAM(s) Oral daily    MEDICATIONS  (PRN):  acetaminophen     Tablet .. 650 milliGRAM(s) Oral every 6 hours PRN Temp greater or equal to 38C (100.4F), Mild Pain (1 - 3)  albuterol    90 MICROgram(s) HFA Inhaler 2 Puff(s) Inhalation every 2 hours PRN Shortness of Breath and/or Wheezing  aluminum hydroxide/magnesium hydroxide/simethicone Suspension 30 milliLiter(s) Oral every 4 hours PRN Dyspepsia  dextrose Oral Gel 15 Gram(s) Oral once PRN Blood Glucose LESS THAN 70 milliGRAM(s)/deciliter  melatonin 3 milliGRAM(s) Oral at bedtime PRN Insomnia  ondansetron Injectable 4 milliGRAM(s) IV Push every 8 hours PRN Nausea and/or Vomiting      Care Discussed with Consultants/Other Providers [x] YES  [ ] NO    Vital Signs Last 24 Hrs  T(C): 36.7 (19 Sep 2024 04:49), Max: 37 (18 Sep 2024 20:40)  T(F): 98.1 (19 Sep 2024 04:49), Max: 98.6 (18 Sep 2024 20:40)  HR: 99 (19 Sep 2024 04:49) (85 - 99)  BP: 145/80 (19 Sep 2024 04:49) (145/80 - 157/85)  BP(mean): --  RR: 18 (19 Sep 2024 04:49) (18 - 18)  SpO2: 99% (19 Sep 2024 04:49) (97% - 99%)    Parameters below as of 19 Sep 2024 04:49  Patient On (Oxygen Delivery Method): room air      I&O's Summary    PHYSICAL EXAM:  GENERAL: NAD, well-developed, comfortable  HEAD:  Atraumatic, Normocephalic  EYES: EOMI, PERRLA, conjunctiva and sclera clear  NECK: Supple, No JVD  CHEST/LUNG: Diminished bilaterally;+wheeze  HEART: Regular rate and rhythm; No murmurs, rubs, or gallops  ABDOMEN: Soft, Nontender, Nondistended; Bowel sounds present  NEURO: AAOx3, no focal weakness, 5/5 b/l extremity strength, b/l knee no arthritis, no effusion   EXTREMITIES:  2+ Peripheral Pulses, No clubbing, cyanosis, or edema  SKIN: No rashes or lesions

## 2024-09-19 NOTE — PROGRESS NOTE ADULT - ASSESSMENT
64F admitted for asthma exacerbation.     Plan:    # Acute asthma exacerbation:  - CXR w/ clear lungs  - C/w nebs/ inhalers/ Singulair   - C/w steroids per Pulm  - D-dimer 570  - BL LE Doppler with no evidence of deep venous thrombosis in either lower extremity  - CT Angio pending  - Serial ABG's  - Maintain Fio2>90%  - Seen by ICU 9/13-> pt does not require ICU level of care  - Seen by ENT 9/13-> afrin to b/l nare bid x3days then d/c, flonase to b/l nare bid [id number]. Pt is to follow up at ENT in 2 weeks with Nikita Badillo Bruni, [place]. Call [phone] to make appointment.  - Pulm following    # lactic acidosis:  - Hold hctz  - Trend lactate/ CMP  - Renal following    # Atypical chest pain:  - Likely  secondary to asthma exacerbation  - trops negative  - Echo w/ Left ventricular cavity is normal in size. Left ventricular wall thickness is normal. Left ventricular systolic function is normal. There are no regional wall motion abnormalities seen.  - Cards following    # SIRS:  - Monitor off abx  - ID following    # Dm2/ Hyperglycemia:  - HgbA1c 7.8  - Continue to monitor blood glucose levels  - Sliding Scale  - Endo following    # HTN/ HLD:  - C/w CV meds    # GI:  - Bowel regimen prn    # DVT ppx:  - IPC's    [facility]  [phone]     Patient is a 64F with PMHx of DM2, HLD and HTN. Admitted for asthma exacerbation.     Plan:    # Acute asthma exacerbation:  - CXR w/ clear lungs  - C/w nebs/ inhalers/ Singulair   - C/w steroids per Pulm  - D-dimer 570  - BL LE Doppler with no evidence of deep venous thrombosis in either lower extremity  - CT Angio pending  - Serial ABG's  - Maintain Fio2>90%  - Seen by ICU 9/13-> pt does not require ICU level of care  - Seen by ENT 9/13-> afrin to b/l nare bid x3days then d/c, flonase to b/l nare bid [id number]. Pt is to follow up at ENT in 2 weeks with Nikita Badillo, Serafin Dumont. Call [phone] to make appointment.  - Pulm following    # Lactic Acidosis:  - Hold hctz  - Trend lactate/CMP  - Renal following    # Atypical chest pain:  - Likely  secondary to asthma exacerbation  - Trops negative  - Echo w/ Left ventricular cavity is normal in size. Left ventricular wall thickness is normal. Left ventricular systolic function is normal. There are no regional wall motion abnormalities seen.  - Cards following    # SIRS:  - UA negative for infection, no gu sx noted   - BCx NGTD x2 (72h)  - Monitor off abx  - ID following    # DM2/ Hyperglycemia:  - HgbA1c 7.8  - Continue to monitor blood glucose levels  - Sliding Scale  - Endo following    # HTN/ HLD:  - C/w CV meds    # GI:  - Bowel regimen prn    # DVT ppx:  - IPC's    [facility]  [phone]

## 2024-09-19 NOTE — PROGRESS NOTE ADULT - SUBJECTIVE AND OBJECTIVE BOX
[facility]  JAMSHID Bridges Y. Patel, S. Shah, G. Husam  [phone]  ([phone] - weekdays after 5pm and weekends)    Name: ROGER PALUMBO  Age/Gender: 64y Female  MRN: [id number]    Interval History:  Patient seen and examined this morning.   Resting comfortably, states still wheezing.  Cough slightly better, no fevers or new complaints.   Notes reviewed. No concerning overnight events  Afebrile. Son at bedside  Allergies: NSAIDs (Unknown)  aspirin (Hives)  AValox (Unknown)      Objective:  Vitals:   T(F): 98.1 (09-19-24 @ 04:49), Max: 98.6 (09-18-24 @ 20:40)  HR: 99 (09-19-24 @ 04:49) (85 - 99)  BP: 145/80 (09-19-24 @ 04:49) (145/80 - 157/85)  RR: 18 (09-19-24 @ 04:49) (18 - 18)  SpO2: 99% (09-19-24 @ 04:49) (97% - 99%)  Physical Examination:  General: no acute distress, RA, nontoxic appearing   HEENT: NC/AT, anicteric, EOMI  Respiratory: no acc muscle use, breathing comfortably  Cardiovascular: S1 and S2 present  Gastrointestinal: normal appearing, nondistended  Extremities: no edema, no cyanosis  Skin: no visible rash    Laboratory Studies:  CBC:                       11.6   13.76 )-----------( 286      ( 18 Sep 2024 09:59 )             34.9     WBC Trend:  13.76 09-18-24 @ 09:59  13.98 09-17-24 @ 09:13  14.20 09-15-24 @ 06:58  11.31 09-14-24 @ 06:26  12.60 09-12-24 @ 18:49    CMP: 09-18    141  |  101  |  19  ----------------------------<  230[H]  3.3[L]   |  23  |  0.73    Ca    8.8      18 Sep 2024 09:59  Mg     2.2     09-18      Creatinine: 0.73 mg/dL (09-18-24 @ 09:59)  Creatinine: 0.74 mg/dL (09-17-24 @ 09:13)  Creatinine: 0.67 mg/dL (09-16-24 @ 06:55)  Creatinine: 0.76 mg/dL (09-15-24 @ 06:56)  Creatinine: 0.81 mg/dL (09-14-24 @ 08:59)  Creatinine: 0.77 mg/dL (09-13-24 @ 14:44)  Creatinine: 0.80 mg/dL (09-13-24 @ 06:17)  Creatinine: 0.79 mg/dL (09-12-24 @ 18:49)    Microbiology: reviewed   Culture - Blood (collected 09-15-24 @ 11:30)  Source: .Blood Blood  Preliminary Report (09-18-24 @ 15:01):    No growth at 72 Hours    Culture - Blood (collected 09-15-24 @ 11:22)  Source: .Blood Blood  Preliminary Report (09-18-24 @ 15:01):    No growth at 72 Hours    SARS-CoV-2 Result: NotDete (12 Sep 2024 18:49)    Radiology: reviewed     Medications:  acetaminophen     Tablet .. 650 milliGRAM(s) Oral every 6 hours PRN  albuterol    90 MICROgram(s) HFA Inhaler 2 Puff(s) Inhalation every 2 hours PRN  albuterol/ipratropium for Nebulization 3 milliLiter(s) Nebulizer every 4 hours  aluminum hydroxide/magnesium hydroxide/simethicone Suspension 30 milliLiter(s) Oral every 4 hours PRN  amLODIPine   Tablet 5 milliGRAM(s) Oral daily  atorvastatin 20 milliGRAM(s) Oral at bedtime  benzonatate 100 milliGRAM(s) Oral every 8 hours  dextrose 5%. 1000 milliLiter(s) IV Continuous <Continuous>  dextrose 5%. 1000 milliLiter(s) IV Continuous <Continuous>  dextrose 50% Injectable 25 Gram(s) IV Push once  dextrose 50% Injectable 12.5 Gram(s) IV Push once  dextrose 50% Injectable 25 Gram(s) IV Push once  dextrose Oral Gel 15 Gram(s) Oral once PRN  fluticasone propionate 50 MICROgram(s)/spray Nasal Spray 1 Spray(s) Both Nostrils two times a day  fluticasone propionate/ salmeterol 250-50 MICROgram(s) Diskus 1 Dose(s) Inhalation two times a day  glucagon  Injectable 1 milliGRAM(s) IntraMuscular once  insulin glargine Injectable (LANTUS) 30 Unit(s) SubCutaneous at bedtime  insulin lispro (ADMELOG) corrective regimen sliding scale   SubCutaneous three times a day before meals  insulin lispro (ADMELOG) corrective regimen sliding scale   SubCutaneous at bedtime  insulin lispro Injectable (ADMELOG) 10 Unit(s) SubCutaneous before breakfast  insulin lispro Injectable (ADMELOG) 15 Unit(s) SubCutaneous before lunch  insulin lispro Injectable (ADMELOG) 25 Unit(s) SubCutaneous before dinner  losartan 100 milliGRAM(s) Oral daily  melatonin 3 milliGRAM(s) Oral at bedtime PRN  methylPREDNISolone sodium succinate Injectable 40 milliGRAM(s) IV Push every 8 hours  montelukast 10 milliGRAM(s) Oral daily  ondansetron Injectable 4 milliGRAM(s) IV Push every 8 hours PRN  sodium chloride 0.9%. 1000 milliLiter(s) IV Continuous <Continuous>  thiamine 100 milliGRAM(s) Oral daily

## 2024-09-19 NOTE — PROGRESS NOTE ADULT - ASSESSMENT
64F PMHx asthma, HTN/HLD, DM. Presenting w/ 3d of SOB and cough, rx'd prednisone w/o improvement outpatient. Pt also been noticing increase in FS glucose while on steroid. In the ED, pt afebrile, bp stable, on RA saturating 99%, tachypneic to 22. Pt was audibly wheezing.   CBC w/ wbc 12.6, hgb 11.8, plt 289. CMP w/ SCr 0.79, bicarb 18, AG 18, glucose 346. VBG w/ initial lactate 6.5 -> 4.6 after 1L IVF. covid/flu/rsv neg. CXR showed clear lungs. Pt received solumedrol 125mg, Mg 2g, and duoneb x3. On interview/exam, pt still wheezing. Appears audible wheezing w/o stethoscope is more upper airway but on lung auscultation, with exp wheezing as well.           Asthma exacerbation:   -she has a known diagnosis of asthma and has been  on Dupixent biweekly as an outpt:  -came in here with asthma exacerbation, possibly 2nd to viral infection  -ENT recs appreciated, no VC dysfunction noted  -Still with significant wheezing, continue current dose of solumedrol  -Change Advair to 250/50 1 puff BID  -Change Duoneb to q4h  -Continue Singulair   -Normoxic, keep sats >90% with o2 PRN     9/15:   -she has been wheezing;   a lot:  cant decrease steroids:   -cont BD and cont iv steroids:   -cont singulair    9/16:  -she is still wheezing considerably:  -needs high dose steroids still:   -cont bd and singulair and nebs;   -check p eak flow q shift    9/17:  -she is still wheezing a lot:   -cont SM 40 mg q 8 hours:   -cont bd ;  -cont singulair and advair:   -check d dimer:   /-peak flow q 12 hours:   -she remans on room air     9/18:  -seems OK;  wheezing persistent  cough excessively:   -d dimer is high : for cta:   -add tessalon per les;   -cont bd:   -peak flow not done    9/19  -she is still having audible wheezing   -But 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 baseline   -she was evaluated by ent  , though they didn't see any abnormality's  she would need to see speech pathologist after dc:   -her cta is still awaited   dw pmd and pt       Uncontrolled DM:   -secondary to stress and steroids   -monitor and control     9/15; cont current meds:   -cont to control on high dose glucose    9/16:  -pt still needs high dosages of steroids :  -cont to control blood glucose on high steroids     9/17: now on q 8 hours of steroids:  manage per primary team    9/18:  -blood glucose seems to be controlled  9/19:  -decrease steroids further      HTN:   -controlled:     dvt prophylaxis    dw acp / pt and PMD

## 2024-09-19 NOTE — PROGRESS NOTE ADULT - ASSESSMENT
Patient is a 64 year old female with PMH of asthma, HTN, HLD, DM who presents with dyspnea and cough for 3 days. Patient reports she first started to have dyspnea and then cough, she saw her Pulmonologist earlier this week and was given oral prednisone and nebulizer treatment but did not improved. States possible sick contact is her sister in law who lives with them, states she had some URI symptoms and recently started going to a new day program. States she continued to have wheezing, chest tightness and dyspnea and came to the ER.    Acute asthma exacerbation  Suspect possible viral URI  SIRS likely d/t above   Leukocytosis likely reactive iso steroids  - tachypnea and leukocytosis on admission, remains afebrile  - COVID/RSV/Flu negative  - CXR with no consolidation/infiltrate  - PCT negative   - UA negative for infection, no gu sx noted   - Bcx NGTD x2 (72h)  - lactate noted, low suspicion for infection, Nephrology following   - remains afebrile, on RA, WBC trend noted, nontoxic    Recommendations:   Continue off antibiotics   Steroids and nebs per Pulmonary   Supportive care   Continue rest of care per primary team   Stable from ID standpoint at this time.       D/w patient and son at bedside; DEDE Duque M.D.  [facility] Division of Infectious Diseases  [phone]  After 5pm on weekdays and all day on weekends - please call [phone]  Available on [employer] TEAMS

## 2024-09-19 NOTE — PROGRESS NOTE ADULT - SUBJECTIVE AND OBJECTIVE BOX
Date of Service: 09-19-24 @ 14:05    Patient is a 64y old  Female who presents with a chief complaint of Asthma exacerbation (14 Sep 2024 16:05)      Any change in ROS:  sheis doing  ok ;butr she is wheezing:   but most of her wheezing is in upper airways;       MEDICATIONS  (STANDING):  albuterol/ipratropium for Nebulization 3 milliLiter(s) Nebulizer every 4 hours  amLODIPine   Tablet 5 milliGRAM(s) Oral daily  atorvastatin 20 milliGRAM(s) Oral at bedtime  benzonatate 100 milliGRAM(s) Oral every 8 hours  dextrose 5%. 1000 milliLiter(s) (50 mL/Hr) IV Continuous <Continuous>  dextrose 5%. 1000 milliLiter(s) (100 mL/Hr) IV Continuous <Continuous>  dextrose 50% Injectable 25 Gram(s) IV Push once  dextrose 50% Injectable 12.5 Gram(s) IV Push once  dextrose 50% Injectable 25 Gram(s) IV Push once  fluticasone propionate 50 MICROgram(s)/spray Nasal Spray 1 Spray(s) Both Nostrils two times a day  fluticasone propionate/ salmeterol 250-50 MICROgram(s) Diskus 1 Dose(s) Inhalation two times a day  glucagon  Injectable 1 milliGRAM(s) IntraMuscular once  insulin glargine Injectable (LANTUS) 30 Unit(s) SubCutaneous at bedtime  insulin lispro (ADMELOG) corrective regimen sliding scale   SubCutaneous three times a day before meals  insulin lispro (ADMELOG) corrective regimen sliding scale   SubCutaneous at bedtime  insulin lispro Injectable (ADMELOG) 25 Unit(s) SubCutaneous before dinner  insulin lispro Injectable (ADMELOG) 10 Unit(s) SubCutaneous before breakfast  losartan 100 milliGRAM(s) Oral daily  methylPREDNISolone sodium succinate Injectable 30 milliGRAM(s) IV Push every 8 hours  montelukast 10 milliGRAM(s) Oral daily  sodium chloride 0.9%. 1000 milliLiter(s) (100 mL/Hr) IV Continuous <Continuous>  thiamine 100 milliGRAM(s) Oral daily    MEDICATIONS  (PRN):  acetaminophen     Tablet .. 650 milliGRAM(s) Oral every 6 hours PRN Temp greater or equal to 38C (100.4F), Mild Pain (1 - 3)  albuterol    90 MICROgram(s) HFA Inhaler 2 Puff(s) Inhalation every 2 hours PRN Shortness of Breath and/or Wheezing  aluminum hydroxide/magnesium hydroxide/simethicone Suspension 30 milliLiter(s) Oral every 4 hours PRN Dyspepsia  dextrose Oral Gel 15 Gram(s) Oral once PRN Blood Glucose LESS THAN 70 milliGRAM(s)/deciliter  melatonin 3 milliGRAM(s) Oral at bedtime PRN Insomnia  ondansetron Injectable 4 milliGRAM(s) IV Push every 8 hours PRN Nausea and/or Vomiting    Vital Signs Last 24 Hrs  T(C): 36.8 (19 Sep 2024 13:40), Max: 37 (18 Sep 2024 20:40)  T(F): 98.3 (19 Sep 2024 13:40), Max: 98.6 (18 Sep 2024 20:40)  HR: 85 (19 Sep 2024 13:40) (85 - 99)  BP: 160/89 (19 Sep 2024 13:40) (145/80 - 160/89)  BP(mean): --  RR: 18 (19 Sep 2024 13:40) (18 - 18)  SpO2: 98% (19 Sep 2024 13:40) (98% - 99%)    Parameters below as of 19 Sep 2024 13:40  Patient On (Oxygen Delivery Method): room air        I&O's Summary        Physical Exam:   GENERAL: NAD, well-groomed, well-developed  HEENT: SAQIB/   Atraumatic, Normocephalic  ENMT: No tonsillar erythema, exudates, or enlargement; Moist mucous membranes, Good dentition, No lesions  NECK: Supple, No JVD, Normal thyroid  CHEST/LUNG: upper airways wheezing > lower airways wheezing  CVS: Regular rate and rhythm; No murmurs, rubs, or gallops  GI: : Soft, Nontender, Nondistended; Bowel sounds present  NERVOUS SYSTEM:  Alert & Oriented X3  EXTREMITIES:  - edema  LYMPH: No lymphadenopathy noted  SKIN: No rashes or lesions  ENDOCRINOLOGY: No Thyromegaly  PSYCH: Appropriate    Labs:  23, 25, 22                            11.3   11.70 )-----------( 160      ( 19 Sep 2024 09:13 )             34.1                         11.6   13.76 )-----------( 286      ( 18 Sep 2024 09:59 )             34.9                         11.9   13.98 )-----------( 277      ( 17 Sep 2024 09:13 )             35.7     09-19    133[L]  |  96  |  20  ----------------------------<  171[H]  4.0   |  22  |  0.75  09-18    141  |  101  |  19  ----------------------------<  230[H]  3.3[L]   |  23  |  0.73  09-17    139  |  101  |  16  ----------------------------<  200[H]  3.4[L]   |  20[L]  |  0.74  09-16    139  |  103  |  16  ----------------------------<  223[H]  3.7   |  21[L]  |  0.67    Ca    8.4      19 Sep 2024 09:13  Ca    8.8      18 Sep 2024 09:59  Mg     2.2     09-19  Mg     2.2     09-18      CAPILLARY BLOOD GLUCOSE      POCT Blood Glucose.: 108 mg/dL (19 Sep 2024 12:31)  POCT Blood Glucose.: 141 mg/dL (19 Sep 2024 08:08)  POCT Blood Glucose.: 222 mg/dL (18 Sep 2024 21:23)  POCT Blood Glucose.: 298 mg/dL (18 Sep 2024 16:53)          Urinalysis Basic - ( 19 Sep 2024 09:13 )    Color: x / Appearance: x / SG: x / pH: x  Gluc: 171 mg/dL / Ketone: x  / Bili: x / Urobili: x   Blood: x / Protein: x / Nitrite: x   Leuk Esterase: x / RBC: x / WBC x   Sq Epi: x / Non Sq Epi: x / Bacteria: x      D-Dimer Assay, Quantitative: 570 ng/mL DDU (09-18 @ 09:59)  Lactate, Blood: 3.3 mmol/L (09-19 @ 01:09)  Lactate, Blood: 5.5 mmol/L (09-18 @ 09:59)  Lactate, Blood: 5.9 mmol/L (09-17 @ 09:13)  Lactate, Blood: 4.9 mmol/L (09-16 @ 06:55)  Lactate, Blood: 5.1 mmol/L (09-15 @ 23:43)        RECENT CULTURES:  09-15 @ 11:30 .Blood Blood                No growth at 72 Hours    09-15 @ 11:22 .Blood Blood         < from: [facility] Duplex Lower Ext Vein Scan, Bilat (09.18.24 @ 16:41) >    RIGHT:  Normal compressibility of the RIGHT common femoral, femoral and popliteal   veins.  Doppler examination shows normal spontaneous and phasic flow.  No RIGHT calf vein thrombosis is detected.    LEFT:  Normal compressibility of the LEFT common femoral, femoral and popliteal   veins.  Doppler examination shows normal spontaneous and phasic flow.  No LEFT calf vein thrombosis is detected.    IMPRESSION:  No evidence of deep venous thrombosis in either lower extremity.            --- End of Report ---            PORTILLO WADE MD; Attending Radiologist  This document has been electronically signed. Sep 18 2024  5:00PM    < end of copied text >  < from: Xray Chest 2 Views PA/Lat (09.12.24 @ 19:07) >  CLINICAL INDICATION: Shortness of breath and cough for past 4 days.    TECHNIQUE: 2 views; Frontal and lateral views of the chest were obtained.    COMPARISON: X-ray chest 10/30/2023.    FINDINGS:    The heart is normal in size.  The lungs are clear.  There is no pneumothorax or pleural effusion.    IMPRESSION:  Clear lungs.    --- End of Report ---          KORINA ROLDAN MD; Resident Radiologist  This document has been electronically signed.  ADRIANNA RICARDO MD; Attending Radiologist  This document has been electronically signed. Sep 13 2024  8:22AM    < end of copied text >         No growth at 72 Hours          RESPIRATORY CULTURES:          Studies  Chest X-RAY  CT SCAN Chest   Venous Dopplers: LE:   CT Abdomen  Others

## 2024-09-19 NOTE — PROGRESS NOTE ADULT - SUBJECTIVE AND OBJECTIVE BOX
DATE OF SERVICE: 09-19-24 @ 14:12    Patient is a 64y old  Female who presents with a chief complaint of Asthma exacerbation (14 Sep 2024 16:05)      INTERVAL HISTORY: Feels ok.     REVIEW OF SYSTEMS:  CONSTITUTIONAL: No weakness  EYES/ENT: No visual changes;  No throat pain   NECK: No pain or stiffness  RESPIRATORY: No cough, wheezing; No shortness of breath  CARDIOVASCULAR: No chest pain or palpitations  GASTROINTESTINAL: No abdominal  pain. No nausea, vomiting, or hematemesis  GENITOURINARY: No dysuria, frequency or hematuria  NEUROLOGICAL: No stroke like symptoms  SKIN: No rashes    TELEMETRY Personally reviewed:  SR   	  MEDICATIONS:  amLODIPine   Tablet 5 milliGRAM(s) Oral daily  losartan 100 milliGRAM(s) Oral daily        PHYSICAL EXAM:  T(C): 36.8 (09-19-24 @ 13:40), Max: 37 (09-18-24 @ 20:40)  HR: 85 (09-19-24 @ 13:40) (85 - 99)  BP: 160/89 (09-19-24 @ 13:40) (145/80 - 160/89)  RR: 18 (09-19-24 @ 13:40) (18 - 18)  SpO2: 98% (09-19-24 @ 13:40) (98% - 99%)  Wt(kg): --  I&O's Summary        Appearance: In no distress	  HEENT:    PERRL, EOMI	  Cardiovascular:  S1 S2, No JVD  Respiratory: B/L wheeze	  Gastrointestinal:  Soft, Non-tender, + BS	  Vascularature:  No edema of LE  Psychiatric: Appropriate affect   Neuro: no acute focal deficits                               11.3   11.70 )-----------( 160      ( 19 Sep 2024 09:13 )             34.1     09-19    133[L]  |  96  |  20  ----------------------------<  171[H]  4.0   |  22  |  0.75    Ca    8.4      19 Sep 2024 09:13  Mg     2.2     09-19          Labs personally reviewed      ASSESSMENT/PLAN: 	      64F PMHx asthma, HTN/HLD, DM. Presenting w/ 3d of SOB and cough, rx'd prednisone w/o improvement outpatient. Pt also been noticing increase in FS glucose while on steroid.  In the ED, pt afebrile, bp stable, on RA saturating 99%, tachypneic to 22. Pt was audibly wheezing.     1. Atypical CP   - non exertional, cp secondary to asthma exacerbation  - Pt describes pain is typical of her asthma  - Trop neg x1, awaiting 2nd   - ECG - SR with PAC, recommend repeat   - TTE wnl    2. Asthma Exacerbation  - audibly wheezing  - Still with significant wheezing, continue solumedrol  -Normoxic, keep sats >90% with o2 PRN   -pulm following  - D-Dimer elevated: LE Ultrasound negative, CTA Chest r/o PE pending    3. HTN  controlled   c/w amLODIPine   Tablet 5 milliGRAM(s) Oral daily   c/w losartan 100 milliGRAM(s) Oral daily    4. HLD   -check LDL   c/w atorvastatin 20 milliGRAM(s) Oral at bedtime    5. Uncontrolled DM  - secondary to stress and steroids, monitor and control   - check A1c     6. DVT prophylactic  - SCD's           Cornelia Schneider, AG-NP   Evan Dietz DO [facility]  Cardiovascular Medicine  [address] [place] [address], [address]  Available through call or text on [employer]  Office: [phone]

## 2024-09-19 NOTE — PROGRESS NOTE ADULT - SUBJECTIVE AND OBJECTIVE BOX
[facility] Progress Note    MAR, chart notes, fingersticks and labs reviewed    Subjective:    Objective  Vital Signs  T(C): 36.7 (09-19-24 @ 04:49), Max: 37 (09-18-24 @ 20:40)  HR: 99 (09-19-24 @ 04:49) (94 - 99)  BP: 145/80 (09-19-24 @ 04:49) (145/80 - 156/75)  RR: 18 (09-19-24 @ 04:49) (18 - 18)  SpO2: 99% (09-19-24 @ 04:49) (99% - 99%)    Physical Exam  Gen: NAD, alert, awake  HEENT: NC/AT, EOMI  Neck: supple  Chest: breathing comfortably  Heart: +s1 +s2    Medications  acetaminophen     Tablet .. 650 milliGRAM(s) Oral every 6 hours PRN  albuterol    90 MICROgram(s) HFA Inhaler 2 Puff(s) Inhalation every 2 hours PRN  albuterol/ipratropium for Nebulization 3 milliLiter(s) Nebulizer every 4 hours  aluminum hydroxide/magnesium hydroxide/simethicone Suspension 30 milliLiter(s) Oral every 4 hours PRN  amLODIPine   Tablet 5 milliGRAM(s) Oral daily  atorvastatin 20 milliGRAM(s) Oral at bedtime  benzonatate 100 milliGRAM(s) Oral every 8 hours  dextrose 5%. 1000 milliLiter(s) IV Continuous <Continuous>  dextrose 5%. 1000 milliLiter(s) IV Continuous <Continuous>  dextrose 50% Injectable 12.5 Gram(s) IV Push once  dextrose 50% Injectable 25 Gram(s) IV Push once  dextrose 50% Injectable 25 Gram(s) IV Push once  dextrose Oral Gel 15 Gram(s) Oral once PRN  fluticasone propionate 50 MICROgram(s)/spray Nasal Spray 1 Spray(s) Both Nostrils two times a day  fluticasone propionate/ salmeterol 250-50 MICROgram(s) Diskus 1 Dose(s) Inhalation two times a day  glucagon  Injectable 1 milliGRAM(s) IntraMuscular once  insulin glargine Injectable (LANTUS) 30 Unit(s) SubCutaneous at bedtime  insulin lispro (ADMELOG) corrective regimen sliding scale   SubCutaneous three times a day before meals  insulin lispro (ADMELOG) corrective regimen sliding scale   SubCutaneous at bedtime  insulin lispro Injectable (ADMELOG) 25 Unit(s) SubCutaneous before dinner  insulin lispro Injectable (ADMELOG) 10 Unit(s) SubCutaneous before breakfast  losartan 100 milliGRAM(s) Oral daily  melatonin 3 milliGRAM(s) Oral at bedtime PRN  methylPREDNISolone sodium succinate Injectable 40 milliGRAM(s) IV Push every 8 hours  montelukast 10 milliGRAM(s) Oral daily  ondansetron Injectable 4 milliGRAM(s) IV Push every 8 hours PRN  sodium chloride 0.9%. 1000 milliLiter(s) IV Continuous <Continuous>  thiamine 100 milliGRAM(s) Oral daily    Pertinent labs/Imaging  POCT Blood Glucose.: 108 mg/dL (09-19-24 @ 12:31)  POCT Blood Glucose.: 141 mg/dL (09-19-24 @ 08:08)  POCT Blood Glucose.: 222 mg/dL (09-18-24 @ 21:23)  POCT Blood Glucose.: 298 mg/dL (09-18-24 @ 16:53)    eGFR: 90 mL/min/1.73m2 (09-19-24 @ 09:13)  eGFR: 93 mL/min/1.73m2 (09-18-24 @ 09:59)       [facility] Progress Note    MAR, chart notes, fingersticks and labs reviewed    Subjective: feels okay, still wheezing and cause not found, CT chest negative for PE    Objective  Vital Signs  T(C): 36.7 (09-19-24 @ 04:49), Max: 37 (09-18-24 @ 20:40)  HR: 99 (09-19-24 @ 04:49) (94 - 99)  BP: 145/80 (09-19-24 @ 04:49) (145/80 - 156/75)  RR: 18 (09-19-24 @ 04:49) (18 - 18)  SpO2: 99% (09-19-24 @ 04:49) (99% - 99%)    Physical Exam  Gen: NAD, alert, awake  HEENT: NC/AT, EOMI  Neck: supple  Chest: breathing comfortably  Heart: +s1 +s2    Medications  acetaminophen     Tablet .. 650 milliGRAM(s) Oral every 6 hours PRN  albuterol    90 MICROgram(s) HFA Inhaler 2 Puff(s) Inhalation every 2 hours PRN  albuterol/ipratropium for Nebulization 3 milliLiter(s) Nebulizer every 4 hours  aluminum hydroxide/magnesium hydroxide/simethicone Suspension 30 milliLiter(s) Oral every 4 hours PRN  amLODIPine   Tablet 5 milliGRAM(s) Oral daily  atorvastatin 20 milliGRAM(s) Oral at bedtime  benzonatate 100 milliGRAM(s) Oral every 8 hours  dextrose 5%. 1000 milliLiter(s) IV Continuous <Continuous>  dextrose 5%. 1000 milliLiter(s) IV Continuous <Continuous>  dextrose 50% Injectable 12.5 Gram(s) IV Push once  dextrose 50% Injectable 25 Gram(s) IV Push once  dextrose 50% Injectable 25 Gram(s) IV Push once  dextrose Oral Gel 15 Gram(s) Oral once PRN  fluticasone propionate 50 MICROgram(s)/spray Nasal Spray 1 Spray(s) Both Nostrils two times a day  fluticasone propionate/ salmeterol 250-50 MICROgram(s) Diskus 1 Dose(s) Inhalation two times a day  glucagon  Injectable 1 milliGRAM(s) IntraMuscular once  insulin glargine Injectable (LANTUS) 30 Unit(s) SubCutaneous at bedtime  insulin lispro (ADMELOG) corrective regimen sliding scale   SubCutaneous three times a day before meals  insulin lispro (ADMELOG) corrective regimen sliding scale   SubCutaneous at bedtime  insulin lispro Injectable (ADMELOG) 25 Unit(s) SubCutaneous before dinner  insulin lispro Injectable (ADMELOG) 10 Unit(s) SubCutaneous before breakfast  losartan 100 milliGRAM(s) Oral daily  melatonin 3 milliGRAM(s) Oral at bedtime PRN  methylPREDNISolone sodium succinate Injectable 40 milliGRAM(s) IV Push every 8 hours  montelukast 10 milliGRAM(s) Oral daily  ondansetron Injectable 4 milliGRAM(s) IV Push every 8 hours PRN  sodium chloride 0.9%. 1000 milliLiter(s) IV Continuous <Continuous>  thiamine 100 milliGRAM(s) Oral daily    Pertinent labs/Imaging  POCT Blood Glucose.: 108 mg/dL (09-19-24 @ 12:31)  POCT Blood Glucose.: 141 mg/dL (09-19-24 @ 08:08)  POCT Blood Glucose.: 222 mg/dL (09-18-24 @ 21:23)  POCT Blood Glucose.: 298 mg/dL (09-18-24 @ 16:53)    eGFR: 90 mL/min/1.73m2 (09-19-24 @ 09:13)  eGFR: 93 mL/min/1.73m2 (09-18-24 @ 09:59)

## 2024-09-19 NOTE — PROGRESS NOTE ADULT - ASSESSMENT
64 y.o. female with history of T2DM , Asthma, HTN and HLD presents for asthma exacerbation.    1. T2DM with hyperglycemia  - Continue Lantus 30 units at night  - Continue Admelog 10 units with breakfast, increase to 20 units with lunch and continue 25 units with dinner  - Continue moderate Admelog correctional scale before meals and bedtime  - Monitor FS before meals and bedtime  - Follow hospital hypoglycemic protocol    2. Asthma exacerbation  - currently on solumedrol 40 mg IV Q8H  - pulm following    Will continue to follow.     Zoe Hayward MD  [facility]    [address] [place] [address]    T [phone]  F [phone]   64 y.o. female with history of T2DM , Asthma, HTN and HLD presents for asthma exacerbation.    1. T2DM with hyperglycemia  - Continue Lantus 30 units at night  - Continue Admelog 10 units with breakfast, 15 units with lunch and 25 units with dinner  - Continue moderate Admelog correctional scale before meals and bedtime  - Monitor FS before meals and bedtime  - Follow hospital hypoglycemic protocol    2. Asthma exacerbation  - solumedrol reduced to 20 mg IV Q8H  - pulm following    Will continue to follow.     Zoe Hayward MD  [facility]    [address] [place] [address]    T [phone]  F [phone]

## 2024-09-20 ENCOUNTER — TRANSCRIPTION ENCOUNTER (OUTPATIENT)
Age: 64
End: 2024-09-20

## 2024-09-20 VITALS
OXYGEN SATURATION: 98 % | DIASTOLIC BLOOD PRESSURE: 80 MMHG | RESPIRATION RATE: 16 BRPM | HEART RATE: 76 BPM | TEMPERATURE: 98 F | SYSTOLIC BLOOD PRESSURE: 138 MMHG

## 2024-09-20 LAB
CULTURE RESULTS: SIGNIFICANT CHANGE UP
CULTURE RESULTS: SIGNIFICANT CHANGE UP
GLUCOSE BLDC GLUCOMTR-MCNC: 154 MG/DL — HIGH (ref 70–99)
GLUCOSE BLDC GLUCOMTR-MCNC: 171 MG/DL — HIGH (ref 70–99)
GLUCOSE BLDC GLUCOMTR-MCNC: 176 MG/DL — HIGH (ref 70–99)
LACTATE SERPL-SCNC: 3.3 MMOL/L — HIGH (ref 0.5–2)
SPECIMEN SOURCE: SIGNIFICANT CHANGE UP
SPECIMEN SOURCE: SIGNIFICANT CHANGE UP

## 2024-09-20 PROCEDURE — 36600 WITHDRAWAL OF ARTERIAL BLOOD: CPT

## 2024-09-20 PROCEDURE — 96375 TX/PRO/DX INJ NEW DRUG ADDON: CPT

## 2024-09-20 PROCEDURE — 93356 MYOCRD STRAIN IMG SPCKL TRCK: CPT

## 2024-09-20 PROCEDURE — 82947 ASSAY GLUCOSE BLOOD QUANT: CPT

## 2024-09-20 PROCEDURE — 83036 HEMOGLOBIN GLYCOSYLATED A1C: CPT

## 2024-09-20 PROCEDURE — 96365 THER/PROPH/DIAG IV INF INIT: CPT

## 2024-09-20 PROCEDURE — 84145 PROCALCITONIN (PCT): CPT

## 2024-09-20 PROCEDURE — 84100 ASSAY OF PHOSPHORUS: CPT

## 2024-09-20 PROCEDURE — 87040 BLOOD CULTURE FOR BACTERIA: CPT

## 2024-09-20 PROCEDURE — 84295 ASSAY OF SERUM SODIUM: CPT

## 2024-09-20 PROCEDURE — 93306 TTE W/DOPPLER COMPLETE: CPT

## 2024-09-20 PROCEDURE — 80048 BASIC METABOLIC PNL TOTAL CA: CPT

## 2024-09-20 PROCEDURE — 87637 SARSCOV2&INF A&B&RSV AMP PRB: CPT

## 2024-09-20 PROCEDURE — 71275 CT ANGIOGRAPHY CHEST: CPT | Mod: MC

## 2024-09-20 PROCEDURE — 85379 FIBRIN DEGRADATION QUANT: CPT

## 2024-09-20 PROCEDURE — 85027 COMPLETE CBC AUTOMATED: CPT

## 2024-09-20 PROCEDURE — 85014 HEMATOCRIT: CPT

## 2024-09-20 PROCEDURE — 81003 URINALYSIS AUTO W/O SCOPE: CPT

## 2024-09-20 PROCEDURE — 83605 ASSAY OF LACTIC ACID: CPT

## 2024-09-20 PROCEDURE — 93005 ELECTROCARDIOGRAM TRACING: CPT

## 2024-09-20 PROCEDURE — 94640 AIRWAY INHALATION TREATMENT: CPT

## 2024-09-20 PROCEDURE — 82803 BLOOD GASES ANY COMBINATION: CPT

## 2024-09-20 PROCEDURE — 82962 GLUCOSE BLOOD TEST: CPT

## 2024-09-20 PROCEDURE — 71275 CT ANGIOGRAPHY CHEST: CPT | Mod: 26

## 2024-09-20 PROCEDURE — 85025 COMPLETE CBC W/AUTO DIFF WBC: CPT

## 2024-09-20 PROCEDURE — 36415 COLL VENOUS BLD VENIPUNCTURE: CPT

## 2024-09-20 PROCEDURE — 82435 ASSAY OF BLOOD CHLORIDE: CPT

## 2024-09-20 PROCEDURE — 99285 EMERGENCY DEPT VISIT HI MDM: CPT | Mod: 25

## 2024-09-20 PROCEDURE — 85018 HEMOGLOBIN: CPT

## 2024-09-20 PROCEDURE — 71046 X-RAY EXAM CHEST 2 VIEWS: CPT

## 2024-09-20 PROCEDURE — 82330 ASSAY OF CALCIUM: CPT

## 2024-09-20 PROCEDURE — 82010 KETONE BODYS QUAN: CPT

## 2024-09-20 PROCEDURE — 83735 ASSAY OF MAGNESIUM: CPT

## 2024-09-20 PROCEDURE — 84425 ASSAY OF VITAMIN B-1: CPT

## 2024-09-20 PROCEDURE — 84484 ASSAY OF TROPONIN QUANT: CPT

## 2024-09-20 PROCEDURE — 80053 COMPREHEN METABOLIC PANEL: CPT

## 2024-09-20 PROCEDURE — 84132 ASSAY OF SERUM POTASSIUM: CPT

## 2024-09-20 PROCEDURE — 93970 EXTREMITY STUDY: CPT

## 2024-09-20 RX ORDER — FLUTICASONE PROPIONATE 50 UG/1
1 SPRAY, METERED NASAL
Qty: 1 | Refills: 0
Start: 2024-09-20 | End: 2024-10-19

## 2024-09-20 RX ORDER — MONTELUKAST SODIUM 5 MG/1
1 TABLET, CHEWABLE ORAL
Qty: 30 | Refills: 0
Start: 2024-09-20 | End: 2024-10-19

## 2024-09-20 RX ORDER — INSULIN ASPART 100 [IU]/ML
6 INJECTION, SOLUTION INTRAVENOUS; SUBCUTANEOUS
Qty: 0 | Refills: 0 | DISCHARGE

## 2024-09-20 RX ORDER — PREDNISONE 10 MG
20 TABLET, DOSE PACK ORAL DAILY
Refills: 0 | Status: DISCONTINUED | OUTPATIENT
Start: 2024-09-20 | End: 2024-09-20

## 2024-09-20 RX ORDER — AMLODIPINE BESYLATE 10 MG/1
1 TABLET ORAL
Qty: 30 | Refills: 0
Start: 2024-09-20 | End: 2024-10-19

## 2024-09-20 RX ORDER — PREDNISONE 10 MG
1 TABLET, DOSE PACK ORAL
Qty: 3 | Refills: 0
Start: 2024-09-20 | End: 2024-09-22

## 2024-09-20 RX ORDER — BENZONATATE 100 MG
1 CAPSULE ORAL
Qty: 30 | Refills: 0
Start: 2024-09-20 | End: 2024-09-29

## 2024-09-20 RX ORDER — IPRATROPIUM BROMIDE AND ALBUTEROL SULFATE .5; 3 MG/3ML; MG/3ML
0 SOLUTION RESPIRATORY (INHALATION)
Qty: 0 | Refills: 0 | DISCHARGE

## 2024-09-20 RX ORDER — INSULIN DETEMIR 100 [IU]/ML
15 INJECTION, SOLUTION SUBCUTANEOUS
Qty: 0 | Refills: 0 | DISCHARGE

## 2024-09-20 RX ADMIN — FLUTICASONE PROPIONATE AND SALMETEROL 1 DOSE(S): 250; 50 POWDER RESPIRATORY (INHALATION) at 05:53

## 2024-09-20 RX ADMIN — IPRATROPIUM BROMIDE AND ALBUTEROL SULFATE 3 MILLILITER(S): .5; 3 SOLUTION RESPIRATORY (INHALATION) at 02:23

## 2024-09-20 RX ADMIN — Medication 2: at 08:44

## 2024-09-20 RX ADMIN — LOSARTAN POTASSIUM 100 MILLIGRAM(S): 50 TABLET ORAL at 05:54

## 2024-09-20 RX ADMIN — Medication 100 MILLIGRAM(S): at 12:45

## 2024-09-20 RX ADMIN — IPRATROPIUM BROMIDE AND ALBUTEROL SULFATE 3 MILLILITER(S): .5; 3 SOLUTION RESPIRATORY (INHALATION) at 09:56

## 2024-09-20 RX ADMIN — IPRATROPIUM BROMIDE AND ALBUTEROL SULFATE 3 MILLILITER(S): .5; 3 SOLUTION RESPIRATORY (INHALATION) at 07:00

## 2024-09-20 RX ADMIN — FLUTICASONE PROPIONATE AND SALMETEROL 1 DOSE(S): 250; 50 POWDER RESPIRATORY (INHALATION) at 17:22

## 2024-09-20 RX ADMIN — IPRATROPIUM BROMIDE AND ALBUTEROL SULFATE 3 MILLILITER(S): .5; 3 SOLUTION RESPIRATORY (INHALATION) at 17:20

## 2024-09-20 RX ADMIN — Medication 20 MILLIGRAM(S): at 05:53

## 2024-09-20 RX ADMIN — FLUTICASONE PROPIONATE 1 SPRAY(S): 50 SPRAY, METERED NASAL at 17:22

## 2024-09-20 RX ADMIN — Medication 100 MILLIGRAM(S): at 05:54

## 2024-09-20 RX ADMIN — FLUTICASONE PROPIONATE 1 SPRAY(S): 50 SPRAY, METERED NASAL at 05:53

## 2024-09-20 RX ADMIN — MONTELUKAST SODIUM 10 MILLIGRAM(S): 5 TABLET, CHEWABLE ORAL at 12:44

## 2024-09-20 RX ADMIN — SODIUM CHLORIDE 100 MILLILITER(S): 9 INJECTION INTRAMUSCULAR; INTRAVENOUS; SUBCUTANEOUS at 08:46

## 2024-09-20 RX ADMIN — Medication 100 MILLIGRAM(S): at 12:43

## 2024-09-20 RX ADMIN — Medication 2: at 12:44

## 2024-09-20 RX ADMIN — AMLODIPINE BESYLATE 5 MILLIGRAM(S): 10 TABLET ORAL at 05:54

## 2024-09-20 RX ADMIN — Medication 15 UNIT(S): at 12:44

## 2024-09-20 RX ADMIN — Medication 10 UNIT(S): at 08:45

## 2024-09-20 NOTE — PROGRESS NOTE ADULT - SUBJECTIVE AND OBJECTIVE BOX
[facility]  JAMSHID Bridges Y. Patel, S. Shah, G. Casimir  [phone]  ([phone] - weekdays after 5pm and weekends)    Name: ROGER PALUMBO  Age/Gender: 64y Female  MRN: [id number]    Interval History:  Patient seen and examined this morning.   Feels much better today, no wheezing.   No new complaints noted.  Notes reviewed  No concerning overnight events  Afebrile   Allergies: NSAIDs (Unknown)  aspirin (Hives)  AValox (Unknown)      Objective:  Vitals:   T(F): 98.2 (09-20-24 @ 04:48), Max: 98.3 (09-19-24 @ 13:40)  HR: 70 (09-20-24 @ 04:48) (70 - 86)  BP: 150/82 (09-20-24 @ 04:48) (130/77 - 160/89)  RR: 18 (09-20-24 @ 04:48) (18 - 18)  SpO2: 98% (09-20-24 @ 04:48) (98% - 98%)  Physical Examination:  General: no acute distress, nontoxic   HEENT: NC/AT, anicteric, EOMI  Respiratory: clear to auscultation bilaterally, no wheezes  Cardiovascular: S1 and S2 present  Gastrointestinal: normal appearing, nondistended  Extremities: no edema, no cyanosis  Skin: no visible rash    Laboratory Studies:  CBC:                       11.3   11.70 )-----------( 160      ( 19 Sep 2024 09:13 )             34.1     WBC Trend:  11.70 09-19-24 @ 09:13  13.76 09-18-24 @ 09:59  13.98 09-17-24 @ 09:13  14.20 09-15-24 @ 06:58  11.31 09-14-24 @ 06:26    CMP: 09-19    133[L]  |  96  |  20  ----------------------------<  171[H]  4.0   |  22  |  0.75    Ca    8.4      19 Sep 2024 09:13  Mg     2.2     09-19      Creatinine: 0.75 mg/dL (09-19-24 @ 09:13)  Creatinine: 0.73 mg/dL (09-18-24 @ 09:59)  Creatinine: 0.74 mg/dL (09-17-24 @ 09:13)  Creatinine: 0.67 mg/dL (09-16-24 @ 06:55)  Creatinine: 0.76 mg/dL (09-15-24 @ 06:56)  Creatinine: 0.81 mg/dL (09-14-24 @ 08:59)  Creatinine: 0.77 mg/dL (09-13-24 @ 14:44)    Microbiology: reviewed   Culture - Blood (collected 09-15-24 @ 11:30)  Source: .Blood Blood  Preliminary Report (09-19-24 @ 15:01):    No growth at 4 days    Culture - Blood (collected 09-15-24 @ 11:22)  Source: .Blood Blood  Preliminary Report (09-19-24 @ 15:01):    No growth at 4 days    SARS-CoV-2 Result: NotDete (12 Sep 2024 18:49)    Radiology: reviewed     Medications:  acetaminophen     Tablet .. 650 milliGRAM(s) Oral every 6 hours PRN  albuterol    90 MICROgram(s) HFA Inhaler 2 Puff(s) Inhalation every 2 hours PRN  albuterol/ipratropium for Nebulization 3 milliLiter(s) Nebulizer every 4 hours  aluminum hydroxide/magnesium hydroxide/simethicone Suspension 30 milliLiter(s) Oral every 4 hours PRN  amLODIPine   Tablet 5 milliGRAM(s) Oral daily  atorvastatin 20 milliGRAM(s) Oral at bedtime  benzonatate 100 milliGRAM(s) Oral every 8 hours  dextrose 5%. 1000 milliLiter(s) IV Continuous <Continuous>  dextrose 5%. 1000 milliLiter(s) IV Continuous <Continuous>  dextrose 50% Injectable 12.5 Gram(s) IV Push once  dextrose 50% Injectable 25 Gram(s) IV Push once  dextrose 50% Injectable 25 Gram(s) IV Push once  dextrose Oral Gel 15 Gram(s) Oral once PRN  fluticasone propionate 50 MICROgram(s)/spray Nasal Spray 1 Spray(s) Both Nostrils two times a day  fluticasone propionate/ salmeterol 250-50 MICROgram(s) Diskus 1 Dose(s) Inhalation two times a day  glucagon  Injectable 1 milliGRAM(s) IntraMuscular once  insulin glargine Injectable (LANTUS) 30 Unit(s) SubCutaneous at bedtime  insulin lispro (ADMELOG) corrective regimen sliding scale   SubCutaneous three times a day before meals  insulin lispro (ADMELOG) corrective regimen sliding scale   SubCutaneous at bedtime  insulin lispro Injectable (ADMELOG) 25 Unit(s) SubCutaneous before dinner  insulin lispro Injectable (ADMELOG) 10 Unit(s) SubCutaneous before breakfast  insulin lispro Injectable (ADMELOG) 15 Unit(s) SubCutaneous before lunch  losartan 100 milliGRAM(s) Oral daily  melatonin 3 milliGRAM(s) Oral at bedtime PRN  methylPREDNISolone sodium succinate Injectable 20 milliGRAM(s) IV Push every 8 hours  montelukast 10 milliGRAM(s) Oral daily  ondansetron Injectable 4 milliGRAM(s) IV Push every 8 hours PRN  sodium chloride 0.9%. 1000 milliLiter(s) IV Continuous <Continuous>  thiamine 100 milliGRAM(s) Oral daily

## 2024-09-20 NOTE — PROGRESS NOTE ADULT - PROVIDER SPECIALTY LIST ADULT
Cardiology
Endocrinology
Internal Medicine
Nephrology
Pulmonology
Pulmonology
Cardiology
Cardiology
Endocrinology
Endocrinology
Infectious Disease
Internal Medicine
Internal Medicine
Nephrology
Pulmonology
Endocrinology
Infectious Disease
Internal Medicine
Nephrology
Nephrology
Pulmonology
Cardiology
Pulmonology

## 2024-09-20 NOTE — PROGRESS NOTE ADULT - ASSESSMENT
64 y.o. female with history of T2DM , Asthma, HTN and HLD presents for asthma exacerbation.    1. T2DM with hyperglycemia  - Continue Lantus 30 units at night  - Continue Admelog 10 units with breakfast, 15 units with lunch and 25 units with dinner  - Continue moderate Admelog correctional scale before meals and bedtime  - Monitor FS before meals and bedtime  - Follow hospital hypoglycemic protocol    2. Asthma exacerbation  - solumedrol 20 mg IV Q8H  - pulm following    Will continue to follow.     Zoe Hayward MD  [facility]    [address] [place] [address]    T [phone]  F [phone]   64 y.o. female with history of T2DM , Asthma, HTN and HLD presents for asthma exacerbation.    1. T2DM with hyperglycemia  - Continue Lantus 30 units at night  - Continue Admelog 10 units with breakfast, 15 units with lunch and 25 units with dinner  - Continue moderate Admelog correctional scale before meals and bedtime  - Monitor FS before meals and bedtime  - Follow hospital hypoglycemic protocol    2. Asthma exacerbation  - solumedrol 20 mg IV Q8H  - pulm following    Discharge recs  - Resume home regimen- Levemir and Novolog  - Will arrange to see outpatient next week    Zoe Hayward MD  [facility]    [address] [place] [address]    T [phone]  F [phone]

## 2024-09-20 NOTE — PROGRESS NOTE ADULT - SUBJECTIVE AND OBJECTIVE BOX
Date of Service: 09-20-24 @ 14:16    Patient is a 64y old  Female who presents with a chief complaint of Asthma exacerbation (14 Sep 2024 16:05)      Any change in ROS: seems pretty good:   no wheezing or stridor today     MEDICATIONS  (STANDING):  albuterol/ipratropium for Nebulization 3 milliLiter(s) Nebulizer every 4 hours  amLODIPine   Tablet 5 milliGRAM(s) Oral daily  atorvastatin 20 milliGRAM(s) Oral at bedtime  benzonatate 100 milliGRAM(s) Oral every 8 hours  dextrose 5%. 1000 milliLiter(s) (50 mL/Hr) IV Continuous <Continuous>  dextrose 5%. 1000 milliLiter(s) (100 mL/Hr) IV Continuous <Continuous>  dextrose 50% Injectable 25 Gram(s) IV Push once  dextrose 50% Injectable 12.5 Gram(s) IV Push once  dextrose 50% Injectable 25 Gram(s) IV Push once  fluticasone propionate 50 MICROgram(s)/spray Nasal Spray 1 Spray(s) Both Nostrils two times a day  fluticasone propionate/ salmeterol 250-50 MICROgram(s) Diskus 1 Dose(s) Inhalation two times a day  glucagon  Injectable 1 milliGRAM(s) IntraMuscular once  insulin glargine Injectable (LANTUS) 30 Unit(s) SubCutaneous at bedtime  insulin lispro (ADMELOG) corrective regimen sliding scale   SubCutaneous three times a day before meals  insulin lispro (ADMELOG) corrective regimen sliding scale   SubCutaneous at bedtime  insulin lispro Injectable (ADMELOG) 15 Unit(s) SubCutaneous before lunch  insulin lispro Injectable (ADMELOG) 10 Unit(s) SubCutaneous before breakfast  insulin lispro Injectable (ADMELOG) 25 Unit(s) SubCutaneous before dinner  losartan 100 milliGRAM(s) Oral daily  montelukast 10 milliGRAM(s) Oral daily  predniSONE   Tablet 20 milliGRAM(s) Oral daily  thiamine 100 milliGRAM(s) Oral daily    MEDICATIONS  (PRN):  acetaminophen     Tablet .. 650 milliGRAM(s) Oral every 6 hours PRN Temp greater or equal to 38C (100.4F), Mild Pain (1 - 3)  albuterol    90 MICROgram(s) HFA Inhaler 2 Puff(s) Inhalation every 2 hours PRN Shortness of Breath and/or Wheezing  aluminum hydroxide/magnesium hydroxide/simethicone Suspension 30 milliLiter(s) Oral every 4 hours PRN Dyspepsia  dextrose Oral Gel 15 Gram(s) Oral once PRN Blood Glucose LESS THAN 70 milliGRAM(s)/deciliter  melatonin 3 milliGRAM(s) Oral at bedtime PRN Insomnia  ondansetron Injectable 4 milliGRAM(s) IV Push every 8 hours PRN Nausea and/or Vomiting    Vital Signs Last 24 Hrs  T(C): 36.7 (20 Sep 2024 13:06), Max: 36.8 (19 Sep 2024 19:51)  T(F): 98.1 (20 Sep 2024 13:06), Max: 98.2 (19 Sep 2024 19:51)  HR: 86 (20 Sep 2024 13:06) (70 - 86)  BP: 144/80 (20 Sep 2024 13:06) (130/77 - 150/82)  BP(mean): --  RR: 18 (20 Sep 2024 13:06) (18 - 18)  SpO2: 96% (20 Sep 2024 13:06) (96% - 98%)    Parameters below as of 20 Sep 2024 13:06  Patient On (Oxygen Delivery Method): room air        I&O's Summary        Physical Exam:   GENERAL: NAD, well-groomed, well-developed  HEENT: SAQIB/   Atraumatic, Normocephalic  ENMT: No tonsillar erythema, exudates, or enlargement; Moist mucous membranes, Good dentition, No lesions  NECK: Supple, No JVD, Normal thyroid  CHEST/LUNG: Clear to auscultaion  CVS: Regular rate and rhythm; No murmurs, rubs, or gallops  GI: : Soft, Nontender, Nondistended; Bowel sounds present  NERVOUS SYSTEM:  Alert & Oriented X3  EXTREMITIES:  2+ Peripheral Pulses, No clubbing, cyanosis, or edema  LYMPH: No lymphadenopathy noted  SKIN: No rashes or lesions  ENDOCRINOLOGY: No Thyromegaly  PSYCH: Appropriate    Labs:  23                            11.3   11.70 )-----------( 160      ( 19 Sep 2024 09:13 )             34.1                         11.6   13.76 )-----------( 286      ( 18 Sep 2024 09:59 )             34.9                         11.9   13.98 )-----------( 277      ( 17 Sep 2024 09:13 )             35.7     09-19    133[L]  |  96  |  20  ----------------------------<  171[H]  4.0   |  22  |  0.75  09-18    141  |  101  |  19  ----------------------------<  230[H]  3.3[L]   |  23  |  0.73  09-17    139  |  101  |  16  ----------------------------<  200[H]  3.4[L]   |  20[L]  |  0.74    Ca    8.4      19 Sep 2024 09:13  Mg     2.2     09-19      CAPILLARY BLOOD GLUCOSE      POCT Blood Glucose.: 154 mg/dL (20 Sep 2024 12:15)  POCT Blood Glucose.: 171 mg/dL (20 Sep 2024 07:58)  POCT Blood Glucose.: 179 mg/dL (19 Sep 2024 21:33)  POCT Blood Glucose.: 108 mg/dL (19 Sep 2024 16:57)          Urinalysis Basic - ( 19 Sep 2024 09:13 )    Color: x / Appearance: x / SG: x / pH: x  Gluc: 171 mg/dL / Ketone: x  / Bili: x / Urobili: x   Blood: x / Protein: x / Nitrite: x   Leuk Esterase: x / RBC: x / WBC x   Sq Epi: x / Non Sq Epi: x / Bacteria: x      D-Dimer Assay, Quantitative: 570 ng/mL DDU (09-18 @ 09:59)  Lactate, Blood: 3.3 mmol/L (09-20 @ 06:46)  Lactate, Blood: 4.1 mmol/L (09-19 @ 18:16)  Lactate, Blood: 3.3 mmol/L (09-19 @ 01:09)  Lactate, Blood: 5.5 mmol/L (09-18 @ 09:59)  Lactate, Blood: 5.9 mmol/L (09-17 @ 09:13)        RECENT CULTURES:  09-15 @ 11:30 .Blood Blood                No growth at 4 days    09-15 @ 11:22 .Blood Blood         rad< from: [facility] Duplex Lower Ext Vein Scan, Bilat (09.18.24 @ 16:41) >    RIGHT:  Normal compressibility of the RIGHT common femoral, femoral and popliteal   veins.  Doppler examination shows normal spontaneous and phasic flow.  No RIGHT calf vein thrombosis is detected.    LEFT:  Normal compressibility of the LEFT common femoral, femoral and popliteal   veins.  Doppler examination shows normal spontaneous and phasic flow.  No LEFT calf vein thrombosis is detected.    IMPRESSION:  No evidence of deep venous thrombosis in either lower extremity.            --- End of Report ---            PORTILLO WADE MD; Attending Radiologist  This document has been electronically signed. Sep 18 2024  5:00PM    < end of copied text >  < from: Xray Chest 2 Views PA/Lat (09.12.24 @ 19:07) >    CLINICAL INDICATION: Shortness of breath and cough for past 4 days.    TECHNIQUE: 2 views; Frontal and lateral views of the chest were obtained.    COMPARISON: X-ray chest 10/30/2023.    FINDINGS:    The heart is normal in size.  The lungs are clear.  There is no pneumothorax or pleural effusion.    IMPRESSION:  Clear lungs.    --- End of Report ---          KORINA ROLDAN MD; Resident Radiologist  This document has been electronically signed.  ADRIANNA RICARDO MD; Attending Radiologist  This document has been electronically signed. Sep 13 2024  8:22AM    < end of copied text >         No growth at 4 days          RESPIRATORY CULTURES:          Studies  Chest X-RAY  CT SCAN Chest   Venous Dopplers: LE:   CT Abdomen  Others

## 2024-09-20 NOTE — PROGRESS NOTE ADULT - SUBJECTIVE AND OBJECTIVE BOX
[facility]   [phone]  RENAL FOLLOW UP NOTE  --------------------------------------------------------------------------------  Chief Complaint:    24 hour events/subjective:    patient seen and examined.   states breathing is better    PAST HISTORY  --------------------------------------------------------------------------------  No significant changes to PMH, PSH, FHx, SHx, unless otherwise noted    ALLERGIES & MEDICATIONS  --------------------------------------------------------------------------------  Allergies    NSAIDs (Unknown)  aspirin (Hives)  AValox (Unknown)    Intolerances      Standing Inpatient Medications  albuterol/ipratropium for Nebulization 3 milliLiter(s) Nebulizer every 4 hours  amLODIPine   Tablet 5 milliGRAM(s) Oral daily  atorvastatin 20 milliGRAM(s) Oral at bedtime  benzonatate 100 milliGRAM(s) Oral every 8 hours  dextrose 5%. 1000 milliLiter(s) IV Continuous <Continuous>  dextrose 5%. 1000 milliLiter(s) IV Continuous <Continuous>  dextrose 50% Injectable 12.5 Gram(s) IV Push once  dextrose 50% Injectable 25 Gram(s) IV Push once  dextrose 50% Injectable 25 Gram(s) IV Push once  fluticasone propionate 50 MICROgram(s)/spray Nasal Spray 1 Spray(s) Both Nostrils two times a day  fluticasone propionate/ salmeterol 250-50 MICROgram(s) Diskus 1 Dose(s) Inhalation two times a day  glucagon  Injectable 1 milliGRAM(s) IntraMuscular once  insulin glargine Injectable (LANTUS) 30 Unit(s) SubCutaneous at bedtime  insulin lispro (ADMELOG) corrective regimen sliding scale   SubCutaneous three times a day before meals  insulin lispro (ADMELOG) corrective regimen sliding scale   SubCutaneous at bedtime  insulin lispro Injectable (ADMELOG) 15 Unit(s) SubCutaneous before lunch  insulin lispro Injectable (ADMELOG) 25 Unit(s) SubCutaneous before dinner  insulin lispro Injectable (ADMELOG) 10 Unit(s) SubCutaneous before breakfast  losartan 100 milliGRAM(s) Oral daily  methylPREDNISolone sodium succinate Injectable 20 milliGRAM(s) IV Push every 8 hours  montelukast 10 milliGRAM(s) Oral daily  thiamine 100 milliGRAM(s) Oral daily    PRN Inpatient Medications  acetaminophen     Tablet .. 650 milliGRAM(s) Oral every 6 hours PRN  albuterol    90 MICROgram(s) HFA Inhaler 2 Puff(s) Inhalation every 2 hours PRN  aluminum hydroxide/magnesium hydroxide/simethicone Suspension 30 milliLiter(s) Oral every 4 hours PRN  dextrose Oral Gel 15 Gram(s) Oral once PRN  melatonin 3 milliGRAM(s) Oral at bedtime PRN  ondansetron Injectable 4 milliGRAM(s) IV Push every 8 hours PRN      REVIEW OF SYSTEMS  --------------------------------------------------------------------------------    Gen: denies fevers/chills,  CVS: denies chest pain/palpitations  Resp: denies SOB/Cough  GI: Denies N/V/Abd pain  : Denies dysuria    VITALS/PHYSICAL EXAM  --------------------------------------------------------------------------------  T(C): 36.7 (09-20-24 @ 13:06), Max: 36.8 (09-19-24 @ 19:51)  HR: 86 (09-20-24 @ 13:06) (70 - 86)  BP: 144/80 (09-20-24 @ 13:06) (130/77 - 150/82)  RR: 18 (09-20-24 @ 13:06) (18 - 18)  SpO2: 96% (09-20-24 @ 13:06) (96% - 98%)  Wt(kg): --        Physical Exam:  	              Gen:  appears comfortable  	Pulm: decrease bs  no rales or ronchi, no wheeze  	CV: no JVD. RRR, S1S2; no rub  	Abd: +BS, soft, nontender/nondistended  	: No suprapubic tenderness  	UE: Warm, no cyanosis  no clubbing,  LUE ? edema   	LE: Warm, no cyanosis  no clubbing, no edema  	Neuro: alert and oriented. speech coherent       LABS/STUDIES  --------------------------------------------------------------------------------              11.3   11.70 >-----------<  160      [09-19-24 @ 09:13]              34.1     133  |  96  |  20  ----------------------------<  171      [09-19-24 @ 09:13]  4.0   |  22  |  0.75        Ca     8.4     [09-19-24 @ 09:13]      Mg     2.2     [09-19-24 @ 09:13]            Creatinine Trend:  SCr 0.75 [09-19 @ 09:13]  SCr 0.73 [09-18 @ 09:59]  SCr 0.74 [09-17 @ 09:13]  SCr 0.67 [09-16 @ 06:55]  SCr 0.76 [09-15 @ 06:56]

## 2024-09-20 NOTE — PROGRESS NOTE ADULT - ASSESSMENT
Patient is a 64F with PMHx of DM2, HLD and HTN. Admitted for asthma exacerbation.     Plan:    # Acute asthma exacerbation:  - CXR w/ clear lungs  - C/w nebs/ inhalers/ Singulair   - C/w steroid taper per Pulm, eventual transition to PO  - D-dimer 570  - BL LE Doppler with no evidence of deep venous thrombosis in either lower extremity  - CT Angio pending  - Serial ABG's  - Maintain Fio2>90%  - Seen by ICU 9/13-> pt does not require ICU level of care  - Seen by ENT 9/13-> afrin to b/l nare bid x3days then d/c, flonase to b/l nare bid [id number]. Pt is to follow up at ENT in 2 weeks with Nikita Badillo, Serafin Dumont. Call [phone] to make appointment.  - Pulm following    # Lactic Acidosis:  - Hold hctz  - Trend lactate/CMP  - Renal following    # Atypical chest pain:  - Likely  secondary to asthma exacerbation  - Trops negative  - Echo w/ Left ventricular cavity is normal in size. Left ventricular wall thickness is normal. Left ventricular systolic function is normal. There are no regional wall motion abnormalities seen.  - Cards following    # SIRS:  - UA negative for infection, no gu sx noted   - BCx NGTD x2 (72h)  - Monitor off abx  - ID following    # DM2/ Hyperglycemia:  - HgbA1c 7.8  - Continue to monitor blood glucose levels  - Sliding Scale  - Endo following    # HTN/ HLD:  - C/w CV meds    # GI:  - Bowel regimen prn    # DVT ppx:  - IPC's    DC planning when cleared by Pulm for PO steroid taper    [facility]  [phone]     Cheiloplasty (Complex) Text: A decision was made to reconstruct the defect with a  cheiloplasty.  The defect was undermined extensively.  Additional obicularis oris muscle was excised with a 15 blade scalpel.  The defect was converted into a full thickness wedge to facilite a better cosmetic result.  Small vessels were then tied off with 5-0 monocyrl. The obicularis oris, superficial fascia, adipose and dermis were then reapproximated.  After the deeper layers were approximated the epidermis was reapproximated with particular care given to realign the vermilion border.

## 2024-09-20 NOTE — DISCHARGE NOTE PROVIDER - CARE PROVIDERS DIRECT ADDRESSES
[email or web address]

## 2024-09-20 NOTE — PROGRESS NOTE ADULT - ASSESSMENT
64F PMHx asthma, HTN/HLD, DM. Presenting w/ 3d of SOB and cough, rx'd prednisone w/o improvement outpatient. Pt also been noticing increase in FS glucose while on steroid. In the ED, pt afebrile, bp stable, on RA saturating 99%, tachypneic to 22. Pt was audibly wheezing.   CBC w/ wbc 12.6, hgb 11.8, plt 289. CMP w/ SCr 0.79, bicarb 18, AG 18, glucose 346. VBG w/ initial lactate 6.5 -> 4.6 after 1L IVF. covid/flu/rsv neg. CXR showed clear lungs. Pt received solumedrol 125mg, Mg 2g, and duoneb x3. On interview/exam, pt still wheezing. Appears audible wheezing w/o stethoscope is more upper airway but on lung auscultation, with exp wheezing as well.           Asthma exacerbation:   -she has a known diagnosis of asthma and has been  on Dupixent biweekly as an outpt:  -came in here with asthma exacerbation, possibly 2nd to viral infection  -ENT recs appreciated, no VC dysfunction noted  -Still with significant wheezing, continue current dose of solumedrol  -Change Advair to 250/50 1 puff BID  -Change Duoneb to q4h  -Continue Singulair   -Normoxic, keep sats >90% with o2 PRN     9/15:   -she has been wheezing;   a lot:  cant decrease steroids:   -cont BD and cont iv steroids:   -cont singulair    9/16:  -she is still wheezing considerably:  -needs high dose steroids still:   -cont bd and singulair and nebs;   -check p eak flow q shift    9/17:  -she is still wheezing a lot:   -cont SM 40 mg q 8 hours:   -cont bd ;  -cont singulair and advair:   -check d dimer:   /-peak flow q 12 hours:   -she remans on room air     9/18:  -seems OK;  wheezing persistent  cough excessively:   -d dimer is high : for cta:   -add tessalon per les;   -cont bd:   -peak flow not done    9/19  -she is still having audible wheezing   -But 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:  -she may have underlying asthma too :  -but she got lot of steroids : will decrease to 20 mg a day x 3 days;   -she is on room air:   -if cta is negative; she cn be dced;         Uncontrolled DM:   -secondary to stress and steroids   -monitor and control     9/15; cont current meds:   -cont to control on high dose glucose    9/16:  -pt still needs high dosages of steroids :  -cont to control blood glucose on high steroids     9/17: now on q 8 hours of steroids:  manage per primary team    9/18:  -blood glucose seems to be controlled  9/19:  -decrease steroids further    9/20: steroids drastically reduced:  as this episode was likely PVCD attack    HTN:   -controlled:     dvt prophylaxis    dw acp

## 2024-09-20 NOTE — DISCHARGE NOTE PROVIDER - PROVIDER TOKENS
PROVIDER:[TOKEN:[[id number]],FOLLOWUP:[1 week]] PROVIDER:[TOKEN:[[id number]],FOLLOWUP:[1 week]],PROVIDER:[TOKEN:[[id number]],FOLLOWUP:[1 week]]

## 2024-09-20 NOTE — PROGRESS NOTE ADULT - SUBJECTIVE AND OBJECTIVE BOX
SUBJECTIVE / OVERNIGHT EVENTS:      Patient seen and examined at bedside. No events noted overnight. Resting in bed      --------------------------------------------------------------------------------------------  LABS:                        11.3   11.70 )-----------( 160      ( 19 Sep 2024 09:13 )             34.1     09-19    133[L]  |  96  |  20  ----------------------------<  171[H]  4.0   |  22  |  0.75    Ca    8.4      19 Sep 2024 09:13  Mg     2.2     09-19        CAPILLARY BLOOD GLUCOSE      POCT Blood Glucose.: 171 mg/dL (20 Sep 2024 07:58)  POCT Blood Glucose.: 179 mg/dL (19 Sep 2024 21:33)  POCT Blood Glucose.: 108 mg/dL (19 Sep 2024 16:57)  POCT Blood Glucose.: 108 mg/dL (19 Sep 2024 12:31)        Urinalysis Basic - ( 19 Sep 2024 09:13 )    Color: x / Appearance: x / SG: x / pH: x  Gluc: 171 mg/dL / Ketone: x  / Bili: x / Urobili: x   Blood: x / Protein: x / Nitrite: x   Leuk Esterase: x / RBC: x / WBC x   Sq Epi: x / Non Sq Epi: x / Bacteria: x        RADIOLOGY & ADDITIONAL TESTS:     Imaging Personally Reviewed:  [x] YES  [ ] NO    Consultant(s) Notes Reviewed:  [x] YES  [ ] NO    MEDICATIONS  (STANDING):  albuterol/ipratropium for Nebulization 3 milliLiter(s) Nebulizer every 4 hours  amLODIPine   Tablet 5 milliGRAM(s) Oral daily  atorvastatin 20 milliGRAM(s) Oral at bedtime  benzonatate 100 milliGRAM(s) Oral every 8 hours  dextrose 5%. 1000 milliLiter(s) (100 mL/Hr) IV Continuous <Continuous>  dextrose 5%. 1000 milliLiter(s) (50 mL/Hr) IV Continuous <Continuous>  dextrose 50% Injectable 12.5 Gram(s) IV Push once  dextrose 50% Injectable 25 Gram(s) IV Push once  dextrose 50% Injectable 25 Gram(s) IV Push once  fluticasone propionate 50 MICROgram(s)/spray Nasal Spray 1 Spray(s) Both Nostrils two times a day  fluticasone propionate/ salmeterol 250-50 MICROgram(s) Diskus 1 Dose(s) Inhalation two times a day  glucagon  Injectable 1 milliGRAM(s) IntraMuscular once  insulin glargine Injectable (LANTUS) 30 Unit(s) SubCutaneous at bedtime  insulin lispro (ADMELOG) corrective regimen sliding scale   SubCutaneous three times a day before meals  insulin lispro (ADMELOG) corrective regimen sliding scale   SubCutaneous at bedtime  insulin lispro Injectable (ADMELOG) 25 Unit(s) SubCutaneous before dinner  insulin lispro Injectable (ADMELOG) 10 Unit(s) SubCutaneous before breakfast  insulin lispro Injectable (ADMELOG) 15 Unit(s) SubCutaneous before lunch  losartan 100 milliGRAM(s) Oral daily  methylPREDNISolone sodium succinate Injectable 20 milliGRAM(s) IV Push every 8 hours  montelukast 10 milliGRAM(s) Oral daily  sodium chloride 0.9%. 1000 milliLiter(s) (100 mL/Hr) IV Continuous <Continuous>  thiamine 100 milliGRAM(s) Oral daily    MEDICATIONS  (PRN):  acetaminophen     Tablet .. 650 milliGRAM(s) Oral every 6 hours PRN Temp greater or equal to 38C (100.4F), Mild Pain (1 - 3)  albuterol    90 MICROgram(s) HFA Inhaler 2 Puff(s) Inhalation every 2 hours PRN Shortness of Breath and/or Wheezing  aluminum hydroxide/magnesium hydroxide/simethicone Suspension 30 milliLiter(s) Oral every 4 hours PRN Dyspepsia  dextrose Oral Gel 15 Gram(s) Oral once PRN Blood Glucose LESS THAN 70 milliGRAM(s)/deciliter  melatonin 3 milliGRAM(s) Oral at bedtime PRN Insomnia  ondansetron Injectable 4 milliGRAM(s) IV Push every 8 hours PRN Nausea and/or Vomiting      Care Discussed with Consultants/Other Providers [x] YES  [ ] NO    Vital Signs Last 24 Hrs  T(C): 36.8 (20 Sep 2024 04:48), Max: 36.8 (19 Sep 2024 13:40)  T(F): 98.2 (20 Sep 2024 04:48), Max: 98.3 (19 Sep 2024 13:40)  HR: 70 (20 Sep 2024 04:48) (70 - 86)  BP: 150/82 (20 Sep 2024 04:48) (130/77 - 160/89)  BP(mean): --  RR: 18 (20 Sep 2024 04:48) (18 - 18)  SpO2: 98% (20 Sep 2024 04:48) (98% - 98%)    Parameters below as of 20 Sep 2024 04:48  Patient On (Oxygen Delivery Method): room air      I&O's Summary    PHYSICAL EXAM:  GENERAL: NAD, well-developed, comfortable  HEAD:  Atraumatic, Normocephalic  EYES: EOMI, PERRLA, conjunctiva and sclera clear  NECK: Supple, No JVD  CHEST/LUNG: Diminished bilaterally;+wheeze  HEART: Regular rate and rhythm; No murmurs, rubs, or gallops  ABDOMEN: Soft, Nontender, Nondistended; Bowel sounds present  NEURO: AAOx3, no focal weakness, 5/5 b/l extremity strength, b/l knee no arthritis, no effusion   EXTREMITIES:  2+ Peripheral Pulses, No clubbing, cyanosis, or edema  SKIN: No rashes or lesions

## 2024-09-20 NOTE — DISCHARGE NOTE PROVIDER - NSDCCPCAREPLAN_GEN_ALL_CORE_FT
PRINCIPAL DISCHARGE DIAGNOSIS  Diagnosis: Acute asthma exacerbation  Assessment and Plan of Treatment:       SECONDARY DISCHARGE DIAGNOSES  Diagnosis: Systemic inflammatory response syndrome (SIRS)  Assessment and Plan of Treatment:     Diagnosis: DM2 (diabetes mellitus, type 2)  Assessment and Plan of Treatment:      PRINCIPAL DISCHARGE DIAGNOSIS  Diagnosis: Acute asthma exacerbation  Assessment and Plan of Treatment: continue current steroid dosing  follow up with pulmonology  Follow up with pcp ; you may benefit from evalaution for paradoxical vocal cord dysfunction      SECONDARY DISCHARGE DIAGNOSES  Diagnosis: Systemic inflammatory response syndrome (SIRS)  Assessment and Plan of Treatment: SIRS from acute asthma excaerbation has since resolved    Diagnosis: DM2 (diabetes mellitus, type 2)  Assessment and Plan of Treatment:     Diagnosis: Lactic acidosis  Assessment and Plan of Treatment: You were evaluated byy Nephrology  you had no evidence of bacterial infection  lactic acidoses has since improved     PRINCIPAL DISCHARGE DIAGNOSIS  Diagnosis: Acute asthma exacerbation  Assessment and Plan of Treatment: continue current steroid dosing  follow up with pulmonology  Follow up with pcp ; you may benefit from evalaution for paradoxical vocal cord dysfunction      SECONDARY DISCHARGE DIAGNOSES  Diagnosis: Systemic inflammatory response syndrome (SIRS)  Assessment and Plan of Treatment: SIRS from acute asthma excaerbation has since resolved    Diagnosis: DM2 (diabetes mellitus, type 2)  Assessment and Plan of Treatment: HgAic 7.8  continue levemir and novolog as prescribed  follow up with endocrinology in 1 week  check blood sugar prior to meals and bed time    Diagnosis: Lactic acidosis  Assessment and Plan of Treatment: You were evaluated byy Nephrology  you had no evidence of bacterial infection  lactic acidoses has since improved

## 2024-09-20 NOTE — DISCHARGE NOTE PROVIDER - HOSPITAL COURSE
HPI:  64F PMHx asthma, HTN/HLD, DM. Presenting w/ 3d of SOB and cough, rx'd prednisone w/o improvement outpatient. Pt also been noticing increase in FS glucose while on steroid.   In the ED, pt afebrile, bp stable, on RA saturating 99%, tachypneic to 22. Pt was audibly wheezing.   CBC w/ wbc 12.6, hgb 11.8, plt 289.   CMP w/ SCr 0.79, bicarb 18, AG 18, glucose 346.   VBG w/ initial lactate 6.5 -> 4.6 after 1L IVF.   covid/flu/rsv neg.   CXR showed clear lungs.     Pt received solumedrol 125mg, Mg 2g, and duoneb x3.   On interview/exam, pt still wheezing. Appears audible wheezing w/o stethoscope is more upper airway but on lung auscultation, with exp wheezing as well. No crackles. Pt reports subjective SOB but saturating 96% on RA.  (13 Sep 2024 00:43)    Hospital Course:      Important Medication Changes and Reason:    Active or Pending Issues Requiring Follow-up:    Advanced Directives:   [ ] Full code  [ ] DNR  [ ] Hospice    Discharge Diagnoses:         HPI:  64F PMHx asthma, HTN/HLD, DM. Presenting w/ 3d of SOB and cough, rx'd prednisone w/o improvement outpatient. Pt also been noticing increase in FS glucose while on steroid.   In the ED, pt afebrile, bp stable, on RA saturating 99%, tachypneic to 22. Pt was audibly wheezing.   CBC w/ wbc 12.6, hgb 11.8, plt 289.   CMP w/ SCr 0.79, bicarb 18, AG 18, glucose 346.   VBG w/ initial lactate 6.5 -> 4.6 after 1L IVF.   covid/flu/rsv neg.   CXR showed clear lungs.     Pt received solumedrol 125mg, Mg 2g, and duoneb x3.   On interview/exam, pt still wheezing. Appears audible wheezing w/o stethoscope is more upper airway but on lung auscultation, with exp wheezing as well. No crackles. Pt reports subjective SOB but saturating 96% on RA.  (13 Sep 2024 00:43)    Hospital Course:  Pulmonary consulted. Asthma exacerbation, possibly 2nd to viral infection. ENT  appreciated, no VC dysfunction noted. pt with  significant wheezing, continued high dose IV  solumedrol with  Advair to 250/50 1 puff BID,  Duoneb  q4h and  Singulair. d dimer is high : VA duplex with no DVT: CTA      on admission w/ initial lactate 6.5 -> 4.6 after 1L IVF.  pt repeat lactic acid on 9/13 6.2.. of note, outpt has been on metformin as well . Renal consulted. pt receiving beta agonists as well. cont IVF. lactate is slowly improving. U/A unremarkable. blood cx negative to date. thiamine level in range. IVF stopped. pt noted with high BS on steroids. Endocrine appreciated. basal/bolus insulin adjusted  once wheezing was confined mostly to  upper airways , steroid dosing reduced .  Pulmonary feels pt has paradoxical vocal cord dysfunction in addition to asthma:   Though ENT  didn't see any abnormality's , pulmonary prefer she sees speech pathologist after dc:   wheezing now has totally disappeared  : PLAN to decrease to 20 mg a day x 3 days.  Medically cleared for discharge home     Important Medication Changes and Reason:    Active or Pending Issues Requiring Follow-up:    Advanced Directives:   [ x] Full code  [ ] DNR  [ ] Hospice    Discharge Diagnoses:  ACute asthma exacerbation  Hyperglycemia  lactic acidoses       HPI:  64F PMHx asthma, HTN/HLD, DM. Presenting w/ 3d of SOB and cough, rx'd prednisone w/o improvement outpatient. Pt also been noticing increase in FS glucose while on steroid.   In the ED, pt afebrile, bp stable, on RA saturating 99%, tachypneic to 22. Pt was audibly wheezing.   CBC w/ wbc 12.6, hgb 11.8, plt 289.   CMP w/ SCr 0.79, bicarb 18, AG 18, glucose 346.   VBG w/ initial lactate 6.5 -> 4.6 after 1L IVF.   covid/flu/rsv neg.   CXR showed clear lungs.     Pt received solumedrol 125mg, Mg 2g, and duoneb x3.   On interview/exam, pt still wheezing. Appears audible wheezing w/o stethoscope is more upper airway but on lung auscultation, with exp wheezing as well. No crackles. Pt reports subjective SOB but saturating 96% on RA.  (13 Sep 2024 00:43)    Hospital Course:  Pulmonary consulted. Asthma exacerbation, possibly 2nd to viral infection. ENT  appreciated, no VC dysfunction noted. pt with  significant wheezing, continued high dose IV  solumedrol with  Advair to 250/50 1 puff BID,  Duoneb  q4h and  Singulair. d dimer is high : VA duplex with no DVT: CTA   chest with no PE.   on admission w/ initial lactate 6.5 -> 4.6 after 1L IVF.  pt repeat lactic acid on 9/13 6.2.. of note, outpt has been on metformin as well . Renal consulted. pt receiving beta agonists as well. cont IVF. lactate is slowly improving. U/A unremarkable. blood cx negative to date. thiamine level in range. IVF stopped. pt noted with high BS on steroids. Endocrine appreciated. basal/bolus insulin adjusted  once wheezing was confined mostly to  upper airways , steroid dosing reduced .  Pulmonary feels pt has paradoxical vocal cord dysfunction in addition to asthma:   Though ENT  didn't see any abnormality's , pulmonary prefer she sees speech pathologist after dc:   wheezing now has totally disappeared  : PLAN to decrease to 20 mg a day x 3 days.  Medically cleared for discharge home     Important Medication Changes and Reason:    Active or Pending Issues Requiring Follow-up:    Advanced Directives:   [ x] Full code  [ ] DNR  [ ] Hospice    Discharge Diagnoses:  ACute asthma exacerbation  Hyperglycemia  lactic acidoses

## 2024-09-20 NOTE — DISCHARGE NOTE NURSING/CASE MANAGEMENT/SOCIAL WORK - PATIENT PORTAL LINK FT
You can access the FollowMyHealth Patient Portal offered by [facility] by registering at the following website: [email or web address]. By joining [facility]’s FollowMyHealth portal, you will also be able to view your health information using other applications (apps) compatible with our system.

## 2024-09-20 NOTE — PROGRESS NOTE ADULT - ASSESSMENT
64F PMHx asthma, HTN/HLD, DM. hx breast ca and colon ca, Presenting w/ 3d of SOB and cough, rx'd prednisone w/o improvement outpatient. Pt also been noticing increase in FS glucose while on steroid.   on admission w/ initial lactate 6.5 -> 4.6 after 1L IVF.  pt repeat lactic acid on 9/13 6.2.. of note, outpt has been on metformin as well   covid/flu/rsv neg. CXR showed clear lungs. Pt received solumedrol 125mg, Mg 2g, and duoneb x3.   pt receiving beta agonists as well         1- lactic acidosis /high AGAP acidosis   2- HTN   3- hyperglycemia/DM  4- wheezing /sob   5- asthma exacerbation     lactate is slowly improving  now with significant LE edema  d/c ivf   U/A unremarkable  blood cx negative to date  thiamine level in range  thiamine 100 mg daily  amlodipine 5 mg daily  losartan 100 mg daily  trend bp  steroids as per pulm

## 2024-09-20 NOTE — DISCHARGE NOTE PROVIDER - CARE PROVIDER_API CALL
Received: 5 days ago  Aleshia Hitchcock MD  [facility], April M, LPN  Caller: Unspecified (1 week ago)  I don't see any records of him being seen at any other [facility] location. Regardless, even if he was seen at another [facility], he would be new to our practice, meaning a new patient and the associated billing/charges. Margi Pagan MD       Copied and pasted Dr. Yamil rosa and sent to patient through [facility]. Uche Sinha  Pulmonary Disease  [address] [facility], [address]  [place] [address]  Phone: [phone]  Fax: [phone]  Follow Up Time: 1 week   Uche Sinha  Pulmonary Disease  [address] [facility] [address] [facility], [address]  [place] [address]  Phone: [phone]  Fax: [phone]  Follow Up Time: 1 week    Zoe Hayward  Endocrinology/Metab/Diabetes  [address] [facility], [address]  [place] [address]  Phone: [phone]  Fax: [phone]  Follow Up Time: 1 week

## 2024-09-20 NOTE — PROGRESS NOTE ADULT - NS ATTEND OPT1 GEN_ALL_CORE
I attest my time as attending is greater than 50% of the total combined time spent on qualifying patient care activities by the PA/NP and attending.
I independently performed the documented:

## 2024-09-20 NOTE — PROGRESS NOTE ADULT - SUBJECTIVE AND OBJECTIVE BOX
[facility] Progress Note    MAR, chart notes, fingersticks and labs reviewed    Subjective:    Objective  Vital Signs  T(C): 36.8 (09-20-24 @ 04:48), Max: 36.8 (09-19-24 @ 13:40)  HR: 70 (09-20-24 @ 04:48) (70 - 86)  BP: 150/82 (09-20-24 @ 04:48) (130/77 - 160/89)  RR: 18 (09-20-24 @ 04:48) (18 - 18)  SpO2: 98% (09-20-24 @ 04:48) (98% - 98%)    Physical Exam  Gen: NAD, alert, awake  HEENT: NC/AT, EOMI  Neck: supple  Chest: breathing comfortably  Heart: +s1 +s2    Medications  acetaminophen     Tablet .. 650 milliGRAM(s) Oral every 6 hours PRN  albuterol    90 MICROgram(s) HFA Inhaler 2 Puff(s) Inhalation every 2 hours PRN  albuterol/ipratropium for Nebulization 3 milliLiter(s) Nebulizer every 4 hours  aluminum hydroxide/magnesium hydroxide/simethicone Suspension 30 milliLiter(s) Oral every 4 hours PRN  amLODIPine   Tablet 5 milliGRAM(s) Oral daily  atorvastatin 20 milliGRAM(s) Oral at bedtime  benzonatate 100 milliGRAM(s) Oral every 8 hours  dextrose 5%. 1000 milliLiter(s) IV Continuous <Continuous>  dextrose 5%. 1000 milliLiter(s) IV Continuous <Continuous>  dextrose 50% Injectable 25 Gram(s) IV Push once  dextrose 50% Injectable 25 Gram(s) IV Push once  dextrose 50% Injectable 12.5 Gram(s) IV Push once  dextrose Oral Gel 15 Gram(s) Oral once PRN  fluticasone propionate 50 MICROgram(s)/spray Nasal Spray 1 Spray(s) Both Nostrils two times a day  fluticasone propionate/ salmeterol 250-50 MICROgram(s) Diskus 1 Dose(s) Inhalation two times a day  glucagon  Injectable 1 milliGRAM(s) IntraMuscular once  insulin glargine Injectable (LANTUS) 30 Unit(s) SubCutaneous at bedtime  insulin lispro (ADMELOG) corrective regimen sliding scale   SubCutaneous three times a day before meals  insulin lispro (ADMELOG) corrective regimen sliding scale   SubCutaneous at bedtime  insulin lispro Injectable (ADMELOG) 15 Unit(s) SubCutaneous before lunch  insulin lispro Injectable (ADMELOG) 10 Unit(s) SubCutaneous before breakfast  insulin lispro Injectable (ADMELOG) 25 Unit(s) SubCutaneous before dinner  losartan 100 milliGRAM(s) Oral daily  melatonin 3 milliGRAM(s) Oral at bedtime PRN  methylPREDNISolone sodium succinate Injectable 20 milliGRAM(s) IV Push every 8 hours  montelukast 10 milliGRAM(s) Oral daily  ondansetron Injectable 4 milliGRAM(s) IV Push every 8 hours PRN  sodium chloride 0.9%. 1000 milliLiter(s) IV Continuous <Continuous>  thiamine 100 milliGRAM(s) Oral daily    Pertinent labs/Imaging  POCT Blood Glucose.: 171 mg/dL (09-20-24 @ 07:58)  POCT Blood Glucose.: 179 mg/dL (09-19-24 @ 21:33)  POCT Blood Glucose.: 108 mg/dL (09-19-24 @ 16:57)  POCT Blood Glucose.: 108 mg/dL (09-19-24 @ 12:31)    eGFR: 90 mL/min/1.73m2 (09-19-24 @ 09:13)  eGFR: 93 mL/min/1.73m2 (09-18-24 @ 09:59)       [facility] Progress Note    MAR, chart notes, fingersticks and labs reviewed    Subjective: spoke with stepfather, being discharged today    Objective  Vital Signs  T(C): 36.8 (09-20-24 @ 04:48), Max: 36.8 (09-19-24 @ 13:40)  HR: 70 (09-20-24 @ 04:48) (70 - 86)  BP: 150/82 (09-20-24 @ 04:48) (130/77 - 160/89)  RR: 18 (09-20-24 @ 04:48) (18 - 18)  SpO2: 98% (09-20-24 @ 04:48) (98% - 98%)    Physical Exam  Gen: NAD, alert, awake  HEENT: NC/AT, EOMI  Neck: supple  Chest: breathing comfortably  Heart: +s1 +s2    Medications  acetaminophen     Tablet .. 650 milliGRAM(s) Oral every 6 hours PRN  albuterol    90 MICROgram(s) HFA Inhaler 2 Puff(s) Inhalation every 2 hours PRN  albuterol/ipratropium for Nebulization 3 milliLiter(s) Nebulizer every 4 hours  aluminum hydroxide/magnesium hydroxide/simethicone Suspension 30 milliLiter(s) Oral every 4 hours PRN  amLODIPine   Tablet 5 milliGRAM(s) Oral daily  atorvastatin 20 milliGRAM(s) Oral at bedtime  benzonatate 100 milliGRAM(s) Oral every 8 hours  dextrose 5%. 1000 milliLiter(s) IV Continuous <Continuous>  dextrose 5%. 1000 milliLiter(s) IV Continuous <Continuous>  dextrose 50% Injectable 25 Gram(s) IV Push once  dextrose 50% Injectable 25 Gram(s) IV Push once  dextrose 50% Injectable 12.5 Gram(s) IV Push once  dextrose Oral Gel 15 Gram(s) Oral once PRN  fluticasone propionate 50 MICROgram(s)/spray Nasal Spray 1 Spray(s) Both Nostrils two times a day  fluticasone propionate/ salmeterol 250-50 MICROgram(s) Diskus 1 Dose(s) Inhalation two times a day  glucagon  Injectable 1 milliGRAM(s) IntraMuscular once  insulin glargine Injectable (LANTUS) 30 Unit(s) SubCutaneous at bedtime  insulin lispro (ADMELOG) corrective regimen sliding scale   SubCutaneous three times a day before meals  insulin lispro (ADMELOG) corrective regimen sliding scale   SubCutaneous at bedtime  insulin lispro Injectable (ADMELOG) 15 Unit(s) SubCutaneous before lunch  insulin lispro Injectable (ADMELOG) 10 Unit(s) SubCutaneous before breakfast  insulin lispro Injectable (ADMELOG) 25 Unit(s) SubCutaneous before dinner  losartan 100 milliGRAM(s) Oral daily  melatonin 3 milliGRAM(s) Oral at bedtime PRN  methylPREDNISolone sodium succinate Injectable 20 milliGRAM(s) IV Push every 8 hours  montelukast 10 milliGRAM(s) Oral daily  ondansetron Injectable 4 milliGRAM(s) IV Push every 8 hours PRN  sodium chloride 0.9%. 1000 milliLiter(s) IV Continuous <Continuous>  thiamine 100 milliGRAM(s) Oral daily    Pertinent labs/Imaging  POCT Blood Glucose.: 171 mg/dL (09-20-24 @ 07:58)  POCT Blood Glucose.: 179 mg/dL (09-19-24 @ 21:33)  POCT Blood Glucose.: 108 mg/dL (09-19-24 @ 16:57)  POCT Blood Glucose.: 108 mg/dL (09-19-24 @ 12:31)    eGFR: 90 mL/min/1.73m2 (09-19-24 @ 09:13)  eGFR: 93 mL/min/1.73m2 (09-18-24 @ 09:59)

## 2024-09-20 NOTE — PROGRESS NOTE ADULT - NS ATTEND AMEND GEN_ALL_CORE FT
continue b dilators  continue solumedrol  keep sat>90%
continue b dilators  continue solumedrol  keep sat>90%
Patient care and plan discussed and reviewed with Advanced Care Provider. Plan as outlined above edited by me to reflect our discussion.
continue b dilators  continue solumedrol  keep sat>90%
Seen, examined with, formulated plan with and  agree with above as scribed by NP Nini [Rosemary]     lungs decrease bs with wheezing   ext LUE edema no LE edema no RUE edema     bcx ucx pending   lactic acidosis persist  IVF   hyperglycemia control

## 2024-09-20 NOTE — PROGRESS NOTE ADULT - ASSESSMENT
Patient is a 64 year old female with PMH of asthma, HTN, HLD, DM who presents with dyspnea and cough for 3 days. Patient reports she first started to have dyspnea and then cough, she saw her Pulmonologist earlier this week and was given oral prednisone and nebulizer treatment but did not improved. States possible sick contact is her sister in law who lives with them, states she had some URI symptoms and recently started going to a new day program. States she continued to have wheezing, chest tightness and dyspnea and came to the ER.    Acute asthma exacerbation  Suspect possible viral URI  SIRS likely d/t above   Leukocytosis likely reactive iso steroids  - tachypnea and leukocytosis on admission, remains afebrile  - COVID/RSV/Flu negative  - CXR with no consolidation/infiltrate  - PCT negative   - UA negative for infection, no gu sx noted   - Bcx negative   - lactate noted, low suspicion for infection, Nephrology following   - remains afebrile, on RA, WBC noted, nontoxic, feels better with no wheezing today    Recommendations:   Continue off antibiotics   Steroids and nebs per Pulmonary   Supportive care   Continue rest of care per primary team     Stable from ID standpoint at this time.   Discharge planning per primary team.   Please call if any questions, thank you.     Meek Duque M.D.  [facility], Division of Infectious Diseases  [phone]  After 5pm on weekdays and all day on weekends - please call [phone]  Available on [employer] TEAMS

## 2024-09-20 NOTE — DISCHARGE NOTE PROVIDER - NSRESEARCHGRANT_OVERRIDEREC_GEN_A_CORE
[FreeTextEntry1] : dub. dw nl us results, treatments available . if benign bx, can start ocps, hormonal iud, ablation, hysterectomy. \par pt inclined to avoid hormones, would be interested in ablation of txs discussed. \par call w path. \par spent 25 min in consultation. IMPROVE-DD Application Not Available

## 2024-09-20 NOTE — DISCHARGE NOTE PROVIDER - NSDCMRMEDTOKEN_GEN_ALL_CORE_FT
albuterol 90 mcg/inh inhalation aerosol: 2 puff(s) inhaled every 4 hours, As Needed -for bronchospasm   amLODIPine 2.5 mg oral tablet: 5 milligram(s) orally once a day  atorvastatin 20 mg oral tablet: 1 tab(s) orally once a day  ipratropium-albuterol 18 mcg-90 mcg/inh inhalation aerosol with adapter: inhaled  Lantus Solostar Pen 100 units/mL subcutaneous solution: subcutaneous once a day (at bedtime) as needed for only when taking steroids  LOSARTAN/HCTZ 100/12.5MG TABLETS: TAKE 1 TABLET BY MOUTH EVERY DAY  MetFORMIN (Eqv-Fortamet) 1000 mg oral tablet, extended release: 1 tab(s) orally 2 times a day  Trelegy Ellipta 100 mcg-62.5 mcg-25 mcg/inh inhalation powder: 1 puff(s) inhaled once a day   albuterol 90 mcg/inh inhalation aerosol: 2 puff(s) inhaled every 4 hours, As Needed -for bronchospasm   amLODIPine 5 mg oral tablet: 1 tab(s) orally once a day  atorvastatin 20 mg oral tablet: 1 tab(s) orally once a day  benzonatate 100 mg oral capsule: 1 cap(s) orally every 8 hours as needed for  cough  fluticasone 50 mcg/inh nasal spray: 1 spray(s) nasal 2 times a day  ipratropium-albuterol 18 mcg-90 mcg/inh inhalation aerosol with adapter: inhaled every 8 hours as needed for  shortness of breath and/or wheezing  Lantus Solostar Pen 100 units/mL subcutaneous solution: subcutaneous once a day (at bedtime) as needed for only when taking steroids  LOSARTAN/HCTZ 100/12.5MG TABLETS: TAKE 1 TABLET BY MOUTH EVERY DAY  montelukast 10 mg oral tablet: 1 tab(s) orally once a day  predniSONE 20 mg oral tablet: 1 tab(s) orally once a day  Trelegy Ellipta 100 mcg-62.5 mcg-25 mcg/inh inhalation powder: 1 puff(s) inhaled once a day   albuterol 90 mcg/inh inhalation aerosol: 2 puff(s) inhaled every 4 hours, As Needed -for bronchospasm   amLODIPine 5 mg oral tablet: 1 tab(s) orally once a day  atorvastatin 20 mg oral tablet: 1 tab(s) orally once a day  benzonatate 100 mg oral capsule: 1 cap(s) orally every 8 hours as needed for  cough  fluticasone 50 mcg/inh nasal spray: 1 spray(s) nasal 2 times a day  ipratropium-albuterol 18 mcg-90 mcg/inh inhalation aerosol with adapter: inhaled every 8 hours as needed for  shortness of breath and/or wheezing  Levemir 100 units/mL subcutaneous solution: 15 unit(s) subcutaneous once a day (at bedtime)  LOSARTAN/HCTZ 100/12.5MG TABLETS: TAKE 1 TABLET BY MOUTH EVERY DAY  montelukast 10 mg oral tablet: 1 tab(s) orally once a day  NovoLOG 100 units/mL injectable solution: 6 unit(s) injectable 3 times a day (before meals)  predniSONE 20 mg oral tablet: 1 tab(s) orally once a day  Trelegy Ellipta 100 mcg-62.5 mcg-25 mcg/inh inhalation powder: 1 puff(s) inhaled once a day

## 2024-09-20 NOTE — PROGRESS NOTE ADULT - SUBJECTIVE AND OBJECTIVE BOX
DATE OF SERVICE: 09-20-24 @ 14:26    Patient is a 64y old  Female who presents with a chief complaint of Asthma exacerbation (14 Sep 2024 16:05)      INTERVAL HISTORY: Patient feeling much better.     REVIEW OF SYSTEMS:  CONSTITUTIONAL: No weakness  EYES/ENT: No visual changes;  No throat pain   NECK: No pain or stiffness  RESPIRATORY: No cough, wheezing; No shortness of breath  CARDIOVASCULAR: No chest pain or palpitations  GASTROINTESTINAL: No abdominal  pain. No nausea, vomiting, or hematemesis  GENITOURINARY: No dysuria, frequency or hematuria  NEUROLOGICAL: No stroke like symptoms  SKIN: No rashes    TELEMETRY Personally reviewed: SR 70-90  	  MEDICATIONS:  amLODIPine   Tablet 5 milliGRAM(s) Oral daily  losartan 100 milliGRAM(s) Oral daily        PHYSICAL EXAM:  T(C): 36.7 (09-20-24 @ 13:06), Max: 36.8 (09-19-24 @ 19:51)  HR: 86 (09-20-24 @ 13:06) (70 - 86)  BP: 144/80 (09-20-24 @ 13:06) (130/77 - 150/82)  RR: 18 (09-20-24 @ 13:06) (18 - 18)  SpO2: 96% (09-20-24 @ 13:06) (96% - 98%)  Wt(kg): --  I&O's Summary        Appearance: In no distress	  HEENT:    PERRL, EOMI	  Cardiovascular:  S1 S2, No JVD  Respiratory: Lungs clear to auscultation	  Gastrointestinal:  Soft, Non-tender, + BS	  Vascularature:  No edema of LE  Psychiatric: Appropriate affect   Neuro: no acute focal deficits                               11.3   11.70 )-----------( 160      ( 19 Sep 2024 09:13 )             34.1     09-19    133[L]  |  96  |  20  ----------------------------<  171[H]  4.0   |  22  |  0.75    Ca    8.4      19 Sep 2024 09:13  Mg     2.2     09-19          Labs personally reviewed      ASSESSMENT/PLAN: 	        64F PMHx asthma, HTN/HLD, DM. Presenting w/ 3d of SOB and cough, rx'd prednisone w/o improvement outpatient. Pt also been noticing increase in FS glucose while on steroid.  In the ED, pt afebrile, bp stable, on RA saturating 99%, tachypneic to 22. Pt was audibly wheezing.     1. Atypical CP   - non exertional, cp secondary to asthma exacerbation  - Pt describes pain is typical of her asthma  - Trop neg x1, awaiting 2nd   - ECG - SR with PAC, recommend repeat   - TTE wnl    2. Asthma Exacerbation  -Normoxic, keep sats >90% with o2 PRN   -pulm following  - D-Dimer elevated: LE Ultrasound negative, CTA Chest r/o PE pending    3. HTN  controlled   c/w amLODIPine   Tablet 5 milliGRAM(s) Oral daily   c/w losartan 100 milliGRAM(s) Oral daily    4. HLD   -check LDL   c/w atorvastatin 20 milliGRAM(s) Oral at bedtime    5. Uncontrolled DM  - secondary to stress and steroids, monitor and control   - check A1c     6. DVT prophylactic  - SCD's             Cornelia Schneider, AG-NP   Evan Dietz DO [facility]  Cardiovascular Medicine  [address] [place] [address], [address]  Available through call or text on [employer]  Office: [phone]   DATE OF SERVICE: 09-20-24 @ 14:26    Patient is a 64y old  Female who presents with a chief complaint of Asthma exacerbation (14 Sep 2024 16:05)      INTERVAL HISTORY: Patient feeling much better.     REVIEW OF SYSTEMS:  CONSTITUTIONAL: No weakness  EYES/ENT: No visual changes;  No throat pain   NECK: No pain or stiffness  RESPIRATORY: No cough, wheezing; No shortness of breath  CARDIOVASCULAR: No chest pain or palpitations  GASTROINTESTINAL: No abdominal  pain. No nausea, vomiting, or hematemesis  GENITOURINARY: No dysuria, frequency or hematuria  NEUROLOGICAL: No stroke like symptoms  SKIN: No rashes    TELEMETRY Personally reviewed: SR 70-90  	  MEDICATIONS:  amLODIPine   Tablet 5 milliGRAM(s) Oral daily  losartan 100 milliGRAM(s) Oral daily        PHYSICAL EXAM:  T(C): 36.7 (09-20-24 @ 13:06), Max: 36.8 (09-19-24 @ 19:51)  HR: 86 (09-20-24 @ 13:06) (70 - 86)  BP: 144/80 (09-20-24 @ 13:06) (130/77 - 150/82)  RR: 18 (09-20-24 @ 13:06) (18 - 18)  SpO2: 96% (09-20-24 @ 13:06) (96% - 98%)  Wt(kg): --  I&O's Summary        Appearance: In no distress	  HEENT:    PERRL, EOMI	  Cardiovascular:  S1 S2, No JVD  Respiratory: Lungs clear to auscultation	  Gastrointestinal:  Soft, Non-tender, + BS	  Vascularature:  No edema of LE  Psychiatric: Appropriate affect   Neuro: no acute focal deficits                               11.3   11.70 )-----------( 160      ( 19 Sep 2024 09:13 )             34.1     09-19    133[L]  |  96  |  20  ----------------------------<  171[H]  4.0   |  22  |  0.75    Ca    8.4      19 Sep 2024 09:13  Mg     2.2     09-19          Labs personally reviewed      ASSESSMENT/PLAN: 	    64F PMHx asthma, HTN/HLD, DM. Presenting w/ 3d of SOB and cough, rx'd prednisone w/o improvement outpatient. Pt also been noticing increase in FS glucose while on steroid.  In the ED, pt afebrile, bp stable, on RA saturating 99%, tachypneic to 22. Pt was audibly wheezing.     1. Atypical CP   - non exertional, cp secondary to asthma exacerbation  - Pt describes pain is typical of her asthma  - Trop neg x1, awaiting 2nd   - ECG - SR with PAC, recommend repeat   - TTE wnl    2. Asthma Exacerbation  -Normoxic, keep sats >90% with o2 PRN   -pulm following  - D-Dimer elevated: LE Ultrasound negative, CTA Chest r/o PE pending    3. HTN  controlled   c/w amLODIPine   Tablet 5 milliGRAM(s) Oral daily   c/w losartan 100 milliGRAM(s) Oral daily    4. HLD   -check LDL   c/w atorvastatin 20 milliGRAM(s) Oral at bedtime    5. Uncontrolled DM  - secondary to stress and steroids, monitor and control   - check A1c     6. DVT prophylactic  - SCD's             Cornelia Schneider, AG-NP   Evan Dietz DO [facility]  Cardiovascular Medicine  [address] [place] [address], [address]  Available through call or text on [employer] TE[employer]  Office: [phone]

## 2024-09-25 NOTE — CHART NOTE - NSCHARTNOTEFT_GEN_A_CORE
Patient was outreached but did not answer. A voicemail was left for the patient to return our call.
ICU consulted for elevated lactate, hyperkalemia, anion gap  PT admitted with Asthma exacerbation   PT seen at bed side is alert and oriented x4   with audible wheezing   sat 97 r/a   Pulm consulted     Dr. Yash kruse    Department of Medicine   [facility] [id number]
ROGER PALUMBO    Notified by RN patient with critical lab result   Lactate, Blood (09.13.24 @ 06:16)    Lactate, Blood: 6.2 mmol/L      Vital Signs Last 24 Hrs  T(C): 36.3 (13 Sep 2024 03:49), Max: 36.7 (13 Sep 2024 01:57)  T(F): 97.3 (13 Sep 2024 03:49), Max: 98 (13 Sep 2024 01:57)  HR: 96 (13 Sep 2024 03:49) (84 - 106)  BP: 153/67 (13 Sep 2024 03:49) (117/67 - 159/91)  BP(mean): 98 (12 Sep 2024 19:50) (98 - 98)  RR: 22 (13 Sep 2024 03:49) (20 - 22)  SpO2: 97% (13 Sep 2024 03:49) (97% - 99%)    Parameters below as of 13 Sep 2024 03:49  Patient On (Oxygen Delivery Method): room air    -NS @ 100 mL/H already in progress  -Awaiting BMP results  -Will endorse to day team to follow up  -Dr. Berrios contacted regarding result, message left

## 2024-12-02 NOTE — ED PROVIDER NOTE - NSTIMEPROVIDERCAREINITIATE_GEN_ER
December 2, 2024        Lizabeth Schmidt  [address]  [address]  [facility] [address]    To Whom It May Concern:    This is to certify Lizabeth Schmidt was evaluated on 12/02/24 and is unable to return to work.    CDC guidelines for return to work are as follows:    Lizabeth Schmidt should self-isolate for 5 days.  I request consideration for Lizabeth Schmidt to continue work from home 5-10 days.  After isolation is completed, she may return to work, wearing a mask while around others.  Perform a home COVID-19 test on days 6 and 8. If both are negative, masking can be discontinued.    **The loss of taste and smell may persist for weeks or months after recovery and do not need to delay the end of isolation.     Per CDC recommendations, employers should not require a COVID-19 test result or a healthcare provider’s note for employees who are sick to validate their illness, qualify for sick leave, or to return to work.    Patient has conditions which places her in a high-risk category for individuals who may develop severe symptoms from COVID-19. The Center for Disease Control (CDC) has issued guidance and recommendations for individuals in high risks groups: to practice social distancing, including avoiding large gatherings, stay home as much as possible, and consideration of telecommuting for workplace settings.       Thank you for your understanding during these unusual times.     Electronically signed by:     JUAN CARLOS Partida                 [id number] [id number]     09-Feb-2018 12:10

## 2025-03-04 NOTE — ED PROVIDER NOTE - CROS ED ROS STATEMENT
[FreeTextEntry1] : 3/2025- normal spirometry PA and lateral chest xray performed using standard projections. Bones and soft tissues structures are unremarkable.Cardiiomegaly. AICD. l. Lung fields are clear. No infiltrate or masses noted. Mediastinal contours are normal. IMPRESSION: cardiomegaly + AICD   previous data reviewed: the labs were drawn in the office today PA and lateral chest xray performed using standard projections. Bones and soft tissues structures are unremarkable. cariomegaly. aicd Lung fields are clear. No infiltrate or masses noted. Mediastinal contours are normal. IMPRESSION: nCardiomegaly. AICD  spirometry mild restriction niox 11  all other ROS negative except as per HPI

## 2025-05-17 NOTE — ED PROVIDER NOTE - MUSCULOSKELETAL, MLM
unable to assess due to poor participation/comprehension
Spine appears normal, range of motion is not limited, no muscle or joint tenderness